# Patient Record
Sex: FEMALE | Race: WHITE | NOT HISPANIC OR LATINO
[De-identification: names, ages, dates, MRNs, and addresses within clinical notes are randomized per-mention and may not be internally consistent; named-entity substitution may affect disease eponyms.]

---

## 2017-01-13 ENCOUNTER — LABORATORY RESULT (OUTPATIENT)
Age: 82
End: 2017-01-13

## 2017-01-13 ENCOUNTER — APPOINTMENT (OUTPATIENT)
Dept: INTERNAL MEDICINE | Facility: CLINIC | Age: 82
End: 2017-01-13

## 2017-01-13 ENCOUNTER — NON-APPOINTMENT (OUTPATIENT)
Age: 82
End: 2017-01-13

## 2017-01-13 VITALS — SYSTOLIC BLOOD PRESSURE: 138 MMHG | DIASTOLIC BLOOD PRESSURE: 80 MMHG

## 2017-01-13 VITALS — BODY MASS INDEX: 29.06 KG/M2 | WEIGHT: 164 LBS | HEIGHT: 63 IN

## 2017-01-13 VITALS — SYSTOLIC BLOOD PRESSURE: 112 MMHG | DIASTOLIC BLOOD PRESSURE: 82 MMHG

## 2017-01-13 VITALS — DIASTOLIC BLOOD PRESSURE: 88 MMHG | SYSTOLIC BLOOD PRESSURE: 128 MMHG

## 2017-01-14 LAB
25(OH)D3 SERPL-MCNC: 37.1 NG/ML
ALBUMIN SERPL ELPH-MCNC: 4 G/DL
ALP BLD-CCNC: 93 U/L
ALT SERPL-CCNC: 19 U/L
ANION GAP SERPL CALC-SCNC: 14 MMOL/L
AST SERPL-CCNC: 25 U/L
BASOPHILS # BLD AUTO: 0.02 K/UL
BASOPHILS NFR BLD AUTO: 0.3 %
BILIRUB SERPL-MCNC: 0.2 MG/DL
BUN SERPL-MCNC: 32 MG/DL
CALCIUM SERPL-MCNC: 9.5 MG/DL
CHLORIDE SERPL-SCNC: 101 MMOL/L
CHOLEST SERPL-MCNC: 166 MG/DL
CHOLEST/HDLC SERPL: 2.1 RATIO
CO2 SERPL-SCNC: 25 MMOL/L
CREAT SERPL-MCNC: 0.96 MG/DL
EOSINOPHIL # BLD AUTO: 0.14 K/UL
EOSINOPHIL NFR BLD AUTO: 1.8 %
GLUCOSE SERPL-MCNC: 94 MG/DL
HBA1C MFR BLD HPLC: 6.1 %
HCT VFR BLD CALC: 37.5 %
HDLC SERPL-MCNC: 80 MG/DL
HGB BLD-MCNC: 12.3 G/DL
IMM GRANULOCYTES NFR BLD AUTO: 0.1 %
LDLC SERPL CALC-MCNC: 72 MG/DL
LYMPHOCYTES # BLD AUTO: 1.93 K/UL
LYMPHOCYTES NFR BLD AUTO: 25.3 %
MAN DIFF?: NORMAL
MCHC RBC-ENTMCNC: 31.4 PG
MCHC RBC-ENTMCNC: 32.8 GM/DL
MCV RBC AUTO: 95.7 FL
MONOCYTES # BLD AUTO: 0.57 K/UL
MONOCYTES NFR BLD AUTO: 7.5 %
NEUTROPHILS # BLD AUTO: 4.97 K/UL
NEUTROPHILS NFR BLD AUTO: 65 %
PLATELET # BLD AUTO: 311 K/UL
POTASSIUM SERPL-SCNC: 3.9 MMOL/L
PROT SERPL-MCNC: 7.1 G/DL
RBC # BLD: 3.92 M/UL
RBC # FLD: 15.2 %
SAVE SPECIMEN: NORMAL
SODIUM SERPL-SCNC: 140 MMOL/L
T3RU NFR SERPL: 1.03 INDEX
T4 SERPL-MCNC: 5.8 UG/DL
TRIGL SERPL-MCNC: 72 MG/DL
TSH SERPL-ACNC: 3.35 UIU/ML
URATE SERPL-MCNC: 6.3 MG/DL
WBC # FLD AUTO: 7.64 K/UL

## 2017-03-25 ENCOUNTER — RX RENEWAL (OUTPATIENT)
Age: 82
End: 2017-03-25

## 2017-03-28 ENCOUNTER — MEDICATION RENEWAL (OUTPATIENT)
Age: 82
End: 2017-03-28

## 2017-04-03 ENCOUNTER — APPOINTMENT (OUTPATIENT)
Dept: INTERNAL MEDICINE | Facility: CLINIC | Age: 82
End: 2017-04-03

## 2017-04-03 VITALS
DIASTOLIC BLOOD PRESSURE: 74 MMHG | WEIGHT: 165 LBS | HEART RATE: 53 BPM | SYSTOLIC BLOOD PRESSURE: 162 MMHG | BODY MASS INDEX: 31.15 KG/M2 | HEIGHT: 61 IN

## 2017-04-03 LAB — S PYO AG SPEC QL IA: NEGATIVE

## 2017-04-20 ENCOUNTER — APPOINTMENT (OUTPATIENT)
Dept: INTERNAL MEDICINE | Facility: CLINIC | Age: 82
End: 2017-04-20

## 2017-04-20 VITALS — BODY MASS INDEX: 31.15 KG/M2 | HEIGHT: 61 IN | WEIGHT: 165 LBS

## 2017-04-20 VITALS — SYSTOLIC BLOOD PRESSURE: 122 MMHG | DIASTOLIC BLOOD PRESSURE: 70 MMHG

## 2017-04-20 RX ORDER — AZITHROMYCIN 250 MG/1
250 TABLET, FILM COATED ORAL
Qty: 6 | Refills: 0 | Status: DISCONTINUED | COMMUNITY
Start: 2017-04-03 | End: 2017-04-20

## 2017-06-12 ENCOUNTER — APPOINTMENT (OUTPATIENT)
Dept: INTERNAL MEDICINE | Facility: CLINIC | Age: 82
End: 2017-06-12

## 2017-06-12 VITALS — HEIGHT: 60 IN | WEIGHT: 168 LBS | BODY MASS INDEX: 32.98 KG/M2

## 2017-06-30 ENCOUNTER — MEDICATION RENEWAL (OUTPATIENT)
Age: 82
End: 2017-06-30

## 2017-07-25 ENCOUNTER — APPOINTMENT (OUTPATIENT)
Dept: INTERNAL MEDICINE | Facility: CLINIC | Age: 82
End: 2017-07-25

## 2017-07-25 ENCOUNTER — LABORATORY RESULT (OUTPATIENT)
Age: 82
End: 2017-07-25

## 2017-07-25 ENCOUNTER — NON-APPOINTMENT (OUTPATIENT)
Age: 82
End: 2017-07-25

## 2017-07-25 VITALS
BODY MASS INDEX: 32.59 KG/M2 | DIASTOLIC BLOOD PRESSURE: 70 MMHG | SYSTOLIC BLOOD PRESSURE: 170 MMHG | HEIGHT: 60 IN | WEIGHT: 166 LBS

## 2017-07-25 VITALS — DIASTOLIC BLOOD PRESSURE: 68 MMHG | SYSTOLIC BLOOD PRESSURE: 160 MMHG

## 2017-07-25 VITALS — DIASTOLIC BLOOD PRESSURE: 74 MMHG | SYSTOLIC BLOOD PRESSURE: 160 MMHG

## 2017-07-26 LAB
25(OH)D3 SERPL-MCNC: 37.8 NG/ML
ALBUMIN SERPL ELPH-MCNC: 4.3 G/DL
ALP BLD-CCNC: 91 U/L
ALT SERPL-CCNC: 16 U/L
ANION GAP SERPL CALC-SCNC: 18 MMOL/L
AST SERPL-CCNC: 26 U/L
BASOPHILS # BLD AUTO: 0.02 K/UL
BASOPHILS NFR BLD AUTO: 0.3 %
BILIRUB SERPL-MCNC: 0.2 MG/DL
BUN SERPL-MCNC: 24 MG/DL
CALCIUM SERPL-MCNC: 10 MG/DL
CHLORIDE SERPL-SCNC: 99 MMOL/L
CHOLEST SERPL-MCNC: 173 MG/DL
CHOLEST/HDLC SERPL: 2 RATIO
CO2 SERPL-SCNC: 25 MMOL/L
CREAT SERPL-MCNC: 0.94 MG/DL
EOSINOPHIL # BLD AUTO: 0.09 K/UL
EOSINOPHIL NFR BLD AUTO: 1.5 %
GLUCOSE SERPL-MCNC: 86 MG/DL
HBA1C MFR BLD HPLC: 5.8 %
HCT VFR BLD CALC: 38.7 %
HDLC SERPL-MCNC: 86 MG/DL
HGB BLD-MCNC: 12.6 G/DL
IMM GRANULOCYTES NFR BLD AUTO: 0.3 %
LDLC SERPL CALC-MCNC: 64 MG/DL
LYMPHOCYTES # BLD AUTO: 2.07 K/UL
LYMPHOCYTES NFR BLD AUTO: 34.1 %
MAN DIFF?: NORMAL
MCHC RBC-ENTMCNC: 30.7 PG
MCHC RBC-ENTMCNC: 32.6 GM/DL
MCV RBC AUTO: 94.2 FL
MONOCYTES # BLD AUTO: 0.41 K/UL
MONOCYTES NFR BLD AUTO: 6.8 %
NEUTROPHILS # BLD AUTO: 3.46 K/UL
NEUTROPHILS NFR BLD AUTO: 57 %
PLATELET # BLD AUTO: 317 K/UL
POTASSIUM SERPL-SCNC: 4.9 MMOL/L
PROT SERPL-MCNC: 7.5 G/DL
RBC # BLD: 4.11 M/UL
RBC # FLD: 15.2 %
SAVE SPECIMEN: NORMAL
SODIUM SERPL-SCNC: 142 MMOL/L
T3RU NFR SERPL: 1.06 INDEX
T4 SERPL-MCNC: 5.8 UG/DL
TRIGL SERPL-MCNC: 117 MG/DL
TSH SERPL-ACNC: 4.02 UIU/ML
URATE SERPL-MCNC: 6.4 MG/DL
WBC # FLD AUTO: 6.07 K/UL

## 2017-08-21 ENCOUNTER — MEDICATION RENEWAL (OUTPATIENT)
Age: 82
End: 2017-08-21

## 2017-09-06 ENCOUNTER — APPOINTMENT (OUTPATIENT)
Dept: INTERNAL MEDICINE | Facility: CLINIC | Age: 82
End: 2017-09-06
Payer: MEDICARE

## 2017-09-06 ENCOUNTER — NON-APPOINTMENT (OUTPATIENT)
Age: 82
End: 2017-09-06

## 2017-09-06 VITALS — WEIGHT: 166 LBS | BODY MASS INDEX: 32.59 KG/M2 | HEIGHT: 60 IN

## 2017-09-06 VITALS — SYSTOLIC BLOOD PRESSURE: 120 MMHG | DIASTOLIC BLOOD PRESSURE: 70 MMHG

## 2017-09-06 VITALS — DIASTOLIC BLOOD PRESSURE: 70 MMHG | SYSTOLIC BLOOD PRESSURE: 130 MMHG

## 2017-09-06 PROCEDURE — 93000 ELECTROCARDIOGRAM COMPLETE: CPT

## 2017-09-06 PROCEDURE — 99214 OFFICE O/P EST MOD 30 MIN: CPT | Mod: 25

## 2017-10-10 ENCOUNTER — APPOINTMENT (OUTPATIENT)
Dept: OPHTHALMOLOGY | Facility: CLINIC | Age: 82
End: 2017-10-10
Payer: MEDICARE

## 2017-10-10 DIAGNOSIS — H35.30 UNSPECIFIED MACULAR DEGENERATION: ICD-10-CM

## 2017-10-10 PROCEDURE — 92014 COMPRE OPH EXAM EST PT 1/>: CPT

## 2017-11-21 ENCOUNTER — MEDICATION RENEWAL (OUTPATIENT)
Age: 82
End: 2017-11-21

## 2017-12-04 ENCOUNTER — RECORD ABSTRACTING (OUTPATIENT)
Age: 82
End: 2017-12-04

## 2017-12-05 ENCOUNTER — LABORATORY RESULT (OUTPATIENT)
Age: 82
End: 2017-12-05

## 2017-12-05 ENCOUNTER — APPOINTMENT (OUTPATIENT)
Dept: INTERNAL MEDICINE | Facility: CLINIC | Age: 82
End: 2017-12-05
Payer: MEDICARE

## 2017-12-05 ENCOUNTER — NON-APPOINTMENT (OUTPATIENT)
Age: 82
End: 2017-12-05

## 2017-12-05 VITALS
WEIGHT: 165 LBS | BODY MASS INDEX: 32.39 KG/M2 | SYSTOLIC BLOOD PRESSURE: 138 MMHG | DIASTOLIC BLOOD PRESSURE: 78 MMHG | HEIGHT: 60 IN

## 2017-12-05 VITALS — SYSTOLIC BLOOD PRESSURE: 140 MMHG | DIASTOLIC BLOOD PRESSURE: 70 MMHG

## 2017-12-05 DIAGNOSIS — J34.89 OTHER SPECIFIED DISORDERS OF NOSE AND NASAL SINUSES: ICD-10-CM

## 2017-12-05 DIAGNOSIS — Z00.00 ENCOUNTER FOR GENERAL ADULT MEDICAL EXAMINATION W/OUT ABNORMAL FINDINGS: ICD-10-CM

## 2017-12-05 LAB
BILIRUB UR QL STRIP: NORMAL
CLARITY UR: CLEAR
COLLECTION METHOD: NORMAL
GLUCOSE UR-MCNC: NORMAL
HCG UR QL: 0.2 EU/DL
HGB UR QL STRIP.AUTO: NORMAL
KETONES UR-MCNC: NORMAL
LEUKOCYTE ESTERASE UR QL STRIP: NORMAL
NITRITE UR QL STRIP: NORMAL
PH UR STRIP: 5
PROT UR STRIP-MCNC: NORMAL
SP GR UR STRIP: 1.01

## 2017-12-05 PROCEDURE — 81003 URINALYSIS AUTO W/O SCOPE: CPT | Mod: QW

## 2017-12-05 PROCEDURE — G0439: CPT

## 2017-12-05 PROCEDURE — G0438: CPT

## 2017-12-05 PROCEDURE — 93000 ELECTROCARDIOGRAM COMPLETE: CPT

## 2017-12-05 PROCEDURE — 36415 COLL VENOUS BLD VENIPUNCTURE: CPT

## 2017-12-05 RX ORDER — MENTHOL/CAMPHOR 0.5 %-0.5%
1000 LOTION (ML) TOPICAL DAILY
Refills: 0 | Status: ACTIVE | COMMUNITY

## 2017-12-05 RX ORDER — AMLODIPINE BESYLATE 2.5 MG/1
2.5 TABLET ORAL
Refills: 0 | Status: DISCONTINUED | COMMUNITY
End: 2017-12-05

## 2017-12-09 LAB
25(OH)D3 SERPL-MCNC: 35.3 NG/ML
ALBUMIN SERPL ELPH-MCNC: 3.9 G/DL
ALP BLD-CCNC: 91 U/L
ALT SERPL-CCNC: 19 U/L
ANION GAP SERPL CALC-SCNC: 16 MMOL/L
AST SERPL-CCNC: 27 U/L
BASOPHILS # BLD AUTO: 0.01 K/UL
BASOPHILS NFR BLD AUTO: 0.1 %
BILIRUB SERPL-MCNC: 0.2 MG/DL
BUN SERPL-MCNC: 24 MG/DL
CALCIUM SERPL-MCNC: 9.5 MG/DL
CHLORIDE SERPL-SCNC: 101 MMOL/L
CHOLEST SERPL-MCNC: 166 MG/DL
CHOLEST/HDLC SERPL: 2.2 RATIO
CO2 SERPL-SCNC: 25 MMOL/L
CREAT SERPL-MCNC: 0.64 MG/DL
EOSINOPHIL # BLD AUTO: 0.16 K/UL
EOSINOPHIL NFR BLD AUTO: 2.3 %
GLUCOSE SERPL-MCNC: 96 MG/DL
HBA1C MFR BLD HPLC: 5.8 %
HCT VFR BLD CALC: 34.7 %
HDLC SERPL-MCNC: 76 MG/DL
HGB BLD-MCNC: 11.4 G/DL
IMM GRANULOCYTES NFR BLD AUTO: 0.1 %
LDLC SERPL CALC-MCNC: 73 MG/DL
LYMPHOCYTES # BLD AUTO: 1.84 K/UL
LYMPHOCYTES NFR BLD AUTO: 26.5 %
MAN DIFF?: NORMAL
MCHC RBC-ENTMCNC: 31.1 PG
MCHC RBC-ENTMCNC: 32.9 GM/DL
MCV RBC AUTO: 94.8 FL
MONOCYTES # BLD AUTO: 0.46 K/UL
MONOCYTES NFR BLD AUTO: 6.6 %
NEUTROPHILS # BLD AUTO: 4.47 K/UL
NEUTROPHILS NFR BLD AUTO: 64.4 %
PLATELET # BLD AUTO: 299 K/UL
POTASSIUM SERPL-SCNC: 3.9 MMOL/L
PROT SERPL-MCNC: 7 G/DL
RBC # BLD: 3.66 M/UL
RBC # FLD: 15.5 %
SAVE SPECIMEN: NORMAL
SODIUM SERPL-SCNC: 142 MMOL/L
T3RU NFR SERPL: 1.06 INDEX
T4 SERPL-MCNC: 5.4 UG/DL
TRIGL SERPL-MCNC: 84 MG/DL
TSH SERPL-ACNC: 3.31 UIU/ML
URATE SERPL-MCNC: 6.1 MG/DL
WBC # FLD AUTO: 6.95 K/UL

## 2018-02-02 ENCOUNTER — APPOINTMENT (OUTPATIENT)
Dept: INTERNAL MEDICINE | Facility: CLINIC | Age: 83
End: 2018-02-02
Payer: MEDICARE

## 2018-02-02 VITALS — WEIGHT: 165 LBS | BODY MASS INDEX: 32.39 KG/M2 | HEIGHT: 60 IN

## 2018-02-02 PROCEDURE — 99214 OFFICE O/P EST MOD 30 MIN: CPT

## 2018-02-02 RX ORDER — FLUTICASONE PROPIONATE 50 UG/1
50 SPRAY, METERED NASAL DAILY
Qty: 1 | Refills: 3 | Status: DISCONTINUED | COMMUNITY
Start: 2017-12-05 | End: 2018-02-02

## 2018-02-02 RX ORDER — MONTELUKAST SODIUM 10 MG/1
10 TABLET, FILM COATED ORAL
Qty: 1 | Refills: 3 | Status: DISCONTINUED | COMMUNITY
Start: 2017-12-05 | End: 2018-02-02

## 2018-02-02 RX ORDER — LIDOCAINE 5% 700 MG/1
5 PATCH TOPICAL
Qty: 1 | Refills: 3 | Status: DISCONTINUED | COMMUNITY
Start: 2017-12-05 | End: 2018-02-02

## 2018-02-05 ENCOUNTER — APPOINTMENT (OUTPATIENT)
Dept: VASCULAR SURGERY | Facility: CLINIC | Age: 83
End: 2018-02-05
Payer: MEDICARE

## 2018-02-05 VITALS
WEIGHT: 165 LBS | TEMPERATURE: 98.5 F | BODY MASS INDEX: 32.39 KG/M2 | HEART RATE: 57 BPM | DIASTOLIC BLOOD PRESSURE: 110 MMHG | SYSTOLIC BLOOD PRESSURE: 154 MMHG | HEIGHT: 60 IN

## 2018-02-05 PROCEDURE — 93970 EXTREMITY STUDY: CPT

## 2018-02-05 PROCEDURE — 99203 OFFICE O/P NEW LOW 30 MIN: CPT

## 2018-02-12 ENCOUNTER — MEDICATION RENEWAL (OUTPATIENT)
Age: 83
End: 2018-02-12

## 2018-03-12 ENCOUNTER — LABORATORY RESULT (OUTPATIENT)
Age: 83
End: 2018-03-12

## 2018-03-12 ENCOUNTER — NON-APPOINTMENT (OUTPATIENT)
Age: 83
End: 2018-03-12

## 2018-03-12 ENCOUNTER — APPOINTMENT (OUTPATIENT)
Dept: INTERNAL MEDICINE | Facility: CLINIC | Age: 83
End: 2018-03-12
Payer: MEDICARE

## 2018-03-12 VITALS
HEIGHT: 60 IN | WEIGHT: 165 LBS | BODY MASS INDEX: 32.39 KG/M2 | DIASTOLIC BLOOD PRESSURE: 70 MMHG | SYSTOLIC BLOOD PRESSURE: 128 MMHG

## 2018-03-12 VITALS — DIASTOLIC BLOOD PRESSURE: 70 MMHG | SYSTOLIC BLOOD PRESSURE: 120 MMHG

## 2018-03-12 PROCEDURE — 99214 OFFICE O/P EST MOD 30 MIN: CPT | Mod: 25

## 2018-03-12 PROCEDURE — 36415 COLL VENOUS BLD VENIPUNCTURE: CPT

## 2018-03-12 PROCEDURE — 93000 ELECTROCARDIOGRAM COMPLETE: CPT

## 2018-03-13 LAB
25(OH)D3 SERPL-MCNC: 41.1 NG/ML
ALBUMIN SERPL ELPH-MCNC: 3.8 G/DL
ALP BLD-CCNC: 85 U/L
ALT SERPL-CCNC: 12 U/L
ANION GAP SERPL CALC-SCNC: 16 MMOL/L
AST SERPL-CCNC: 25 U/L
BASOPHILS # BLD AUTO: 0.03 K/UL
BASOPHILS NFR BLD AUTO: 0.5 %
BILIRUB SERPL-MCNC: 0.2 MG/DL
BUN SERPL-MCNC: 32 MG/DL
CALCIUM SERPL-MCNC: 9.2 MG/DL
CHLORIDE SERPL-SCNC: 101 MMOL/L
CHOLEST SERPL-MCNC: 170 MG/DL
CHOLEST/HDLC SERPL: 2.4 RATIO
CO2 SERPL-SCNC: 25 MMOL/L
CREAT SERPL-MCNC: 0.87 MG/DL
EOSINOPHIL # BLD AUTO: 0.09 K/UL
EOSINOPHIL NFR BLD AUTO: 1.4 %
GLUCOSE SERPL-MCNC: 93 MG/DL
HBA1C MFR BLD HPLC: 5.8 %
HCT VFR BLD CALC: 37.5 %
HDLC SERPL-MCNC: 72 MG/DL
HGB BLD-MCNC: 12 G/DL
IMM GRANULOCYTES NFR BLD AUTO: 0.3 %
LDLC SERPL CALC-MCNC: 84 MG/DL
LYMPHOCYTES # BLD AUTO: 1.97 K/UL
LYMPHOCYTES NFR BLD AUTO: 30.6 %
MAN DIFF?: NORMAL
MCHC RBC-ENTMCNC: 31.1 PG
MCHC RBC-ENTMCNC: 32 GM/DL
MCV RBC AUTO: 97.2 FL
MONOCYTES # BLD AUTO: 0.57 K/UL
MONOCYTES NFR BLD AUTO: 8.9 %
NEUTROPHILS # BLD AUTO: 3.76 K/UL
NEUTROPHILS NFR BLD AUTO: 58.3 %
PLATELET # BLD AUTO: 268 K/UL
POTASSIUM SERPL-SCNC: 4.1 MMOL/L
PROT SERPL-MCNC: 7.1 G/DL
RBC # BLD: 3.86 M/UL
RBC # FLD: 15.1 %
SAVE SPECIMEN: NORMAL
SODIUM SERPL-SCNC: 142 MMOL/L
T3RU NFR SERPL: 0.98 INDEX
T4 SERPL-MCNC: 5.8 UG/DL
TRIGL SERPL-MCNC: 72 MG/DL
TSH SERPL-ACNC: 2.52 UIU/ML
URATE SERPL-MCNC: 6.8 MG/DL
WBC # FLD AUTO: 6.44 K/UL

## 2018-03-29 ENCOUNTER — APPOINTMENT (OUTPATIENT)
Dept: VASCULAR SURGERY | Facility: CLINIC | Age: 83
End: 2018-03-29

## 2018-04-23 ENCOUNTER — FORM ENCOUNTER (OUTPATIENT)
Age: 83
End: 2018-04-23

## 2018-04-23 ENCOUNTER — APPOINTMENT (OUTPATIENT)
Dept: INTERNAL MEDICINE | Facility: CLINIC | Age: 83
End: 2018-04-23
Payer: MEDICARE

## 2018-04-23 VITALS — SYSTOLIC BLOOD PRESSURE: 120 MMHG | DIASTOLIC BLOOD PRESSURE: 70 MMHG

## 2018-04-23 VITALS — HEIGHT: 61 IN | BODY MASS INDEX: 30.58 KG/M2 | WEIGHT: 162 LBS

## 2018-04-23 VITALS — OXYGEN SATURATION: 92 % | HEART RATE: 59 BPM

## 2018-04-23 VITALS — TEMPERATURE: 97.8 F

## 2018-04-23 DIAGNOSIS — Z87.09 PERSONAL HISTORY OF OTHER DISEASES OF THE RESPIRATORY SYSTEM: ICD-10-CM

## 2018-04-23 LAB
FLUAV SPEC QL CULT: NORMAL
FLUBV AG SPEC QL IA: NORMAL
S PYO AG SPEC QL IA: NORMAL

## 2018-04-23 PROCEDURE — 87804 INFLUENZA ASSAY W/OPTIC: CPT | Mod: QW

## 2018-04-23 PROCEDURE — 99214 OFFICE O/P EST MOD 30 MIN: CPT

## 2018-04-23 PROCEDURE — 87880 STREP A ASSAY W/OPTIC: CPT | Mod: QW

## 2018-04-23 RX ORDER — CEPHALEXIN 500 MG/1
500 CAPSULE ORAL
Qty: 40 | Refills: 0 | Status: DISCONTINUED | COMMUNITY
Start: 2018-01-28 | End: 2018-04-23

## 2018-04-24 ENCOUNTER — APPOINTMENT (OUTPATIENT)
Dept: INTERNAL MEDICINE | Facility: CLINIC | Age: 83
End: 2018-04-24
Payer: MEDICARE

## 2018-04-24 ENCOUNTER — OUTPATIENT (OUTPATIENT)
Dept: OUTPATIENT SERVICES | Facility: HOSPITAL | Age: 83
LOS: 1 days | End: 2018-04-24
Payer: MEDICARE

## 2018-04-24 ENCOUNTER — APPOINTMENT (OUTPATIENT)
Dept: RADIOLOGY | Facility: IMAGING CENTER | Age: 83
End: 2018-04-24
Payer: MEDICARE

## 2018-04-24 VITALS — SYSTOLIC BLOOD PRESSURE: 156 MMHG | DIASTOLIC BLOOD PRESSURE: 80 MMHG

## 2018-04-24 VITALS — BODY MASS INDEX: 30.58 KG/M2 | HEIGHT: 61 IN | WEIGHT: 162 LBS

## 2018-04-24 DIAGNOSIS — J20.9 ACUTE BRONCHITIS, UNSPECIFIED: ICD-10-CM

## 2018-04-24 DIAGNOSIS — Z87.09 PERSONAL HISTORY OF OTHER DISEASES OF THE RESPIRATORY SYSTEM: ICD-10-CM

## 2018-04-24 PROCEDURE — 71046 X-RAY EXAM CHEST 2 VIEWS: CPT | Mod: 26

## 2018-04-24 PROCEDURE — 99214 OFFICE O/P EST MOD 30 MIN: CPT

## 2018-04-24 PROCEDURE — 71046 X-RAY EXAM CHEST 2 VIEWS: CPT

## 2018-05-15 ENCOUNTER — LABORATORY RESULT (OUTPATIENT)
Age: 83
End: 2018-05-15

## 2018-05-15 ENCOUNTER — APPOINTMENT (OUTPATIENT)
Dept: INTERNAL MEDICINE | Facility: CLINIC | Age: 83
End: 2018-05-15
Payer: MEDICARE

## 2018-05-15 VITALS
WEIGHT: 161 LBS | DIASTOLIC BLOOD PRESSURE: 78 MMHG | BODY MASS INDEX: 30.4 KG/M2 | HEIGHT: 61 IN | SYSTOLIC BLOOD PRESSURE: 148 MMHG

## 2018-05-15 VITALS — SYSTOLIC BLOOD PRESSURE: 140 MMHG | DIASTOLIC BLOOD PRESSURE: 80 MMHG

## 2018-05-15 DIAGNOSIS — D89.2 HYPERGAMMAGLOBULINEMIA, UNSPECIFIED: ICD-10-CM

## 2018-05-15 PROCEDURE — 99214 OFFICE O/P EST MOD 30 MIN: CPT | Mod: 25

## 2018-05-15 PROCEDURE — 93000 ELECTROCARDIOGRAM COMPLETE: CPT

## 2018-05-15 PROCEDURE — 36415 COLL VENOUS BLD VENIPUNCTURE: CPT

## 2018-05-15 RX ORDER — METHYLPREDNISOLONE 4 MG/1
4 TABLET ORAL
Qty: 1 | Refills: 0 | Status: DISCONTINUED | COMMUNITY
Start: 2018-04-24 | End: 2018-05-15

## 2018-05-15 RX ORDER — FLUTICASONE PROPIONATE 250 UG/1
250 POWDER, METERED RESPIRATORY (INHALATION) TWICE DAILY
Qty: 1 | Refills: 3 | Status: DISCONTINUED | COMMUNITY
Start: 2018-04-23 | End: 2018-05-15

## 2018-05-15 RX ORDER — AZITHROMYCIN DIHYDRATE 250 MG/1
250 TABLET, FILM COATED ORAL
Qty: 1 | Refills: 0 | Status: DISCONTINUED | COMMUNITY
Start: 2018-04-24 | End: 2018-05-15

## 2018-05-15 NOTE — REVIEW OF SYSTEMS
[Negative] : Psychiatric [FreeTextEntry5] : history [FreeTextEntry6] : history [de-identified] : history

## 2018-05-15 NOTE — PHYSICAL EXAM
[No Acute Distress] : no acute distress [Well Nourished] : well nourished [Normal Sclera/Conjunctiva] : normal sclera/conjunctiva [Normal Rate] : normal rate  [Normal Affect] : the affect was normal [de-identified] : possible occasional rales right base. No wheeze [de-identified] : Difficulty with ambulation but able to walk with walker [de-identified] : Mild ataxia

## 2018-05-15 NOTE — ASSESSMENT
[FreeTextEntry1] : This is a complex 89-year-old female whose history has been reviewed above\par \par She has had exacerbations of both her symptoms of dyspnea on exertion and unsteadiness. It is of interest that both of these seem to diminish with prolonged exertion.\par \par She does have a normal LV she does have some mild pulmonary hypertension\par \par Her physical examination is essentially unchanged.\par \par It is possible that either her Tegretol levels are too high we'll she has electrolyte imbalance.\par \par he states she feels better today than yesterday. At this point I will just wait for blood tests no intervention Other than to restart Norvasc

## 2018-05-15 NOTE — HISTORY OF PRESENT ILLNESS
[FreeTextEntry8] : This is a complex 89-year-old female with a history of atrial fibrillation seizure disorder mild pulmonary hypertension and preserved left ventricular function who presents with increasing symptoms of shortness of breath and the feeling of being unstable as she walks.\par \par Neither of these complaints or new however they are more pronounced over the last 2 days.

## 2018-05-16 LAB
25(OH)D3 SERPL-MCNC: 40.9 NG/ML
ALBUMIN SERPL ELPH-MCNC: 3.7 G/DL
ALP BLD-CCNC: 87 U/L
ALT SERPL-CCNC: 19 U/L
ANION GAP SERPL CALC-SCNC: 14 MMOL/L
AST SERPL-CCNC: 27 U/L
BASOPHILS # BLD AUTO: 0.01 K/UL
BASOPHILS NFR BLD AUTO: 0.1 %
BILIRUB SERPL-MCNC: 0.2 MG/DL
BUN SERPL-MCNC: 26 MG/DL
CALCIUM SERPL-MCNC: 9.3 MG/DL
CHLORIDE SERPL-SCNC: 95 MMOL/L
CHOLEST SERPL-MCNC: 159 MG/DL
CHOLEST/HDLC SERPL: 2.2 RATIO
CO2 SERPL-SCNC: 24 MMOL/L
CREAT SERPL-MCNC: 0.92 MG/DL
EOSINOPHIL # BLD AUTO: 0.07 K/UL
EOSINOPHIL NFR BLD AUTO: 1 %
GLUCOSE SERPL-MCNC: 95 MG/DL
HBA1C MFR BLD HPLC: 6.1 %
HCT VFR BLD CALC: 32.7 %
HDLC SERPL-MCNC: 73 MG/DL
HGB BLD-MCNC: 11.3 G/DL
IMM GRANULOCYTES NFR BLD AUTO: 0.1 %
LDLC SERPL CALC-MCNC: 69 MG/DL
LYMPHOCYTES # BLD AUTO: 1.67 K/UL
LYMPHOCYTES NFR BLD AUTO: 24.5 %
MAN DIFF?: NORMAL
MCHC RBC-ENTMCNC: 30.7 PG
MCHC RBC-ENTMCNC: 34.6 GM/DL
MCV RBC AUTO: 88.9 FL
MONOCYTES # BLD AUTO: 0.55 K/UL
MONOCYTES NFR BLD AUTO: 8.1 %
NEUTROPHILS # BLD AUTO: 4.52 K/UL
NEUTROPHILS NFR BLD AUTO: 66.2 %
PLATELET # BLD AUTO: 353 K/UL
POTASSIUM SERPL-SCNC: 3.9 MMOL/L
PROT SERPL-MCNC: 7.2 G/DL
RBC # BLD: 3.68 M/UL
RBC # FLD: 15.2 %
SODIUM SERPL-SCNC: 133 MMOL/L
T3RU NFR SERPL: 0.96 INDEX
T4 SERPL-MCNC: 5.2 UG/DL
TRIGL SERPL-MCNC: 83 MG/DL
TSH SERPL-ACNC: 4.5 UIU/ML
URATE SERPL-MCNC: 6.3 MG/DL
WBC # FLD AUTO: 6.83 K/UL

## 2018-05-18 PROBLEM — D89.2 PARAPROTEINEMIA: Status: ACTIVE | Noted: 2017-12-11

## 2018-05-18 LAB
ALBUMIN MFR SERPL ELPH: 54.1 %
ALBUMIN SERPL-MCNC: 3.4 G/DL
ALBUMIN/GLOB SERPL: 1.2 RATIO
ALPHA1 GLOB MFR SERPL ELPH: 6.4 %
ALPHA1 GLOB SERPL ELPH-MCNC: 0.4 G/DL
ALPHA2 GLOB MFR SERPL ELPH: 14.9 %
ALPHA2 GLOB SERPL ELPH-MCNC: 0.9 G/DL
B-GLOBULIN MFR SERPL ELPH: 11.5 %
B-GLOBULIN SERPL ELPH-MCNC: 0.7 G/DL
FERRITIN SERPL-MCNC: 49 NG/ML
FOLATE SERPL-MCNC: >20 NG/ML
GAMMA GLOB FLD ELPH-MCNC: 0.8 G/DL
GAMMA GLOB MFR SERPL ELPH: 13.1 %
INTERPRETATION SERPL IEP-IMP: NORMAL
IRON SATN MFR SERPL: 31 %
IRON SERPL-MCNC: 80 UG/DL
M PROTEIN MFR SERPL ELPH: NORMAL %
M PROTEIN SPEC IFE-MCNC: NORMAL
MONOCLON BAND OBS SERPL: NORMAL G/DL
OXCARBAZEPINE SERPL-MCNC: 20 UG/ML
PROT SERPL-MCNC: 6.3 G/DL
PROT SERPL-MCNC: 6.3 G/DL
STFR SERPL-MCNC: 3 MG/L
TIBC SERPL-MCNC: 262 UG/DL
UIBC SERPL-MCNC: 182 UG/DL
VIT B12 SERPL-MCNC: 949 PG/ML

## 2018-05-21 ENCOUNTER — RX RENEWAL (OUTPATIENT)
Age: 83
End: 2018-05-21

## 2018-06-19 ENCOUNTER — LABORATORY RESULT (OUTPATIENT)
Age: 83
End: 2018-06-19

## 2018-06-19 ENCOUNTER — APPOINTMENT (OUTPATIENT)
Dept: INTERNAL MEDICINE | Facility: CLINIC | Age: 83
End: 2018-06-19
Payer: MEDICARE

## 2018-06-19 VITALS
HEIGHT: 61 IN | SYSTOLIC BLOOD PRESSURE: 138 MMHG | WEIGHT: 161 LBS | BODY MASS INDEX: 30.4 KG/M2 | DIASTOLIC BLOOD PRESSURE: 70 MMHG

## 2018-06-19 VITALS — SYSTOLIC BLOOD PRESSURE: 120 MMHG | DIASTOLIC BLOOD PRESSURE: 70 MMHG

## 2018-06-19 DIAGNOSIS — E87.1 HYPO-OSMOLALITY AND HYPONATREMIA: ICD-10-CM

## 2018-06-19 PROCEDURE — 36415 COLL VENOUS BLD VENIPUNCTURE: CPT

## 2018-06-19 PROCEDURE — 99214 OFFICE O/P EST MOD 30 MIN: CPT | Mod: 25

## 2018-06-19 RX ORDER — AMLODIPINE BESYLATE 2.5 MG/1
2.5 TABLET ORAL
Qty: 90 | Refills: 1 | Status: DISCONTINUED | COMMUNITY
Start: 2017-07-25 | End: 2018-06-19

## 2018-06-19 RX ORDER — BUDESONIDE AND FORMOTEROL FUMARATE DIHYDRATE 80; 4.5 UG/1; UG/1
80-4.5 AEROSOL RESPIRATORY (INHALATION) TWICE DAILY
Qty: 1 | Refills: 5 | Status: DISCONTINUED | COMMUNITY
Start: 2018-04-24 | End: 2018-06-19

## 2018-06-19 RX ORDER — MONTELUKAST 10 MG/1
10 TABLET, FILM COATED ORAL
Qty: 30 | Refills: 0 | Status: DISCONTINUED | COMMUNITY
Start: 2018-04-23 | End: 2018-06-19

## 2018-06-19 RX ORDER — FLUTICASONE PROPIONATE 50 UG/1
50 SPRAY, METERED NASAL DAILY
Qty: 1 | Refills: 0 | Status: DISCONTINUED | COMMUNITY
Start: 2018-04-23 | End: 2018-06-19

## 2018-06-19 NOTE — PHYSICAL EXAM
[No Acute Distress] : no acute distress [Well Nourished] : well nourished [No JVD] : no jugular venous distention [No Respiratory Distress] : no respiratory distress  [Clear to Auscultation] : lungs were clear to auscultation bilaterally [No Accessory Muscle Use] : no accessory muscle use [Normal Rate] : normal rate  [Regular Rhythm] : with a regular rhythm [No Edema] : there was no peripheral edema [No Focal Deficits] : no focal deficits [Normal Affect] : the affect was normal

## 2018-06-19 NOTE — HISTORY OF PRESENT ILLNESS
[FreeTextEntry1] : This is a 89-year-old female for evaluation and treatment of her hypertension hypothyroidism hypercholesterolemia dizziness cough shortness of breath edema and ecchymosis [de-identified] : She is here in followup because of the edema ecchymosis or shortness of breath\par \par She has seen cardiology she has had some minor changes in her medications\par \par She feels well

## 2018-06-19 NOTE — ASSESSMENT
[FreeTextEntry1] : This is a remarkable 89-year-old female whose history has been reviewed above\par \par She had had some increasing shortness of breath. She was seen by cardiology although I noted no evidence of fluid overload. His assessment was that this was probably due to salt excess. In point of fact at this point she is symptom-free she has clear lungs. An she has no edema\par \par She did have some edema which was probably due to amlodipine this is  and discontinued her edema is gone incidentally her ecchymosis has improved probably secondary to decreased pressure on her cutaneous tissue\par \par Her blood pressure has remained normal she has no orthostasis no medication changes\par \par She is encouraged to continue exercise and to do balance therapy.\par \par She did have some mild anemia but did work this up she is not oriented to fish and a repeat CBC has been obtained

## 2018-06-20 LAB
25(OH)D3 SERPL-MCNC: 40.6 NG/ML
ALBUMIN SERPL ELPH-MCNC: 3.9 G/DL
ALP BLD-CCNC: 90 U/L
ALT SERPL-CCNC: 21 U/L
ANION GAP SERPL CALC-SCNC: 13 MMOL/L
AST SERPL-CCNC: 27 U/L
BASOPHILS # BLD AUTO: 0.02 K/UL
BASOPHILS NFR BLD AUTO: 0.3 %
BILIRUB SERPL-MCNC: 0.3 MG/DL
BUN SERPL-MCNC: 25 MG/DL
CALCIUM SERPL-MCNC: 9.6 MG/DL
CHLORIDE SERPL-SCNC: 99 MMOL/L
CHOLEST SERPL-MCNC: 150 MG/DL
CHOLEST/HDLC SERPL: 1.8 RATIO
CO2 SERPL-SCNC: 26 MMOL/L
CREAT SERPL-MCNC: 0.95 MG/DL
EOSINOPHIL # BLD AUTO: 0.13 K/UL
EOSINOPHIL NFR BLD AUTO: 2.1 %
GLUCOSE SERPL-MCNC: 85 MG/DL
HBA1C MFR BLD HPLC: 5.9 %
HCT VFR BLD CALC: 35.2 %
HDLC SERPL-MCNC: 84 MG/DL
HGB BLD-MCNC: 11.6 G/DL
IMM GRANULOCYTES NFR BLD AUTO: 0.2 %
LDLC SERPL CALC-MCNC: 52 MG/DL
LYMPHOCYTES # BLD AUTO: 1.82 K/UL
LYMPHOCYTES NFR BLD AUTO: 29.7 %
MAN DIFF?: NORMAL
MCHC RBC-ENTMCNC: 30.9 PG
MCHC RBC-ENTMCNC: 33 GM/DL
MCV RBC AUTO: 93.9 FL
MONOCYTES # BLD AUTO: 0.57 K/UL
MONOCYTES NFR BLD AUTO: 9.3 %
NEUTROPHILS # BLD AUTO: 3.58 K/UL
NEUTROPHILS NFR BLD AUTO: 58.4 %
PLATELET # BLD AUTO: 290 K/UL
POTASSIUM SERPL-SCNC: 4.4 MMOL/L
PROT SERPL-MCNC: 6.7 G/DL
RBC # BLD: 3.75 M/UL
RBC # FLD: 16.5 %
SAVE SPECIMEN: NORMAL
SODIUM SERPL-SCNC: 138 MMOL/L
T3RU NFR SERPL: 1.01 INDEX
T4 SERPL-MCNC: 5.7 UG/DL
TRIGL SERPL-MCNC: 72 MG/DL
TSH SERPL-ACNC: 3.23 UIU/ML
URATE SERPL-MCNC: 5.5 MG/DL
WBC # FLD AUTO: 6.13 K/UL

## 2018-06-21 ENCOUNTER — RECORD ABSTRACTING (OUTPATIENT)
Age: 83
End: 2018-06-21

## 2018-06-21 RX ORDER — CARVEDILOL 12.5 MG/1
12.5 TABLET, FILM COATED ORAL
Qty: 120 | Refills: 0 | Status: DISCONTINUED | COMMUNITY
Start: 2018-05-17 | End: 2018-06-21

## 2018-06-28 ENCOUNTER — MEDICATION RENEWAL (OUTPATIENT)
Age: 83
End: 2018-06-28

## 2018-10-15 ENCOUNTER — RX RENEWAL (OUTPATIENT)
Age: 83
End: 2018-10-15

## 2018-11-20 ENCOUNTER — LABORATORY RESULT (OUTPATIENT)
Age: 83
End: 2018-11-20

## 2018-11-20 ENCOUNTER — APPOINTMENT (OUTPATIENT)
Dept: INTERNAL MEDICINE | Facility: CLINIC | Age: 83
End: 2018-11-20
Payer: MEDICARE

## 2018-11-20 ENCOUNTER — NON-APPOINTMENT (OUTPATIENT)
Age: 83
End: 2018-11-20

## 2018-11-20 VITALS
WEIGHT: 161 LBS | HEIGHT: 61 IN | SYSTOLIC BLOOD PRESSURE: 120 MMHG | BODY MASS INDEX: 30.4 KG/M2 | DIASTOLIC BLOOD PRESSURE: 70 MMHG

## 2018-11-20 VITALS — SYSTOLIC BLOOD PRESSURE: 122 MMHG | DIASTOLIC BLOOD PRESSURE: 80 MMHG

## 2018-11-20 PROCEDURE — 99214 OFFICE O/P EST MOD 30 MIN: CPT | Mod: 25

## 2018-11-20 PROCEDURE — 93000 ELECTROCARDIOGRAM COMPLETE: CPT

## 2018-11-20 PROCEDURE — 36415 COLL VENOUS BLD VENIPUNCTURE: CPT

## 2018-11-20 NOTE — HISTORY OF PRESENT ILLNESS
[FreeTextEntry1] : This is a 89-year-old female for evaluation and treatment of her hypertension hypercholesterolemia hypothyroidism atrial fibrillation seizure disorder [de-identified] : Patient overall is doing well. She has no chest pain but it seems that her shortness of breath may be increasing.\par \par She's had no further syncope or seizure activity\par \par She is complaining of profound nasal discharge which is chronic

## 2018-11-20 NOTE — PHYSICAL EXAM
[No Acute Distress] : no acute distress [Well Nourished] : well nourished [Normal Sclera/Conjunctiva] : normal sclera/conjunctiva [Normal Outer Ear/Nose] : the outer ears and nose were normal in appearance [Normal Oropharynx] : the oropharynx was normal [Normal TMs] : both tympanic membranes were normal [No JVD] : no jugular venous distention [Normal Rate] : normal rate  [Regular Rhythm] : with a regular rhythm [Normal S1, S2] : normal S1 and S2 [No HSM] : no HSM [No Focal Deficits] : no focal deficits [Normal Affect] : the affect was normal [de-identified] : Bibasal rales which may be chronic [de-identified] : no ecchymosis

## 2018-11-20 NOTE — ASSESSMENT
[FreeTextEntry1] : This is a remarkable 89-year-old female whose history has been reviewed above\par \par She has a history of hypertension blood pressure is normal she has no orthostasis in going from the sitting to standing position. No medication changes\par \par She has a history of atrial fibrillation remains on anticoagulation rate is well controlled.\par \par She has a history of a seizure disorder she remains on seizure medicines Tegretol. She has had no further episodes medication will be continued\par \par She is complaining of some increasing shortness of breath this may be due to her pulmonary hypertension or some fluid overload she does have +1 edema.I spoke with cardiology we will defer increasing diuresis until after her echo which has been scheduled\par \par She has had intermittent hyponatremia her serum sodium was repeated today

## 2018-11-21 LAB
25(OH)D3 SERPL-MCNC: 38.7 NG/ML
ALBUMIN SERPL ELPH-MCNC: 3.8 G/DL
ALP BLD-CCNC: 89 U/L
ALT SERPL-CCNC: 17 U/L
ANION GAP SERPL CALC-SCNC: 14 MMOL/L
AST SERPL-CCNC: 22 U/L
BASOPHILS # BLD AUTO: 0.02 K/UL
BASOPHILS NFR BLD AUTO: 0.3 %
BILIRUB SERPL-MCNC: <0.2 MG/DL
BUN SERPL-MCNC: 33 MG/DL
CALCIUM SERPL-MCNC: 9.4 MG/DL
CHLORIDE SERPL-SCNC: 100 MMOL/L
CHOLEST SERPL-MCNC: 153 MG/DL
CHOLEST/HDLC SERPL: 2.2 RATIO
CO2 SERPL-SCNC: 26 MMOL/L
CREAT SERPL-MCNC: 1.08 MG/DL
EOSINOPHIL # BLD AUTO: 0.14 K/UL
EOSINOPHIL NFR BLD AUTO: 2.1 %
GLUCOSE SERPL-MCNC: 127 MG/DL
HBA1C MFR BLD HPLC: 5.8 %
HCT VFR BLD CALC: 37.5 %
HDLC SERPL-MCNC: 69 MG/DL
HGB BLD-MCNC: 12 G/DL
IMM GRANULOCYTES NFR BLD AUTO: 0.2 %
LDLC SERPL CALC-MCNC: 53 MG/DL
LYMPHOCYTES # BLD AUTO: 1.56 K/UL
LYMPHOCYTES NFR BLD AUTO: 23.5 %
MAN DIFF?: NORMAL
MCHC RBC-ENTMCNC: 30.5 PG
MCHC RBC-ENTMCNC: 32 GM/DL
MCV RBC AUTO: 95.4 FL
MONOCYTES # BLD AUTO: 0.5 K/UL
MONOCYTES NFR BLD AUTO: 7.5 %
NEUTROPHILS # BLD AUTO: 4.42 K/UL
NEUTROPHILS NFR BLD AUTO: 66.4 %
PLATELET # BLD AUTO: 278 K/UL
POTASSIUM SERPL-SCNC: 3.8 MMOL/L
PROT SERPL-MCNC: 6.7 G/DL
RBC # BLD: 3.93 M/UL
RBC # FLD: 17 %
SAVE SPECIMEN: NORMAL
SODIUM SERPL-SCNC: 140 MMOL/L
T3RU NFR SERPL: 1.04 INDEX
T4 SERPL-MCNC: 5.3 UG/DL
TRIGL SERPL-MCNC: 153 MG/DL
TSH SERPL-ACNC: 1.84 UIU/ML
URATE SERPL-MCNC: 6.7 MG/DL
WBC # FLD AUTO: 6.65 K/UL

## 2019-02-26 ENCOUNTER — APPOINTMENT (OUTPATIENT)
Dept: INTERNAL MEDICINE | Facility: CLINIC | Age: 84
End: 2019-02-26
Payer: MEDICARE

## 2019-02-26 ENCOUNTER — LABORATORY RESULT (OUTPATIENT)
Age: 84
End: 2019-02-26

## 2019-02-26 ENCOUNTER — MEDICATION RENEWAL (OUTPATIENT)
Age: 84
End: 2019-02-26

## 2019-02-26 ENCOUNTER — NON-APPOINTMENT (OUTPATIENT)
Age: 84
End: 2019-02-26

## 2019-02-26 VITALS — HEIGHT: 60 IN | WEIGHT: 160 LBS | BODY MASS INDEX: 31.41 KG/M2

## 2019-02-26 PROCEDURE — 99214 OFFICE O/P EST MOD 30 MIN: CPT | Mod: 25

## 2019-02-26 PROCEDURE — 36415 COLL VENOUS BLD VENIPUNCTURE: CPT

## 2019-02-26 PROCEDURE — 93000 ELECTROCARDIOGRAM COMPLETE: CPT

## 2019-02-26 NOTE — HISTORY OF PRESENT ILLNESS
[FreeTextEntry1] : This is a 89-year-old female for evaluation and treatment of her paroxysmal atrial fibrillation hypothyroidism hypertension hypercholesterolemia seizure disorder and dyspnea on exertion [de-identified] : Overall she is quite stable. Her only complaint is still dyspnea on exertion\par \par She denies any seizure activities no chest pain no shortness of breath on rest no orthopnea

## 2019-02-26 NOTE — ASSESSMENT
[FreeTextEntry1] : This is as stated in the past a remarkable 89-year-old female whose history has been reviewed above\par \par She has a history of atrial fibrillation she is currently in sinus rhythm she remains on anticoagulation\par \par She is in sinus rhythm today no intervention\par \par She has a history of hypertension her blood pressure is under excellent control she has no orthostasis no medication changes\par \par She has a history of hypercholesterolemia she remains on a statin a cholesterol profile to obtain medication changes predicated on results\par \par She has history of hypothyroidism she remains on Synthroid she is clinically euthyroid but TSH has been obtained medication changes predicated on the results\par \par In terms of her dyspnea on exertion there has been no change in her cardiovascular status my impression is that she should and strengthening exercises as well as weight loss\par \par She has had no seizure or syncopal activity we will continue her current regime

## 2019-02-27 ENCOUNTER — MEDICATION RENEWAL (OUTPATIENT)
Age: 84
End: 2019-02-27

## 2019-02-27 LAB
ALBUMIN SERPL ELPH-MCNC: 4.1 G/DL
ALP BLD-CCNC: 99 U/L
ALT SERPL-CCNC: 17 U/L
ANION GAP SERPL CALC-SCNC: 12 MMOL/L
AST SERPL-CCNC: 22 U/L
BASOPHILS # BLD AUTO: 0.02 K/UL
BASOPHILS NFR BLD AUTO: 0.3 %
BILIRUB SERPL-MCNC: 0.2 MG/DL
BUN SERPL-MCNC: 27 MG/DL
CALCIUM SERPL-MCNC: 9.6 MG/DL
CHLORIDE SERPL-SCNC: 104 MMOL/L
CHOLEST SERPL-MCNC: 165 MG/DL
CHOLEST/HDLC SERPL: 2.3 RATIO
CO2 SERPL-SCNC: 27 MMOL/L
CREAT SERPL-MCNC: 0.9 MG/DL
EOSINOPHIL # BLD AUTO: 0.1 K/UL
EOSINOPHIL NFR BLD AUTO: 1.6 %
GLUCOSE SERPL-MCNC: 83 MG/DL
HBA1C MFR BLD HPLC: 6 %
HCT VFR BLD CALC: 36 %
HDLC SERPL-MCNC: 73 MG/DL
HGB BLD-MCNC: 11.6 G/DL
IMM GRANULOCYTES NFR BLD AUTO: 0.3 %
LDLC SERPL CALC-MCNC: 73 MG/DL
LYMPHOCYTES # BLD AUTO: 1.42 K/UL
LYMPHOCYTES NFR BLD AUTO: 22.8 %
MAN DIFF?: NORMAL
MCHC RBC-ENTMCNC: 30.4 PG
MCHC RBC-ENTMCNC: 32.2 GM/DL
MCV RBC AUTO: 94.2 FL
MONOCYTES # BLD AUTO: 0.54 K/UL
MONOCYTES NFR BLD AUTO: 8.7 %
NEUTROPHILS # BLD AUTO: 4.12 K/UL
NEUTROPHILS NFR BLD AUTO: 66.3 %
PLATELET # BLD AUTO: 263 K/UL
POTASSIUM SERPL-SCNC: 4.3 MMOL/L
PROT SERPL-MCNC: 6.5 G/DL
RBC # BLD: 3.82 M/UL
RBC # FLD: 14.9 %
SAVE SPECIMEN: NORMAL
SODIUM SERPL-SCNC: 143 MMOL/L
T3RU NFR SERPL: 1 TBI
T4 SERPL-MCNC: 5.5 UG/DL
TRIGL SERPL-MCNC: 93 MG/DL
TSH SERPL-ACNC: 5.93 UIU/ML
URATE SERPL-MCNC: 6.3 MG/DL
WBC # FLD AUTO: 6.22 K/UL

## 2019-02-28 LAB — 25(OH)D3 SERPL-MCNC: 40.7 NG/ML

## 2019-05-01 ENCOUNTER — APPOINTMENT (OUTPATIENT)
Dept: INTERNAL MEDICINE | Facility: CLINIC | Age: 84
End: 2019-05-01
Payer: MEDICARE

## 2019-05-01 PROCEDURE — 36415 COLL VENOUS BLD VENIPUNCTURE: CPT

## 2019-05-02 LAB
ALBUMIN SERPL ELPH-MCNC: 4.1 G/DL
ALP BLD-CCNC: 98 U/L
ALT SERPL-CCNC: 21 U/L
ANION GAP SERPL CALC-SCNC: 14 MMOL/L
AST SERPL-CCNC: 26 U/L
BASOPHILS # BLD AUTO: 0.03 K/UL
BASOPHILS NFR BLD AUTO: 0.4 %
BILIRUB SERPL-MCNC: 0.2 MG/DL
BUN SERPL-MCNC: 30 MG/DL
CALCIUM SERPL-MCNC: 9.6 MG/DL
CHLORIDE SERPL-SCNC: 98 MMOL/L
CO2 SERPL-SCNC: 28 MMOL/L
CREAT SERPL-MCNC: 0.89 MG/DL
EOSINOPHIL # BLD AUTO: 0.16 K/UL
EOSINOPHIL NFR BLD AUTO: 2.4 %
GLUCOSE SERPL-MCNC: 85 MG/DL
HCT VFR BLD CALC: 36.7 %
HGB BLD-MCNC: 11.6 G/DL
IMM GRANULOCYTES NFR BLD AUTO: 0.3 %
LYMPHOCYTES # BLD AUTO: 1.9 K/UL
LYMPHOCYTES NFR BLD AUTO: 28.2 %
MAN DIFF?: NORMAL
MCHC RBC-ENTMCNC: 30 PG
MCHC RBC-ENTMCNC: 31.6 GM/DL
MCV RBC AUTO: 94.8 FL
MONOCYTES # BLD AUTO: 0.53 K/UL
MONOCYTES NFR BLD AUTO: 7.9 %
NEUTROPHILS # BLD AUTO: 4.09 K/UL
NEUTROPHILS NFR BLD AUTO: 60.8 %
PLATELET # BLD AUTO: 270 K/UL
POTASSIUM SERPL-SCNC: 3.7 MMOL/L
PROT SERPL-MCNC: 6.4 G/DL
RBC # BLD: 3.87 M/UL
RBC # FLD: 16.4 %
SODIUM SERPL-SCNC: 140 MMOL/L
T4 SERPL-MCNC: 5.3 UG/DL
TSH SERPL-ACNC: 3.83 UIU/ML
WBC # FLD AUTO: 6.73 K/UL

## 2019-05-06 LAB — SAVE SPECIMEN: NORMAL

## 2019-05-13 ENCOUNTER — EMERGENCY (EMERGENCY)
Facility: HOSPITAL | Age: 84
LOS: 1 days | Discharge: HOME | End: 2019-05-13
Attending: EMERGENCY MEDICINE
Payer: MEDICARE

## 2019-05-13 VITALS
DIASTOLIC BLOOD PRESSURE: 70 MMHG | TEMPERATURE: 98 F | HEART RATE: 68 BPM | SYSTOLIC BLOOD PRESSURE: 185 MMHG | OXYGEN SATURATION: 94 % | RESPIRATION RATE: 17 BRPM

## 2019-05-13 VITALS
DIASTOLIC BLOOD PRESSURE: 64 MMHG | OXYGEN SATURATION: 96 % | SYSTOLIC BLOOD PRESSURE: 163 MMHG | RESPIRATION RATE: 16 BRPM | HEART RATE: 68 BPM | TEMPERATURE: 98 F

## 2019-05-13 PROCEDURE — 99284 EMERGENCY DEPT VISIT MOD MDM: CPT | Mod: 25

## 2019-05-13 PROCEDURE — 70450 CT HEAD/BRAIN W/O DYE: CPT

## 2019-05-13 PROCEDURE — 70450 CT HEAD/BRAIN W/O DYE: CPT | Mod: 26

## 2019-05-13 PROCEDURE — 99284 EMERGENCY DEPT VISIT MOD MDM: CPT | Mod: GC

## 2019-05-13 NOTE — ED PROVIDER NOTE - NS ED ROS FT
Constitutional: no fever  Eyes: no conjunctivitis  Ears: no ear pain   Nose: no nasal congestion, Mouth/Throat: no throat pain, Neck: no stiffness  Cardiovascular: no chest pain  Chest: no cough  Gastrointestinal: no abdominal pain, no vomiting and diarrhea  MSK: no joint pain  : no dysuria  Skin: +bruise  Neuro: no LOC

## 2019-05-13 NOTE — ED PROVIDER NOTE - PMH
Afib  on pradaxa  CVA (cerebrovascular accident)    GERD (gastroesophageal reflux disease)    HTN (hypertension)    Hyperlipemia    Hypothyroidism    TIA (transient ischemic attack)

## 2019-05-13 NOTE — ED ADULT NURSE NOTE - NSFALLRSKINDICATORS_ED_ALL_ED
.  
Please advise. She's suppose to do the urine at [facility] today when she goes for her mammo.  
yes

## 2019-05-13 NOTE — ED ADULT NURSE NOTE - OBJECTIVE STATEMENT
Pt is an ambulatory 89 yr old female a/oX 3 BIBA after mechanical fall avoiding a falling billboard.  Pt hit head, denies LOC and was on ground for about 10 minutes before EMS arrived as she could not walk.  Pt normally walks with a walker at home.  PERRL wnl, crawford with equal strength.  No neurological deficits appreciated.  LUNGS CTA no chest pain or sob.  No fevers, chills or N/V.  Abdomen NT ND.  Denies urinary symptoms.  No bony deformity palpated from head to toe.  Small hematoma to posterior head.

## 2019-05-13 NOTE — ED PROVIDER NOTE - ATTENDING CONTRIBUTION TO CARE
Dr. Mccormick (Attending Physician)  I performed a history and physical exam of the patient and discussed their management with the resident. I reviewed the resident's note and agree with the documented findings and plan of care. My medical decision making and observations are found above.

## 2019-05-13 NOTE — ED ADULT NURSE NOTE - NSIMPLEMENTINTERV_GEN_ALL_ED
Implemented All Fall with Harm Risk Interventions:  [place] to call system. Call bell, personal items and telephone within reach. Instruct patient to call for assistance. Room bathroom lighting operational. Non-slip footwear when patient is off stretcher. Physically safe environment: no spills, clutter or unnecessary equipment. Stretcher in lowest position, wheels locked, appropriate side rails in place. Provide visual cue, wrist band, yellow gown, etc. Monitor gait and stability. Monitor for mental status changes and reorient to person, place, and time. Review medications for side effects contributing to fall risk. Reinforce activity limits and safety measures with patient and family. Provide visual clues: red socks.

## 2019-05-13 NOTE — ED PROVIDER NOTE - CLINICAL SUMMARY MEDICAL DECISION MAKING FREE TEXT BOX
89 yo well appearing female presenting after mechanical fall with concern for head injury; ct head re eval Dr. Mccormick (Attending Physician)  89 yo well appearing female presenting after mechanical fall with concern for head injury on rivaroxaban; ct head re eval

## 2019-05-13 NOTE — ED PROVIDER NOTE - PSH
Bilateral cataracts    H/O repair of left rotator cuff    S/P laminectomy    Status post left hip replacement

## 2019-05-13 NOTE — ED PROVIDER NOTE - OBJECTIVE STATEMENT
89 yo female presenting with mechanical fall backwards 89 yo female presenting with mechanical fall backwards hitting her head, no loc, was not able to ambulate after the event.  currently at her baseline with no complaints or pain.  came in because she is on xarelto.  has been ambulating since the fall.  does not want anything for pain.

## 2019-05-13 NOTE — ED PROVIDER NOTE - PHYSICAL EXAMINATION
gen: well appearing  Mentation: AAO x 3  psych: mood appropriate  ENT: airway patent  Eyes: conjunctivae clear bilaterally  Cardio: RRR, no m/r/g  Resp: normal BS b/l  GI: s/nt/nd   Neuro: AAO x 3, sensation and motor function intact  Skin: +bruise in upper back   MSK: normal movement of all extremities, no midline spinal tenderness, no upper or lower extremity tenderness, no chest wall tenderness, no pelvic instability, able to ambulate with assistance

## 2019-05-15 NOTE — ED ADULT NURSE NOTE - NS ED NURSE LEVEL OF CONSCIOUSNESS SPEECH
"CLINICAL NUTRITION SERVICES - ASSESSMENT NOTE     Nutrition Prescription    RECOMMENDATIONS FOR MDs/PROVIDERS TO ORDER:  --Addendum: patient transitioned to comfort cares, No further Nutrition interventions planned at this time. RD can be consulted if needed.   ---RD signing off on 5/15/2019.    Malnutrition Status:    - Unable to determine - pt confused and lethargic.    Recommendations already ordered by Registered Dietitian (RD):   --None at this time         REASON FOR ASSESSMENT  Herminia Dewitt is a/an 59 year old female assessed by the dietitian for Provider Order - specify Reason - Malnutrition in setting of liver disease    Chart Reviewed:  PMH&Admit:  History of HLD, alcoholic cirrhosis c/b EV and ascites s/p TIPS (10/2018), hemochromatosis, provoked DVT, and depression and was transferred from [facility] ED with decompensated cirrhosis. Not a candidate for transplant workup.    - Decompensated alcoholic cirrhosis: Paracentesis every 1-2 weeks (last para 5/6);  - AIMEE: Concern for hepatorenal syndrome;      NUTRITION HISTORY  - Deferred     CURRENT NUTRITION ORDERS  Diet: NPO for medical/clinical reasons  Intake/Tolerance:   - - Prior to seeing pt spoke with charge nurse who stated pt is confused and lethargic. She also indicated pt may need a higher level of care.    LABS  Na+ 129 (L); K+ 3.0 (L);  Mg+ 2.6 (H)  BUN 31 (H); Cr 1.36 (H); T. Bili 25 (H);    (H); ALT 42 (WNL)  GFR 42 (L);  Meld Na score of 33;    MEDICATIONS  Albumin;  Folic Acid;  Lactulose;  Protonix;  Thiamine;    ANTHROPOMETRICS  Height: 170.2 cm (5' 7\")  Most Recent Weight: 69.6 kg (153 lb 6.4 oz), on 05/14  IBW: 61 kg - 114%IBW  BMI: 24.03 kg/m2 Normal BMI  Weight History:   Wt Readings from Last 10 Encounters:   05/14/19 69.6 kg (153 lb 6.4 oz)     Per care everywhere review, pt's weight has been fluctuating between 66 kg - 70 kg  from June 18 - Feb 19. Wt seems stable over the past six months.    Dosing Weight: 70 kg based on " admit wt.    ASSESSED NUTRITION NEEDS  Estimated Energy Needs: 2257-3182 kcals/day (25 - 30 kcals/kg)  Justification: Maintenance  Estimated Protein Needs: 70-84 grams protein/day (1 - 1.2 grams of pro/kg)  Justification: Maintenance  Estimated Fluid Needs: Per provider pending fluid status    PHYSICAL FINDINGS  See malnutrition section below.  (Per chart note review:)  - Generalized jaundice;  - No peripheral edema;    MALNUTRITION  % Intake: Unable to assess  % Weight Loss: None noted  Subcutaneous Fat Loss: Unable to assess  Muscle Loss: Unable to assess  Fluid Accumulation/Edema: Unable to assess  Malnutrition Diagnosis: Unable to determine - pt confused and lethargic.    NUTRITION DIAGNOSIS  Predicted inadequate nutrient intake related to suspected decreased oral intake and NPO status.    INTERVENTIONS  Implementation  - Unable to visit with pt.    Goals  Diet adv v nutrition support within 2-3 days.     Monitoring/Evaluation  Progress toward goals will be monitored and evaluated per protocol.    Nicole Muniz  Dietetic Intern,    I have read the above assessment and agree with the evaluation and implementation.  Wesley Barnett RD/RYANN  Pager [phone]                     Speaking Coherently

## 2019-05-31 ENCOUNTER — LABORATORY RESULT (OUTPATIENT)
Age: 84
End: 2019-05-31

## 2019-05-31 ENCOUNTER — APPOINTMENT (OUTPATIENT)
Dept: INTERNAL MEDICINE | Facility: CLINIC | Age: 84
End: 2019-05-31
Payer: MEDICARE

## 2019-05-31 ENCOUNTER — NON-APPOINTMENT (OUTPATIENT)
Age: 84
End: 2019-05-31

## 2019-05-31 VITALS
BODY MASS INDEX: 31.02 KG/M2 | WEIGHT: 158 LBS | DIASTOLIC BLOOD PRESSURE: 72 MMHG | HEIGHT: 60 IN | SYSTOLIC BLOOD PRESSURE: 160 MMHG
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
## 2019-05-31 PROCEDURE — 93000 ELECTROCARDIOGRAM COMPLETE: CPT

## 2019-05-31 PROCEDURE — 99214 OFFICE O/P EST MOD 30 MIN: CPT | Mod: 25

## 2019-05-31 PROCEDURE — 36415 COLL VENOUS BLD VENIPUNCTURE: CPT

## 2019-05-31 NOTE — REVIEW OF SYSTEMS
[Shortness Of Breath] : shortness of breath [Negative] : Psychiatric [FreeTextEntry5] : no change [FreeTextEntry6] : no change

## 2019-05-31 NOTE — ASSESSMENT
[FreeTextEntry1] : This is a remarkable 89-year-old female who was history has been reviewed above\par \par She has had a recent fall I reviewed the emergency room records. CT of the head was done which was normal. And the patient was discharged. The fall was mechanical no loss of consciousness. She has no residual effects and has no neurologic defects\par \par She recently had her atenolol diminished because of normal blood pressure however her systolics are consistently 160 today. Of note is that she does have mild orthostasis. However I do not wish to leave her with a blood pressure at this level we will restore her original antihypertensives. She was cautioned on getting up quickly which is unlikely\par \par She has a history of hypothyroidism a TSH has been obtained medication changes per dictated on the results\par \par She has a history of hypercholesterolemia she remains on atorvastatin and cholesterol profile today medication changes particularly on the results\par \par She does have a seizure disorder she has had no further episodes she remains on Tegretol appropriate blood tests excluding a level were drawn\par \par She has her persistent shortness of breath on exertion with no change lungs are clear no intervention\par \par She has history for fibrillation she remains on anticoagulation currently she is in sinus rhythm no intervention\par \par

## 2019-05-31 NOTE — PHYSICAL EXAM
[No Acute Distress] : no acute distress [Well Nourished] : well nourished [Well Developed] : well developed [No JVD] : no jugular venous distention [Supple] : supple [No Lymphadenopathy] : no lymphadenopathy [No Respiratory Distress] : no respiratory distress  [Clear to Auscultation] : lungs were clear to auscultation bilaterally [No Accessory Muscle Use] : no accessory muscle use [Normal Percussion] : the chest was normal to percussion [Soft] : abdomen soft [Non Tender] : non-tender [No HSM] : no HSM [Coordination Grossly Intact] : coordination grossly intact [No Focal Deficits] : no focal deficits [Normal Affect] : the affect was normal [de-identified] : irregularly irregular [de-identified] : difficulty with gait chemical [de-identified] : Anterior scar where lesion removed from scalp

## 2019-06-01 LAB
25(OH)D3 SERPL-MCNC: 39.6 NG/ML
ALBUMIN SERPL ELPH-MCNC: 4.1 G/DL
ALP BLD-CCNC: 101 U/L
ALT SERPL-CCNC: 19 U/L
ANION GAP SERPL CALC-SCNC: 12 MMOL/L
AST SERPL-CCNC: 25 U/L
BASOPHILS # BLD AUTO: 0.04 K/UL
BASOPHILS NFR BLD AUTO: 0.6 %
BILIRUB SERPL-MCNC: <0.2 MG/DL
BUN SERPL-MCNC: 22 MG/DL
CALCIUM SERPL-MCNC: 10 MG/DL
CHLORIDE SERPL-SCNC: 100 MMOL/L
CHOLEST SERPL-MCNC: 168 MG/DL
CHOLEST/HDLC SERPL: 2.1 RATIO
CO2 SERPL-SCNC: 28 MMOL/L
CREAT SERPL-MCNC: 0.83 MG/DL
EOSINOPHIL # BLD AUTO: 0.14 K/UL
EOSINOPHIL NFR BLD AUTO: 2 %
ESTIMATED AVERAGE GLUCOSE: 117 MG/DL
FERRITIN SERPL-MCNC: 28 NG/ML
GLUCOSE SERPL-MCNC: 85 MG/DL
HBA1C MFR BLD HPLC: 5.7 %
HCT VFR BLD CALC: 37.2 %
HDLC SERPL-MCNC: 81 MG/DL
HGB BLD-MCNC: 11.7 G/DL
IMM GRANULOCYTES NFR BLD AUTO: 0.3 %
LDLC SERPL CALC-MCNC: 70 MG/DL
LYMPHOCYTES # BLD AUTO: 1.9 K/UL
LYMPHOCYTES NFR BLD AUTO: 26.5 %
MAN DIFF?: NORMAL
MCHC RBC-ENTMCNC: 30.3 PG
MCHC RBC-ENTMCNC: 31.5 GM/DL
MCV RBC AUTO: 96.4 FL
MONOCYTES # BLD AUTO: 0.6 K/UL
MONOCYTES NFR BLD AUTO: 8.4 %
NEUTROPHILS # BLD AUTO: 4.46 K/UL
NEUTROPHILS NFR BLD AUTO: 62.2 %
PLATELET # BLD AUTO: 298 K/UL
POTASSIUM SERPL-SCNC: 4.5 MMOL/L
PROT SERPL-MCNC: 7.1 G/DL
RBC # BLD: 3.86 M/UL
RBC # FLD: 16.9 %
SAVE SPECIMEN: NORMAL
SODIUM SERPL-SCNC: 140 MMOL/L
T3RU NFR SERPL: 1 TBI
T4 SERPL-MCNC: 5.6 UG/DL
TRIGL SERPL-MCNC: 85 MG/DL
TSH SERPL-ACNC: 3.93 UIU/ML
URATE SERPL-MCNC: 5.3 MG/DL
VIT B12 SERPL-MCNC: 841 PG/ML
WBC # FLD AUTO: 7.16 K/UL

## 2019-06-24 ENCOUNTER — RX RENEWAL (OUTPATIENT)
Age: 84
End: 2019-06-24

## 2019-07-01 ENCOUNTER — RX RENEWAL (OUTPATIENT)
Age: 84
End: 2019-07-01

## 2019-08-13 ENCOUNTER — OTHER (OUTPATIENT)
Age: 84
End: 2019-08-13

## 2019-09-10 ENCOUNTER — LABORATORY RESULT (OUTPATIENT)
Age: 84
End: 2019-09-10

## 2019-09-10 ENCOUNTER — APPOINTMENT (OUTPATIENT)
Dept: INTERNAL MEDICINE | Facility: CLINIC | Age: 84
End: 2019-09-10
Payer: MEDICARE

## 2019-09-10 ENCOUNTER — NON-APPOINTMENT (OUTPATIENT)
Age: 84
End: 2019-09-10

## 2019-09-10 VITALS — SYSTOLIC BLOOD PRESSURE: 120 MMHG | DIASTOLIC BLOOD PRESSURE: 70 MMHG

## 2019-09-10 LAB
BILIRUB UR QL STRIP: NORMAL
CLARITY UR: NORMAL
COLLECTION METHOD: NORMAL
GLUCOSE UR-MCNC: NORMAL
HCG UR QL: 0.2 EU/DL
HGB UR QL STRIP.AUTO: NORMAL
KETONES UR-MCNC: NORMAL
LEUKOCYTE ESTERASE UR QL STRIP: NORMAL
NITRITE UR QL STRIP: POSITIVE
PH UR STRIP: 5.5
PROT UR STRIP-MCNC: NORMAL
SP GR UR STRIP: 1.02

## 2019-09-10 PROCEDURE — 36415 COLL VENOUS BLD VENIPUNCTURE: CPT

## 2019-09-10 PROCEDURE — 99214 OFFICE O/P EST MOD 30 MIN: CPT | Mod: 25

## 2019-09-10 PROCEDURE — 93000 ELECTROCARDIOGRAM COMPLETE: CPT

## 2019-09-10 PROCEDURE — 81003 URINALYSIS AUTO W/O SCOPE: CPT | Mod: QW

## 2019-09-10 NOTE — REVIEW OF SYSTEMS
[Fatigue] : fatigue [Shortness Of Breath] : shortness of breath [Negative] : Musculoskeletal [FreeTextEntry8] : history [de-identified] : stable

## 2019-09-10 NOTE — ASSESSMENT
[FreeTextEntry1] : This is a remarkable 89-year-old female whose history has been reviewed above\par \par She has a history of hypertension blood pressure is under excellent control she has no orthostasis no medication changes she has a history of fibrillation she remains in sinus rhythm she is on anticoagulation. Her EKG shows a sinus bradycardia\par \par She has a history of hypercholesterolemia she remains on a statin cholesterol profile has been obtained medication changes predicated on the results\par \par She has a history of hypothyroidism she remains on Synthroid her TSH has been obtained medication changes predicated on the results\par \par She is being treated for a seizure disorder which she is being treated with Tegretol. She has had no further episodes\par \par Urinalysis today shows large amount of leukocytes and trace blood\par \par Urine culture is pending we will treat her empirically with Macrobid. I will obtain a repeat urinalysis in approximately 10 days

## 2019-09-10 NOTE — PHYSICAL EXAM
[No JVD] : no jugular venous distention [Supple] : supple [Normal] : no respiratory distress, lungs were clear to auscultation bilaterally and no accessory muscle use

## 2019-09-10 NOTE — HISTORY OF PRESENT ILLNESS
[FreeTextEntry1] : This is a 89-year-old female for evaluation treatment for hypertension hypercholesterolemia hypothyroidism atrial fibrillation seizure disorder and occasional dyspnea on exertion [de-identified] : Patient has chronic shortness of breath. She has some difficulty with ambulation but this is stable\par \par She has chronic rhinorrhea\par \par Her new problem is she noted bright red blood in her urine this morning and once following she has no dysuria but states that her urinary frequency which is minimal to begin with has increased. Over the last 2 days

## 2019-09-11 LAB
25(OH)D3 SERPL-MCNC: 36.5 NG/ML
ALBUMIN SERPL ELPH-MCNC: 4.1 G/DL
ALP BLD-CCNC: 93 U/L
ALT SERPL-CCNC: 17 U/L
ANION GAP SERPL CALC-SCNC: 14 MMOL/L
AST SERPL-CCNC: 27 U/L
BASOPHILS # BLD AUTO: 0.02 K/UL
BASOPHILS NFR BLD AUTO: 0.3 %
BILIRUB SERPL-MCNC: 0.2 MG/DL
BUN SERPL-MCNC: 27 MG/DL
CALCIUM SERPL-MCNC: 9.4 MG/DL
CHLORIDE SERPL-SCNC: 98 MMOL/L
CHOLEST SERPL-MCNC: 156 MG/DL
CHOLEST/HDLC SERPL: 2.1 RATIO
CO2 SERPL-SCNC: 26 MMOL/L
CREAT SERPL-MCNC: 0.83 MG/DL
EOSINOPHIL # BLD AUTO: 0.1 K/UL
EOSINOPHIL NFR BLD AUTO: 1.5 %
ESTIMATED AVERAGE GLUCOSE: 120 MG/DL
GLUCOSE SERPL-MCNC: 104 MG/DL
HBA1C MFR BLD HPLC: 5.8 %
HCT VFR BLD CALC: 36.5 %
HDLC SERPL-MCNC: 76 MG/DL
HGB BLD-MCNC: 12.2 G/DL
IMM GRANULOCYTES NFR BLD AUTO: 0.1 %
LDLC SERPL CALC-MCNC: 63 MG/DL
LYMPHOCYTES # BLD AUTO: 1.62 K/UL
LYMPHOCYTES NFR BLD AUTO: 23.6 %
MAN DIFF?: NORMAL
MCHC RBC-ENTMCNC: 31.1 PG
MCHC RBC-ENTMCNC: 33.4 GM/DL
MCV RBC AUTO: 93.1 FL
MONOCYTES # BLD AUTO: 0.54 K/UL
MONOCYTES NFR BLD AUTO: 7.9 %
NEUTROPHILS # BLD AUTO: 4.58 K/UL
NEUTROPHILS NFR BLD AUTO: 66.6 %
PLATELET # BLD AUTO: 253 K/UL
POTASSIUM SERPL-SCNC: 3.6 MMOL/L
PROT SERPL-MCNC: 6.4 G/DL
RBC # BLD: 3.92 M/UL
RBC # FLD: 15.2 %
SAVE SPECIMEN: NORMAL
SODIUM SERPL-SCNC: 138 MMOL/L
T3RU NFR SERPL: 1 TBI
T4 SERPL-MCNC: 4.2 UG/DL
TRIGL SERPL-MCNC: 83 MG/DL
TSH SERPL-ACNC: 7.1 UIU/ML
URATE SERPL-MCNC: 5.8 MG/DL
WBC # FLD AUTO: 6.87 K/UL

## 2019-09-13 LAB — BACTERIA UR CULT: ABNORMAL

## 2019-09-19 ENCOUNTER — RESULT CHARGE (OUTPATIENT)
Age: 84
End: 2019-09-19

## 2019-09-19 ENCOUNTER — APPOINTMENT (OUTPATIENT)
Dept: INTERNAL MEDICINE | Facility: CLINIC | Age: 84
End: 2019-09-19
Payer: MEDICARE

## 2019-09-19 DIAGNOSIS — M54.9 DORSALGIA, UNSPECIFIED: ICD-10-CM

## 2019-09-19 DIAGNOSIS — N39.0 URINARY TRACT INFECTION, SITE NOT SPECIFIED: ICD-10-CM

## 2019-09-19 PROCEDURE — 81003 URINALYSIS AUTO W/O SCOPE: CPT | Mod: QW

## 2019-09-20 LAB
APPEARANCE: CLEAR
BACTERIA: NEGATIVE
BILIRUB UR QL STRIP: NEGATIVE
BILIRUBIN URINE: NEGATIVE
BLOOD URINE: NEGATIVE
CLARITY UR: CLEAR
COLLECTION METHOD: NORMAL
COLOR: YELLOW
GLUCOSE QUALITATIVE U: NEGATIVE
GLUCOSE UR-MCNC: NEGATIVE
HCG UR QL: 0.2 EU/DL
HGB UR QL STRIP.AUTO: NEGATIVE
HYALINE CASTS: 0 /LPF
KETONES UR-MCNC: NEGATIVE
KETONES URINE: NEGATIVE
LEUKOCYTE ESTERASE UR QL STRIP: NORMAL
LEUKOCYTE ESTERASE URINE: NEGATIVE
MICROSCOPIC-UA: NORMAL
NITRITE UR QL STRIP: NEGATIVE
NITRITE URINE: NEGATIVE
PH UR STRIP: 6.5
PH URINE: 6.5
PROT UR STRIP-MCNC: NEGATIVE
PROTEIN URINE: NEGATIVE
RED BLOOD CELLS URINE: 3 /HPF
SP GR UR STRIP: 1.01
SPECIFIC GRAVITY URINE: 1.02
SQUAMOUS EPITHELIAL CELLS: 1 /HPF
UROBILINOGEN URINE: NORMAL
WHITE BLOOD CELLS URINE: 3 /HPF

## 2019-09-21 LAB — BACTERIA UR CULT: NORMAL

## 2019-09-27 ENCOUNTER — APPOINTMENT (OUTPATIENT)
Dept: INTERNAL MEDICINE | Facility: CLINIC | Age: 84
End: 2019-09-27
Payer: MEDICARE

## 2019-09-27 VITALS — SYSTOLIC BLOOD PRESSURE: 140 MMHG | DIASTOLIC BLOOD PRESSURE: 80 MMHG

## 2019-09-27 DIAGNOSIS — M54.9 DORSALGIA, UNSPECIFIED: ICD-10-CM

## 2019-09-27 LAB
BILIRUB UR QL STRIP: NORMAL
CLARITY UR: CLEAR
COLLECTION METHOD: NORMAL
GLUCOSE UR-MCNC: NORMAL
HCG UR QL: 0.2 EU/DL
HGB UR QL STRIP.AUTO: NORMAL
KETONES UR-MCNC: NORMAL
LEUKOCYTE ESTERASE UR QL STRIP: NORMAL
NITRITE UR QL STRIP: NORMAL
PH UR STRIP: 5.5
PROT UR STRIP-MCNC: NORMAL
SP GR UR STRIP: 1.01

## 2019-09-27 PROCEDURE — 81003 URINALYSIS AUTO W/O SCOPE: CPT | Mod: QW

## 2019-09-27 PROCEDURE — 99214 OFFICE O/P EST MOD 30 MIN: CPT | Mod: 25

## 2019-09-27 RX ORDER — NITROFURANTOIN (MONOHYDRATE/MACROCRYSTALS) 25; 75 MG/1; MG/1
100 CAPSULE ORAL TWICE DAILY
Qty: 14 | Refills: 0 | Status: DISCONTINUED | COMMUNITY
Start: 2019-09-10 | End: 2019-09-27

## 2019-09-27 NOTE — PHYSICAL EXAM
[No Acute Distress] : no acute distress [Normal Sclera/Conjunctiva] : normal sclera/conjunctiva [No JVD] : no jugular venous distention [Normal] : normal rate, regular rhythm, normal S1 and S2 and no murmur heard [No Edema] : there was no peripheral edema [de-identified] : weight

## 2019-09-27 NOTE — HISTORY OF PRESENT ILLNESS
[FreeTextEntry8] : This is a 89-year-old female accompanied by her daughter. She has 2 distinct complaints.\par \par She has noticed blood on her underpants and also blood when she wipes after urinating. She has been treated for urinary tract infection\par \par In addition she has a peculiar os a history of early shortness of breath which seems to improve with the day and with activity

## 2019-09-27 NOTE — REVIEW OF SYSTEMS
[Negative] : Constitutional [FreeTextEntry5] : history [FreeTextEntry6] : history [FreeTextEntry8] : history

## 2019-09-27 NOTE — ASSESSMENT
[FreeTextEntry1] : This is a 89-year-old female whose history has been reviewed above\par \par She has complaints of seeing blood when she wipes after urinating and blood on the toilet paper a urinalysis continues to show the absence of blood and only trace leukocytes. It isn't clear to me whether this is vaginal or coming from the bladder. In the presence of gross hematuria sufficient to sustain her panties on the toilet paper I would think that I would see hematuria on dipstick. I will just start with a gynecologist and if this is negative we will have her seen by urology we do have a urine culture pending\par \par In terms of her dyspnea it is very difficult to ascertain exactly the change that has occurred over the last 3-4 weeks.\par \par She is taking hydrochlorothiazide I will ask her to stop that and start her on Lasix empirically I will have her take this for a week and then to call me at the end of the week to see if there is a change in her shortness of breath she may have a bit of fluid overload\par \par Her daughter states that she has also become a little sedentary and stiff she has no neurologic signs. We will send her for physical therapy

## 2019-09-28 LAB
APPEARANCE: CLEAR
BACTERIA: NEGATIVE
BILIRUBIN URINE: NEGATIVE
BLOOD URINE: NEGATIVE
COLOR: NORMAL
GLUCOSE QUALITATIVE U: NEGATIVE
HYALINE CASTS: 1 /LPF
KETONES URINE: NEGATIVE
LEUKOCYTE ESTERASE URINE: ABNORMAL
MICROSCOPIC-UA: NORMAL
NITRITE URINE: NEGATIVE
PH URINE: 6
PROTEIN URINE: NEGATIVE
RED BLOOD CELLS URINE: 1 /HPF
SPECIFIC GRAVITY URINE: 1.01
SQUAMOUS EPITHELIAL CELLS: 2 /HPF
UROBILINOGEN URINE: NORMAL
WHITE BLOOD CELLS URINE: 5 /HPF

## 2019-09-30 ENCOUNTER — APPOINTMENT (OUTPATIENT)
Dept: OBGYN | Facility: CLINIC | Age: 84
End: 2019-09-30
Payer: MEDICARE

## 2019-09-30 ENCOUNTER — APPOINTMENT (OUTPATIENT)
Dept: OBGYN | Facility: CLINIC | Age: 84
End: 2019-09-30

## 2019-09-30 VITALS
SYSTOLIC BLOOD PRESSURE: 151 MMHG | HEIGHT: 60 IN | DIASTOLIC BLOOD PRESSURE: 78 MMHG | HEART RATE: 57 BPM | BODY MASS INDEX: 30.63 KG/M2 | WEIGHT: 156 LBS

## 2019-09-30 DIAGNOSIS — N36.2 URETHRAL CARUNCLE: ICD-10-CM

## 2019-09-30 PROCEDURE — 99203 OFFICE O/P NEW LOW 30 MIN: CPT

## 2019-09-30 NOTE — CHIEF COMPLAINT
[Initial Visit] : initial GYN visit [FreeTextEntry1] : 89 y.o. P 3 postmenopausal female presents for evaluation of 2 weeks of blood tinged urine. pt saw Dr. Nolasco, who performed a Urine C&S which was non-revealing ,and pt was referred here for GYN evaluation.

## 2019-10-01 LAB — HPV HIGH+LOW RISK DNA PNL CVX: NOT DETECTED

## 2019-10-02 LAB — BACTERIA UR CULT: NORMAL

## 2019-10-04 LAB — CYTOLOGY CVX/VAG DOC THIN PREP: ABNORMAL

## 2019-12-01 ENCOUNTER — RX RENEWAL (OUTPATIENT)
Age: 84
End: 2019-12-01

## 2019-12-02 ENCOUNTER — APPOINTMENT (OUTPATIENT)
Dept: INTERNAL MEDICINE | Facility: CLINIC | Age: 84
End: 2019-12-02

## 2019-12-06 ENCOUNTER — NON-APPOINTMENT (OUTPATIENT)
Age: 84
End: 2019-12-06

## 2019-12-06 ENCOUNTER — LABORATORY RESULT (OUTPATIENT)
Age: 84
End: 2019-12-06

## 2019-12-06 ENCOUNTER — APPOINTMENT (OUTPATIENT)
Dept: INTERNAL MEDICINE | Facility: CLINIC | Age: 84
End: 2019-12-06
Payer: MEDICARE

## 2019-12-06 VITALS
WEIGHT: 155 LBS | BODY MASS INDEX: 30.43 KG/M2 | HEIGHT: 60 IN | SYSTOLIC BLOOD PRESSURE: 142 MMHG | DIASTOLIC BLOOD PRESSURE: 80 MMHG

## 2019-12-06 PROCEDURE — 99214 OFFICE O/P EST MOD 30 MIN: CPT | Mod: 25

## 2019-12-06 PROCEDURE — 93000 ELECTROCARDIOGRAM COMPLETE: CPT

## 2019-12-06 PROCEDURE — 36415 COLL VENOUS BLD VENIPUNCTURE: CPT

## 2019-12-06 RX ORDER — FUROSEMIDE 20 MG/1
20 TABLET ORAL DAILY
Qty: 90 | Refills: 0 | Status: DISCONTINUED | COMMUNITY
Start: 2019-09-27 | End: 2019-12-06

## 2019-12-06 RX ORDER — BETAMETHASONE DIPROPIONATE 0.5 MG/G
0.05 CREAM, AUGMENTED TOPICAL
Qty: 50 | Refills: 0 | Status: DISCONTINUED | COMMUNITY
Start: 2019-04-22 | End: 2019-12-06

## 2019-12-06 NOTE — ASSESSMENT
[FreeTextEntry1] : This is a 89-year-old female whose history has been reviewed above\par \par She has a history of hypertension her blood pressure is normal she has no orthostasis no medication changes\par \par She has history of a seizure disorder she remains on Tegretol she has no further episodes this will be continued\par \par She has a history of major fibrillation she remains on anticoagulation she has had no bleeding episodes this will be continued\par \par Patient has a history of hypothyroidism she remains on Synthroid her TSH has been obtained medication changes predicated on results\par \par In terms of her shortness of breath and etiology is unclear. I will not increase her diuretics at this time.\par \par We will try an empiric trial of Spirira

## 2019-12-06 NOTE — HISTORY OF PRESENT ILLNESS
[FreeTextEntry1] : This is a 89-year-old remarkable female for evaluation of treatment for hypertension hypercholesterolemia hypothyroidism atrial fibrillation and seizure disorder [de-identified] : Patient had been complaining of increasing shortness of breath. She was placed on Lasix she took it for 2 days and had several episodes of feeling like she was going to pass out so she discontinued it. At this point she is back to baseline with a mild increase in her shortness of on exertion

## 2019-12-06 NOTE — PHYSICAL EXAM
[Normal Sclera/Conjunctiva] : normal sclera/conjunctiva [No Edema] : there was no peripheral edema [No JVD] : no jugular venous distention [Normal] : affect was normal and insight and judgment were intact [No Focal Deficits] : no focal deficits [de-identified] : Rales right base possibly chronic [de-identified] : no ecchymosis [de-identified] : seems to be in sinus

## 2019-12-06 NOTE — REVIEW OF SYSTEMS
[Negative] : Psychiatric [FreeTextEntry5] : dyspnea on exertion [FreeTextEntry2] : stable [FreeTextEntry6] : dyspnea on exertion

## 2019-12-10 LAB
25(OH)D3 SERPL-MCNC: 38.5 NG/ML
ALBUMIN SERPL ELPH-MCNC: 4 G/DL
ALP BLD-CCNC: 92 U/L
ALT SERPL-CCNC: 18 U/L
ANION GAP SERPL CALC-SCNC: 12 MMOL/L
AST SERPL-CCNC: 24 U/L
BASOPHILS # BLD AUTO: 0.03 K/UL
BASOPHILS NFR BLD AUTO: 0.4 %
BILIRUB SERPL-MCNC: <0.2 MG/DL
BUN SERPL-MCNC: 29 MG/DL
CALCIUM SERPL-MCNC: 9.6 MG/DL
CHLORIDE SERPL-SCNC: 103 MMOL/L
CHOLEST SERPL-MCNC: 168 MG/DL
CHOLEST/HDLC SERPL: 2.3 RATIO
CO2 SERPL-SCNC: 26 MMOL/L
CREAT SERPL-MCNC: 0.91 MG/DL
EOSINOPHIL # BLD AUTO: 0.11 K/UL
EOSINOPHIL NFR BLD AUTO: 1.6 %
ESTIMATED AVERAGE GLUCOSE: 123 MG/DL
FERRITIN SERPL-MCNC: 23 NG/ML
FOLATE SERPL-MCNC: 20 NG/ML
GLUCOSE SERPL-MCNC: 85 MG/DL
HBA1C MFR BLD HPLC: 5.9 %
HCT VFR BLD CALC: 34.3 %
HDLC SERPL-MCNC: 73 MG/DL
HGB BLD-MCNC: 11.2 G/DL
IMM GRANULOCYTES NFR BLD AUTO: 0.1 %
IRON SATN MFR SERPL: 12 %
IRON SERPL-MCNC: 43 UG/DL
LDLC SERPL CALC-MCNC: 77 MG/DL
LYMPHOCYTES # BLD AUTO: 1.76 K/UL
LYMPHOCYTES NFR BLD AUTO: 25.8 %
MAN DIFF?: NORMAL
MCHC RBC-ENTMCNC: 31.3 PG
MCHC RBC-ENTMCNC: 32.7 GM/DL
MCV RBC AUTO: 95.8 FL
MONOCYTES # BLD AUTO: 0.59 K/UL
MONOCYTES NFR BLD AUTO: 8.7 %
NEUTROPHILS # BLD AUTO: 4.31 K/UL
NEUTROPHILS NFR BLD AUTO: 63.4 %
PLATELET # BLD AUTO: 282 K/UL
POTASSIUM SERPL-SCNC: 3.7 MMOL/L
PROT SERPL-MCNC: 6.3 G/DL
RBC # BLD: 3.58 M/UL
RBC # FLD: 15.9 %
SAVE SPECIMEN: NORMAL
SODIUM SERPL-SCNC: 141 MMOL/L
T3RU NFR SERPL: 1 TBI
T4 SERPL-MCNC: 4.9 UG/DL
TIBC SERPL-MCNC: 354 UG/DL
TRIGL SERPL-MCNC: 90 MG/DL
TSH SERPL-ACNC: 7.89 UIU/ML
UIBC SERPL-MCNC: 311 UG/DL
URATE SERPL-MCNC: 6.1 MG/DL
VIT B12 SERPL-MCNC: 731 PG/ML
WBC # FLD AUTO: 6.81 K/UL

## 2019-12-11 LAB
ALBUMIN MFR SERPL ELPH: 59.6 %
ALBUMIN SERPL-MCNC: 3.9 G/DL
ALBUMIN/GLOB SERPL: 1.4 RATIO
ALPHA1 GLOB MFR SERPL ELPH: 5.1 %
ALPHA1 GLOB SERPL ELPH-MCNC: 0.3 G/DL
ALPHA2 GLOB MFR SERPL ELPH: 11.6 %
ALPHA2 GLOB SERPL ELPH-MCNC: 0.8 G/DL
B-GLOBULIN MFR SERPL ELPH: 11.3 %
B-GLOBULIN SERPL ELPH-MCNC: 0.7 G/DL
GAMMA GLOB FLD ELPH-MCNC: 0.8 G/DL
GAMMA GLOB MFR SERPL ELPH: 12.4 %
INTERPRETATION SERPL IEP-IMP: NORMAL
M PROTEIN MFR SERPL ELPH: NORMAL
M PROTEIN SPEC IFE-MCNC: NORMAL
MONOCLON BAND OBS SERPL: NORMAL
PROT SERPL-MCNC: 6.6 G/DL
PROT SERPL-MCNC: 6.6 G/DL
STFR SERPL-MCNC: 3.7 MG/L

## 2019-12-20 ENCOUNTER — NON-APPOINTMENT (OUTPATIENT)
Age: 84
End: 2019-12-20

## 2019-12-20 ENCOUNTER — APPOINTMENT (OUTPATIENT)
Dept: OPHTHALMOLOGY | Facility: CLINIC | Age: 84
End: 2019-12-20
Payer: MEDICARE

## 2019-12-20 PROCEDURE — 92012 INTRM OPH EXAM EST PATIENT: CPT

## 2020-01-13 ENCOUNTER — RX RENEWAL (OUTPATIENT)
Age: 85
End: 2020-01-13

## 2020-01-15 ENCOUNTER — LABORATORY RESULT (OUTPATIENT)
Age: 85
End: 2020-01-15

## 2020-01-15 ENCOUNTER — APPOINTMENT (OUTPATIENT)
Dept: INTERNAL MEDICINE | Facility: CLINIC | Age: 85
End: 2020-01-15
Payer: MEDICARE

## 2020-01-15 ENCOUNTER — NON-APPOINTMENT (OUTPATIENT)
Age: 85
End: 2020-01-15

## 2020-01-15 VITALS — DIASTOLIC BLOOD PRESSURE: 80 MMHG | SYSTOLIC BLOOD PRESSURE: 130 MMHG

## 2020-01-15 VITALS — WEIGHT: 156 LBS | BODY MASS INDEX: 30.63 KG/M2 | HEIGHT: 60 IN

## 2020-01-15 PROCEDURE — 36415 COLL VENOUS BLD VENIPUNCTURE: CPT

## 2020-01-15 PROCEDURE — 99214 OFFICE O/P EST MOD 30 MIN: CPT | Mod: 25

## 2020-01-15 PROCEDURE — 93000 ELECTROCARDIOGRAM COMPLETE: CPT

## 2020-01-15 NOTE — PHYSICAL EXAM
[No Acute Distress] : no acute distress [No JVD] : no jugular venous distention [No Joint Swelling] : no joint swelling [Normal] : affect was normal and insight and judgment were intact [No Focal Deficits] : no focal deficits [de-identified] : weight [de-identified] : no ecchymosis [de-identified] : 1+ edema [de-identified] : 1/6 systolic ejection murmur

## 2020-01-15 NOTE — ASSESSMENT
[FreeTextEntry1] : This is a 89-year-old female whose history has been reviewed above\par \par She has a history of hypertension her blood pressure is under good control she has no orthostasis when tested in the sitting to standing position\par \par She has a history of hypothyroidism she remains on  Synthroid she is clinically euthyroid her TSH has been obtained medication changes predicated on the results.\par \par She has history of a fibrillation she is clinically in sinus she remains on anticoagulation this will be continued\par \par She has a seizure disorder that is loss of consciousness she remains on Tegretol. I will obtain levels in addition we will obtain appropriate blood tests\par \par in terms of shortness of breath it is difficult to ascertain the etiology. She is on we will switch her to a combination medication in addition I will have her seen by cardiology. She has increased her diuretic on her own twice and both times she feltas if she was going to lose consciousness\par \par She was not taking spider we will try once again

## 2020-01-15 NOTE — REVIEW OF SYSTEMS
[Fatigue] : fatigue [Dyspnea on Exertion] : dyspnea on exertion [Negative] : Psychiatric [FreeTextEntry5] : dysmenorrhea exertion

## 2020-01-15 NOTE — HISTORY OF PRESENT ILLNESS
[de-identified] : She states that she has had increasing shortness of breath. She wakes up short of breath when she ambulates for a while in the morning continues did dyspnea decreases. She walks 3 blocks to her bridge game she arrived short of breath but this dissipates she denies chest pain.\par \par She has had no seizure activity no loss of consciousness [FreeTextEntry1] : This is a 89-year-old female for evaluation and treatment of her atrial fibrillation hypothyroidism hypertension hypercholesterolemia balance disorder discharge exertion and seizure disorder

## 2020-01-16 LAB
25(OH)D3 SERPL-MCNC: 48.6 NG/ML
ALBUMIN SERPL ELPH-MCNC: 4.1 G/DL
ALP BLD-CCNC: 97 U/L
ALT SERPL-CCNC: 19 U/L
ANION GAP SERPL CALC-SCNC: 11 MMOL/L
AST SERPL-CCNC: 26 U/L
BASOPHILS # BLD AUTO: 0.03 K/UL
BASOPHILS NFR BLD AUTO: 0.5 %
BILIRUB SERPL-MCNC: 0.2 MG/DL
BUN SERPL-MCNC: 27 MG/DL
CALCIUM SERPL-MCNC: 9.6 MG/DL
CHLORIDE SERPL-SCNC: 100 MMOL/L
CHOLEST SERPL-MCNC: 154 MG/DL
CHOLEST/HDLC SERPL: 1.9 RATIO
CO2 SERPL-SCNC: 30 MMOL/L
CREAT SERPL-MCNC: 0.89 MG/DL
EOSINOPHIL # BLD AUTO: 0.15 K/UL
EOSINOPHIL NFR BLD AUTO: 2.4 %
ESTIMATED AVERAGE GLUCOSE: 120 MG/DL
GLUCOSE SERPL-MCNC: 92 MG/DL
HBA1C MFR BLD HPLC: 5.8 %
HCT VFR BLD CALC: 34.9 %
HDLC SERPL-MCNC: 80 MG/DL
HGB BLD-MCNC: 11.3 G/DL
IMM GRANULOCYTES NFR BLD AUTO: 0.2 %
LDLC SERPL CALC-MCNC: 62 MG/DL
LYMPHOCYTES # BLD AUTO: 1.58 K/UL
LYMPHOCYTES NFR BLD AUTO: 25 %
MAN DIFF?: NORMAL
MCHC RBC-ENTMCNC: 31 PG
MCHC RBC-ENTMCNC: 32.4 GM/DL
MCV RBC AUTO: 95.9 FL
MONOCYTES # BLD AUTO: 0.5 K/UL
MONOCYTES NFR BLD AUTO: 7.9 %
NEUTROPHILS # BLD AUTO: 4.05 K/UL
NEUTROPHILS NFR BLD AUTO: 64 %
PLATELET # BLD AUTO: 248 K/UL
POTASSIUM SERPL-SCNC: 4.2 MMOL/L
PROT SERPL-MCNC: 6.5 G/DL
RBC # BLD: 3.64 M/UL
RBC # FLD: 14.9 %
SAVE SPECIMEN: NORMAL
SODIUM SERPL-SCNC: 140 MMOL/L
T3RU NFR SERPL: 1 TBI
T4 SERPL-MCNC: 6.2 UG/DL
TRIGL SERPL-MCNC: 59 MG/DL
TSH SERPL-ACNC: 4.36 UIU/ML
URATE SERPL-MCNC: 5.9 MG/DL
WBC # FLD AUTO: 6.32 K/UL

## 2020-01-21 ENCOUNTER — RX RENEWAL (OUTPATIENT)
Age: 85
End: 2020-01-21

## 2020-02-22 ENCOUNTER — RX RENEWAL (OUTPATIENT)
Age: 85
End: 2020-02-22

## 2020-03-09 ENCOUNTER — LABORATORY RESULT (OUTPATIENT)
Age: 85
End: 2020-03-09

## 2020-03-09 ENCOUNTER — NON-APPOINTMENT (OUTPATIENT)
Age: 85
End: 2020-03-09

## 2020-03-09 ENCOUNTER — APPOINTMENT (OUTPATIENT)
Dept: INTERNAL MEDICINE | Facility: CLINIC | Age: 85
End: 2020-03-09
Payer: MEDICARE

## 2020-03-09 VITALS
DIASTOLIC BLOOD PRESSURE: 70 MMHG | BODY MASS INDEX: 30.63 KG/M2 | HEIGHT: 60 IN | SYSTOLIC BLOOD PRESSURE: 120 MMHG | WEIGHT: 156 LBS

## 2020-03-09 PROCEDURE — 36415 COLL VENOUS BLD VENIPUNCTURE: CPT

## 2020-03-09 PROCEDURE — 93000 ELECTROCARDIOGRAM COMPLETE: CPT

## 2020-03-09 PROCEDURE — 99214 OFFICE O/P EST MOD 30 MIN: CPT | Mod: 25

## 2020-03-09 NOTE — HISTORY OF PRESENT ILLNESS
[FreeTextEntry1] : This is a 89-year-old female for evaluation of treatment of hypertension hypercholesterolemia hypothyroidism atrial fibrillation and seizure disorder [de-identified] : Patient states that she is relatively well and she has noticed a significant effect from her bronchodilator she has less shortness of breath

## 2020-03-09 NOTE — ASSESSMENT
[FreeTextEntry1] : This is a remarkable 89-year-old female whose history has been reviewed above\par \par She has a history of hypertension the blood pressure is under excellent control she has no orthostasis no medication changes\par \par She has a history of hypothyroidism she remains on Synthroid her TSH has been obtained medication changes predicated on the results\par \par She has a history of paroxysmal atrial fibrillation she remains on anticoagulation she currently is in sinus rhythm no intervention\par \par She has a history of seizures. She remains on medication she's had no new events\par \par Nicely she has started taking bronchodilators. She does have a long history of smoking in the past her pulmonary examination is is improved and she is less short of breath. No change

## 2020-03-09 NOTE — PHYSICAL EXAM
[No JVD] : no jugular venous distention [Normal Rate] : normal rate  [Regular Rhythm] : with a regular rhythm [No Joint Swelling] : no joint swelling [No Focal Deficits] : no focal deficits [Normal] : affect was normal and insight and judgment were intact [de-identified] : Scattered occasional rales but this is chronic and actually sounds less pronounced

## 2020-03-10 LAB
25(OH)D3 SERPL-MCNC: 38.2 NG/ML
ALBUMIN SERPL ELPH-MCNC: 4 G/DL
ALP BLD-CCNC: 84 U/L
ALT SERPL-CCNC: 17 U/L
ANION GAP SERPL CALC-SCNC: 11 MMOL/L
AST SERPL-CCNC: 23 U/L
BASOPHILS # BLD AUTO: 0.03 K/UL
BASOPHILS NFR BLD AUTO: 0.5 %
BILIRUB SERPL-MCNC: 0.2 MG/DL
BUN SERPL-MCNC: 25 MG/DL
CALCIUM SERPL-MCNC: 9.4 MG/DL
CHLORIDE SERPL-SCNC: 102 MMOL/L
CHOLEST SERPL-MCNC: 162 MG/DL
CHOLEST/HDLC SERPL: 2.2 RATIO
CO2 SERPL-SCNC: 28 MMOL/L
CREAT SERPL-MCNC: 0.94 MG/DL
EOSINOPHIL # BLD AUTO: 0.11 K/UL
EOSINOPHIL NFR BLD AUTO: 1.7 %
ESTIMATED AVERAGE GLUCOSE: 120 MG/DL
GLUCOSE SERPL-MCNC: 90 MG/DL
HBA1C MFR BLD HPLC: 5.8 %
HCT VFR BLD CALC: 34.7 %
HDLC SERPL-MCNC: 73 MG/DL
HGB BLD-MCNC: 10.9 G/DL
IMM GRANULOCYTES NFR BLD AUTO: 0.3 %
LDLC SERPL CALC-MCNC: 71 MG/DL
LYMPHOCYTES # BLD AUTO: 1.41 K/UL
LYMPHOCYTES NFR BLD AUTO: 21.9 %
MAN DIFF?: NORMAL
MCHC RBC-ENTMCNC: 30.5 PG
MCHC RBC-ENTMCNC: 31.4 GM/DL
MCV RBC AUTO: 97.2 FL
MONOCYTES # BLD AUTO: 0.58 K/UL
MONOCYTES NFR BLD AUTO: 9 %
NEUTROPHILS # BLD AUTO: 4.28 K/UL
NEUTROPHILS NFR BLD AUTO: 66.6 %
PLATELET # BLD AUTO: 273 K/UL
POTASSIUM SERPL-SCNC: 4.1 MMOL/L
PROT SERPL-MCNC: 6.1 G/DL
RBC # BLD: 3.57 M/UL
RBC # FLD: 15.1 %
SAVE SPECIMEN: NORMAL
SODIUM SERPL-SCNC: 141 MMOL/L
T3RU NFR SERPL: 1 TBI
T4 SERPL-MCNC: 5.3 UG/DL
TRIGL SERPL-MCNC: 91 MG/DL
TSH SERPL-ACNC: 2.67 UIU/ML
URATE SERPL-MCNC: 6.3 MG/DL
WBC # FLD AUTO: 6.43 K/UL

## 2020-03-30 ENCOUNTER — RX RENEWAL (OUTPATIENT)
Age: 85
End: 2020-03-30

## 2020-04-21 ENCOUNTER — APPOINTMENT (OUTPATIENT)
Dept: INTERNAL MEDICINE | Facility: CLINIC | Age: 85
End: 2020-04-21

## 2020-04-24 ENCOUNTER — RX RENEWAL (OUTPATIENT)
Age: 85
End: 2020-04-24

## 2020-06-10 ENCOUNTER — APPOINTMENT (OUTPATIENT)
Dept: INTERNAL MEDICINE | Facility: CLINIC | Age: 85
End: 2020-06-10
Payer: MEDICARE

## 2020-06-10 VITALS
BODY MASS INDEX: 31.22 KG/M2 | WEIGHT: 159 LBS | SYSTOLIC BLOOD PRESSURE: 135 MMHG | DIASTOLIC BLOOD PRESSURE: 75 MMHG | HEIGHT: 60 IN | HEART RATE: 60 BPM | TEMPERATURE: 96 F

## 2020-06-10 PROCEDURE — 99214 OFFICE O/P EST MOD 30 MIN: CPT | Mod: 95

## 2020-06-10 RX ORDER — CRANBERRY FRUIT EXTRACT 200 MG
200 CAPSULE ORAL DAILY
Refills: 0 | Status: DISCONTINUED | COMMUNITY
Start: 2019-09-27 | End: 2020-06-10

## 2020-06-10 NOTE — HISTORY OF PRESENT ILLNESS
[Home] : at home, [unfilled] , at the time of the visit. [Medical Office: ([facility])___] : at the medical office located in  [Family Member] : family member [FreeTextEntry1] : 89-year-old female for evaluation and treatment of her hypertension hypothyroidismatrial fibrillation seizure disorder [de-identified] : patient is in her usual state of health\par \par She did have a couple of events about a week and a half ago she had some mild swelling in her foot. This has gotten better\par \par She stopped her spivera with no change in her breathing\par \par He had one episode of dizziness this morning after eating but this was not positional

## 2020-06-10 NOTE — REVIEW OF SYSTEMS
[Lower Ext Edema] : lower extremity edema [Dyspnea on Exertion] : dyspnea on exertion [Negative] : Psychiatric [FreeTextEntry5] : dyspnea on exertion [de-identified] : Transient episode of dizziness

## 2020-06-10 NOTE — PHYSICAL EXAM
[Normal] : no acute distress, well nourished, well developed and well-appearing [No Focal Deficits] : no focal deficits [de-identified] : unchanged [de-identified] : Minimal swelling left foot

## 2020-06-10 NOTE — ASSESSMENT
[FreeTextEntry1] : This is a remarkable 89-year-old female whose history has been reviewed above\par \par She has a history of hypertension the blood pressure is normal she has no significant orthostasis she took her blood pressure sitting and standing 120/65 and 121/68 no change\par \par She had one episode of dizziness after eating this morning. She is fine now no intervention if this continues she will let me know\par \par She has had no further episodes of seizures she continues her current medications\par \par She has stable dyspnea on exertion she did not respond to bronchodilators. No intervention at this time\par \par She had trivial edema of her left foot no intervention other than to keep the feet elevated this has been improved\par \par She has had some mild anemia serologic workup was negative we will repeat her CBC

## 2020-07-20 ENCOUNTER — RX RENEWAL (OUTPATIENT)
Age: 85
End: 2020-07-20

## 2020-08-31 ENCOUNTER — RX RENEWAL (OUTPATIENT)
Age: 85
End: 2020-08-31

## 2020-09-24 ENCOUNTER — NON-APPOINTMENT (OUTPATIENT)
Age: 85
End: 2020-09-24

## 2020-09-24 ENCOUNTER — APPOINTMENT (OUTPATIENT)
Dept: CARDIOLOGY | Facility: CLINIC | Age: 85
End: 2020-09-24
Payer: MEDICARE

## 2020-09-24 VITALS
HEART RATE: 52 BPM | BODY MASS INDEX: 31.02 KG/M2 | DIASTOLIC BLOOD PRESSURE: 80 MMHG | WEIGHT: 158 LBS | HEIGHT: 60 IN | SYSTOLIC BLOOD PRESSURE: 152 MMHG | OXYGEN SATURATION: 95 %

## 2020-09-24 DIAGNOSIS — Z23 ENCOUNTER FOR IMMUNIZATION: ICD-10-CM

## 2020-09-24 PROCEDURE — 99214 OFFICE O/P EST MOD 30 MIN: CPT

## 2020-09-24 PROCEDURE — 90662 IIV NO PRSV INCREASED AG IM: CPT

## 2020-09-24 PROCEDURE — G0008: CPT

## 2020-09-24 PROCEDURE — 93000 ELECTROCARDIOGRAM COMPLETE: CPT

## 2020-09-24 PROCEDURE — 99204 OFFICE O/P NEW MOD 45 MIN: CPT

## 2020-09-24 RX ORDER — ZOSTER VACCINE RECOMBINANT, ADJUVANTED 50 MCG/0.5
50 KIT INTRAMUSCULAR
Qty: 1 | Refills: 0 | Status: DISCONTINUED | COMMUNITY
Start: 2018-06-19 | End: 2020-09-24

## 2020-09-24 NOTE — ASSESSMENT
[FreeTextEntry1] : Sabi Cordova with a history of hypertension paroxysmal atrial fibrillation is actually doing amazingly well.  Her blood pressure is under good control and there is no evidence of overt CHF.  She does have chronic shortness of breath which she has had for some time which could be a combination of her body habitus perhaps diastolic dysfunction and age.  She is somewhat unsteady on her feet\par \par Overall however I find her clinically stable\par The episodes of feeling out of it as if she is going to pass out are unclear to me in etiology.  If they worsen or continue it more frequency a further cardiac evaluation to include monitoring and repeat echocardiogram will be considered

## 2020-09-24 NOTE — HISTORY OF PRESENT ILLNESS
[FreeTextEntry1] : Sabi is now 89 years old and well-known to me.  She has a history of paroxysmal atrial fibrillation but has reverted to normal sinus rhythm and remains in normal sinus rhythm with a sinus bradycardia.  She has been on anticoagulation for several years and is tolerated\par \par Several years ago she had an episode of confusion initially felt to be a possible TIA but subsequently felt possibly to be a seizure disorder.  She has been on seizure medications without any recurrence of these episodes and has remained on those medications\par \par She does have chronic shortness of breath which may recently have exacerbated\par Last echocardiogram 2016 revealed overall preserved LV function with no significant valvular disease\par \par She has had recurrent episodes difficult to describe often while she is sitting which she feels that she is possibly going to pass out, feels strange feeling come over her difficult to describe which passes in about 30 seconds.  This is occurred a few times and is of concern to her.  She has no chest pain palpitations.  She has had no clinical recurrence of atrial fibrillation\par \par She remains quite mentally intact, uses a walker but finds a difficulty walking with fatigue and discomfort in her legs\par

## 2020-09-24 NOTE — PHYSICAL EXAM
[Normal Conjunctiva] : the conjunctiva exhibited no abnormalities [] : no respiratory distress [Respiration, Rhythm And Depth] : normal respiratory rhythm and effort [Auscultation Breath Sounds / Voice Sounds] : lungs were clear to auscultation bilaterally [Abdomen Soft] : soft [Abdomen Tenderness] : non-tender [FreeTextEntry1] : Trace edema of the lower extremities

## 2020-09-24 NOTE — REASON FOR VISIT
[FreeTextEntry1] : Sabi Cordova now 89 years old with a history of paroxysmal atrial fibrillation essential hypertension for follow-up of her cardiac status with symptoms of shortness of breath and unsteadiness on her feet

## 2020-09-24 NOTE — DISCUSSION/SUMMARY
[FreeTextEntry1] : For now she will continue on her present regimen of medications.  I encouraged her to try to be as active as possible and walk with a walker.  If the symptoms worsen, then we will reevaluate her from a cardiac point of view with an echo and possibly long-term monitoring\par \par Overall however I find a relatively stable at age 89\par \par Follow-up with me in 3 months

## 2020-11-02 ENCOUNTER — RX RENEWAL (OUTPATIENT)
Age: 85
End: 2020-11-02

## 2020-11-24 ENCOUNTER — RX RENEWAL (OUTPATIENT)
Age: 85
End: 2020-11-24

## 2020-12-11 DIAGNOSIS — Z11.59 ENCOUNTER FOR SCREENING FOR OTHER VIRAL DISEASES: ICD-10-CM

## 2020-12-14 LAB
SARS-COV-2 IGG SERPL IA-ACNC: 0.06 INDEX
SARS-COV-2 IGG SERPL QL IA: NEGATIVE
SARS-COV-2 N GENE NPH QL NAA+PROBE: NOT DETECTED

## 2020-12-17 ENCOUNTER — APPOINTMENT (OUTPATIENT)
Dept: CARDIOLOGY | Facility: CLINIC | Age: 85
End: 2020-12-17
Payer: MEDICARE

## 2020-12-17 ENCOUNTER — NON-APPOINTMENT (OUTPATIENT)
Age: 85
End: 2020-12-17

## 2020-12-17 VITALS
DIASTOLIC BLOOD PRESSURE: 70 MMHG | WEIGHT: 157 LBS | OXYGEN SATURATION: 94 % | BODY MASS INDEX: 30.66 KG/M2 | HEART RATE: 58 BPM | SYSTOLIC BLOOD PRESSURE: 160 MMHG | TEMPERATURE: 98.6 F

## 2020-12-17 PROCEDURE — 93000 ELECTROCARDIOGRAM COMPLETE: CPT

## 2020-12-17 PROCEDURE — 99214 OFFICE O/P EST MOD 30 MIN: CPT

## 2020-12-17 NOTE — HISTORY OF PRESENT ILLNESS
[FreeTextEntry1] : Sabi is now 89 years old and well-known to me.  She has a history of paroxysmal atrial fibrillation but has reverted to normal sinus rhythm and remains in normal sinus rhythm with a sinus bradycardia.  She has been on anticoagulation for several years with Xarelto which she has tolerated.  She is also on aspirin which was given to her after a presumed CVA\par \par Several years ago she had an episode of confusion initially felt to be a possible TIA but subsequently felt possibly to be a seizure disorder.  She has been on seizure medications without any recurrence of these episodes and has remained on those medications including the Xarelto and aspirin\par \par She does have chronic shortness of breath which may recently have exacerbated\par Last echocardiogram 2019 revealed overall preserved LV function with moderate mitral regurgitation and mild pulmonary hypertension.\par \par \par She remains quite mentally intact, uses a walker but finds a difficulty walking with fatigue and discomfort in her legs\par \par She recently had some degree of a cold and a cough with sputum production and felt increased shortness of breath.  She had no chest pain.  She took Spiriva with improvement and presently has no significant shortness of breath other than her chronic shortness of breath and fatigue when she is walking with a walker.  According to her daughter she has decreased her walking activity.  She continues to be mentally intact.  Presently she has no shortness of breath no wheezing no cough and no sputum production.  No time did she have fever\par

## 2020-12-17 NOTE — ASSESSMENT
[FreeTextEntry1] : Sabi Cordova with a history of hypertension paroxysmal atrial fibrillation is actually doing amazingly well.  Her blood pressure is under good control and there is no evidence of overt CHF.  She does have chronic shortness of breath which she has had for some time which could be a combination of her body habitus perhaps diastolic dysfunction and some underlying airways disease\par Overall however I find her clinically stable\par \par The present episode of shortness of breath cough sputum production probably related to some bronchitis which has now resolved completely.  There was never any fever.\par \par Blood pressure systolic is somewhat elevated today but this usually is the case in my office\par \par Clinically she looks quite well and quite intact at age 89

## 2020-12-17 NOTE — PHYSICAL EXAM
[Normal Conjunctiva] : the conjunctiva exhibited no abnormalities [No Jugular Venous Bustillo A Waves] : no jugular venous bustillo A waves [Murmurs] : no murmurs present [] : no respiratory distress [Respiration, Rhythm And Depth] : normal respiratory rhythm and effort [Auscultation Breath Sounds / Voice Sounds] : lungs were clear to auscultation bilaterally [Abdomen Soft] : soft [Abdomen Tenderness] : non-tender [FreeTextEntry1] : No edema

## 2020-12-17 NOTE — REASON FOR VISIT
[FreeTextEntry1] : Sabi Cordova 89 years old seen semiurgently for symptoms of shortness of breath over the last week which now have resolved

## 2020-12-17 NOTE — DISCUSSION/SUMMARY
[FreeTextEntry1] : 1.  I reassured her that the episode of shortness of breath sputum production was not related to a cardiac issue.  EKG today again remains with normal sinus rhythm first-degree AV block and otherwise within normal limits\par \par 2.  The patient is on aspirin and Xarelto.  I think the aspirin can be discontinued.  At age 89 with 2 anticoagulants she is at increased risk of bleeding which I discussed with the daughter and the patient.  She will stop aspirin for now\par \par 3.  I urged to be careful with salt restriction concerning her blood pressure.  She should continue otherwise on her present medications which he is tolerating

## 2020-12-21 PROBLEM — N39.0 ACUTE UTI: Status: RESOLVED | Noted: 2019-09-10 | Resolved: 2020-12-21

## 2021-01-30 ENCOUNTER — RX RENEWAL (OUTPATIENT)
Age: 86
End: 2021-01-30

## 2021-02-03 ENCOUNTER — RX RENEWAL (OUTPATIENT)
Age: 86
End: 2021-02-03

## 2021-02-15 ENCOUNTER — RX RENEWAL (OUTPATIENT)
Age: 86
End: 2021-02-15

## 2021-03-05 ENCOUNTER — RX RENEWAL (OUTPATIENT)
Age: 86
End: 2021-03-05

## 2021-03-11 ENCOUNTER — NON-APPOINTMENT (OUTPATIENT)
Age: 86
End: 2021-03-11

## 2021-03-11 ENCOUNTER — APPOINTMENT (OUTPATIENT)
Dept: CARDIOLOGY | Facility: CLINIC | Age: 86
End: 2021-03-11
Payer: MEDICARE

## 2021-03-11 VITALS
OXYGEN SATURATION: 92 % | DIASTOLIC BLOOD PRESSURE: 70 MMHG | BODY MASS INDEX: 33.96 KG/M2 | SYSTOLIC BLOOD PRESSURE: 140 MMHG | HEART RATE: 55 BPM | HEIGHT: 60 IN | WEIGHT: 173 LBS

## 2021-03-11 PROCEDURE — 93000 ELECTROCARDIOGRAM COMPLETE: CPT

## 2021-03-11 PROCEDURE — 99214 OFFICE O/P EST MOD 30 MIN: CPT

## 2021-03-11 NOTE — PHYSICAL EXAM
[Normal Conjunctiva] : the conjunctiva exhibited no abnormalities [No Jugular Venous Bustillo A Waves] : no jugular venous bustillo A waves [] : no respiratory distress [Respiration, Rhythm And Depth] : normal respiratory rhythm and effort [Auscultation Breath Sounds / Voice Sounds] : lungs were clear to auscultation bilaterally [Murmurs] : no murmurs present [Abdomen Soft] : soft [Abdomen Tenderness] : non-tender [FreeTextEntry1] : No edema

## 2021-03-11 NOTE — HISTORY OF PRESENT ILLNESS
[FreeTextEntry1] : To review, Sabi is now 89 years old and well-known to me.  She has a history of paroxysmal atrial fibrillation but has reverted to normal sinus rhythm and remains in normal sinus rhythm with a sinus bradycardia.  She has been on anticoagulation for several years with Xarelto which she has tolerated.  She is also on aspirin which was given to her after a presumed CVA\par \par Several years ago she had an episode of confusion initially felt to be a possible TIA but subsequently felt possibly to be a seizure disorder.  She has been on seizure medications without any recurrence of these episodes and has remained on those medications including the Xarelto and aspirin\par \par She does have chronic shortness of breath which may recently have exacerbated\par Last echocardiogram 2019 revealed overall preserved LV function with moderate mitral regurgitation and mild pulmonary hypertension.\par \par \par She remains quite mentally intact, uses a walker but finds a difficulty walking with fatigue and discomfort in her legs\par \par Last visit, she had some degree of a cold and a cough with sputum production and felt increased shortness of breath.  She had no chest pain.  She took Spiriva with improvement and presently has no significant shortness of breath other than her chronic shortness of breath and fatigue when she is walking with a walker.  According to her daughter she has decreased her walking activity.  She continues to be mentally intact.  Presently she has no shortness of breath no wheezing no cough and no sputum production.  No time did she have fever\par \par She received the COVID-19 Moderna vaccine\par \par She continues to fail exertional shortness of breath and fatigue no chest pain or palpitations.  She has had the symptoms for some time but this is her major complaint.  She denies palpitations dizziness or syncope\par \par EKG today reveals normal sinus rhythm first-degree AV block with a sinus bradycardia\par \par

## 2021-03-11 NOTE — REASON FOR VISIT
[FreeTextEntry1] : Sabi Cordova now 89 years old returns for follow-up of her hypertension and cardiac status.  She continues to complain of shortness of breath with mild degrees of exertion which has been present for some time

## 2021-03-11 NOTE — ASSESSMENT
[FreeTextEntry1] : Sabi Cordova with a history of hypertension paroxysmal atrial fibrillation is actually doing amazingly well.  Her blood pressure is under good control and there is no evidence of overt CHF.  She does have chronic shortness of breath which she has had for some time which could be a combination of her body habitus perhaps diastolic dysfunction and some underlying airways disease.  The shortness of breath is a major complaint.  It has not exacerbated but is very bothersome to her\par \par Overall however I find her clinically stable\par \par Clinically she looks quite well and quite intact at age 89\par \par Exact cause of the shortness of breath is still unclear to me

## 2021-03-11 NOTE — DISCUSSION/SUMMARY
[FreeTextEntry1] : 1.  Continue present regimen of medications low-dose beta-blocker and ACE inhibitor\par 2.  Continue Xarelto for paroxysmal atrial fibrillation\par 3.  Repeat echocardiogram to reevaluate for mitral regurgitation pulmonary hypertension LV function as a possible cause of her shortness of breath\par \par Patient will follow with me in 3 months.  Once the echo was performed I will review the results with the patient and a copy will be sent to Dr. Nolasco

## 2021-03-25 ENCOUNTER — APPOINTMENT (OUTPATIENT)
Dept: CARDIOLOGY | Facility: CLINIC | Age: 86
End: 2021-03-25
Payer: MEDICARE

## 2021-03-25 PROCEDURE — 93306 TTE W/DOPPLER COMPLETE: CPT

## 2021-04-16 ENCOUNTER — LABORATORY RESULT (OUTPATIENT)
Age: 86
End: 2021-04-16

## 2021-04-16 ENCOUNTER — APPOINTMENT (OUTPATIENT)
Dept: INTERNAL MEDICINE | Facility: CLINIC | Age: 86
End: 2021-04-16
Payer: MEDICARE

## 2021-04-16 ENCOUNTER — NON-APPOINTMENT (OUTPATIENT)
Age: 86
End: 2021-04-16

## 2021-04-16 VITALS — BODY MASS INDEX: 29.64 KG/M2 | HEIGHT: 60 IN | HEART RATE: 57 BPM | WEIGHT: 151 LBS | TEMPERATURE: 97.8 F

## 2021-04-16 DIAGNOSIS — Z00.00 ENCOUNTER FOR GENERAL ADULT MEDICAL EXAMINATION W/OUT ABNORMAL FINDINGS: ICD-10-CM

## 2021-04-16 PROCEDURE — 99214 OFFICE O/P EST MOD 30 MIN: CPT | Mod: 25

## 2021-04-16 PROCEDURE — 93000 ELECTROCARDIOGRAM COMPLETE: CPT

## 2021-04-16 PROCEDURE — G0009: CPT

## 2021-04-16 PROCEDURE — 90732 PPSV23 VACC 2 YRS+ SUBQ/IM: CPT

## 2021-04-16 PROCEDURE — 36415 COLL VENOUS BLD VENIPUNCTURE: CPT

## 2021-04-16 PROCEDURE — G0439: CPT

## 2021-04-16 NOTE — HEALTH RISK ASSESSMENT
[Fair] :  ~his/her~ mood as fair [Yes] : Yes [0] : 1) Little interest or pleasure doing things: Not at all (0) [None] : None [College] : College [] :  [Fully functional (bathing, dressing, toileting, transferring, walking, feeding)] : Fully functional (bathing, dressing, toileting, transferring, walking, feeding) [Fully functional (using the telephone, shopping, preparing meals, housekeeping, doing laundry, using] : Fully functional and needs no help or supervision to perform IADLs (using the telephone, shopping, preparing meals, housekeeping, doing laundry, using transportation, managing medications and managing finances) [Reports changes in vision] : Reports changes in vision [Smoke Detector] : smoke detector [Carbon Monoxide Detector] : carbon monoxide detector [Seat Belt] :  uses seat belt [Sunscreen] : uses sunscreen [I will adhere to the patient's wishes as expressed in the advance directive except as noted below.] : I will adhere to the patient's wishes as expressed in the advance directive except as noted below [Any fall with injury in past year] : Patient reported fall with injury in the past year [] : No [Change in mental status noted] : No change in mental status noted [Sexually Active] : not sexually active [Reports changes in hearing] : Reports no changes in hearing [Reports changes in dental health] : Reports no changes in dental health [Guns at Home] : no guns at home [Safety elements used in home] : no safety elements used in home [Travel to Developing Areas] : does not  travel to developing areas [TB Exposure] : is not being exposed to tuberculosis [Caregiver Concerns] : does not have caregiver concerns [FreeTextEntry4] : Daughter is healthcare proxy

## 2021-04-16 NOTE — REVIEW OF SYSTEMS
[Vision Problems] : vision problems [Dyspnea on Exertion] : dyspnea on exertion [Joint Pain] : joint pain [Negative] : Heme/Lymph [de-identified] : Laceration

## 2021-04-16 NOTE — ASSESSMENT
[FreeTextEntry1] : This is a 89-year-old female with multiple medical issues\par \par She has a history of hypertension her blood pressure is under good control she has no orthostasis no medication changes\par \par She has a history of hypercholesterolemia she remains on a statin a cholesterol profile has been obtained medication changes predicated on the results.\par \par She has a deterioration in her vision she has a historyOf macular degeneration she is now complaining of decreased vision I asked her to see retinal ASAP\par \par She has a History of seizure disorder she Remains on Tegretol she has been seizure-free Level has been drawn\par \par She had a recent fall she has no evidence of infection however we will put her on antibiotics prophylactically this is a large area.  In addition she has pain in her toe we will refer her to podiatry\par \par He has a history of reflux she remains on Prilosec appropriate blood tests have been obtained\par \par She has a history of COPD she states that she takes Spivera and it is helpful however she takes it intermittently I asked her to take it on a daily basis and to give me a call in about 2 weeks\par \par Has a history of atrial fibrillation she remains on Xarelto\par \par Recent echocardiogram was reviewed she does have moderate pulmonary hypertension and  moderate To severe mitral insufficiency Cardiology does not wish any intervention and I agree

## 2021-04-16 NOTE — HISTORY OF PRESENT ILLNESS
[FreeTextEntry1] : This is a 89-year-old female for annual health assessment.  Specifically we will address her history of atrial fibrillation hypertension hypercholesterolemia hypothyroidism seizure disorder [de-identified] : Patient continues to be short of breath on exertion\par \par She states that she had a fall approximately 3 days ago injuring her left toe and lacerating her shin she went to [facility] med

## 2021-04-17 ENCOUNTER — RX RENEWAL (OUTPATIENT)
Age: 86
End: 2021-04-17

## 2021-04-17 LAB
25(OH)D3 SERPL-MCNC: 40.9 NG/ML
ALBUMIN SERPL ELPH-MCNC: 4.2 G/DL
ALP BLD-CCNC: 115 U/L
ALT SERPL-CCNC: 18 U/L
ANION GAP SERPL CALC-SCNC: 12 MMOL/L
APPEARANCE: CLEAR
AST SERPL-CCNC: 27 U/L
BASOPHILS # BLD AUTO: 0.02 K/UL
BASOPHILS NFR BLD AUTO: 0.2 %
BILIRUB SERPL-MCNC: 0.3 MG/DL
BILIRUBIN URINE: NEGATIVE
BLOOD URINE: NEGATIVE
BUN SERPL-MCNC: 32 MG/DL
CALCIUM SERPL-MCNC: 9.4 MG/DL
CHLORIDE SERPL-SCNC: 97 MMOL/L
CHOLEST SERPL-MCNC: 166 MG/DL
CO2 SERPL-SCNC: 26 MMOL/L
COLOR: YELLOW
CREAT SERPL-MCNC: 0.92 MG/DL
EOSINOPHIL # BLD AUTO: 0.08 K/UL
EOSINOPHIL NFR BLD AUTO: 0.9 %
ESTIMATED AVERAGE GLUCOSE: 123 MG/DL
GLUCOSE QUALITATIVE U: NEGATIVE
GLUCOSE SERPL-MCNC: 92 MG/DL
HBA1C MFR BLD HPLC: 5.9 %
HCT VFR BLD CALC: 40.1 %
HDLC SERPL-MCNC: 76 MG/DL
HGB BLD-MCNC: 12.8 G/DL
IMM GRANULOCYTES NFR BLD AUTO: 0.1 %
KETONES URINE: NEGATIVE
LDLC SERPL CALC-MCNC: 68 MG/DL
LEUKOCYTE ESTERASE URINE: ABNORMAL
LYMPHOCYTES # BLD AUTO: 1.6 K/UL
LYMPHOCYTES NFR BLD AUTO: 18 %
MAN DIFF?: NORMAL
MCHC RBC-ENTMCNC: 30.2 PG
MCHC RBC-ENTMCNC: 31.9 GM/DL
MCV RBC AUTO: 94.6 FL
MONOCYTES # BLD AUTO: 0.74 K/UL
MONOCYTES NFR BLD AUTO: 8.3 %
NEUTROPHILS # BLD AUTO: 6.44 K/UL
NEUTROPHILS NFR BLD AUTO: 72.5 %
NITRITE URINE: POSITIVE
NONHDLC SERPL-MCNC: 90 MG/DL
PH URINE: 6
PLATELET # BLD AUTO: 249 K/UL
POTASSIUM SERPL-SCNC: 4 MMOL/L
PROT SERPL-MCNC: 6.7 G/DL
PROTEIN URINE: NORMAL
RBC # BLD: 4.24 M/UL
RBC # FLD: 16.1 %
SAVE SPECIMEN: NORMAL
SODIUM SERPL-SCNC: 135 MMOL/L
SPECIFIC GRAVITY URINE: 1.02
T3RU NFR SERPL: 0.9 TBI
T4 SERPL-MCNC: 6.3 UG/DL
TRIGL SERPL-MCNC: 111 MG/DL
TSH SERPL-ACNC: 3.06 UIU/ML
URATE SERPL-MCNC: 6.8 MG/DL
UROBILINOGEN URINE: NORMAL
WBC # FLD AUTO: 8.89 K/UL

## 2021-04-20 PROBLEM — D47.2 MONOCLONAL GAMMOPATHY: Status: ACTIVE | Noted: 2021-04-20

## 2021-04-20 LAB
ALBUMIN MFR SERPL ELPH: 54.6 %
ALBUMIN SERPL-MCNC: 3.9 G/DL
ALBUMIN/GLOB SERPL: 1.2 RATIO
ALPHA1 GLOB MFR SERPL ELPH: 6.2 %
ALPHA1 GLOB SERPL ELPH-MCNC: 0.4 G/DL
ALPHA2 GLOB MFR SERPL ELPH: 13.7 %
ALPHA2 GLOB SERPL ELPH-MCNC: 1 G/DL
B-GLOBULIN MFR SERPL ELPH: 11.6 %
B-GLOBULIN SERPL ELPH-MCNC: 0.8 G/DL
GAMMA GLOB FLD ELPH-MCNC: 1 G/DL
GAMMA GLOB MFR SERPL ELPH: 13.9 %
INTERPRETATION SERPL IEP-IMP: NORMAL
M PROTEIN MFR SERPL ELPH: 2.4 %
M PROTEIN SPEC IFE-MCNC: NORMAL
MONOCLON BAND OBS SERPL: 0.2 G/DL
OXCARBAZEPINE SERPL-MCNC: 12 UG/ML
PROT SERPL-MCNC: 7.1 G/DL
PROT SERPL-MCNC: 7.1 G/DL

## 2021-04-22 ENCOUNTER — APPOINTMENT (OUTPATIENT)
Dept: INTERNAL MEDICINE | Facility: CLINIC | Age: 86
End: 2021-04-22

## 2021-04-22 DIAGNOSIS — D47.2 MONOCLONAL GAMMOPATHY: ICD-10-CM

## 2021-04-29 ENCOUNTER — OUTPATIENT (OUTPATIENT)
Dept: OUTPATIENT SERVICES | Facility: HOSPITAL | Age: 86
LOS: 1 days | Discharge: HOME | End: 2021-04-29

## 2021-04-29 DIAGNOSIS — L89.90 PRESSURE ULCER OF UNSPECIFIED SITE, UNSPECIFIED STAGE: ICD-10-CM

## 2021-05-03 ENCOUNTER — APPOINTMENT (OUTPATIENT)
Dept: WOUND CARE | Facility: CLINIC | Age: 86
End: 2021-05-03

## 2021-05-12 DIAGNOSIS — Z91.018 ALLERGY TO OTHER FOODS: ICD-10-CM
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
## 2021-05-12 DIAGNOSIS — Z86.73 PERSONAL HISTORY OF TRANSIENT ISCHEMIC ATTACK (TIA), AND CEREBRAL INFARCTION WITHOUT RESIDUAL DEFICITS: ICD-10-CM

## 2021-05-12 DIAGNOSIS — Z91.81 HISTORY OF FALLING: ICD-10-CM

## 2021-05-12 DIAGNOSIS — H04.123 DRY EYE SYNDROME OF BILATERAL LACRIMAL GLANDS: ICD-10-CM

## 2021-05-12 DIAGNOSIS — R06.09 OTHER FORMS OF DYSPNEA: ICD-10-CM

## 2021-05-12 DIAGNOSIS — F40.240 CLAUSTROPHOBIA: ICD-10-CM

## 2021-05-12 DIAGNOSIS — Z87.891 PERSONAL HISTORY OF NICOTINE DEPENDENCE: ICD-10-CM

## 2021-05-12 DIAGNOSIS — Z98.49 CATARACT EXTRACTION STATUS, UNSPECIFIED EYE: ICD-10-CM

## 2021-05-12 DIAGNOSIS — I10 ESSENTIAL (PRIMARY) HYPERTENSION: ICD-10-CM

## 2021-05-12 DIAGNOSIS — S81.802A UNSPECIFIED OPEN WOUND, LEFT LOWER LEG, INITIAL ENCOUNTER: ICD-10-CM
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
## 2021-05-12 DIAGNOSIS — H35.30 UNSPECIFIED MACULAR DEGENERATION: ICD-10-CM
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
## 2021-05-12 DIAGNOSIS — Z96.642 PRESENCE OF LEFT ARTIFICIAL HIP JOINT: ICD-10-CM

## 2021-05-12 DIAGNOSIS — Z79.890 HORMONE REPLACEMENT THERAPY: ICD-10-CM

## 2021-05-12 DIAGNOSIS — Z79.899 OTHER LONG TERM (CURRENT) DRUG THERAPY: ICD-10-CM
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
## 2021-05-12 DIAGNOSIS — R56.9 UNSPECIFIED CONVULSIONS: ICD-10-CM

## 2021-05-12 DIAGNOSIS — M19.90 UNSPECIFIED OSTEOARTHRITIS, UNSPECIFIED SITE: ICD-10-CM

## 2021-05-13 ENCOUNTER — OUTPATIENT (OUTPATIENT)
Dept: OUTPATIENT SERVICES | Facility: HOSPITAL | Age: 86
LOS: 1 days | Discharge: HOME | End: 2021-05-13

## 2021-05-13 DIAGNOSIS — L89.90 PRESSURE ULCER OF UNSPECIFIED SITE, UNSPECIFIED STAGE: ICD-10-CM
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
## 2021-05-14 DIAGNOSIS — Z79.890 HORMONE REPLACEMENT THERAPY: ICD-10-CM
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
## 2021-05-14 DIAGNOSIS — S81.802A UNSPECIFIED OPEN WOUND, LEFT LOWER LEG, INITIAL ENCOUNTER: ICD-10-CM

## 2021-05-14 DIAGNOSIS — M19.90 UNSPECIFIED OSTEOARTHRITIS, UNSPECIFIED SITE: ICD-10-CM

## 2021-05-14 DIAGNOSIS — H26.9 UNSPECIFIED CATARACT: ICD-10-CM

## 2021-05-14 DIAGNOSIS — Z91.018 ALLERGY TO OTHER FOODS: ICD-10-CM
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
## 2021-05-14 DIAGNOSIS — F40.240 CLAUSTROPHOBIA: ICD-10-CM

## 2021-05-14 DIAGNOSIS — R56.9 UNSPECIFIED CONVULSIONS: ICD-10-CM

## 2021-05-14 DIAGNOSIS — I10 ESSENTIAL (PRIMARY) HYPERTENSION: ICD-10-CM

## 2021-05-14 DIAGNOSIS — Z79.899 OTHER LONG TERM (CURRENT) DRUG THERAPY: ICD-10-CM

## 2021-05-14 DIAGNOSIS — Z79.01 LONG TERM (CURRENT) USE OF ANTICOAGULANTS: ICD-10-CM
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
## 2021-05-14 DIAGNOSIS — Z91.81 HISTORY OF FALLING: ICD-10-CM

## 2021-05-17 ENCOUNTER — RX RENEWAL (OUTPATIENT)
Age: 86
End: 2021-05-17

## 2021-06-09 DIAGNOSIS — M25.519 PAIN IN UNSPECIFIED SHOULDER: ICD-10-CM

## 2021-06-21 RX ORDER — TIOTROPIUM BROMIDE 18 UG/1
18 CAPSULE ORAL; RESPIRATORY (INHALATION)
Qty: 1 | Refills: 6 | Status: DISCONTINUED | COMMUNITY
Start: 2019-12-06 | End: 2021-06-21

## 2021-06-23 ENCOUNTER — APPOINTMENT (OUTPATIENT)
Dept: INTERNAL MEDICINE | Facility: CLINIC | Age: 86
End: 2021-06-23
Payer: MEDICARE

## 2021-06-23 ENCOUNTER — APPOINTMENT (OUTPATIENT)
Dept: ULTRASOUND IMAGING | Facility: CLINIC | Age: 86
End: 2021-06-23
Payer: MEDICARE

## 2021-06-23 ENCOUNTER — OUTPATIENT (OUTPATIENT)
Dept: OUTPATIENT SERVICES | Facility: HOSPITAL | Age: 86
LOS: 1 days | End: 2021-06-23
Payer: MEDICARE

## 2021-06-23 VITALS — DIASTOLIC BLOOD PRESSURE: 70 MMHG | TEMPERATURE: 97.6 F | SYSTOLIC BLOOD PRESSURE: 120 MMHG

## 2021-06-23 DIAGNOSIS — Z00.8 ENCOUNTER FOR OTHER GENERAL EXAMINATION: ICD-10-CM

## 2021-06-23 PROCEDURE — 99213 OFFICE O/P EST LOW 20 MIN: CPT

## 2021-06-23 PROCEDURE — 93970 EXTREMITY STUDY: CPT

## 2021-06-23 PROCEDURE — 93970 EXTREMITY STUDY: CPT | Mod: 26

## 2021-06-23 NOTE — HISTORY OF PRESENT ILLNESS
[FreeTextEntry8] : This is a remarkable 89-year-old female who is totally in charge of her faculties and is accompanied by her daughter.  She had a wound several weeks ago and was treated at a wound center and ultimately discharged the wound was open it is now closed.  She states over the last 3 days she has felt a tightness in both legs but more so in the left leg\par \par She has no pleuritic pain she has no change in her basic dyspnea

## 2021-06-23 NOTE — REVIEW OF SYSTEMS
[Lower Ext Edema] : lower extremity edema [Dyspnea on Exertion] : dyspnea on exertion [Negative] : Constitutional [de-identified] : Erythema left lower extremity

## 2021-06-23 NOTE — ASSESSMENT
[FreeTextEntry1] : 89-year-old female whose history has been reviewed above\par \par She has mildly asymmetric edema which would be expected secondary to her wound she has some erythema as well.  She was discharged from wound care and feels that it has just gotten better since then\par \par She is on anticoagulation.  I seriously doubt that she has a DVT but I cannot know her asymmetric edema and her underlying sedentary situation and inflammatory process.  We will get a duplex and hopefully rule out a DVT

## 2021-06-24 DIAGNOSIS — M71.20 SYNOVIAL CYST OF POPLITEAL SPACE [BAKER], UNSPECIFIED KNEE: ICD-10-CM

## 2021-06-29 ENCOUNTER — RX RENEWAL (OUTPATIENT)
Age: 86
End: 2021-06-29

## 2021-07-05 ENCOUNTER — RX RENEWAL (OUTPATIENT)
Age: 86
End: 2021-07-05

## 2021-07-14 ENCOUNTER — LABORATORY RESULT (OUTPATIENT)
Age: 86
End: 2021-07-14

## 2021-07-14 ENCOUNTER — APPOINTMENT (OUTPATIENT)
Dept: INTERNAL MEDICINE | Facility: CLINIC | Age: 86
End: 2021-07-14
Payer: MEDICARE

## 2021-07-14 VITALS
WEIGHT: 156 LBS | SYSTOLIC BLOOD PRESSURE: 138 MMHG | HEIGHT: 60 IN | DIASTOLIC BLOOD PRESSURE: 78 MMHG | BODY MASS INDEX: 30.63 KG/M2

## 2021-07-14 PROCEDURE — 99214 OFFICE O/P EST MOD 30 MIN: CPT | Mod: 25

## 2021-07-14 PROCEDURE — 36415 COLL VENOUS BLD VENIPUNCTURE: CPT

## 2021-07-14 NOTE — PHYSICAL EXAM
[No JVD] : no jugular venous distention [Normal] : no CVA or spinal tenderness [No Focal Deficits] : no focal deficits [de-identified] : Overweight [de-identified] : Swelling edema mild erythema lower extremities [de-identified] : Minimal erythema

## 2021-07-14 NOTE — HISTORY OF PRESENT ILLNESS
[FreeTextEntry1] : This is a 89-year-old female for evaluation and treatment of her seizure disorder hypertension hypercholesterolemia atrial fibrillation COPD shortness of breath [de-identified] : Patient is feeling well she has chronic dyspnea on exertion but no change\par \par Her wounds have healed.\par \par She has had no seizure activity

## 2021-07-14 NOTE — ASSESSMENT
[FreeTextEntry1] : This is a 89-year-old female whose history has been reviewed above\par \par Has a history of hypertension her blood pressure is under excellent control she has no orthostasis no intervention continue current medications assess electrolytes\par \par She remains on atorvastatin for hypercholesterolemia cholesterol profile has been obtained medication changes predicated on the results\par \par She has a history of hypothyroidism TSH has been repeated she is clinically euthyroid recommendations pending results\par \par She does have shortness of breath which has been stable her lungs are clear she is currently on Incruse Ellipta her lungs are clear however she does feel that this does help her with her walking we will continue\par \par She has a history of atrial fibrillation she clinically is in sinus we will continue her anticoagulation\par \par She has no seizure activity she remains on oxcarbazepine appropriate blood tests were obtained including a serum sodium\par

## 2021-07-14 NOTE — REVIEW OF SYSTEMS
[Dyspnea on Exertion] : dyspnea on exertion [Negative] : Psychiatric [FreeTextEntry5] : Dyspnea on exertion

## 2021-07-16 LAB
25(OH)D3 SERPL-MCNC: 46.7 NG/ML
ALBUMIN SERPL ELPH-MCNC: 4.1 G/DL
ALP BLD-CCNC: 131 U/L
ALT SERPL-CCNC: 19 U/L
ANION GAP SERPL CALC-SCNC: 12 MMOL/L
AST SERPL-CCNC: 26 U/L
BASOPHILS # BLD AUTO: 0.03 K/UL
BASOPHILS NFR BLD AUTO: 0.4 %
BILIRUB SERPL-MCNC: 0.2 MG/DL
BUN SERPL-MCNC: 27 MG/DL
CALCIUM SERPL-MCNC: 9.2 MG/DL
CHLORIDE SERPL-SCNC: 99 MMOL/L
CHOLEST SERPL-MCNC: 151 MG/DL
CO2 SERPL-SCNC: 26 MMOL/L
CREAT SERPL-MCNC: 0.92 MG/DL
EOSINOPHIL # BLD AUTO: 0.1 K/UL
EOSINOPHIL NFR BLD AUTO: 1.4 %
ESTIMATED AVERAGE GLUCOSE: 117 MG/DL
GLUCOSE SERPL-MCNC: 96 MG/DL
HBA1C MFR BLD HPLC: 5.7 %
HCT VFR BLD CALC: 35.9 %
HDLC SERPL-MCNC: 68 MG/DL
HGB BLD-MCNC: 11.9 G/DL
IMM GRANULOCYTES NFR BLD AUTO: 0.3 %
LDLC SERPL CALC-MCNC: 66 MG/DL
LYMPHOCYTES # BLD AUTO: 1.41 K/UL
LYMPHOCYTES NFR BLD AUTO: 19.9 %
MAN DIFF?: NORMAL
MCHC RBC-ENTMCNC: 31.1 PG
MCHC RBC-ENTMCNC: 33.1 GM/DL
MCV RBC AUTO: 93.7 FL
MONOCYTES # BLD AUTO: 0.63 K/UL
MONOCYTES NFR BLD AUTO: 8.9 %
NEUTROPHILS # BLD AUTO: 4.91 K/UL
NEUTROPHILS NFR BLD AUTO: 69.1 %
NONHDLC SERPL-MCNC: 83 MG/DL
PLATELET # BLD AUTO: 246 K/UL
POTASSIUM SERPL-SCNC: 4.3 MMOL/L
PROT SERPL-MCNC: 6.4 G/DL
RBC # BLD: 3.83 M/UL
RBC # FLD: 14.7 %
SAVE SPECIMEN: NORMAL
SODIUM SERPL-SCNC: 136 MMOL/L
T3RU NFR SERPL: 1 TBI
T4 SERPL-MCNC: 4.7 UG/DL
TRIGL SERPL-MCNC: 88 MG/DL
TSH SERPL-ACNC: 11.2 UIU/ML
URATE SERPL-MCNC: 6.1 MG/DL
WBC # FLD AUTO: 7.1 K/UL

## 2021-07-26 ENCOUNTER — RX RENEWAL (OUTPATIENT)
Age: 86
End: 2021-07-26

## 2021-08-09 ENCOUNTER — RX RENEWAL (OUTPATIENT)
Age: 86
End: 2021-08-09

## 2021-08-16 ENCOUNTER — RX RENEWAL (OUTPATIENT)
Age: 86
End: 2021-08-16

## 2021-08-24 ENCOUNTER — RX RENEWAL (OUTPATIENT)
Age: 86
End: 2021-08-24

## 2021-10-08 ENCOUNTER — RX RENEWAL (OUTPATIENT)
Age: 86
End: 2021-10-08

## 2021-10-13 ENCOUNTER — LABORATORY RESULT (OUTPATIENT)
Age: 86
End: 2021-10-13

## 2021-10-13 ENCOUNTER — APPOINTMENT (OUTPATIENT)
Dept: INTERNAL MEDICINE | Facility: CLINIC | Age: 86
End: 2021-10-13
Payer: MEDICARE

## 2021-10-13 ENCOUNTER — NON-APPOINTMENT (OUTPATIENT)
Age: 86
End: 2021-10-13

## 2021-10-13 VITALS
HEIGHT: 60 IN | BODY MASS INDEX: 30.23 KG/M2 | DIASTOLIC BLOOD PRESSURE: 80 MMHG | WEIGHT: 154 LBS | SYSTOLIC BLOOD PRESSURE: 142 MMHG

## 2021-10-13 PROCEDURE — 93000 ELECTROCARDIOGRAM COMPLETE: CPT

## 2021-10-13 PROCEDURE — 99214 OFFICE O/P EST MOD 30 MIN: CPT | Mod: 25

## 2021-10-13 PROCEDURE — 36415 COLL VENOUS BLD VENIPUNCTURE: CPT

## 2021-10-13 NOTE — ASSESSMENT
[FreeTextEntry1] : -This is a 89 year-old female whose history has been reviewed above\par \par She has a history of hypertension her blood pressure is under excellent control she has no orthostasis no medication changes\par \par Has a history of hypercholesterolemia she remains on a statin a cholesterol profile has been obtained medication changes predicated on the results\par \par She does have a chronic cough which is more like a tickle I do believe this is due to her quinapril we discussed this she can tolerate the sensation\par \par He has no seizure activity she remains on oxcarbazepine appropriate blood tests including serum sodium were obtained\par \par She is currently on Incruse Ellipta which she feels is helping\par \par I will obtain a BNP.  She may have mild fluid overload.  If her BNP is significantly elevated I will switch her from hydrochlorothiazide to Lasix\par Last echo showed good left ventricular function but moderate pulmonary hypertension\par \par Get the Covid booster and then the flu shot

## 2021-10-13 NOTE — PHYSICAL EXAM
[No JVD] : no jugular venous distention [Normal] : normal rate, regular rhythm, normal S1 and S2 and no murmur heard [No Focal Deficits] : no focal deficits [de-identified] : Bibasilar crackles

## 2021-10-13 NOTE — HISTORY OF PRESENT ILLNESS
[FreeTextEntry1] : Is a remarkable 89-year-old female for evaluation and treatment of her hypertension hypercholesterolemia hypothyroidism pulmonary hypertension seizure disorder and balance difficulties [de-identified] : Her complaints remain the same she has dyspnea on exertion.  She does not believe that this has increased but she is a bit tachypneic today.  However she did not take her inhaler.  Actually this is one of the few times where she states that the inhaler has been helpful\par \par She has had no chest pain no dysuria no seizures

## 2021-10-14 LAB
25(OH)D3 SERPL-MCNC: 42.1 NG/ML
ALBUMIN SERPL ELPH-MCNC: 4.1 G/DL
ALP BLD-CCNC: 114 U/L
ALT SERPL-CCNC: 23 U/L
ANION GAP SERPL CALC-SCNC: 11 MMOL/L
AST SERPL-CCNC: 28 U/L
BASOPHILS # BLD AUTO: 0.03 K/UL
BASOPHILS NFR BLD AUTO: 0.5 %
BILIRUB SERPL-MCNC: 0.2 MG/DL
BUN SERPL-MCNC: 27 MG/DL
CALCIUM SERPL-MCNC: 9.6 MG/DL
CHLORIDE SERPL-SCNC: 96 MMOL/L
CHOLEST SERPL-MCNC: 159 MG/DL
CO2 SERPL-SCNC: 27 MMOL/L
CREAT SERPL-MCNC: 0.94 MG/DL
EOSINOPHIL # BLD AUTO: 0.1 K/UL
EOSINOPHIL NFR BLD AUTO: 1.6 %
ESTIMATED AVERAGE GLUCOSE: 123 MG/DL
GLUCOSE SERPL-MCNC: 123 MG/DL
HBA1C MFR BLD HPLC: 5.9 %
HCT VFR BLD CALC: 38.4 %
HDLC SERPL-MCNC: 78 MG/DL
HGB BLD-MCNC: 12.5 G/DL
IMM GRANULOCYTES NFR BLD AUTO: 0.3 %
LDLC SERPL CALC-MCNC: 66 MG/DL
LYMPHOCYTES # BLD AUTO: 1.35 K/UL
LYMPHOCYTES NFR BLD AUTO: 21.3 %
MAN DIFF?: NORMAL
MCHC RBC-ENTMCNC: 30.6 PG
MCHC RBC-ENTMCNC: 32.6 GM/DL
MCV RBC AUTO: 93.9 FL
MONOCYTES # BLD AUTO: 0.53 K/UL
MONOCYTES NFR BLD AUTO: 8.4 %
NEUTROPHILS # BLD AUTO: 4.31 K/UL
NEUTROPHILS NFR BLD AUTO: 67.9 %
NONHDLC SERPL-MCNC: 81 MG/DL
NT-PROBNP SERPL-MCNC: 971 PG/ML
PLATELET # BLD AUTO: 237 K/UL
POTASSIUM SERPL-SCNC: 4.1 MMOL/L
PROT SERPL-MCNC: 6.5 G/DL
RBC # BLD: 4.09 M/UL
RBC # FLD: 15.5 %
SODIUM SERPL-SCNC: 133 MMOL/L
T3RU NFR SERPL: 0.9 TBI
T4 SERPL-MCNC: 6.4 UG/DL
TRIGL SERPL-MCNC: 77 MG/DL
TSH SERPL-ACNC: 5.17 UIU/ML
URATE SERPL-MCNC: 5.1 MG/DL
WBC # FLD AUTO: 6.34 K/UL

## 2021-10-25 ENCOUNTER — APPOINTMENT (OUTPATIENT)
Dept: INTERNAL MEDICINE | Facility: CLINIC | Age: 86
End: 2021-10-25
Payer: MEDICARE

## 2021-10-25 ENCOUNTER — RX RENEWAL (OUTPATIENT)
Age: 86
End: 2021-10-25

## 2021-10-25 VITALS
HEIGHT: 60 IN | DIASTOLIC BLOOD PRESSURE: 70 MMHG | BODY MASS INDEX: 30.23 KG/M2 | SYSTOLIC BLOOD PRESSURE: 120 MMHG | WEIGHT: 154 LBS

## 2021-10-25 PROCEDURE — 99214 OFFICE O/P EST MOD 30 MIN: CPT

## 2021-11-02 ENCOUNTER — NON-APPOINTMENT (OUTPATIENT)
Age: 86
End: 2021-11-02

## 2021-11-02 ENCOUNTER — APPOINTMENT (OUTPATIENT)
Dept: CARDIOLOGY | Facility: CLINIC | Age: 86
End: 2021-11-02
Payer: MEDICARE

## 2021-11-02 VITALS
OXYGEN SATURATION: 96 % | HEART RATE: 92 BPM | SYSTOLIC BLOOD PRESSURE: 150 MMHG | WEIGHT: 153 LBS | DIASTOLIC BLOOD PRESSURE: 88 MMHG | BODY MASS INDEX: 29.88 KG/M2

## 2021-11-02 PROCEDURE — 93000 ELECTROCARDIOGRAM COMPLETE: CPT

## 2021-11-02 PROCEDURE — 99214 OFFICE O/P EST MOD 30 MIN: CPT

## 2021-11-02 NOTE — HISTORY OF PRESENT ILLNESS
[FreeTextEntry1] : To review, Sabi is now 89 years old and well-known to me.  She has a history of paroxysmal atrial fibrillation but has reverted to normal sinus rhythm and remains in normal sinus rhythm with a sinus bradycardia.  She has been on anticoagulation for several years with Xarelto which she has tolerated.  She is also on aspirin which was given to her after a presumed CVA\par \par Several years ago she had an episode of confusion initially felt to be a possible TIA but subsequently felt possibly to be a seizure disorder.  She has been on seizure medications without any recurrence of these episodes and has remained on those medications including the Xarelto and aspirin\par \par She does have chronic shortness of breath which may recently have exacerbated\par Last echocardiogram 2019 revealed overall preserved LV function with moderate mitral regurgitation and mild pulmonary hypertension.\par \par \par She remains quite mentally intact, uses a walker but finds a difficulty walking with fatigue and discomfort in her legs\par \par Last visit, she had some degree of a cold and a cough with sputum production and felt increased shortness of breath.  She had no chest pain.  She took Spiriva with improvement and presently has no significant shortness of breath other than her chronic shortness of breath and fatigue when she is walking with a walker.  According to her daughter she has decreased her walking activity.  She continues to be mentally intact.  Presently she has no shortness of breath no wheezing no cough and no sputum production.  No time did she have fever\par \par She received the COVID-19 Moderna vaccine\par \par She continues to experience  exertional shortness of breath and fatigue no chest pain or palpitations.  She has had the symptoms for some time but this is her major complaint.  She denies palpitations dizziness or syncope\par \par She recently had something fall on her right leg and developed some increasing edema and some weeping.  It is slowly getting better and Lasix 20 mg was prescribed\par \par EKG today reveals normal sinus rhythm first-degree AV block with a sinus bradycardia\par \par During the exam she suddenly had an irregularly irregular heart rate probably compatible with A. fib\par \par Overall at age 89 she has been functioning fairly well.  She tries to keep as active as possible and mentally she remains quite sharp\par \par Recent blood test October 2021  LDL 66 triglycerides 77 HDL 78 creatinine 0.94 potassium 4.1 GFR 51 and hemoglobin A1c 5.9\par \par

## 2021-11-02 NOTE — DISCUSSION/SUMMARY
[FreeTextEntry1] : Continue on her regimen of medication and continue on Xarelto for prevention of CVA.  In terms of the swelling and weeping from her right leg I believe it is related to the trauma and should heal on its own.  I would continue the Lasix 20 mg for now and if the leg does not heal I agree with wound care.\par \par She will follow up again in 3 months

## 2021-11-02 NOTE — PHYSICAL EXAM
[Normal Conjunctiva] : the conjunctiva exhibited no abnormalities [No Jugular Venous Bustillo A Waves] : no jugular venous bustillo A waves [] : no respiratory distress [Respiration, Rhythm And Depth] : normal respiratory rhythm and effort [Auscultation Breath Sounds / Voice Sounds] : lungs were clear to auscultation bilaterally [Murmurs] : no murmurs present [Abdomen Soft] : soft [Abdomen Tenderness] : non-tender [FreeTextEntry1] : Right lower extremity 2+ pitting edema left lower extremity brawny edema

## 2021-11-02 NOTE — ASSESSMENT
[FreeTextEntry1] : Sabi Cordova with a history of hypertension paroxysmal atrial fibrillation is actually doing amazingly well.  Her blood pressure is under good control and there is no evidence of overt CHF.  She does have chronic shortness of breath which she has had for some time which could be a combination of her body habitus perhaps diastolic dysfunction and some underlying airways disease.  The shortness of breath is a major complaint.  It has not exacerbated but is very bothersome to her.  Her last echocardiogram in March 2021 revealed moderate pulmonary hypertension moderate to severe mitral regurgitation but preserved LV function\par \par \par 1.  Essential hypertension relatively well controlled on present regimen of medication\par \par 2.  Diastolic dysfunction moderate mitral regurgitation and pulmonary hypertension probably the cause of her shortness of breath.  Her shortness of breath however is stable\par \par 3.  Recent injury to her right leg with edema slowly improving started on Lasix 20\par \par 4.  History of paroxysmal atrial fibrillation.  Initially she was in normal sinus rhythm and then later in the exam she was in atrial fibrillation with a relatively well-controlled ventricular response.  Patient initially today was in sinus rhythm but repeat EKG reveals atrial fibrillation with a relatively well-controlled ventricular rate\par \par \par \par

## 2021-11-08 NOTE — ADDENDUM
[FreeTextEntry1] : This patient requires a transport wheelchair.  She easily becomes short of breath, edematous, and has pain when walking short distances.  She has a family member to help her with the chair.  Due to her medical conditions her ADL's are impaired.

## 2021-11-08 NOTE — HISTORY OF PRESENT ILLNESS
[FreeTextEntry8] : 89-year-old female who comes in because of an abrasion to her lower extremity.  Her daughter called and stated it is weeping

## 2021-11-08 NOTE — ASSESSMENT
[FreeTextEntry1] : This is a 89-year-old female whose history has been reviewed above\par \par Patient has a history of COPD and possible start dysfunction.\par \par She does have significant edema and she does have some rales a BNP was mildly elevated we will switch her hydrochlorothiazide to Lasix.  I will reevaluate her or have cardiology evaluate her within the next 2 weeks\par \par Her blood pressure is normal she has no orthostasis\par \par In terms of her wound we will have her elevate and keep bacitracin on she was told that if there is any evidence of infection to let me know\par \par A bit concerned because of her hyponatremia but we will continue to follow\par \par Reviewing cardiology's notes she may have some mild diastolic dysfunction

## 2021-11-08 NOTE — REVIEW OF SYSTEMS
[Lower Ext Edema] : lower extremity edema [Shortness Of Breath] : shortness of breath [Negative] : Constitutional [FreeTextEntry5] : Shortness of breath

## 2021-11-08 NOTE — PHYSICAL EXAM
[No JVD] : no jugular venous distention [Normal] : normal rate, regular rhythm, normal S1 and S2 and no murmur heard [de-identified] : Dry crackles mild minimal mild [de-identified] : 2+ edema [de-identified] : Lacerations small not infected

## 2021-11-19 ENCOUNTER — APPOINTMENT (OUTPATIENT)
Dept: INTERNAL MEDICINE | Facility: CLINIC | Age: 86
End: 2021-11-19
Payer: MEDICARE

## 2021-11-19 VITALS — SYSTOLIC BLOOD PRESSURE: 120 MMHG | HEIGHT: 60 IN | DIASTOLIC BLOOD PRESSURE: 70 MMHG

## 2021-11-19 PROCEDURE — 36415 COLL VENOUS BLD VENIPUNCTURE: CPT

## 2021-11-19 PROCEDURE — 99214 OFFICE O/P EST MOD 30 MIN: CPT | Mod: 25

## 2021-11-19 RX ORDER — UMECLIDINIUM 62.5 UG/1
62.5 AEROSOL, POWDER ORAL DAILY
Qty: 3 | Refills: 1 | Status: DISCONTINUED | COMMUNITY
Start: 2021-06-21 | End: 2021-11-19

## 2021-11-19 NOTE — PHYSICAL EXAM
[Normal] : no acute distress, well nourished, well developed and well-appearing [de-identified] : Diffuse crackles seem somewhat improved [de-identified] : Bilateral edema

## 2021-11-19 NOTE — HISTORY OF PRESENT ILLNESS
[FreeTextEntry8] : This is a 89-year-old female who is brought in by her daughter for increasing edema weeping and bilateral\par \par In addition she is complaining of increasing shortness of breath however she states that the inhaler has helped

## 2021-11-19 NOTE — REVIEW OF SYSTEMS
[Shortness Of Breath] : shortness of breath [Negative] : Cardiovascular [Lower Ext Edema] : no lower extremity edema

## 2021-11-20 ENCOUNTER — RX RENEWAL (OUTPATIENT)
Age: 86
End: 2021-11-20

## 2021-11-20 LAB
BUN SERPL-MCNC: 25 MG/DL
CHLORIDE SERPL-SCNC: 102 MMOL/L
CO2 SERPL-SCNC: 27 MMOL/L
CREAT SERPL-MCNC: 0.94 MG/DL
NT-PROBNP SERPL-MCNC: 1767 PG/ML
POTASSIUM SERPL-SCNC: 4.5 MMOL/L
POTASSIUM SERPL-SCNC: 4.5 MMOL/L
SODIUM SERPL-SCNC: 141 MMOL/L

## 2021-11-28 ENCOUNTER — RX RENEWAL (OUTPATIENT)
Age: 86
End: 2021-11-28

## 2021-12-07 ENCOUNTER — APPOINTMENT (OUTPATIENT)
Dept: INTERNAL MEDICINE | Facility: CLINIC | Age: 86
End: 2021-12-07
Payer: MEDICARE

## 2021-12-07 VITALS
SYSTOLIC BLOOD PRESSURE: 140 MMHG | DIASTOLIC BLOOD PRESSURE: 80 MMHG | WEIGHT: 154 LBS | BODY MASS INDEX: 30.23 KG/M2 | HEIGHT: 60 IN

## 2021-12-07 PROCEDURE — 36415 COLL VENOUS BLD VENIPUNCTURE: CPT

## 2021-12-07 PROCEDURE — 99214 OFFICE O/P EST MOD 30 MIN: CPT | Mod: 25

## 2021-12-07 NOTE — REVIEW OF SYSTEMS
[Negative] : Constitutional [FreeTextEntry5] :  diminished edema [FreeTextEntry6] : Minute shortness of breath  [de-identified] : Wound healed

## 2021-12-07 NOTE — ASSESSMENT
[FreeTextEntry1] : Patient seems to be doing significantly better\par \par Patient has been on increased Lasix which has resulted in decreased edema.  She also has been using support hose and elevating her leg.\par \par In addition she did have weeping of her legs which has disappeared and a small wound which has been recalcitrant is now healed as well\par \par It is also of interest that her shortness of breath has decreased I started her on trilogy as well\par \par An SMA-6 in a proBNP have been obtained\par \par I am not sure as to why her shortness of breath is decreased she may have been in some degree of failure or it may be the inhaler.  At this point I am going to maintain her on her current regime.\par \par She is normotensive and has no orthostasis

## 2021-12-07 NOTE — HISTORY OF PRESENT ILLNESS
[FreeTextEntry1] : This is a 89-year-old female who has returned today after treatment for her peripheral edema\par \par In addition she was started on an inhaler\par \par Also she was asked to use support hose and keep her legs elevated and follow-up with wound care [de-identified] : Patient has had dramatic improvement she has follow-up with wound care and dermatology\par \par

## 2021-12-07 NOTE — PHYSICAL EXAM
[Normal] : no acute distress, well nourished, well developed and well-appearing [de-identified] : Still with occasional crackles [de-identified] : Unchanged [de-identified] : Edema has dramatically diminished [de-identified] : Wound healed

## 2021-12-08 LAB
BUN SERPL-MCNC: 29 MG/DL
CHLORIDE SERPL-SCNC: 104 MMOL/L
CO2 SERPL-SCNC: 27 MMOL/L
CREAT SERPL-MCNC: 0.93 MG/DL
NT-PROBNP SERPL-MCNC: 2563 PG/ML
POTASSIUM SERPL-SCNC: 4.6 MMOL/L
SODIUM SERPL-SCNC: 142 MMOL/L

## 2022-01-11 ENCOUNTER — APPOINTMENT (OUTPATIENT)
Dept: CARDIOLOGY | Facility: CLINIC | Age: 87
End: 2022-01-11
Payer: MEDICARE

## 2022-01-11 VITALS
HEIGHT: 60 IN | SYSTOLIC BLOOD PRESSURE: 142 MMHG | HEART RATE: 58 BPM | OXYGEN SATURATION: 93 % | BODY MASS INDEX: 30.23 KG/M2 | DIASTOLIC BLOOD PRESSURE: 80 MMHG | WEIGHT: 154 LBS

## 2022-01-11 DIAGNOSIS — R53.83 OTHER FATIGUE: ICD-10-CM

## 2022-01-11 PROCEDURE — 93000 ELECTROCARDIOGRAM COMPLETE: CPT

## 2022-01-11 PROCEDURE — 99214 OFFICE O/P EST MOD 30 MIN: CPT

## 2022-01-11 NOTE — DISCUSSION/SUMMARY
[FreeTextEntry1] : 1.  I told her to decrease her Lasix to 40 mg a day taking 2 tablets in the morning.\par \par 2.  As I feel she does have a significant element of diastolic dysfunction heart failure preserved ejection fraction based upon new data, I suggested she start an SGLT2 inhibitor and I started her on Jardiance 10 mg a day\par \par 3.  She will follow-up with me again in 1 month and keep me aware of how she is feeling and if she is improved on the Jardiance.  I did tell her and her daughter about the side effect of urinary tract infection\par \par She will follow-up with me in 1 month

## 2022-01-11 NOTE — ASSESSMENT
[FreeTextEntry1] : Sabi Cordova with a history of hypertension paroxysmal atrial fibrillation is actually doing amazingly well.  Her blood pressure is under good control and there is no evidence of overt CHF.  She does have chronic shortness of breath which she has had for some time which could be a combination of her body habitus perhaps diastolic dysfunction and some underlying airways disease.  The shortness of breath is a major complaint.  Recently her shortness of breath seems to have increased with some response to Spiriva and Trelegy.  She does have some edema of the lower extremities.  Her noninvasive echo was suggestive of moderate to severe MR though I do not appreciate the murmur and pulmonary hypertension with preserved LV function probably mostly compatible with diastolic dysfunction–heart failure preserved ejection fraction\par \par \par 1.  Essential hypertension relatively well controlled on present regimen of medication\par \par 2.  Diastolic dysfunction moderate mitral regurgitation and pulmonary hypertension probably the cause of her shortness of breath.  Her shortness of breath however is stable.  She probably has heart failure preserved ejection fraction contributing to her shortness of breath\par \par 3..  History of paroxysmal atrial fibrillation.  Initially she was in normal sinus rhythm and then later in the exam she was in atrial fibrillation with a relatively well-controlled ventricular response.  Patient initially today was in sinus rhythm but repeat EKG reveals atrial fibrillation with a relatively well-controlled ventricular rate.  She does have intermittent atrial fibrillation alternating with sinus rhythm\par \par \par \par

## 2022-01-11 NOTE — PHYSICAL EXAM
[Normal Conjunctiva] : the conjunctiva exhibited no abnormalities [No Jugular Venous Bustillo A Waves] : no jugular venous bustillo A waves [] : no respiratory distress [Respiration, Rhythm And Depth] : normal respiratory rhythm and effort [Auscultation Breath Sounds / Voice Sounds] : lungs were clear to auscultation bilaterally [Murmurs] : no murmurs present [Abdomen Soft] : soft [Abdomen Tenderness] : non-tender [FreeTextEntry1] : Both lower extremities 1-2+ pitting edema

## 2022-01-14 ENCOUNTER — RX RENEWAL (OUTPATIENT)
Age: 87
End: 2022-01-14

## 2022-01-26 ENCOUNTER — LABORATORY RESULT (OUTPATIENT)
Age: 87
End: 2022-01-26

## 2022-01-26 ENCOUNTER — NON-APPOINTMENT (OUTPATIENT)
Age: 87
End: 2022-01-26

## 2022-01-26 ENCOUNTER — APPOINTMENT (OUTPATIENT)
Dept: INTERNAL MEDICINE | Facility: CLINIC | Age: 87
End: 2022-01-26
Payer: MEDICARE

## 2022-01-26 VITALS — DIASTOLIC BLOOD PRESSURE: 70 MMHG | SYSTOLIC BLOOD PRESSURE: 120 MMHG

## 2022-01-26 PROCEDURE — 36415 COLL VENOUS BLD VENIPUNCTURE: CPT

## 2022-01-26 PROCEDURE — 93000 ELECTROCARDIOGRAM COMPLETE: CPT

## 2022-01-26 PROCEDURE — 99214 OFFICE O/P EST MOD 30 MIN: CPT | Mod: 25

## 2022-01-26 NOTE — REVIEW OF SYSTEMS
[Lower Ext Edema] : lower extremity edema [Shortness Of Breath] : shortness of breath [Negative] : Psychiatric

## 2022-01-26 NOTE — HISTORY OF PRESENT ILLNESS
[FreeTextEntry1] : This is a 89-year-old female for evaluation treatment of her hypertension hypercholesterolemia congestive failure with preserved ejection fraction hypothyroidism dyspnea on exertion [de-identified] : Patient has recently been seen by cardiology and started on Jardiance.  In addition her Lasix has decreased\par \par Patient states that she is still short of breath she thinks that her inhaler helps.  Additionally she has reaccumulated edema and has some areas of breakdown

## 2022-01-26 NOTE — PHYSICAL EXAM
[Normal] : no acute distress, well nourished, well developed and well-appearing [No JVD] : no jugular venous distention [de-identified] : Rales seem to be decreased [de-identified] : Irregularly irregular [de-identified] : 2+ edema [de-identified] : 2 new areas of breakdown

## 2022-01-26 NOTE — ASSESSMENT
[FreeTextEntry1] : This is a remarkable but complex 89-year-old female who is faculties or remarkably intact\par \par She has a history of congestive heart failure with preserved ejection fraction and has recently been started on Jardiance.  She does not believe there has been a change in her shortness of breath however this is hard to ascertain.  She has had no urinary tract infections.  She does have some areas of breakdown with possible local infection.  We will apply local care and I will have her reviewed by nursing\par Will be restarted on Bactroban\par \par She has a history of hypertension her blood pressure is under excellent control she has no orthostasis no medication changes\par \par She has reaccumulated her edema and has breakdown as above we will increase her Lasix back to 3 a day\par \par Today she is in atrial fib flutter but her rate is well controlled she is unaware of the arrhythmia no intervention however electrolytes were drawn she remains on Xarelto\par \par She has a history of a seizure or she remains on medication ox combined oxcarbamizine appropriate blood tests have been drawn\par \par She has a history of hypothyroidism a TSH was obtained she is clinically euthyroid medication changes predicated on the results\par \par \par \par

## 2022-01-27 LAB
25(OH)D3 SERPL-MCNC: 35.8 NG/ML
ALBUMIN SERPL ELPH-MCNC: 4.2 G/DL
ALP BLD-CCNC: 119 U/L
ALT SERPL-CCNC: 30 U/L
ANION GAP SERPL CALC-SCNC: 15 MMOL/L
AST SERPL-CCNC: 30 U/L
BASOPHILS # BLD AUTO: 0.04 K/UL
BASOPHILS NFR BLD AUTO: 0.5 %
BILIRUB SERPL-MCNC: 0.4 MG/DL
BUN SERPL-MCNC: 34 MG/DL
CALCIUM SERPL-MCNC: 9.6 MG/DL
CHLORIDE SERPL-SCNC: 105 MMOL/L
CHOLEST SERPL-MCNC: 158 MG/DL
CO2 SERPL-SCNC: 24 MMOL/L
CREAT SERPL-MCNC: 1.07 MG/DL
EOSINOPHIL # BLD AUTO: 0.09 K/UL
EOSINOPHIL NFR BLD AUTO: 1.2 %
ESTIMATED AVERAGE GLUCOSE: 123 MG/DL
GLUCOSE SERPL-MCNC: 90 MG/DL
HBA1C MFR BLD HPLC: 5.9 %
HCT VFR BLD CALC: 41.5 %
HDLC SERPL-MCNC: 66 MG/DL
HGB BLD-MCNC: 13.1 G/DL
IMM GRANULOCYTES NFR BLD AUTO: 0.4 %
LDLC SERPL CALC-MCNC: 75 MG/DL
LYMPHOCYTES # BLD AUTO: 1.29 K/UL
LYMPHOCYTES NFR BLD AUTO: 17.2 %
MAN DIFF?: NORMAL
MCHC RBC-ENTMCNC: 31.6 GM/DL
MCHC RBC-ENTMCNC: 31.6 PG
MCV RBC AUTO: 100.2 FL
MONOCYTES # BLD AUTO: 0.6 K/UL
MONOCYTES NFR BLD AUTO: 8 %
NEUTROPHILS # BLD AUTO: 5.43 K/UL
NEUTROPHILS NFR BLD AUTO: 72.7 %
NONHDLC SERPL-MCNC: 92 MG/DL
PLATELET # BLD AUTO: 253 K/UL
POTASSIUM SERPL-SCNC: 4.4 MMOL/L
PROT SERPL-MCNC: 6.7 G/DL
RBC # BLD: 4.14 M/UL
RBC # FLD: 16.1 %
SODIUM SERPL-SCNC: 143 MMOL/L
T3RU NFR SERPL: 0.9 TBI
T4 SERPL-MCNC: 6 UG/DL
TRIGL SERPL-MCNC: 86 MG/DL
TSH SERPL-ACNC: 5.84 UIU/ML
URATE SERPL-MCNC: 6.2 MG/DL
WBC # FLD AUTO: 7.48 K/UL

## 2022-01-28 DIAGNOSIS — D75.89 OTHER SPECIFIED DISEASES OF BLOOD AND BLOOD-FORMING ORGANS: ICD-10-CM

## 2022-01-28 LAB — VIT B12 SERPL-MCNC: 762 PG/ML

## 2022-02-08 ENCOUNTER — APPOINTMENT (OUTPATIENT)
Dept: INTERNAL MEDICINE | Facility: CLINIC | Age: 87
End: 2022-02-08
Payer: MEDICARE

## 2022-02-08 VITALS — DIASTOLIC BLOOD PRESSURE: 76 MMHG | SYSTOLIC BLOOD PRESSURE: 110 MMHG

## 2022-02-08 PROCEDURE — 99213 OFFICE O/P EST LOW 20 MIN: CPT

## 2022-02-08 NOTE — ASSESSMENT
[FreeTextEntry1] : This is a 89-year-old female whose history has been reviewed above\par \par She has a history of congestive heart failure with preserved ejection fraction and pulmonary hypertension.\par \par She has recently been started on Jardiance.  Her Lasix had been decreased and she developed peripheral edema\par \par Although her breathing is stable her peripheral edema has receded she has no areas of breakdown\par \par At this point we will continue her current regime.  I did review her potassium it was 4.0 I do not see any reason to increase her potassium but I will see her again in 6 weeks

## 2022-02-08 NOTE — PHYSICAL EXAM
[No Acute Distress] : no acute distress [de-identified] : Occasional rhonchi [de-identified] : Irregularly irregular [de-identified] : 1+ edema

## 2022-02-08 NOTE — HISTORY OF PRESENT ILLNESS
[FreeTextEntry1] : This is a 89-year-old female with a history of heart failure with preserved ejection fraction pulmonary hypertension and mitral insufficiency peripheral edema atrial fibrillation seizure disorder and hypothyroidism [de-identified] : Patient was recently started on Jardiance by cardiology with reaccumulation of her edema.  We increased her Lasix because of the edema and some areas of breakdown\par \par Patient is accompanied by her daughter she is still short of breath but her edema has decreased and there is no longer areas of breakdown

## 2022-02-08 NOTE — REVIEW OF SYSTEMS
[Lower Ext Edema] : lower extremity edema [Dyspnea on Exertion] : dyspnea on exertion [Negative] : Psychiatric

## 2022-02-24 ENCOUNTER — RX RENEWAL (OUTPATIENT)
Age: 87
End: 2022-02-24

## 2022-03-03 ENCOUNTER — RX RENEWAL (OUTPATIENT)
Age: 87
End: 2022-03-03

## 2022-03-15 ENCOUNTER — NON-APPOINTMENT (OUTPATIENT)
Age: 87
End: 2022-03-15

## 2022-03-15 ENCOUNTER — LABORATORY RESULT (OUTPATIENT)
Age: 87
End: 2022-03-15

## 2022-03-15 ENCOUNTER — APPOINTMENT (OUTPATIENT)
Dept: INTERNAL MEDICINE | Facility: CLINIC | Age: 87
End: 2022-03-15
Payer: MEDICARE

## 2022-03-15 VITALS
DIASTOLIC BLOOD PRESSURE: 76 MMHG | WEIGHT: 154 LBS | HEIGHT: 60 IN | SYSTOLIC BLOOD PRESSURE: 110 MMHG | BODY MASS INDEX: 30.23 KG/M2

## 2022-03-15 PROCEDURE — 99214 OFFICE O/P EST MOD 30 MIN: CPT | Mod: 25

## 2022-03-15 PROCEDURE — 36415 COLL VENOUS BLD VENIPUNCTURE: CPT

## 2022-03-15 PROCEDURE — 93000 ELECTROCARDIOGRAM COMPLETE: CPT

## 2022-03-15 RX ORDER — EMPAGLIFLOZIN 10 MG/1
10 TABLET, FILM COATED ORAL DAILY
Qty: 30 | Refills: 3 | Status: DISCONTINUED | COMMUNITY
Start: 2022-01-11 | End: 2022-03-15

## 2022-03-15 NOTE — HISTORY OF PRESENT ILLNESS
[FreeTextEntry1] : This is a 89-year-old female for evaluation and treatment of her seizure disorder hypertension hypercholesterolemia seizure disorder hypothyroidism peripheral edema shortness of breath.  She does have preserved left ventricular function and moderate mitral regurgitation [de-identified] : Patient is accompanied by her daughter.  They both state that she is in her usual state of health which is dyspnea on mild exertion.\par \par She did however have an episode 2 weeks ago where she lacerated her lower extremity she had some weeping edema she was seen in urgent care and was placed on tetracycline yesterday.  She feels that the tetracycline may have precipitated an episode of shortness of breath.  She did take it with a large amount of water

## 2022-03-15 NOTE — ASSESSMENT
[FreeTextEntry1] : This is a 89-year-old female whose history has been reviewed above\par \par She has an acute episode of "a laceration of her left lower extremity which seems to be healing fine.  She is not using support hose because this is been too difficult.  At this point I will send her to vascular.  Although I do not feel this is infected she can continue the tetracycline but if she develops any symptomatology I have no compulsion about stopping\par \par She remains dyspneic her lungs are clear.  We certainly can change her diuretic to a more potent diuretic such Bumex or torsemide.  I will defer to cardiology I did asked him to make an appointment\par \par She has had no further episodes of seizures we will continue her current medications she remains on ox carbamazepine appropriate blood tests have been drawn\par \par She has a history of hypothyroidism she remains on Synthroid a TSH has been obtained medication changes predicated on the results\par \par Does use an inhaler irregularly but I do not believe this is made a substantial difference however we will continue\par \par She does remain in sinus rhythm\par \par Patient's wound was reviewed by nurse.  It was decided to send her to wound care

## 2022-03-15 NOTE — REVIEW OF SYSTEMS
[Fatigue] : fatigue [Lower Ext Edema] : lower extremity edema [Dyspnea on Exertion] : dyspnea on exertion [Negative] : Psychiatric

## 2022-03-15 NOTE — PHYSICAL EXAM
[No JVD] : no jugular venous distention [Normal] : no respiratory distress, lungs were clear to auscultation bilaterally and no accessory muscle use [de-identified] : Regular [de-identified] : 2+ edema.  Healing noninfected looking eschar left lower extremity

## 2022-03-16 LAB
25(OH)D3 SERPL-MCNC: 44.6 NG/ML
ALBUMIN SERPL ELPH-MCNC: 4 G/DL
ALP BLD-CCNC: 126 U/L
ALT SERPL-CCNC: 38 U/L
ANION GAP SERPL CALC-SCNC: 13 MMOL/L
AST SERPL-CCNC: 35 U/L
BASOPHILS # BLD AUTO: 0.03 K/UL
BASOPHILS NFR BLD AUTO: 0.4 %
BILIRUB SERPL-MCNC: 0.3 MG/DL
BUN SERPL-MCNC: 29 MG/DL
CALCIUM SERPL-MCNC: 9 MG/DL
CHLORIDE SERPL-SCNC: 101 MMOL/L
CHOLEST SERPL-MCNC: 150 MG/DL
CO2 SERPL-SCNC: 25 MMOL/L
CREAT SERPL-MCNC: 0.97 MG/DL
EGFR: 53 ML/MIN/1.73M2
EOSINOPHIL # BLD AUTO: 0.09 K/UL
EOSINOPHIL NFR BLD AUTO: 1.3 %
ESTIMATED AVERAGE GLUCOSE: 123 MG/DL
GLUCOSE SERPL-MCNC: 93 MG/DL
HBA1C MFR BLD HPLC: 5.9 %
HCT VFR BLD CALC: 39.8 %
HDLC SERPL-MCNC: 72 MG/DL
HGB BLD-MCNC: 12.8 G/DL
IMM GRANULOCYTES NFR BLD AUTO: 0.4 %
LDLC SERPL CALC-MCNC: 66 MG/DL
LYMPHOCYTES # BLD AUTO: 1.43 K/UL
LYMPHOCYTES NFR BLD AUTO: 20.2 %
MAN DIFF?: NORMAL
MCHC RBC-ENTMCNC: 31.8 PG
MCHC RBC-ENTMCNC: 32.2 GM/DL
MCV RBC AUTO: 98.8 FL
MONOCYTES # BLD AUTO: 0.59 K/UL
MONOCYTES NFR BLD AUTO: 8.3 %
NEUTROPHILS # BLD AUTO: 4.91 K/UL
NEUTROPHILS NFR BLD AUTO: 69.4 %
NONHDLC SERPL-MCNC: 78 MG/DL
PLATELET # BLD AUTO: 218 K/UL
POTASSIUM SERPL-SCNC: 4.3 MMOL/L
PROT SERPL-MCNC: 6.4 G/DL
RBC # BLD: 4.03 M/UL
RBC # FLD: 15 %
SODIUM SERPL-SCNC: 139 MMOL/L
T3RU NFR SERPL: 1 TBI
T4 SERPL-MCNC: 5.9 UG/DL
TRIGL SERPL-MCNC: 64 MG/DL
TSH SERPL-ACNC: 6.13 UIU/ML
URATE SERPL-MCNC: 7.1 MG/DL
WBC # FLD AUTO: 7.08 K/UL

## 2022-03-28 NOTE — REASON FOR VISIT
Dr Nicholson requests RN call patient's mom to convey normal results of tests completed at recent visit on 3/25/22. We are still waiting on celiac disease panel and will call back when those results are complete with a plan of care.   Mom verbalized understanding.   
[FreeTextEntry1] : Sabi Cordova now 89 years old here for follow-up of her cardiac status.  She has been complaining of increased exertional shortness of breath with less exertion

## 2022-03-30 ENCOUNTER — APPOINTMENT (OUTPATIENT)
Dept: WOUND CARE | Facility: CLINIC | Age: 87
End: 2022-03-30
Payer: MEDICARE

## 2022-03-30 VITALS — HEIGHT: 60 IN | WEIGHT: 154 LBS | BODY MASS INDEX: 30.23 KG/M2 | TEMPERATURE: 98 F

## 2022-03-30 PROCEDURE — 99204 OFFICE O/P NEW MOD 45 MIN: CPT

## 2022-04-02 NOTE — HISTORY OF PRESENT ILLNESS
[FreeTextEntry1] : Ms. CARLOS SALVADOR   presents to the office with a wound since the beginning of March 2022. The wound is located on  the right leg. The patient has no complaints of pain.  The patient has been dressing the wound with mupirocin.  The patient denies fevers or chills. The patient has localized pain to the wound upon dressing changes. The patient has no other complaints or associated symptoms.\par \par Wound started with walker rubbing against leg and opened skin. Patient went to Urgent Care and received 10 days of antibiotics for wound. \par \par

## 2022-04-02 NOTE — ASSESSMENT
[FreeTextEntry1] : The patient presents with a wound to the right leg.  Swelling noted to the extremity.  \par TERRY/PVR and standing venous reflux study scheduled.\par No clinical sign of infection

## 2022-04-02 NOTE — PLAN
[FreeTextEntry1] : Plan\par Apply lidocaine or topical anesthetic if needed to reduce pain upon washing the wound.\par Wash wound with ---- Dove skin sensitive soap and clean water \par Apply ----Lathea and Gauze, with ming\par Apply ---- compression garment\par Change dressing ---every other day\par \par Leg elevation as tolerated\par Encouraged ambulation or exercise.\par Optimization of nutrition.\par Offloading to the wound site.\par \par -----Wound supplies ordered via DME\par Patient given contact information to DME\par \par Follow up appointment scheduled for 1 week\par \par TeleHealth Services discussed with the patient and/or family.  Discharge instructions given including download of [facility] information regarding:\par 1)  [facility] to obtain medical records\par 2)  [facility] to conduct Face-to-Face TeleHealth visit\par 3)  [employer] for the Patient (patient takes a picture of their wound which is sent to the patient's chart for review)\par \par

## 2022-04-03 ENCOUNTER — INPATIENT (INPATIENT)
Facility: HOSPITAL | Age: 87
LOS: 4 days | Discharge: HOME | DRG: 871 | End: 2022-04-08
Attending: HOSPITALIST | Admitting: STUDENT IN AN ORGANIZED HEALTH CARE EDUCATION/TRAINING PROGRAM
Payer: MEDICARE

## 2022-04-03 VITALS
OXYGEN SATURATION: 93 % | RESPIRATION RATE: 16 BRPM | TEMPERATURE: 98 F | HEART RATE: 69 BPM | SYSTOLIC BLOOD PRESSURE: 108 MMHG | DIASTOLIC BLOOD PRESSURE: 64 MMHG | HEIGHT: 61 IN | WEIGHT: 149.91 LBS

## 2022-04-03 DIAGNOSIS — I10 ESSENTIAL (PRIMARY) HYPERTENSION: ICD-10-CM

## 2022-04-03 DIAGNOSIS — R65.10 SYSTEMIC INFLAMMATORY RESPONSE SYNDROME (SIRS) OF NON-INFECTIOUS ORIGIN WITHOUT ACUTE ORGAN DYSFUNCTION: ICD-10-CM

## 2022-04-03 DIAGNOSIS — R29.898 OTHER SYMPTOMS AND SIGNS INVOLVING THE MUSCULOSKELETAL SYSTEM: ICD-10-CM

## 2022-04-03 DIAGNOSIS — I48.0 PAROXYSMAL ATRIAL FIBRILLATION: ICD-10-CM

## 2022-04-03 DIAGNOSIS — J12.9 VIRAL PNEUMONIA, UNSPECIFIED: ICD-10-CM

## 2022-04-03 DIAGNOSIS — K21.9 GASTRO-ESOPHAGEAL REFLUX DISEASE WITHOUT ESOPHAGITIS: ICD-10-CM

## 2022-04-03 DIAGNOSIS — I50.32 CHRONIC DIASTOLIC (CONGESTIVE) HEART FAILURE: ICD-10-CM

## 2022-04-03 DIAGNOSIS — G40.909 EPILEPSY, UNSPECIFIED, NOT INTRACTABLE, WITHOUT STATUS EPILEPTICUS: ICD-10-CM

## 2022-04-03 DIAGNOSIS — E78.5 HYPERLIPIDEMIA, UNSPECIFIED: ICD-10-CM

## 2022-04-03 DIAGNOSIS — J96.01 ACUTE RESPIRATORY FAILURE WITH HYPOXIA: ICD-10-CM

## 2022-04-03 DIAGNOSIS — Z29.9 ENCOUNTER FOR PROPHYLACTIC MEASURES, UNSPECIFIED: ICD-10-CM

## 2022-04-03 LAB
ALBUMIN SERPL ELPH-MCNC: 3.9 G/DL — SIGNIFICANT CHANGE UP (ref 3.3–5)
ALP SERPL-CCNC: 118 U/L — SIGNIFICANT CHANGE UP (ref 40–120)
ALT FLD-CCNC: 27 U/L — SIGNIFICANT CHANGE UP (ref 10–45)
ANION GAP SERPL CALC-SCNC: 13 MMOL/L — SIGNIFICANT CHANGE UP (ref 5–17)
APPEARANCE UR: ABNORMAL
APTT BLD: 34.7 SEC — SIGNIFICANT CHANGE UP (ref 27.5–35.5)
AST SERPL-CCNC: 29 U/L — SIGNIFICANT CHANGE UP (ref 10–40)
BACTERIA # UR AUTO: ABNORMAL
BASE EXCESS BLDV CALC-SCNC: 3.5 MMOL/L — HIGH (ref -2–2)
BASOPHILS # BLD AUTO: 0.01 K/UL — SIGNIFICANT CHANGE UP (ref 0–0.2)
BASOPHILS NFR BLD AUTO: 0.1 % — SIGNIFICANT CHANGE UP (ref 0–2)
BILIRUB SERPL-MCNC: 0.5 MG/DL — SIGNIFICANT CHANGE UP (ref 0.2–1.2)
BILIRUB UR-MCNC: NEGATIVE — SIGNIFICANT CHANGE UP
BLOOD GAS VENOUS - CREATININE: SIGNIFICANT CHANGE UP MG/DL (ref 0.5–1.3)
BUN SERPL-MCNC: 22 MG/DL — SIGNIFICANT CHANGE UP (ref 7–23)
CA-I SERPL-SCNC: 1.17 MMOL/L — SIGNIFICANT CHANGE UP (ref 1.15–1.33)
CALCIUM SERPL-MCNC: 9 MG/DL — SIGNIFICANT CHANGE UP (ref 8.4–10.5)
CHLORIDE BLDV-SCNC: 99 MMOL/L — SIGNIFICANT CHANGE UP (ref 96–108)
CHLORIDE SERPL-SCNC: 98 MMOL/L — SIGNIFICANT CHANGE UP (ref 96–108)
CO2 BLDV-SCNC: 30 MMOL/L — HIGH (ref 22–26)
CO2 SERPL-SCNC: 26 MMOL/L — SIGNIFICANT CHANGE UP (ref 22–31)
COLOR SPEC: YELLOW — SIGNIFICANT CHANGE UP
CREAT SERPL-MCNC: 1.06 MG/DL — SIGNIFICANT CHANGE UP (ref 0.5–1.3)
DIFF PNL FLD: ABNORMAL
EGFR: 48 ML/MIN/1.73M2 — LOW
EOSINOPHIL # BLD AUTO: 0 K/UL — SIGNIFICANT CHANGE UP (ref 0–0.5)
EOSINOPHIL NFR BLD AUTO: 0 % — SIGNIFICANT CHANGE UP (ref 0–6)
EPI CELLS # UR: 0 /HPF — SIGNIFICANT CHANGE UP
GAS PNL BLDV: 129 MMOL/L — LOW (ref 136–145)
GAS PNL BLDV: SIGNIFICANT CHANGE UP
GAS PNL BLDV: SIGNIFICANT CHANGE UP
GLUCOSE BLDV-MCNC: 111 MG/DL — HIGH (ref 70–99)
GLUCOSE SERPL-MCNC: 106 MG/DL — HIGH (ref 70–99)
GLUCOSE UR QL: NEGATIVE — SIGNIFICANT CHANGE UP
HCO3 BLDV-SCNC: 28 MMOL/L — SIGNIFICANT CHANGE UP (ref 22–29)
HCT VFR BLD CALC: 38.4 % — SIGNIFICANT CHANGE UP (ref 34.5–45)
HCT VFR BLDA CALC: 39 % — SIGNIFICANT CHANGE UP (ref 34.5–46.5)
HGB BLD CALC-MCNC: 12.9 G/DL — SIGNIFICANT CHANGE UP (ref 11.7–16.1)
HGB BLD-MCNC: 12.9 G/DL — SIGNIFICANT CHANGE UP (ref 11.5–15.5)
HYALINE CASTS # UR AUTO: 3 /LPF — HIGH (ref 0–2)
IMM GRANULOCYTES NFR BLD AUTO: 1.4 % — SIGNIFICANT CHANGE UP (ref 0–1.5)
INR BLD: 1.43 RATIO — HIGH (ref 0.88–1.16)
KETONES UR-MCNC: ABNORMAL
LACTATE BLDV-MCNC: 1.4 MMOL/L — SIGNIFICANT CHANGE UP (ref 0.7–2)
LEUKOCYTE ESTERASE UR-ACNC: ABNORMAL
LYMPHOCYTES # BLD AUTO: 0.54 K/UL — LOW (ref 1–3.3)
LYMPHOCYTES # BLD AUTO: 4.1 % — LOW (ref 13–44)
MCHC RBC-ENTMCNC: 32 PG — SIGNIFICANT CHANGE UP (ref 27–34)
MCHC RBC-ENTMCNC: 33.6 GM/DL — SIGNIFICANT CHANGE UP (ref 32–36)
MCV RBC AUTO: 95.3 FL — SIGNIFICANT CHANGE UP (ref 80–100)
MONOCYTES # BLD AUTO: 0.66 K/UL — SIGNIFICANT CHANGE UP (ref 0–0.9)
MONOCYTES NFR BLD AUTO: 5 % — SIGNIFICANT CHANGE UP (ref 2–14)
NEUTROPHILS # BLD AUTO: 11.81 K/UL — HIGH (ref 1.8–7.4)
NEUTROPHILS NFR BLD AUTO: 89.4 % — HIGH (ref 43–77)
NITRITE UR-MCNC: POSITIVE
NRBC # BLD: 0 /100 WBCS — SIGNIFICANT CHANGE UP (ref 0–0)
PCO2 BLDV: 44 MMHG — HIGH (ref 39–42)
PH BLDV: 7.42 — SIGNIFICANT CHANGE UP (ref 7.32–7.43)
PH UR: 7 — SIGNIFICANT CHANGE UP (ref 5–8)
PLATELET # BLD AUTO: 192 K/UL — SIGNIFICANT CHANGE UP (ref 150–400)
PO2 BLDV: 35 MMHG — SIGNIFICANT CHANGE UP (ref 25–45)
POTASSIUM BLDV-SCNC: 3.6 MMOL/L — SIGNIFICANT CHANGE UP (ref 3.5–5.1)
POTASSIUM SERPL-MCNC: 3.9 MMOL/L — SIGNIFICANT CHANGE UP (ref 3.5–5.3)
POTASSIUM SERPL-SCNC: 3.9 MMOL/L — SIGNIFICANT CHANGE UP (ref 3.5–5.3)
PROT SERPL-MCNC: 6.8 G/DL — SIGNIFICANT CHANGE UP (ref 6–8.3)
PROT UR-MCNC: ABNORMAL
PROTHROM AB SERPL-ACNC: 16.7 SEC — HIGH (ref 10.5–13.4)
RAPID RVP RESULT: SIGNIFICANT CHANGE UP
RBC # BLD: 4.03 M/UL — SIGNIFICANT CHANGE UP (ref 3.8–5.2)
RBC # FLD: 14.4 % — SIGNIFICANT CHANGE UP (ref 10.3–14.5)
RBC CASTS # UR COMP ASSIST: 7 /HPF — HIGH (ref 0–4)
SAO2 % BLDV: 57 % — LOW (ref 67–88)
SARS-COV-2 RNA SPEC QL NAA+PROBE: SIGNIFICANT CHANGE UP
SODIUM SERPL-SCNC: 137 MMOL/L — SIGNIFICANT CHANGE UP (ref 135–145)
SP GR SPEC: 1.02 — SIGNIFICANT CHANGE UP (ref 1.01–1.02)
UROBILINOGEN FLD QL: NEGATIVE — SIGNIFICANT CHANGE UP
WBC # BLD: 13.2 K/UL — HIGH (ref 3.8–10.5)
WBC # FLD AUTO: 13.2 K/UL — HIGH (ref 3.8–10.5)
WBC UR QL: 640 /HPF — HIGH (ref 0–5)

## 2022-04-03 PROCEDURE — 99285 EMERGENCY DEPT VISIT HI MDM: CPT | Mod: CS,GC

## 2022-04-03 PROCEDURE — 99223 1ST HOSP IP/OBS HIGH 75: CPT

## 2022-04-03 PROCEDURE — 71045 X-RAY EXAM CHEST 1 VIEW: CPT | Mod: 26

## 2022-04-03 RX ORDER — RIVAROXABAN 15 MG-20MG
15 KIT ORAL
Refills: 0 | Status: DISCONTINUED | OUTPATIENT
Start: 2022-04-03 | End: 2022-04-08

## 2022-04-03 RX ORDER — ATORVASTATIN CALCIUM 80 MG/1
20 TABLET, FILM COATED ORAL AT BEDTIME
Refills: 0 | Status: DISCONTINUED | OUTPATIENT
Start: 2022-04-03 | End: 2022-04-08

## 2022-04-03 RX ORDER — DIPHENHYDRAMINE HCL 50 MG
25 CAPSULE ORAL ONCE
Refills: 0 | Status: DISCONTINUED | OUTPATIENT
Start: 2022-04-03 | End: 2022-04-04

## 2022-04-03 RX ORDER — LEVOTHYROXINE SODIUM 125 MCG
75 TABLET ORAL DAILY
Refills: 0 | Status: DISCONTINUED | OUTPATIENT
Start: 2022-04-03 | End: 2022-04-08

## 2022-04-03 RX ORDER — CHOLECALCIFEROL (VITAMIN D3) 125 MCG
1000 CAPSULE ORAL DAILY
Refills: 0 | Status: DISCONTINUED | OUTPATIENT
Start: 2022-04-03 | End: 2022-04-08

## 2022-04-03 RX ORDER — ACETAMINOPHEN 500 MG
975 TABLET ORAL ONCE
Refills: 0 | Status: COMPLETED | OUTPATIENT
Start: 2022-04-03 | End: 2022-04-03

## 2022-04-03 RX ORDER — SODIUM CHLORIDE 9 MG/ML
500 INJECTION INTRAMUSCULAR; INTRAVENOUS; SUBCUTANEOUS ONCE
Refills: 0 | Status: DISCONTINUED | OUTPATIENT
Start: 2022-04-03 | End: 2022-04-03

## 2022-04-03 RX ORDER — SODIUM CHLORIDE 9 MG/ML
1000 INJECTION INTRAMUSCULAR; INTRAVENOUS; SUBCUTANEOUS ONCE
Refills: 0 | Status: COMPLETED | OUTPATIENT
Start: 2022-04-03 | End: 2022-04-03

## 2022-04-03 RX ORDER — CEFTRIAXONE 500 MG/1
1000 INJECTION, POWDER, FOR SOLUTION INTRAMUSCULAR; INTRAVENOUS ONCE
Refills: 0 | Status: COMPLETED | OUTPATIENT
Start: 2022-04-03 | End: 2022-04-03

## 2022-04-03 RX ORDER — OXCARBAZEPINE 300 MG/1
150 TABLET, FILM COATED ORAL
Refills: 0 | Status: DISCONTINUED | OUTPATIENT
Start: 2022-04-03 | End: 2022-04-08

## 2022-04-03 RX ORDER — PANTOPRAZOLE SODIUM 20 MG/1
40 TABLET, DELAYED RELEASE ORAL
Refills: 0 | Status: DISCONTINUED | OUTPATIENT
Start: 2022-04-03 | End: 2022-04-08

## 2022-04-03 RX ORDER — ACETAMINOPHEN 500 MG
1000 TABLET ORAL ONCE
Refills: 0 | Status: COMPLETED | OUTPATIENT
Start: 2022-04-03 | End: 2022-04-03

## 2022-04-03 RX ADMIN — CEFTRIAXONE 100 MILLIGRAM(S): 500 INJECTION, POWDER, FOR SOLUTION INTRAMUSCULAR; INTRAVENOUS at 17:47

## 2022-04-03 RX ADMIN — Medication 1000 MILLIGRAM(S): at 22:50

## 2022-04-03 RX ADMIN — SODIUM CHLORIDE 1000 MILLILITER(S): 9 INJECTION INTRAMUSCULAR; INTRAVENOUS; SUBCUTANEOUS at 17:31

## 2022-04-03 RX ADMIN — Medication 975 MILLIGRAM(S): at 17:36

## 2022-04-03 RX ADMIN — Medication 975 MILLIGRAM(S): at 15:41

## 2022-04-03 RX ADMIN — Medication 400 MILLIGRAM(S): at 20:30

## 2022-04-03 NOTE — ED PROVIDER NOTE - PROGRESS NOTE DETAILS
Oswaldo, PGY3 - UTI+, given rocephin.Gentle ivf given as pt with normal lactate, not hypotensive; initially had concern for ?fluid overload as pt hypoxic but cxr clear; potential viral pna component. Oswaldo, PGY3 - Called to bedside for bradycardia to 50s, pt reevaluated, asymptomatic, no change in baseline SOB. Will repeat ekg & change admission to tele. Pt also previously ordered for 1L ivf for hypotension to SBP 90s, BP improved s/p ivf Dr. Mendieta Note: pt already admitted, called to bedside for rigors.  Pt with same pulse ox reading, clear lungs, no JVD, rigors.  Rectal temp normal, likely still febrile rigors. Gave tylenol with resolution of symptoms. Stable for inpt.

## 2022-04-03 NOTE — ED PROVIDER NOTE - ATTENDING CONTRIBUTION TO CARE
Pt here with daughter with weakness in legs b/l, found here to be febrile and slightly hypoxic, improved with 2L NC O2, coarse bs b/l, no JVD, chronic RLE wound, chronic R foot drop, 5/5 motor UE and LE able to lift either leg against gravity without drift.  suspect viral pneumonia, labs, o2, rvp, admission.

## 2022-04-03 NOTE — ED PROVIDER NOTE - NSICDXPASTMEDICALHX_GEN_ALL_CORE_FT
PAST MEDICAL HISTORY:  Afib on pradaxa    CVA (cerebrovascular accident)     GERD (gastroesophageal reflux disease)     HTN (hypertension)     Hyperlipemia     Hypothyroidism     TIA (transient ischemic attack)

## 2022-04-03 NOTE — ED PROVIDER NOTE - CARE PLAN
Principal Discharge DX:	Viral pneumonia  Secondary Diagnosis:	Hypoxia   1 Principal Discharge DX:	Viral pneumonia  Secondary Diagnosis:	Hypoxia  Secondary Diagnosis:	UTI (urinary tract infection), bacterial   Principal Discharge DX:	Viral pneumonia  Secondary Diagnosis:	Hypoxia  Secondary Diagnosis:	UTI (urinary tract infection), bacterial  Secondary Diagnosis:	Sinus bradycardia

## 2022-04-03 NOTE — ED ADULT NURSE REASSESSMENT NOTE - NS ED NURSE REASSESS COMMENT FT1
pt found to be acutely dyspneic after eating dinner stating "the food does not feel like it is going down". VS recorded, lungs auscultated, crackles heard over left lower lobe. ED MD Mendieta notified and at bedside for eval, IV ofirmev ordered to be given per order. pt remains 96% on 2L at this time. multiple attempts made to contact inpatient team.

## 2022-04-03 NOTE — H&P ADULT - NSHPREVIEWOFSYSTEMS_GEN_ALL_CORE
REVIEW OF SYSTEMS:    CONSTITUTIONAL: No weakness, fevers or chills  EYES/ENT: No visual changes;  No vertigo or throat pain   NECK: No pain or stiffness  RESPIRATORY: No cough, wheezing, hemoptysis; No shortness of breath  CARDIOVASCULAR: No chest pain or palpitations  GASTROINTESTINAL: No abdominal or epigastric pain. No nausea, vomiting, or hematemesis; No diarrhea or constipation. No melena or hematochezia.  GENITOURINARY: No dysuria, frequency or hematuria  NEUROLOGICAL: No numbness or weakness  SKIN: No itching, burning, rashes, or lesions   HEME: no easy bruising or unexplained bleeding  ENDO: no heat intolerance, no cold intolerance  PSYCH: no SI, or depression  All other review of systems is negative unless indicated above. REVIEW OF SYSTEMS:    CONSTITUTIONAL: +weakness, +fevers, No chills  EYES/ENT: No visual changes;  No vertigo or throat pain   NECK: No pain or stiffness  RESPIRATORY: No cough, wheezing, hemoptysis; +VILLAR  CARDIOVASCULAR: No chest pain or palpitations  GASTROINTESTINAL: No abdominal or epigastric pain. No nausea, vomiting, or hematemesis; No diarrhea or constipation. No melena or hematochezia.  GENITOURINARY: No dysuria, frequency or hematuria  NEUROLOGICAL: No numbness or weakness  SKIN: No itching, burning, rashes, or lesions   HEME: no easy bruising or unexplained bleeding  ENDO: no heat intolerance, no cold intolerance  PSYCH: no SI, or depression  All other review of systems is negative unless indicated above.

## 2022-04-03 NOTE — ED PROVIDER NOTE - NSICDXPASTSURGICALHX_GEN_ALL_CORE_FT
PAST SURGICAL HISTORY:  Bilateral cataracts     H/O repair of left rotator cuff     S/P laminectomy     Status post left hip replacement

## 2022-04-03 NOTE — ED ADULT NURSE NOTE - OBJECTIVE STATEMENT
pt is a 89 y/o female brought in by EMS presenting to the ED with generalized weakness. pt stated that she could not move her legs when she woke up this morning. upon assessment pt was A/Ox4 with a rectal temp was 101.2 F. pt has a wound on the right lower leg around the ankle. pt is a 89 y/o female brought in by EMS presenting to the ED with generalized weakness. pt stated that she could not move her legs when she woke up this morning. upon assessment pt A&Ox3 gross neuro intact, no difficulty speaking in complete sentences, s1s2 heart sounds heard, pulses x 4, crawford x4, abdomen soft nontender nondistended, two open wounds noted on lower right lower extremity. dressing dry and intact before removal by provider. no drainage noted to wound. pt denies chest pain, sob, ha, n/v/d, abdominal pain, f/c, urinary symptoms, hematuria. 2 18 gauge IVs placed to bilateral AC. pt put on cardiac monitor, NS rhythm 60s. pt straight cathed under sterile technique, 300mL output dark yellow urine.

## 2022-04-03 NOTE — H&P ADULT - NSHPPHYSICALEXAM_GEN_ALL_CORE
VITALS:   T(C): 37 (04-03-22 @ 20:30), Max: 38.4 (04-03-22 @ 15:56)  HR: 66 (04-03-22 @ 20:51) (61 - 72)  BP: 100/78 (04-03-22 @ 20:51) (100/51 - 158/143)  RR: 20 (04-03-22 @ 20:51) (16 - 24)  SpO2: 96% (04-03-22 @ 20:51) (90% - 98%)    GENERAL: NAD, lying in bed comfortably  HEAD:  Atraumatic, Normocephalic  EYES: EOMI, PERRLA, conjunctiva and sclera clear  ENT: Moist mucous membranes  NECK: Supple, No JVD  CHEST/LUNG: Clear to auscultation bilaterally; No rales, rhonchi, wheezing, or rubs. Unlabored respirations  HEART: Regular rate and rhythm; No murmurs, rubs, or gallops  ABDOMEN: BSx4; Soft, nontender, nondistended  EXTREMITIES:  2+ Peripheral Pulses, brisk capillary refill. No clubbing, cyanosis, or edema  NERVOUS SYSTEM:  A&Ox3, no focal deficits   SKIN: No rashes or lesions  Psych: Normal speech, normal behavior, normal affect VITALS:   T(C): 37 (04-03-22 @ 20:30), Max: 38.4 (04-03-22 @ 15:56)  HR: 66 (04-03-22 @ 20:51) (61 - 72)  BP: 100/78 (04-03-22 @ 20:51) (100/51 - 158/143)  RR: 20 (04-03-22 @ 20:51) (16 - 24)  SpO2: 96% (04-03-22 @ 20:51) (90% - 98%)    GENERAL: NAD, lying in bed comfortably, on 2L NC  HEAD:  Atraumatic, Normocephalic  EYES: EOMI, PERRLA, conjunctiva and sclera clear  ENT: Moist mucous membranes  NECK: Supple, No JVD  CHEST/LUNG: Clear to auscultation bilaterally; No rales, rhonchi, wheezing, or rubs. Unlabored respirations  HEART: Regular rate and rhythm; No murmurs, rubs, or gallops  ABDOMEN: BSx4; Soft, nontender, nondistended  EXTREMITIES:  2+ Peripheral Pulses, brisk capillary refill. No clubbing, cyanosis, or edema  NERVOUS SYSTEM:  A&Ox3, no focal deficits noted on lower extremity neurologic exam  SKIN: 2x healing wound on RLE with surrounding erythema, no discharge, edema, pain on palpation of area  Psych: Normal speech, normal behavior, normal affect

## 2022-04-03 NOTE — ED PROVIDER NOTE - PHYSICAL EXAMINATION
I have reviewed the triage vital signs.  Const: AAOx3, in NAD  Eyes: no conjunctival injection  HENT: NCAT, Neck supple, oral mucosa moist  CV: RRR, +S1, S2  Resp: CTAB, no respiratory distress, on NC  GI: Abdomen soft, NTND, no guarding, no CVA tenderness  Extremities: No peripheral edema,  2+ radial and DP pulses  Skin: R shin with 2cm round ulcer (with pink tissue, does not appear infected), adjacent to skin tear, surrounding erythema by wounds which pt & pt's daughter state are baseline  MSK: No gross deformities appreciated  Neuro: No focal sensory or motor deficits  Psych: Appropriate mood and affect

## 2022-04-03 NOTE — ED ADULT NURSE REASSESSMENT NOTE - NS ED NURSE REASSESS COMMENT FT1
pt now bradycardic in 40s, NS rhythm. repeat EKG being performed as per providers orders. pt is A/Ox3, appears calm, and is reading a book. denies CP, SOB, complaints at this time.

## 2022-04-03 NOTE — H&P ADULT - PROBLEM SELECTOR PLAN 6
- Holding home atenolol, quinapril, Lasix i/s/o sepsis with hypotension As above, questionable history of TIAs  - Continue aspirin, statin

## 2022-04-03 NOTE — H&P ADULT - PROBLEM SELECTOR PLAN 1
Patient met SIRS criteria on admission with T>38, RR>20, WBC>12  With weakness, decreased po, and VILLAR, no other localizing symptoms  UA positive for nitrite, large LE, 640 WBC, many bacteria  CXR without acute pathology, RVP negative  Likely i/s/o urinary tract infection, less likely 2/2 viral pneumonia  s/p 1x CTX in ED  - Admit to telemetry for further management  - Continue CTX (4/3-)  - F/u urine culture  - F/u blood culture  - F/u repeat CXR  - Trend fever curve  - Monitor WBC count Patient met SIRS criteria on admission with T>38, RR>20, WBC>12  With weakness, decreased po, and VILLAR, no other localizing symptoms  UA positive for nitrite, large LE, 640 WBC, many bacteria  CXR without acute pathology, RVP negative  Likely i/s/o urinary tract infection, less likely 2/2 viral pneumonia  s/p 1x CTX in ED  - Admit to telemetry for further management  - Continue CTX (4/3-)  - F/u urine culture  - F/u blood culture  - F/u repeat CXR  - Trend fever curve  - Monitor WBC count  - Tylenol prn fever

## 2022-04-03 NOTE — H&P ADULT - NSHPSOCIALHISTORY_GEN_ALL_CORE
Patient lives alone in [facility]. She is mostly independent but has someone clean the apartment and has a HHA that helps mostly with cooking. She walks with a walker at baseline. Her daughter and son live in [place]. She denies any tobacco or illicit drug use, endorses an occasional glass of wine.

## 2022-04-03 NOTE — H&P ADULT - ATTENDING COMMENTS
89 yr old female with a pmh of HTN, HLD, hypothyoridism, ?CVA vs. seizure disorder, paroxysmal afib on Xarelto who presents with a one day history of weakness/inability to ambulate and poor oral intake.   Denies  headache, dizziness, chest pain, palpitations, SOB, abdominal pain, joint pain, diarrhea/constipation, urinary symptoms.   Vitals in the ED: T 101.2, HR 68, BP 90/51, RR 23 satting 97% on 2L    EKG interpreted by myself: sinus bradycardia with 1st degree AV Block  CXR x 2-interpreted by myself: clear lungs      REVIEW OF SYSTEMS:    CONSTITUTIONAL: +weakness, + decreased PO intake no fevers or chills  EYES/ENT: No visual changes;  No dysphagia; No sore throat; No rhinorrhea; No sinus pain/pressure  NECK: No pain or stiffness  RESPIRATORY: No cough, wheezing, hemoptysis; + shortness of breath slightly more than from baseline   CARDIOVASCULAR: No chest pain or palpitations; No lower extremity edema  GASTROINTESTINAL: No abdominal or epigastric pain. No nausea, vomiting, or hematemesis; No diarrhea or constipation. No melena or hematochezia.  GENITOURINARY: No dysuria, frequency or hematuria  NEUROLOGICAL: No numbness or weakness  MSK: ambulates with a walker  SKIN: No itching, burning, rashes, or lesions   All other review of systems is negative unless indicated above.    PHYSICAL EXAM:  GENERAL: NAD, well-developed, well-nourished  HEAD:  Atraumatic, Normocephalic  EYES: EOMI, PERRL, conjunctiva and sclera clear  NECK: Supple, No JVD  CHEST/LUNG: Clear to auscultation bilaterally; No wheezes, rales or rhonchi  HEART: Regular rate and rhythm; No murmurs, rubs, or gallops, (+)S1, S2  ABDOMEN: Soft, Nontender, Nondistended; Normal Bowel sounds   EXTREMITIES:  2+ Peripheral Pulses, No clubbing, cyanosis, or edema  PSYCH: normal mood and affect  NEUROLOGY: AAOx3, non-focal  SKIN: No rashes or lesions- dressing on right lower extremity as described above     Sepsis 2/2 UTI  New  T 101.2 WBC 13.2, BP 90/51, UA +, CXR clear lungs  Bcx and Ucx pending   Continue Rocephin   Encourage  PO intake     Lower extremity weakness  New  Likely in setting of sepsis  PT consulted     Acute hypoxic resp failure  New  Desatted in the ED while having rigors  CXR clear  Wean O2 as tolerated- aim for sats >92%    Paroxysmal atrial fibrillation  Chronic stable  Continue home Xarelto  Holding home BB in setting of sepsis    HTN  Chronic unstable as hypotensive  Holding home antihypertensive agents    Remained as above

## 2022-04-03 NOTE — ED PROVIDER NOTE - NS ED ROS FT
General: +Fever, poor appetite, generalized weakness  Eyes: Denies vision changes  ENMT: Denies sore throat  CV: Denies chest pain  Resp: Denies SOB  GI: Denies abdominal pain, nausea, vomiting, diarrhea  : Denies painful urination  Skin: +R shin wound  Neuro: Denies headache, focal weakness  MSK: Denies recent trauma

## 2022-04-03 NOTE — PATIENT PROFILE ADULT - FALL HARM RISK - HARM RISK INTERVENTIONS

## 2022-04-03 NOTE — ED ADULT NURSE REASSESSMENT NOTE - NS ED NURSE REASSESS COMMENT FT1
Pt reporting significant improvement in symptoms after ofirmev administration. VSS at this time. Contacted MD Kim on teams, updated on pt status, MD Kim reports that he will evaluate pt after transport to room on 3D.

## 2022-04-03 NOTE — H&P ADULT - PROBLEM SELECTOR PLAN 2
Patient presented with weakness, noted inability to walk, denies numbness, collapsing  No residual noted neurologic deficits on exam  Patient is compliant with medications  Likely i/s/o sepsis versus less-likely neurologic event (TIA vs. seizure)  - Admit to telemetry  - Treatment of sepsis as per above  - Fall precautions  - PT eval  - Can consider CTH or EEG if patient has recurrent symptoms

## 2022-04-03 NOTE — H&P ADULT - ASSESSMENT
Ms. Cordova is a 89 year old F with a history of HTN, HLD, hypothyoridism, ?CVA vs. seizure disorder, paroxysmal a. fib on Xarelto who presents with weakness and decreased po intake, likely in the setting of sepsis secondary to urinary tract infection vs. viral pneumonia, less likely neurologic event.

## 2022-04-03 NOTE — ED PROVIDER NOTE - CLINICAL SUMMARY MEDICAL DECISION MAKING FREE TEXT BOX
Oswaldo, PGY3 - 89F compliant with xarelto p/w fever, generalized weakness, poor appetite. Hypoxic to 93% on RA, does not appear in respiratory distress, CTAB, RLE with some erythema & wound which does not appear infected (daughter states redness is at baseline and appears improved s/p doxy). Suspect poor PO 2/2 infectious etiology (pna vs UTI, lower suspicion for cellulitis). Plan for labs, cxr, tba

## 2022-04-03 NOTE — H&P ADULT - PROBLEM SELECTOR PLAN 5
- Continue home oxcarbazepine Questionable history of TIAs vs. seizure disorder, episodes stopped after starting oxcarbazepine  - Continue home oxcarbazepine

## 2022-04-03 NOTE — H&P ADULT - PROBLEM SELECTOR PLAN 3
Patient hypoxic to 89-90% on RA on presentation to the ED, placed on 2L NC with O2 sat >95%  W/ recent workup for VILLAR outpatient, w/ noted moderate MR, pulmonary hypertension, ?HFpEF  Started on Trelegy Ellipta inhaler prn with noted improvement  CXR without acute findings  Likely i/s/o Patient hypoxic to 89-90% on RA on presentation to the ED, placed on 2L NC with O2 sat >95%  W/ recent workup for VILLAR outpatient, w/ noted moderate MR, pulmonary hypertension, ?HFpEF  Started on Trelegy Ellipta inhaler prn with noted improvement  CXR without acute pathology  Likely 2/2   - Wean NC as tolerated, currently ~97% on 2L NC  - Continue equivalent of Trelegy Ellipta qd  - Continuous pulse oximetry  - Incentive spirometry Patient hypoxic to 89-90% on RA on presentation to the ED, placed on 2L NC with O2 sat >95%  W/ recent workup for VILLAR outpatient, w/ noted moderate MR, pulmonary hypertension, ?HFpEF  Started on Trelegy Ellipta inhaler prn with noted improvement  CXR without acute pathology  Possibly 2/2 pulmonary hypertension or HFpEF or i/s/o sepsis 2/2 UTI vs. viral pneumonia  - Wean NC as tolerated, currently ~97% on 2L NC  - Continue equivalent of Trelegy Ellipta qd  - Continuous pulse oximetry  - Incentive spirometry  - Treatment of sepsis as per above

## 2022-04-03 NOTE — H&P ADULT - HISTORY OF PRESENT ILLNESS
Ms. Cordova is a 89 year old F with a history of HTN, HLD, hypothyoridism, ?CVA vs. seizure disorder, paroxysmal a. fib on Xarelto, HFpEF who presents with generalized weakness and decreased po intake.  Ms. Cordova is a 89 year old F with a history of HTN, HLD, hypothyoridism, ?CVA vs. seizure disorder, paroxysmal a. fib on Xarelto who presents with weakness and decreased po intake. Per the patient and daughter at bedside, this morning starting around 10:30AM, she started to suddenly feel weaker than usual, finding herself unable to initiate the steps to walk. Prior to that, she had gotten herself out of bed and been active. She denies any numbness or tingling at the time of the event, denies her legs giving out or falling, and denies any confusion or visual changes. She endorses decreased appetite and worsened from baseline VILLAR this morning but denies any chills, chest pain, palpitations, cough, SOB at rest, abdominal pain, nausea, vomiting, diarrhea, constipation, dysuria, urinary frequency, worsened lower extremity swelling. She denies experiencing weakness like this before, prior TIAs presented as loss of speech. She was started on oxcarbazepine following her last episode in 2014 and has not had any episodes since.    Of note, she has had ongoing VILLAR, which has been recently addressed by her primary care doctor and cardiologist. Her cardiologist believes she may have a component of HFpEF given moderate MR and pulmonary hypertension noted on echo. She was also started on a Trelegy Ellipta inhaler that provides her some benefit. She has a remote history of treated tuberculosis.     In the ED,  Ms. Cordova is a 89 year old F with a history of HTN, HLD, hypothyoridism, ?CVA vs. seizure disorder, paroxysmal a. fib on Xarelto who presents with weakness and decreased po intake. Per the patient and daughter at bedside, this morning starting around 10:30AM, she started to suddenly feel weaker than usual, finding herself unable to initiate the steps to walk. Prior to that, she had gotten herself out of bed and been active. She denies any numbness or tingling at the time of the event, denies her legs giving out or falling, and denies any confusion or visual changes. She endorses decreased appetite and worsened from baseline VILLAR this morning but denies any chills, chest pain, palpitations, cough, SOB at rest, abdominal pain, nausea, vomiting, diarrhea, constipation, dysuria, urinary frequency, worsened lower extremity swelling. She denies experiencing weakness like this before, prior TIAs presented as loss of speech. She was started on oxcarbazepine following her last episode in 2014 and has not had any episodes since.    Of note, she has had ongoing VILLAR, which has been recently addressed by her primary care doctor and cardiologist. Her cardiologist believes she may have a component of HFpEF given moderate MR and pulmonary hypertension noted on echo. She was also started on a Trelegy Ellipta inhaler that provides her some benefit. She has a remote history of treated tuberculosis.     In the ED, she was febrile to 101.2, HR 60s, /64, RR to 26, O2 sat 89-90% on RA, >95% on 2L NC. Labs were significant for WBC 13.20. Lactate 1.4. UA positive for nitrite, large LE, 640 WBC, many bacteria. RVP negative. EKG with sinus bradycardia with 1st degree AV block. CXR with no acute pulmonary disease. In the ED, she had an episode of hypoxia with rigors, afebrile, but symptoms resolved with 1g acetaminophen. In total, she was given 1g acetaminophen x2, 1.5L NS, CTX 1g x1 and admitted to telemetry for further management.

## 2022-04-03 NOTE — ED PROVIDER NOTE - OBJECTIVE STATEMENT
89F PMH AFib on Xarelto, HTN, HLD, hypothyroidism, CVA p/w generalized weakness and decreased PO intake today. Normally ambulates with a walker at baseline, felt too weak today in general. No falls/trauma. Also states has poor appetite. Found to be febrile here, denies fevers. No CP, SOB, URI sx, abd pain, NVD, urinary sx. Does have R shin wound which is being followed by wound clinic, completed course of Doxycycline recently and pt/daughter at bedside states site looks improved.    PCP Dr. Kenneth Nolasco (physician partner)

## 2022-04-04 DIAGNOSIS — Z86.73 PERSONAL HISTORY OF TRANSIENT ISCHEMIC ATTACK (TIA), AND CEREBRAL INFARCTION WITHOUT RESIDUAL DEFICITS: ICD-10-CM

## 2022-04-04 DIAGNOSIS — R78.81 BACTEREMIA: ICD-10-CM

## 2022-04-04 DIAGNOSIS — A41.9 SEPSIS, UNSPECIFIED ORGANISM: ICD-10-CM

## 2022-04-04 LAB
ANION GAP SERPL CALC-SCNC: 14 MMOL/L — SIGNIFICANT CHANGE UP (ref 5–17)
BUN SERPL-MCNC: 30 MG/DL — HIGH (ref 7–23)
CALCIUM SERPL-MCNC: 8.4 MG/DL — SIGNIFICANT CHANGE UP (ref 8.4–10.5)
CHLORIDE SERPL-SCNC: 102 MMOL/L — SIGNIFICANT CHANGE UP (ref 96–108)
CO2 SERPL-SCNC: 21 MMOL/L — LOW (ref 22–31)
CREAT SERPL-MCNC: 1.05 MG/DL — SIGNIFICANT CHANGE UP (ref 0.5–1.3)
E COLI DNA BLD POS QL NAA+NON-PROBE: SIGNIFICANT CHANGE UP
EGFR: 48 ML/MIN/1.73M2 — LOW
GLUCOSE SERPL-MCNC: 83 MG/DL — SIGNIFICANT CHANGE UP (ref 70–99)
GRAM STN FLD: SIGNIFICANT CHANGE UP
GRAM STN FLD: SIGNIFICANT CHANGE UP
HCT VFR BLD CALC: 34.3 % — LOW (ref 34.5–45)
HGB BLD-MCNC: 11.4 G/DL — LOW (ref 11.5–15.5)
LEGIONELLA AG UR QL: NEGATIVE — SIGNIFICANT CHANGE UP
MAGNESIUM SERPL-MCNC: 1.8 MG/DL — SIGNIFICANT CHANGE UP (ref 1.6–2.6)
MCHC RBC-ENTMCNC: 31.7 PG — SIGNIFICANT CHANGE UP (ref 27–34)
MCHC RBC-ENTMCNC: 33.2 GM/DL — SIGNIFICANT CHANGE UP (ref 32–36)
MCV RBC AUTO: 95.3 FL — SIGNIFICANT CHANGE UP (ref 80–100)
METHOD TYPE: SIGNIFICANT CHANGE UP
NRBC # BLD: 0 /100 WBCS — SIGNIFICANT CHANGE UP (ref 0–0)
PHOSPHATE SERPL-MCNC: 3.1 MG/DL — SIGNIFICANT CHANGE UP (ref 2.5–4.5)
PLATELET # BLD AUTO: 164 K/UL — SIGNIFICANT CHANGE UP (ref 150–400)
POTASSIUM SERPL-MCNC: 3.2 MMOL/L — LOW (ref 3.5–5.3)
POTASSIUM SERPL-SCNC: 3.2 MMOL/L — LOW (ref 3.5–5.3)
RBC # BLD: 3.6 M/UL — LOW (ref 3.8–5.2)
RBC # FLD: 14.6 % — HIGH (ref 10.3–14.5)
SODIUM SERPL-SCNC: 137 MMOL/L — SIGNIFICANT CHANGE UP (ref 135–145)
SPECIMEN SOURCE: SIGNIFICANT CHANGE UP
SPECIMEN SOURCE: SIGNIFICANT CHANGE UP
WBC # BLD: 14.34 K/UL — HIGH (ref 3.8–10.5)
WBC # FLD AUTO: 14.34 K/UL — HIGH (ref 3.8–10.5)

## 2022-04-04 PROCEDURE — 99233 SBSQ HOSP IP/OBS HIGH 50: CPT

## 2022-04-04 PROCEDURE — 99222 1ST HOSP IP/OBS MODERATE 55: CPT

## 2022-04-04 PROCEDURE — 76775 US EXAM ABDO BACK WALL LIM: CPT | Mod: 26

## 2022-04-04 RX ORDER — POTASSIUM CHLORIDE 20 MEQ
40 PACKET (EA) ORAL ONCE
Refills: 0 | Status: COMPLETED | OUTPATIENT
Start: 2022-04-04 | End: 2022-04-04

## 2022-04-04 RX ORDER — FLUTICASONE FUROATE, UMECLIDINIUM BROMIDE AND VILANTEROL TRIFENATATE 200; 62.5; 25 UG/1; UG/1; UG/1
1 POWDER RESPIRATORY (INHALATION) DAILY
Refills: 0 | Status: DISCONTINUED | OUTPATIENT
Start: 2022-04-04 | End: 2022-04-08

## 2022-04-04 RX ORDER — TIOTROPIUM BROMIDE 18 UG/1
1 CAPSULE ORAL; RESPIRATORY (INHALATION) DAILY
Refills: 0 | Status: DISCONTINUED | OUTPATIENT
Start: 2022-04-04 | End: 2022-04-04

## 2022-04-04 RX ORDER — ACETAMINOPHEN 500 MG
650 TABLET ORAL EVERY 6 HOURS
Refills: 0 | Status: DISCONTINUED | OUTPATIENT
Start: 2022-04-04 | End: 2022-04-08

## 2022-04-04 RX ORDER — CEFTRIAXONE 500 MG/1
1000 INJECTION, POWDER, FOR SOLUTION INTRAMUSCULAR; INTRAVENOUS EVERY 24 HOURS
Refills: 0 | Status: DISCONTINUED | OUTPATIENT
Start: 2022-04-04 | End: 2022-04-04

## 2022-04-04 RX ORDER — CEFTRIAXONE 500 MG/1
2000 INJECTION, POWDER, FOR SOLUTION INTRAMUSCULAR; INTRAVENOUS EVERY 24 HOURS
Refills: 0 | Status: DISCONTINUED | OUTPATIENT
Start: 2022-04-04 | End: 2022-04-06

## 2022-04-04 RX ORDER — BUDESONIDE AND FORMOTEROL FUMARATE DIHYDRATE 160; 4.5 UG/1; UG/1
2 AEROSOL RESPIRATORY (INHALATION)
Refills: 0 | Status: DISCONTINUED | OUTPATIENT
Start: 2022-04-04 | End: 2022-04-04

## 2022-04-04 RX ORDER — PETROLATUM,WHITE
1 JELLY (GRAM) TOPICAL
Refills: 0 | Status: DISCONTINUED | OUTPATIENT
Start: 2022-04-04 | End: 2022-04-08

## 2022-04-04 RX ADMIN — Medication 1000 UNIT(S): at 12:04

## 2022-04-04 RX ADMIN — Medication 1 APPLICATION(S): at 16:52

## 2022-04-04 RX ADMIN — CEFTRIAXONE 100 MILLIGRAM(S): 500 INJECTION, POWDER, FOR SOLUTION INTRAMUSCULAR; INTRAVENOUS at 16:35

## 2022-04-04 RX ADMIN — Medication 75 MICROGRAM(S): at 05:23

## 2022-04-04 RX ADMIN — PANTOPRAZOLE SODIUM 40 MILLIGRAM(S): 20 TABLET, DELAYED RELEASE ORAL at 05:23

## 2022-04-04 RX ADMIN — OXCARBAZEPINE 150 MILLIGRAM(S): 300 TABLET, FILM COATED ORAL at 05:23

## 2022-04-04 RX ADMIN — ATORVASTATIN CALCIUM 20 MILLIGRAM(S): 80 TABLET, FILM COATED ORAL at 20:36

## 2022-04-04 RX ADMIN — RIVAROXABAN 15 MILLIGRAM(S): KIT at 16:36

## 2022-04-04 RX ADMIN — OXCARBAZEPINE 150 MILLIGRAM(S): 300 TABLET, FILM COATED ORAL at 16:36

## 2022-04-04 RX ADMIN — Medication 1 TABLET(S): at 12:04

## 2022-04-04 RX ADMIN — FLUTICASONE FUROATE, UMECLIDINIUM BROMIDE AND VILANTEROL TRIFENATATE 1 PUFF(S): 200; 62.5; 25 POWDER RESPIRATORY (INHALATION) at 12:57

## 2022-04-04 RX ADMIN — Medication 40 MILLIEQUIVALENT(S): at 12:03

## 2022-04-04 NOTE — PROGRESS NOTE ADULT - SUBJECTIVE AND OBJECTIVE BOX
Andre Reyes, M.D.  Pager: [phone]  Office: [phone]    Patient is a 89y old  Female who presents with a chief complaint of Generalized weakness, decreased po (04 Apr 2022 15:27)          SUBJECTIVE / OVERNIGHT EVENTS:    No acute overnight events.  pt reports feeling better today  weakness has improved    ROS: ( - ) Fever, ( - )Chills,  ( - )Nausea/Vomiting, ( - ) Cough, ( - )Shortness of breath, ( - )Chest Pain    MEDICATIONS  (STANDING):  AQUAPHOR (petrolatum Ointment) 1 Application(s) Topical two times a day  atorvastatin 20 milliGRAM(s) Oral at bedtime  cefTRIAXone   IVPB 2000 milliGRAM(s) IV Intermittent every 24 hours  cholecalciferol 1000 Unit(s) Oral daily  fluticasone furoate/umeclidinium/vilanterol 100-62.5-25 MICROgram(s) Inhaler 1 Puff(s) Inhalation daily  levothyroxine 75 MICROGram(s) Oral daily  multivitamin 1 Tablet(s) Oral daily  OXcarbazepine 150 milliGRAM(s) Oral two times a day  pantoprazole    Tablet 40 milliGRAM(s) Oral before breakfast  rivaroxaban 15 milliGRAM(s) Oral with dinner    MEDICATIONS  (PRN):  acetaminophen     Tablet .. 650 milliGRAM(s) Oral every 6 hours PRN Temp greater or equal to 38C (100.4F), Mild Pain (1 - 3), Moderate Pain (4 - 6)          T(C): 36.4 (04-04 @ 20:41), Max: 36.8 (04-04 @ 04:08)   HR: 78   BP: 126/66   RR: 18   SpO2: 92%    PHYSICAL EXAM:    CONSTITUTIONAL: NAD, well-developed, well-groomed  EYES: PERRLA; conjunctiva and sclera clear  ENMT: Moist oral mucosa, no pharyngeal injection or exudates; normal dentition  NECK: Supple, no palpable masses; no thyromegaly  RESPIRATORY: Normal respiratory effort; lungs are clear to auscultation bilaterally  CARDIOVASCULAR: Regular rate and rhythm, normal S1 and S2, no murmur/rub/gallop; No lower extremity edema; Peripheral pulses are 2+ bilaterally  ABDOMEN: Nontender to palpation, normoactive bowel sounds, no rebound/guarding; No hepatosplenomegaly  MUSCULOSKELETAL:  Normal gait; no clubbing or cyanosis of digits; no joint swelling or tenderness to palpation  PSYCH: A+O to person, place, and time; affect appropriate  NEUROLOGY: CN 2-12 are intact and symmetric; no gross sensory deficits   SKIN: No rashes; no palpable lesions      LABS:                        11.4   14.34 )-----------( 164      ( 04 Apr 2022 07:06 )             34.3      04-04    137  |  102  |  30<H>  ----------------------------<  83  3.2<L>   |  21<L>  |  1.05    Ca    8.4      04 Apr 2022 07:00  Phos  3.1     04-04  Mg     1.8     04-04    TPro  6.8  /  Alb  3.9  /  TBili  0.5  /  DBili  x   /  AST  29  /  ALT  27  /  AlkPhos  118  04-03       CAPILLARY BLOOD GLUCOSE          RADIOLOGY & ADDITIONAL TESTS:    Imaging Personally Reviewed:  Consultant(s) Notes Reviewed:    Care Discussed with Consultants/Other Providers:

## 2022-04-04 NOTE — PROVIDER CONTACT NOTE (CRITICAL VALUE NOTIFICATION) - TEST AND RESULT REPORTED:
Lab Culture-Blood (one set) Result: Growth in anaerobic bottle: Gram Variable Rods. Growth in aerobic bottle: Gram Negative Rods. Lab Culture-Blood (second set) Result: Growth in aerobic bottle: Gram Negative Rods.

## 2022-04-04 NOTE — PHYSICAL THERAPY INITIAL EVALUATION ADULT - PLANNED THERAPY INTERVENTIONS, PT EVAL
balance training/bed mobility training/gait training/transfer training local wound care at direction of wound team/balance training/bed mobility training/gait training/transfer training

## 2022-04-04 NOTE — PHYSICAL THERAPY INITIAL EVALUATION ADULT - MANUAL MUSCLE TESTING RESULTS, REHAB EVAL
B/l shoulder flex 3-/5, b/l elbow, wrist 4/5, BLE grossly 4/5, except R ankle eversion 0/5/grossly assessed due to

## 2022-04-04 NOTE — PROGRESS NOTE ADULT - PROBLEM SELECTOR PLAN 4
unclear cause at this time. ? decondition ? COPD exacerbation  - c/w fluticasone furoate/umeclidinium/vilanterol 100-62.5-25 MICROgram(s) Inhaler 1 Puff(s) Inhalation daily  - check a tte  < from: Xray Chest 1 View- PORTABLE-Urgent (Xray Chest 1 View- PORTABLE-Urgent .) (04.03.22 @ 20:50) >    FINDINGS:  Calcified aortic knob. The heart is is not accurately assessed on this projection, unchanged. The lungs are clear. There is no pneumothorax or pleural effusion.    IMPRESSION:  Clear lungs.    < end of copied text >  - c/w supplemental oxygen to maintain a Sat of >90

## 2022-04-04 NOTE — PROVIDER CONTACT NOTE (OTHER) - ASSESSMENT
Physical Therapist reported once patient was back in bed patient's O2 Saturation increased to 94% Room Air. Physical Therapist reported once patient was back in bed patient's O2 Saturation increased to 94% Room Air. Physical Therapist reported that patient's Heart Rate 63 and Blood Pressure 125/63. Patient is asymptomatic beside patient complained of Dyspnea on Exertion. Patient states that once she rested in bed her dyspnea resolved. Patient is resting in bed. Patient is Alert and Oriented times Four. Patient denies chest pain, discomfort, shortness of breath, palpitations, dizziness and lightheadedness. Patient's Heart Rhythm is Normal Sinus Rhythm on the cardiac monitor. Patient's Respiratory Rate 18 & O2 Saturation is 93% - 100% Room Air while patient is resting in bed. Patient states that she does not use Symbicort inhaler and patient educated but continues to refuse stating that she uses a different inhaler at home and only as needed and will ask her daughter to bring in her inhaler.

## 2022-04-04 NOTE — PROGRESS NOTE ADULT - PROBLEM SELECTOR PLAN 2
Culture Results:   Growth in aerobic bottle: Gram Negative Rods (04.03.22 @ 18:22)  Culture Results:   Growth in anaerobic bottle: Gram Variable Rods  Growth in aerobic bottle: Gram Negative Rods    will increase ceftriaxone to 2gm daily  needs repeat cxs in the am

## 2022-04-04 NOTE — PROVIDER CONTACT NOTE (CRITICAL VALUE NOTIFICATION) - BACKGROUND
Patient admitted for Pneumonia, Urinary Tract Infection, History of Transient Ischemic Attack, Respiratory Failure, Lower Extremity Weakness.

## 2022-04-04 NOTE — PROVIDER CONTACT NOTE (CRITICAL VALUE NOTIFICATION) - NS PROVIDER READ BACK TO LAB
Lab Culture-Blood (one set) Result: Growth in anaerobic bottle: Gram Variable Rods. Growth in aerobic bottle: Gram Negative Rods. Lab Culture-Blood (second set) Result: Growth in aerobic bottle: Gram Negative Rods./yes

## 2022-04-04 NOTE — PROVIDER CONTACT NOTE (CRITICAL VALUE NOTIFICATION) - ASSESSMENT
Patient's Lab Culture-Blood (one set) Result: Growth in anaerobic bottle: Gram Variable Rods. Growth in aerobic bottle: Gram Negative Rods. Lab Culture-Blood (second set) Result: Growth in aerobic bottle: Gram Negative Rods. Patient has an active order for Ceftriaxone IVPB every 24 hours.

## 2022-04-04 NOTE — PHYSICAL THERAPY INITIAL EVALUATION ADULT - ORIENTATION, REHAB EVAL
Admitted for elevated troponin, generalized weakness; s/p transfusion, EGD oriented to person, place, time and situation

## 2022-04-04 NOTE — PHYSICAL THERAPY INITIAL EVALUATION ADULT - MODALITIES TREATMENT COMMENTS
Patient with chronic RLE lower leg partial thickness wounds managed at [facility] wound center s/p recent collagen matrix application; +erythema

## 2022-04-04 NOTE — PHYSICAL THERAPY INITIAL EVALUATION ADULT - ACTIVE RANGE OF MOTION EXAMINATION, REHAB EVAL
except b/l shoulder flex/abduction limited ~50%; R ankle inversion AROM severly limited/passive intact/bilateral upper extremity Active ROM was WFL (within functional limits)/bilateral  lower extremity Active ROM was WFL (within functional limits)

## 2022-04-04 NOTE — PROVIDER CONTACT NOTE (CRITICAL VALUE NOTIFICATION) - ACTION/TREATMENT ORDERED:
NP aware & assessed patient & reviewed all Lab Results, orders, history & plan. NP consulted with MD.

## 2022-04-04 NOTE — PHYSICAL THERAPY INITIAL EVALUATION ADULT - PERTINENT HX OF CURRENT PROBLEM, REHAB EVAL
89 year old F with pmhx of HTN, HLD, hypothyoridism, ?CVA vs. seizure disorder, paroxysmal a. fib on Xarelto; p/w weakness and decreased po intake, likely in the setting of sepsis secondary to urinary tract infection vs. viral pneumonia, less likely neurologic event.

## 2022-04-04 NOTE — PHYSICAL THERAPY INITIAL EVALUATION ADULT - REFERRING PHYSICIAN, REHAB EVAL
Marek Kim. Consult- PT evaluate and treat, Marek Kim. Consult- PT evaluate and treat; PT wound consult requested 4/6 by wound care team

## 2022-04-04 NOTE — PHYSICAL THERAPY INITIAL EVALUATION ADULT - LIVES WITH, PROFILE
Pt lives alone in [facility] with elevator access. Pt is independent with functional mobility, ambulating with RW. Pt with HHA that assists mostly with cooking, and has someone clean the apartment./alone

## 2022-04-05 DIAGNOSIS — N28.89 OTHER SPECIFIED DISORDERS OF KIDNEY AND URETER: ICD-10-CM

## 2022-04-05 LAB
ANION GAP SERPL CALC-SCNC: 14 MMOL/L — SIGNIFICANT CHANGE UP (ref 5–17)
BUN SERPL-MCNC: 27 MG/DL — HIGH (ref 7–23)
CALCIUM SERPL-MCNC: 9.2 MG/DL — SIGNIFICANT CHANGE UP (ref 8.4–10.5)
CHLORIDE SERPL-SCNC: 103 MMOL/L — SIGNIFICANT CHANGE UP (ref 96–108)
CO2 SERPL-SCNC: 21 MMOL/L — LOW (ref 22–31)
CREAT SERPL-MCNC: 1.06 MG/DL — SIGNIFICANT CHANGE UP (ref 0.5–1.3)
EGFR: 48 ML/MIN/1.73M2 — LOW
GLUCOSE SERPL-MCNC: 87 MG/DL — SIGNIFICANT CHANGE UP (ref 70–99)
HCT VFR BLD CALC: 38.6 % — SIGNIFICANT CHANGE UP (ref 34.5–45)
HGB BLD-MCNC: 12.6 G/DL — SIGNIFICANT CHANGE UP (ref 11.5–15.5)
MCHC RBC-ENTMCNC: 31.2 PG — SIGNIFICANT CHANGE UP (ref 27–34)
MCHC RBC-ENTMCNC: 32.6 GM/DL — SIGNIFICANT CHANGE UP (ref 32–36)
MCV RBC AUTO: 95.5 FL — SIGNIFICANT CHANGE UP (ref 80–100)
NRBC # BLD: 0 /100 WBCS — SIGNIFICANT CHANGE UP (ref 0–0)
PLATELET # BLD AUTO: 190 K/UL — SIGNIFICANT CHANGE UP (ref 150–400)
POTASSIUM SERPL-MCNC: 4.3 MMOL/L — SIGNIFICANT CHANGE UP (ref 3.5–5.3)
POTASSIUM SERPL-SCNC: 4.3 MMOL/L — SIGNIFICANT CHANGE UP (ref 3.5–5.3)
RBC # BLD: 4.04 M/UL — SIGNIFICANT CHANGE UP (ref 3.8–5.2)
RBC # FLD: 14.8 % — HIGH (ref 10.3–14.5)
SODIUM SERPL-SCNC: 138 MMOL/L — SIGNIFICANT CHANGE UP (ref 135–145)
WBC # BLD: 13.62 K/UL — HIGH (ref 3.8–10.5)
WBC # FLD AUTO: 13.62 K/UL — HIGH (ref 3.8–10.5)

## 2022-04-05 PROCEDURE — 99223 1ST HOSP IP/OBS HIGH 75: CPT

## 2022-04-05 PROCEDURE — 99233 SBSQ HOSP IP/OBS HIGH 50: CPT

## 2022-04-05 PROCEDURE — 71250 CT THORAX DX C-: CPT | Mod: 26

## 2022-04-05 RX ORDER — ATENOLOL 25 MG/1
25 TABLET ORAL
Refills: 0 | Status: DISCONTINUED | OUTPATIENT
Start: 2022-04-05 | End: 2022-04-08

## 2022-04-05 RX ADMIN — Medication 75 MICROGRAM(S): at 05:41

## 2022-04-05 RX ADMIN — Medication 1 APPLICATION(S): at 05:41

## 2022-04-05 RX ADMIN — Medication 1000 UNIT(S): at 12:55

## 2022-04-05 RX ADMIN — ATENOLOL 25 MILLIGRAM(S): 25 TABLET ORAL at 22:25

## 2022-04-05 RX ADMIN — OXCARBAZEPINE 150 MILLIGRAM(S): 300 TABLET, FILM COATED ORAL at 17:55

## 2022-04-05 RX ADMIN — ATENOLOL 25 MILLIGRAM(S): 25 TABLET ORAL at 12:54

## 2022-04-05 RX ADMIN — Medication 1 APPLICATION(S): at 17:58

## 2022-04-05 RX ADMIN — CEFTRIAXONE 100 MILLIGRAM(S): 500 INJECTION, POWDER, FOR SOLUTION INTRAMUSCULAR; INTRAVENOUS at 17:48

## 2022-04-05 RX ADMIN — RIVAROXABAN 15 MILLIGRAM(S): KIT at 18:34

## 2022-04-05 RX ADMIN — ATORVASTATIN CALCIUM 20 MILLIGRAM(S): 80 TABLET, FILM COATED ORAL at 21:14

## 2022-04-05 RX ADMIN — Medication 1 TABLET(S): at 12:55

## 2022-04-05 RX ADMIN — PANTOPRAZOLE SODIUM 40 MILLIGRAM(S): 20 TABLET, DELAYED RELEASE ORAL at 05:41

## 2022-04-05 RX ADMIN — OXCARBAZEPINE 150 MILLIGRAM(S): 300 TABLET, FILM COATED ORAL at 05:41

## 2022-04-05 NOTE — PROGRESS NOTE ADULT - PROBLEM SELECTOR PLAN 5
pt with incidental finding of a renal lesion  will need to discuss with patient on how extensive/invasive she would like this to be worked up. If she is agreeable will persue a dedicated CT of the Kidney

## 2022-04-05 NOTE — PROGRESS NOTE ADULT - SUBJECTIVE AND OBJECTIVE BOX
Andre Reyes, M.D.  Pager: [phone]  Office: [phone]    Patient is a 89y old  Female who presents with a chief complaint of Generalized weakness, decreased po (05 Apr 2022 22:11)          SUBJECTIVE / OVERNIGHT EVENTS:    No acute overnight events.  pt feeling well.    ROS: ( - ) Fever, ( - )Chills,  ( - )Nausea/Vomiting, ( - ) Cough, ( - )Shortness of breath, ( - )Chest Pain    MEDICATIONS  (STANDING):  AQUAPHOR (petrolatum Ointment) 1 Application(s) Topical two times a day  ATENolol  Tablet 25 milliGRAM(s) Oral two times a day  atorvastatin 20 milliGRAM(s) Oral at bedtime  cefTRIAXone   IVPB 2000 milliGRAM(s) IV Intermittent every 24 hours  cholecalciferol 1000 Unit(s) Oral daily  fluticasone furoate/umeclidinium/vilanterol 100-62.5-25 MICROgram(s) Inhaler 1 Puff(s) Inhalation daily  levothyroxine 75 MICROGram(s) Oral daily  multivitamin 1 Tablet(s) Oral daily  OXcarbazepine 150 milliGRAM(s) Oral two times a day  pantoprazole    Tablet 40 milliGRAM(s) Oral before breakfast  rivaroxaban 15 milliGRAM(s) Oral with dinner    MEDICATIONS  (PRN):  acetaminophen     Tablet .. 650 milliGRAM(s) Oral every 6 hours PRN Temp greater or equal to 38C (100.4F), Mild Pain (1 - 3), Moderate Pain (4 - 6)          T(C): 36.4 (04-05 @ 20:37), Max: 36.9 (04-05 @ 04:42)   HR: 84   BP: 124/71   RR: 19   SpO2: 96%    PHYSICAL EXAM:    CONSTITUTIONAL: NAD, well-developed, well-groomed  EYES: PERRLA; conjunctiva and sclera clear  ENMT: Moist oral mucosa, no pharyngeal injection or exudates; normal dentition  NECK: Supple, no palpable masses; no thyromegaly  RESPIRATORY: Normal respiratory effort; lungs are clear to auscultation bilaterally  CARDIOVASCULAR: Regular rate and rhythm, normal S1 and S2, no murmur/rub/gallop; No lower extremity edema; Peripheral pulses are 2+ bilaterally  ABDOMEN: Nontender to palpation, normoactive bowel sounds, no rebound/guarding; No hepatosplenomegaly  MUSCULOSKELETAL:  Normal gait; no clubbing or cyanosis of digits; no joint swelling or tenderness to palpation  PSYCH: A+O to person, place, and time; affect appropriate  NEUROLOGY: CN 2-12 are intact and symmetric; no gross sensory deficits   SKIN: No rashes; no palpable lesions      LABS:                        12.6   13.62 )-----------( 190      ( 05 Apr 2022 07:10 )             38.6      04-05    138  |  103  |  27<H>  ----------------------------<  87  4.3   |  21<L>  |  1.06    Ca    9.2      05 Apr 2022 07:10  Phos  3.1     04-04  Mg     1.8     04-04         CAPILLARY BLOOD GLUCOSE          RADIOLOGY & ADDITIONAL TESTS:    Imaging Personally Reviewed:  Consultant(s) Notes Reviewed:    Care Discussed with Consultants/Other Providers:

## 2022-04-05 NOTE — PROGRESS NOTE ADULT - PROBLEM SELECTOR PLAN 2
Culture Results:   Growth in aerobic bottle: Escherichia coli  Urine Culture Results: >100,000 CFU/ml Escherichia coli Susceptibility to follow. (04.04.22 @ 01:15)  c/w ceftriaxone to 2gm daily

## 2022-04-05 NOTE — PROGRESS NOTE ADULT - PROBLEM SELECTOR PLAN 1
on ceftriaxone  complicated UTI  - pt symptomatically improving with abx  - check a renal US< from: US Renal (04.04.22 @ 18:02) >  Left upper pole hypoechoic mass with increased vascularity. It is  suspicious for renal neoplasm. Recommend further evaluation with  contrast-enhanced CT or MRI as clinically indicated. Additional  complicated and simple left renal cysts.  Increased echogenicity of the right kidney compatible with medical renal disease.  < end of copied text >

## 2022-04-06 LAB
-  AMIKACIN: SIGNIFICANT CHANGE UP
-  AMIKACIN: SIGNIFICANT CHANGE UP
-  AMOXICILLIN/CLAVULANIC ACID: SIGNIFICANT CHANGE UP
-  AMPICILLIN/SULBACTAM: SIGNIFICANT CHANGE UP
-  AMPICILLIN/SULBACTAM: SIGNIFICANT CHANGE UP
-  AMPICILLIN: SIGNIFICANT CHANGE UP
-  AMPICILLIN: SIGNIFICANT CHANGE UP
-  AZTREONAM: SIGNIFICANT CHANGE UP
-  AZTREONAM: SIGNIFICANT CHANGE UP
-  CEFAZOLIN: SIGNIFICANT CHANGE UP
-  CEFAZOLIN: SIGNIFICANT CHANGE UP
-  CEFEPIME: SIGNIFICANT CHANGE UP
-  CEFEPIME: SIGNIFICANT CHANGE UP
-  CEFOXITIN: SIGNIFICANT CHANGE UP
-  CEFOXITIN: SIGNIFICANT CHANGE UP
-  CEFTRIAXONE: SIGNIFICANT CHANGE UP
-  CEFTRIAXONE: SIGNIFICANT CHANGE UP
-  CIPROFLOXACIN: SIGNIFICANT CHANGE UP
-  CIPROFLOXACIN: SIGNIFICANT CHANGE UP
-  ERTAPENEM: SIGNIFICANT CHANGE UP
-  ERTAPENEM: SIGNIFICANT CHANGE UP
-  GENTAMICIN: SIGNIFICANT CHANGE UP
-  GENTAMICIN: SIGNIFICANT CHANGE UP
-  IMIPENEM: SIGNIFICANT CHANGE UP
-  IMIPENEM: SIGNIFICANT CHANGE UP
-  LEVOFLOXACIN: SIGNIFICANT CHANGE UP
-  LEVOFLOXACIN: SIGNIFICANT CHANGE UP
-  MEROPENEM: SIGNIFICANT CHANGE UP
-  MEROPENEM: SIGNIFICANT CHANGE UP
-  NITROFURANTOIN: SIGNIFICANT CHANGE UP
-  PIPERACILLIN/TAZOBACTAM: SIGNIFICANT CHANGE UP
-  PIPERACILLIN/TAZOBACTAM: SIGNIFICANT CHANGE UP
-  TIGECYCLINE: SIGNIFICANT CHANGE UP
-  TOBRAMYCIN: SIGNIFICANT CHANGE UP
-  TOBRAMYCIN: SIGNIFICANT CHANGE UP
-  TRIMETHOPRIM/SULFAMETHOXAZOLE: SIGNIFICANT CHANGE UP
-  TRIMETHOPRIM/SULFAMETHOXAZOLE: SIGNIFICANT CHANGE UP
ANION GAP SERPL CALC-SCNC: 13 MMOL/L — SIGNIFICANT CHANGE UP (ref 5–17)
BUN SERPL-MCNC: 23 MG/DL — SIGNIFICANT CHANGE UP (ref 7–23)
CALCIUM SERPL-MCNC: 8.8 MG/DL — SIGNIFICANT CHANGE UP (ref 8.4–10.5)
CHLORIDE SERPL-SCNC: 104 MMOL/L — SIGNIFICANT CHANGE UP (ref 96–108)
CO2 SERPL-SCNC: 22 MMOL/L — SIGNIFICANT CHANGE UP (ref 22–31)
CREAT SERPL-MCNC: 1 MG/DL — SIGNIFICANT CHANGE UP (ref 0.5–1.3)
CULTURE RESULTS: SIGNIFICANT CHANGE UP
EGFR: 51 ML/MIN/1.73M2 — LOW
GLUCOSE SERPL-MCNC: 80 MG/DL — SIGNIFICANT CHANGE UP (ref 70–99)
HCT VFR BLD CALC: 37.7 % — SIGNIFICANT CHANGE UP (ref 34.5–45)
HGB BLD-MCNC: 12.2 G/DL — SIGNIFICANT CHANGE UP (ref 11.5–15.5)
MCHC RBC-ENTMCNC: 31.5 PG — SIGNIFICANT CHANGE UP (ref 27–34)
MCHC RBC-ENTMCNC: 32.4 GM/DL — SIGNIFICANT CHANGE UP (ref 32–36)
MCV RBC AUTO: 97.4 FL — SIGNIFICANT CHANGE UP (ref 80–100)
METHOD TYPE: SIGNIFICANT CHANGE UP
METHOD TYPE: SIGNIFICANT CHANGE UP
NRBC # BLD: 0 /100 WBCS — SIGNIFICANT CHANGE UP (ref 0–0)
ORGANISM # SPEC MICROSCOPIC CNT: SIGNIFICANT CHANGE UP
PLATELET # BLD AUTO: 188 K/UL — SIGNIFICANT CHANGE UP (ref 150–400)
POTASSIUM SERPL-MCNC: 4.2 MMOL/L — SIGNIFICANT CHANGE UP (ref 3.5–5.3)
POTASSIUM SERPL-SCNC: 4.2 MMOL/L — SIGNIFICANT CHANGE UP (ref 3.5–5.3)
RBC # BLD: 3.87 M/UL — SIGNIFICANT CHANGE UP (ref 3.8–5.2)
RBC # FLD: 14.9 % — HIGH (ref 10.3–14.5)
SODIUM SERPL-SCNC: 139 MMOL/L — SIGNIFICANT CHANGE UP (ref 135–145)
SPECIMEN SOURCE: SIGNIFICANT CHANGE UP
WBC # BLD: 8.71 K/UL — SIGNIFICANT CHANGE UP (ref 3.8–10.5)
WBC # FLD AUTO: 8.71 K/UL — SIGNIFICANT CHANGE UP (ref 3.8–10.5)

## 2022-04-06 PROCEDURE — 74174 CTA ABD&PLVS W/CONTRAST: CPT | Mod: 26

## 2022-04-06 PROCEDURE — 99222 1ST HOSP IP/OBS MODERATE 55: CPT

## 2022-04-06 PROCEDURE — 99233 SBSQ HOSP IP/OBS HIGH 50: CPT

## 2022-04-06 PROCEDURE — 93306 TTE W/DOPPLER COMPLETE: CPT | Mod: 26

## 2022-04-06 PROCEDURE — 99232 SBSQ HOSP IP/OBS MODERATE 35: CPT

## 2022-04-06 RX ORDER — CEFAZOLIN SODIUM 1 G
1000 VIAL (EA) INJECTION EVERY 8 HOURS
Refills: 0 | Status: DISCONTINUED | OUTPATIENT
Start: 2022-04-06 | End: 2022-04-08

## 2022-04-06 RX ADMIN — Medication 75 MICROGRAM(S): at 06:09

## 2022-04-06 RX ADMIN — PANTOPRAZOLE SODIUM 40 MILLIGRAM(S): 20 TABLET, DELAYED RELEASE ORAL at 06:09

## 2022-04-06 RX ADMIN — ATENOLOL 25 MILLIGRAM(S): 25 TABLET ORAL at 21:54

## 2022-04-06 RX ADMIN — OXCARBAZEPINE 150 MILLIGRAM(S): 300 TABLET, FILM COATED ORAL at 05:56

## 2022-04-06 RX ADMIN — Medication 1 TABLET(S): at 11:30

## 2022-04-06 RX ADMIN — Medication 1 APPLICATION(S): at 06:15

## 2022-04-06 RX ADMIN — ATENOLOL 25 MILLIGRAM(S): 25 TABLET ORAL at 11:30

## 2022-04-06 RX ADMIN — Medication 1 APPLICATION(S): at 17:04

## 2022-04-06 RX ADMIN — Medication 100 MILLIGRAM(S): at 21:46

## 2022-04-06 RX ADMIN — RIVAROXABAN 15 MILLIGRAM(S): KIT at 17:03

## 2022-04-06 RX ADMIN — FLUTICASONE FUROATE, UMECLIDINIUM BROMIDE AND VILANTEROL TRIFENATATE 1 PUFF(S): 200; 62.5; 25 POWDER RESPIRATORY (INHALATION) at 11:00

## 2022-04-06 RX ADMIN — OXCARBAZEPINE 150 MILLIGRAM(S): 300 TABLET, FILM COATED ORAL at 17:03

## 2022-04-06 RX ADMIN — Medication 1000 UNIT(S): at 11:29

## 2022-04-06 RX ADMIN — ATORVASTATIN CALCIUM 20 MILLIGRAM(S): 80 TABLET, FILM COATED ORAL at 21:54

## 2022-04-06 NOTE — PROVIDER CONTACT NOTE (OTHER) - SITUATION
Pt HR dropped to 41 while asleep.
Physical Therapist reports that patient's O2 Saturation decreased to 89% Room Air while patient was ambulating to and from the bathroom and immediately post ambulation.

## 2022-04-06 NOTE — PROGRESS NOTE ADULT - SUBJECTIVE AND OBJECTIVE BOX
Andre Reyes, M.D.  Pager: [phone]  Office: [phone]    Patient is a 89y old  Female who presents with a chief complaint of Generalized weakness, decreased po (06 Apr 2022 08:26)          SUBJECTIVE / OVERNIGHT EVENTS:    No acute overnight events.    ROS: ( - ) Fever, ( - )Chills,  ( - )Nausea/Vomiting, ( - ) Cough, ( - )Shortness of breath, ( - )Chest Pain    MEDICATIONS  (STANDING):  AQUAPHOR (petrolatum Ointment) 1 Application(s) Topical two times a day  ATENolol  Tablet 25 milliGRAM(s) Oral two times a day  atorvastatin 20 milliGRAM(s) Oral at bedtime  ceFAZolin   IVPB 1000 milliGRAM(s) IV Intermittent every 8 hours  cholecalciferol 1000 Unit(s) Oral daily  fluticasone furoate/umeclidinium/vilanterol 100-62.5-25 MICROgram(s) Inhaler 1 Puff(s) Inhalation daily  levothyroxine 75 MICROGram(s) Oral daily  multivitamin 1 Tablet(s) Oral daily  OXcarbazepine 150 milliGRAM(s) Oral two times a day  pantoprazole    Tablet 40 milliGRAM(s) Oral before breakfast  rivaroxaban 15 milliGRAM(s) Oral with dinner    MEDICATIONS  (PRN):  acetaminophen     Tablet .. 650 milliGRAM(s) Oral every 6 hours PRN Temp greater or equal to 38C (100.4F), Mild Pain (1 - 3), Moderate Pain (4 - 6)          T(C): 36.5 (04-06 @ 12:42), Max: 36.5 (04-06 @ 12:42)   HR: 76   BP: 113/71   RR: 18   SpO2: 94%    PHYSICAL EXAM:    CONSTITUTIONAL: NAD, well-developed, well-groomed  EYES: PERRLA; conjunctiva and sclera clear  ENMT: Moist oral mucosa, no pharyngeal injection or exudates; normal dentition  NECK: Supple, no palpable masses; no thyromegaly  RESPIRATORY: Normal respiratory effort; lungs are clear to auscultation bilaterally  CARDIOVASCULAR: Regular rate and rhythm, normal S1 and S2, no murmur/rub/gallop; No lower extremity edema; Peripheral pulses are 2+ bilaterally  ABDOMEN: Nontender to palpation, normoactive bowel sounds, no rebound/guarding; No hepatosplenomegaly  MUSCULOSKELETAL:  Normal gait; no clubbing or cyanosis of digits; no joint swelling or tenderness to palpation  PSYCH: A+O to person, place, and time; affect appropriate  NEUROLOGY: CN 2-12 are intact and symmetric; no gross sensory deficits   SKIN: No rashes; no palpable lesions      LABS:                        12.2   8.71  )-----------( 188      ( 06 Apr 2022 07:50 )             37.7      04-06    139  |  104  |  23  ----------------------------<  80  4.2   |  22  |  1.00    Ca    8.8      06 Apr 2022 07:50         CAPILLARY BLOOD GLUCOSE          RADIOLOGY & ADDITIONAL TESTS:    Imaging Personally Reviewed:  Consultant(s) Notes Reviewed:    Care Discussed with Consultants/Other Providers:   Andre Reyes, M.D.  Pager: [phone]  Office: [phone]    Patient is a 89y old  Female who presents with a chief complaint of Generalized weakness, decreased po (06 Apr 2022 08:26)          SUBJECTIVE / OVERNIGHT EVENTS:    No acute overnight events.  Pt feels well today.  no complaints    ROS: ( - ) Fever, ( - )Chills,  ( - )Nausea/Vomiting, ( - ) Cough, ( - )Shortness of breath, ( - )Chest Pain    MEDICATIONS  (STANDING):  AQUAPHOR (petrolatum Ointment) 1 Application(s) Topical two times a day  ATENolol  Tablet 25 milliGRAM(s) Oral two times a day  atorvastatin 20 milliGRAM(s) Oral at bedtime  ceFAZolin   IVPB 1000 milliGRAM(s) IV Intermittent every 8 hours  cholecalciferol 1000 Unit(s) Oral daily  fluticasone furoate/umeclidinium/vilanterol 100-62.5-25 MICROgram(s) Inhaler 1 Puff(s) Inhalation daily  levothyroxine 75 MICROGram(s) Oral daily  multivitamin 1 Tablet(s) Oral daily  OXcarbazepine 150 milliGRAM(s) Oral two times a day  pantoprazole    Tablet 40 milliGRAM(s) Oral before breakfast  rivaroxaban 15 milliGRAM(s) Oral with dinner    MEDICATIONS  (PRN):  acetaminophen     Tablet .. 650 milliGRAM(s) Oral every 6 hours PRN Temp greater or equal to 38C (100.4F), Mild Pain (1 - 3), Moderate Pain (4 - 6)          T(C): 36.5 (04-06 @ 12:42), Max: 36.5 (04-06 @ 12:42)   HR: 76   BP: 113/71   RR: 18   SpO2: 94%    PHYSICAL EXAM:    CONSTITUTIONAL: NAD, well-developed, well-groomed  EYES: PERRLA; conjunctiva and sclera clear  ENMT: Moist oral mucosa, no pharyngeal injection or exudates; normal dentition  NECK: Supple, no palpable masses; no thyromegaly  RESPIRATORY: Normal respiratory effort; lungs are clear to auscultation bilaterally  CARDIOVASCULAR: Regular rate and rhythm, normal S1 and S2, no murmur/rub/gallop; No lower extremity edema; Peripheral pulses are 2+ bilaterally  ABDOMEN: Nontender to palpation, normoactive bowel sounds, no rebound/guarding; No hepatosplenomegaly  MUSCULOSKELETAL:  Normal gait; no clubbing or cyanosis of digits; no joint swelling or tenderness to palpation  PSYCH: A+O to person, place, and time; affect appropriate  NEUROLOGY: CN 2-12 are intact and symmetric; no gross sensory deficits   SKIN: No rashes; no palpable lesions      LABS:                        12.2   8.71  )-----------( 188      ( 06 Apr 2022 07:50 )             37.7      04-06    139  |  104  |  23  ----------------------------<  80  4.2   |  22  |  1.00    Ca    8.8      06 Apr 2022 07:50         CAPILLARY BLOOD GLUCOSE          RADIOLOGY & ADDITIONAL TESTS:    Imaging Personally Reviewed:    < from: US Renal (04.04.22 @ 18:02) >  FINDINGS:  Right kidney: 10.6 cm. No renal mass, hydronephrosis or calculi.   Increased echogenicity compatible with medical renal disease.    Left kidney: 11.6 cm. No hydronephrosis or calculi. Well-circumscribed   solid hypoechoic mass in the upper pole with vascularity measuring 3.5 x   2.8 x 2.6 cm. It is suspicious for renal neoplasm. Additional Midpole   cortical cyst with debris and septations measuring 6.0 x 4.7 x 4.7 cm.   Multiple additional cortical cysts.    Urinary bladder: Within normal limits.    IMPRESSION:  Left upper pole hypoechoic mass with increased vascularity. It is   suspicious for renal neoplasm. Recommend further evaluation with   contrast-enhanced CT or MRI as clinically indicated. Additional   complicated and simple left renal cysts.    Increased echogenicity of the right kidney compatible with medical renal   disease.      < end of copied text >    Consultant(s) Notes Reviewed:    Care Discussed with Consultants/Other Providers:

## 2022-04-06 NOTE — PROGRESS NOTE ADULT - PROBLEM SELECTOR PLAN 7
Chronic stable  Continue home Xarelto  Holding home BB in setting of sepsis Chronic stable  Continue home Xarelto  atenolol now restarted

## 2022-04-06 NOTE — PROGRESS NOTE ADULT - PROBLEM SELECTOR PLAN 5
pt with incidental finding of a renal lesion  will need to discuss with patient on how extensive/invasive she would like this to be worked up. If she is agreeable will persue a dedicated CT of the Kidney pt with incidental finding of a renal lesion  we discussed the concern for malignant renal mass. Pt states she just does not want to suffer from pain. She would like to discuss the options with Urology.  she is agreeable to getting a CT for better assessment of this renal mass

## 2022-04-06 NOTE — PROGRESS NOTE ADULT - PROBLEM SELECTOR PLAN 1
on ceftriaxone  complicated UTI  - pt symptomatically improving with abx  - check a renal US< from: US Renal (04.04.22 @ 18:02) >  Left upper pole hypoechoic mass with increased vascularity. It is  suspicious for renal neoplasm. Recommend further evaluation with  contrast-enhanced CT or MRI as clinically indicated. Additional  complicated and simple left renal cysts.  Increased echogenicity of the right kidney compatible with medical renal disease.  < end of copied text > Complicated UTI with associated bacteremia  Sepsis is resolving.  Leukocytosis is improving  Bld Cx showing E. Coli sensitive to Ancef, will change abx and follow up cultures until clearance. (Last sent yesterday)

## 2022-04-06 NOTE — PROGRESS NOTE ADULT - PROBLEM SELECTOR PLAN 4
unclear cause at this time. ? decondition ? COPD exacerbation  - c/w fluticasone furoate/umeclidinium/vilanterol 100-62.5-25 MICROgram(s) Inhaler 1 Puff(s) Inhalation daily  - check a tte  < from: Xray Chest 1 View- PORTABLE-Urgent (Xray Chest 1 View- PORTABLE-Urgent .) (04.03.22 @ 20:50) >    FINDINGS:  Calcified aortic knob. The heart is is not accurately assessed on this projection, unchanged. The lungs are clear. There is no pneumothorax or pleural effusion.    IMPRESSION:  Clear lungs.    < end of copied text >  - c/w supplemental oxygen to maintain a Sat of >90  - will check a CT chest given persistent Hypoxia CT chest showing Empyema and lower lobe is a 7 x 7mm noncalcified nodule. 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 Moderate AS. Minimal aortic regurgitation. Normal left ventricular systolic. No wall motion abnormalities. Mild right ventricular enlargement with normal right ventricular systolic function. Moderate pulmonary pressures.  - c/w fluticasone furoate/umeclidinium/vilanterol 100-62.5-25 MICROgram(s) Inhaler 1 Puff(s) Inhalation daily  - c/w supplemental oxygen to maintain a Sat of >90  - hypoxia seems to be from Emphysema --> will target a lower O2 sats

## 2022-04-06 NOTE — PROVIDER CONTACT NOTE (OTHER) - BACKGROUND
Pt admitted sepsis, viral pneumonia, acute respiratory failure with hypoxia
Patient admitted for Pneumonia, Urinary Tract Infection, History of Transient Ischemic Attack, Respiratory Failure, Lower Extremity Weakness.

## 2022-04-06 NOTE — CONSULT NOTE ADULT - ASSESSMENT
89 year old F with a history of HTN, HLD, hypothyoridism, ?CVA vs. seizure disorder, paroxysmal a. fib on Xarelto who presents with weakness and decreased po intake, likely in the setting of sepsis. Urology consulted for 3.5cm renal mass, L.     --Images reviewed.   --Pending CT, will f/u  --Discussed w/ patient and family options. Both would prefer conservative management at this point  --Likely will need outpatient f/u for surveillance of the mass    d/w Dr Monreal 
A/P:  89 year old F with a history of HTN, HLD, hypothyroidism, CVA vs. seizure disorder, paroxysmal a fib on Xarelto who presents with weakness and decreased po intake, likely in the setting of sepsis secondary to urinary tract infection vs. viral pneumonia, less likely neurologic event.    Wound Consult requested to assist w/ management of BLE PVD  RLE wounds    RLE- Althea dressing QOD  BLE elevation & Compression  Abx per Medicine/ ID  Moisturize intact skin w/ Aquaphor cream BID and prn  Nutrition Consult for optimization         encourage high quality protein, MVI & Vit C to promote wound healing  Continue turning and positioning w/ offloading assistive devices as per protocol  Hetal BID and prn soiling, Continue w/ Pericare w/ pravena care, as per protocol  Waffle Cushion to chair when oob to chair  Continue w/ low air loss pressure redistribution bed surface   Care as per medicine, will follow w/ you  Upon discharge f/[facility] as outpatient at [facility] [address] [place] [address] [phone]  D/w team & RN   Thank you for this consult  Melvi Flaherty PA-C [id number]  I spent 50 minutes face to face w/ this pt of which more than 50% of the time was spent counseling & coordinating care of this pt. 
1. sepsis secondary to UTI ?kidney mass ?pyelonephritis    2. PAF alternating with sinus bradycardia    3. diastolic heart failure MR moderate to severe normal LV function    Recommend  continue anticaogulation  continue present meds  further evaluation of renal mass as per primary umm  ID followup

## 2022-04-06 NOTE — CONSULT NOTE ADULT - SUBJECTIVE AND OBJECTIVE BOX
Wound SURGERY CONSULT NOTE    HPI:  89 year old F with PMH/o HTN, HLD, hypothyoridism, CVA vs. seizure disorder, paroxysmal a. fib who presents with weakness and decreased po intake. Per the patient and daughter at bedside.  On day of admission pt suddenly felt weaker than usual, finding herself unable to initiate the steps to walk. Prior to that, she had gotten herself out of bed and been active. She denies any numbness or tingling at the time of the event, denies her legs giving out or falling, and denies any confusion or visual changes. She had decreased appetite and worsened from baseline VILLAR which has been improving. She denies any chills, chest pain, palpitations, cough, SOB at rest, abdominal pain, nausea, vomiting, diarrhea, constipation, dysuria, urinary frequency, worsened lower extremity swelling. She denies experiencing weakness like this before, prior TIAs presented as loss of speech. She was started on oxcarbazepine following her last episode in 2014 and has not had any episodes since.  Of note, she has had ongoing VILLAR, which has been recently addressed by her primary care doctor and cardiologist. Her cardiologist believes she may have a component of HFpEF given moderate MR and pulmonary hypertension noted on echo. She was also started on a Trelegy Ellipta inhaler that provides her some benefit. She has a remote history of treated tuberculosis.     In the ED, she was febrile to 101.2, HR 60s, /64, RR to 26, O2 sat 89-90% on RA, >95% on 2L NC. Labs were significant for WBC 13.20. Lactate 1.4. UA positive for nitrite, large LE, 640 WBC, many bacteria. RVP negative. EKG with sinus bradycardia with 1st degree AV block. CXR with no acute pulmonary disease. In the ED, she had an episode of hypoxia with rigors, afebrile, but symptoms resolved with 1g acetaminophen. In total, she was given 1g acetaminophen x2, 1.5L NS, CTX 1g x1 and admitted to telemetry for further management.     Wound consult requested by team to assist w/ management of RLE wound. Pt fell out of bed at home about one month ago and scratched her leg on her walker.  Pt used mupirocin, but wound didn't heal.  Pt w/ leg swelling from time to time, but not wearing compression garments. Pt w/o c/o pain, drainage, oor, color change,  worsening swelling.  Pt was seen at the wound center last week and Rx Althea.  This is something not stocked in the hospital, so mupirocin dressings continued.  Daughter at bedside w/ althea dressings today.  Offloading and pericare initiated as pt sedentary 2/2 to recent illness. Pt;s Appetite improving w/o weight loss.  All questions asked and answered to pt's and family's satisfaction and expressed understanding     Current Diet: Diet, Regular:   DASH/TLC Sodium & Cholesterol Restricted (DASH) (04-03-22 @ 23:43)      PAST MEDICAL & SURGICAL HISTORY:  HTN (hypertension)    Hyperlipemia    GERD (gastroesophageal reflux disease)    Hypothyroidism    TIA (transient ischemic attack)    CVA (cerebrovascular accident)    Afib    Tuberculosis    s/p left hip replacement    s/p repair of left rotator cuff    S/P laminectomy    s/p removal of Bilateral cataracts        REVIEW OF SYSTEMS: General/ Skin/ MSK: see HPI  All other systems negative    MEDICATIONS  (STANDING):  atorvastatin 20 milliGRAM(s) Oral at bedtime  cefTRIAXone   IVPB 2000 milliGRAM(s) IV Intermittent every 24 hours  cholecalciferol 1000 Unit(s) Oral daily  fluticasone furoate/umeclidinium/vilanterol 100-62.5-25 MICROgram(s) Inhaler 1 Puff(s) Inhalation daily  levothyroxine 75 MICROGram(s) Oral daily  multivitamin 1 Tablet(s) Oral daily  OXcarbazepine 150 milliGRAM(s) Oral two times a day  pantoprazole    Tablet 40 milliGRAM(s) Oral before breakfast  rivaroxaban 15 milliGRAM(s) Oral with dinner    MEDICATIONS  (PRN):  acetaminophen     Tablet .. 650 milliGRAM(s) Oral every 6 hours PRN Temp greater or equal to 38C (100.4F), Mild Pain (1 - 3), Moderate Pain (4 - 6)      No Known Allergies    SOCIAL HISTORY: ; lives alone w/ (+)HHA, family involved; Denies smoking, ETOH, drugs    FAMILY HISTORY: no significant problems    PHYSICAL EXAM:  Vital Signs Last 24 Hrs  T(C): 36.4 (04 Apr 2022 12:02), Max: 38.4 (03 Apr 2022 15:56)  T(F): 97.6 (04 Apr 2022 12:02), Max: 101.2 (03 Apr 2022 15:56)  HR: 65 (04 Apr 2022 12:02) (48 - 83)  BP: 112/66 (04 Apr 2022 12:02) (90/51 - 158/143)  BP(mean): --  RR: 18 (04 Apr 2022 12:02) (16 - 26)  SpO2: 96% (04 Apr 2022 12:02) (89% - 98%)    NAD,  A&Ox3, WD/ WN/ WG  Versa Care P500 bed  HEENT:  NC/AT, EOMI, mucosa moist, throat clear, trachea midline, neck supple  Cardiovascular: RRR (+)m  Respiratory: CTA  Gastrointestinal soft NT/ND (+)BS   Neurology: strength & sensation grossly intact  Psych: calm/ appropriate  Musculoskeletal: FROM, no deformities/ contractures  Vascular: BLE equally warm,  no cyanosis, clubbing, nor acute ischemia noted         BLE DP/PT pulses palpable,              BLE hemosiderin staining         Rt shin w/ 2 partial thickness wounds, moist granular         scant serosanguinous drainage       No odor, erythema, increased warmth, tenderness, induration, fluctuance, nor crepitus  Skin: thin, atrophic, frail,  ecchymosis w/o hematoma      LABS/ CULTURES/ RADIOLOGY:                        11.4   14.34 )-----------( 164      ( 04 Apr 2022 07:06 )             34.3       137  |  102  |  30  ----------------------------<  83      [04-04-22 @ 07:00]  3.2   |  21  |  1.05        Ca     8.4     [04-04-22 @ 07:00]      Mg     1.8     [04-04-22 @ 07:00]      Phos  3.1     [04-04-22 @ 07:00]    TPro  6.8  /  Alb  3.9  /  TBili  0.5  /  DBili  x   /  AST  29  /  ALT  27  /  AlkPhos  118  [04-03-22 @ 15:38]    PT/INR: PT 16.7 , INR 1.43       [04-03-22 @ 15:39]  PTT: 34.7       [04-03-22 @ 15:39]      Culture - Blood (collected 04-03-22 @ 18:22)  Source: .Blood Blood-Peripheral  Gram Stain (04-04-22 @ 10:06):    Growth in aerobic bottle: Gram Negative Rods  Preliminary Report (04-04-22 @ 10:06):    Growth in aerobic bottle: Gram Negative Rods    Culture - Blood (collected 04-03-22 @ 18:22)  Source: .Blood Blood-Peripheral  Gram Stain (04-04-22 @ 10:04):    Growth in anaerobic bottle: Gram Variable Rods    Growth in aerobic bottle: Gram Negative Rods  Preliminary Report (04-04-22 @ 10:05):    Growth in anaerobic bottle: Gram Variable Rods    Growth in aerobic bottle: Gram Negative Rods    ***Blood Panel PCR results on this specimen are available    approximately 3 hours after the Gram stain result.***    Gram stain, PCR, and/or culture results may not always    correspond due to difference in methodologies.    ************************************************************    This PCR assay was performed by multiplex PCR. This    Assay tests for 66 bacterial and resistance gene targets.    Please refer to the [facility] test directory    at [email or web address] for details.  Organism: Blood Culture PCR (04-04-22 @ 12:20)  Organism: Blood Culture PCR (04-04-22 @ 12:20)      -  Escherichia coli: Detec      Method Type: PCR                  
HPI  89 year old F with a history of HTN, HLD, hypothyoridism, ?CVA vs. seizure disorder, paroxysmal a. fib on Xarelto who presents with weakness and decreased po intake, likely in the setting of sepsis secondary to urinary tract infection vs. viral pneumonia, less likely neurologic event. During workup for sepsis, found to have 3cm renal mass on u/s    Evaluated at bedside. No prior  hx. No hx of known renal mass. No family hx of kidney/bladder cancer. Cannot remember having gross hematuria. Nonsmoker. No past abdominal surgeries.     PAST MEDICAL & SURGICAL HISTORY:  HTN (hypertension)    Hyperlipemia    GERD (gastroesophageal reflux disease)    Hypothyroidism    TIA (transient ischemic attack)    CVA (cerebrovascular accident)    Afib  on pradaxa    Status post left hip replacement    H/O repair of left rotator cuff    S/P laminectomy    Bilateral cataract  MEDICATIONS  (STANDING):  AQUAPHOR (petrolatum Ointment) 1 Application(s) Topical two times a day  ATENolol  Tablet 25 milliGRAM(s) Oral two times a day  atorvastatin 20 milliGRAM(s) Oral at bedtime  ceFAZolin   IVPB 1000 milliGRAM(s) IV Intermittent every 8 hours  cholecalciferol 1000 Unit(s) Oral daily  fluticasone furoate/umeclidinium/vilanterol 100-62.5-25 MICROgram(s) Inhaler 1 Puff(s) Inhalation daily  levothyroxine 75 MICROGram(s) Oral daily     multivitamin 1 Tablet(s) Oral daily  OXcarbazepine 150 milliGRAM(s) Oral two times a day  pantoprazole    Tablet 40 milliGRAM(s) Oral before breakfast  rivaroxaban 15 milliGRAM(s) Oral with dinner    MEDICATIONS  (PRN):  acetaminophen     Tablet .. 650 milliGRAM(s) Oral every 6 hours PRN Temp greater or equal to 38C (100.4F), Mild Pain (1 - 3), Moderate Pain (4 - 6)      FAMILY HISTORY:      Allergies    No Known Allergies    Intolerances        SOCIAL HISTORY:    REVIEW OF SYSTEMS: Otherwise negative as stated in HPI    Physical Exam  Vital signs  T(C): 36.5 (04-06-22 @ 12:42), Max: 36.5 (04-06-22 @ 12:42)  HR: 76 (04-06-22 @ 12:42)  BP: 113/71 (04-06-22 @ 12:42)  SpO2: 94% (04-06-22 @ 12:42)  Wt(kg): --    Output        Gen: awake and alert. NAD.   Pulm: Normal work of breathing on RA  : no cva ttp b/l   Psych:  AOx3        LABS:      04-06 @ 07:50    WBC 8.71  / Hct 37.7  / SCr 1.00     04-05 @ 07:10    WBC 13.62 / Hct 38.6  / SCr 1.06     04-06    139  |  104  |  23  ----------------------------<  80  4.2   |  22  |  1.00    Ca    8.8      06 Apr 2022 07:50        RADIOLOGY:    < from: US Renal (04.04.22 @ 18:02) >    ACC: [id number] EXAM:  US KIDNEY(S)                          PROCEDURE DATE:  04/04/2022          INTERPRETATION:  CLINICAL INFORMATION: Bacteremia.    COMPARISON: None available.    TECHNIQUE: Sonography of the kidneys and bladder.    FINDINGS:  Right kidney: 10.6 cm. No renal mass, hydronephrosis or calculi.   Increased echogenicity compatible with medical renal disease.    Left kidney: 11.6 cm. No hydronephrosis or calculi. Well-circumscribed   solid hypoechoic mass in the upper pole with vascularity measuring 3.5 x   2.8 x 2.6 cm. It is suspicious for renal neoplasm. Additional Midpole   cortical cyst with debris and septations measuring 6.0 x 4.7 x 4.7 cm.   Multiple additional cortical cysts.    Urinary bladder: Within normal limits.    IMPRESSION:  Left upper pole hypoechoic mass with increased vascularity. It is   suspicious for renal neoplasm. Recommend further evaluation with   contrast-enhanced CT or MRI as clinically indicated. Additional   complicated and simple left renal cysts.    Increased echogenicity of the right kidney compatible with medical renal   disease.        --- End of Report ---          KRISTA STRICKLAND MD; Resident Radiology  This document has been electronically signed.  DU MARTINEZ MD; Attending Radiologist  This document has been electronically signed. Apr 5 2022  4:50PM    < end of copied text >  
Patient is well known to me family called for me to evaluate cardiac status today patient in [place]    HPI:  Ms. Cordova is a 89 year old F with a history of HTN, HLD, hypothyoridism, ?CVA vs. seizure disorder, paroxysmal a. fib on Xarelto who presents with weakness and decreased po intake. Per the patient and daughter at bedside, this morning starting around 10:30AM, she started to suddenly feel weaker than usual, finding herself unable to initiate the steps to walk. Prior to that, she had gotten herself out of bed and been active. She denies any numbness or tingling at the time of the event, denies her legs giving out or falling, and denies any confusion or visual changes. She endorses decreased appetite and worsened from baseline VILLAR this morning but denies any chills, chest pain, palpitations, cough, SOB at rest, abdominal pain, nausea, vomiting, diarrhea, constipation, dysuria, urinary frequency, worsened lower extremity swelling. She denies experiencing weakness like this before, prior TIAs presented as loss of speech. She was started on oxcarbazepine following her last episode in 2014 and has not had any episodes since.    Of note, she has had ongoing VILLAR, which has been recently addressed by her primary care doctor and me FDr. Toi. believes she may have a component of HFpEF given moderate MR and pulmonary hypertension noted on echo. She was also started on a Trelegy Ellipta inhaler that provides her some benefit. She has a remote history of treated tuberculosis.     In the ED, she was febrile to 101.2, HR 60s, /64, RR to 26, O2 sat 89-90% on RA, >95% on 2L NC. Labs were significant for WBC 13.20. Lactate 1.4. UA positive for nitrite, large LE, 640 WBC, many bacteria. RVP negative. EKG with sinus bradycardia with 1st degree AV block. CXR with no acute pulmonary disease. In the ED, she had an episode of hypoxia with rigors, afebrile, but symptoms resolved with 1g acetaminophen. In total, she was given 1g acetaminophen x2, 1.5L NS, CTX 1g x1 and admitted to telemetry for further management.   Blodd cultures postive  ultrasound echp left ple of kidney mass  Presently feels well no cp denies sob but is in afib (has hx of PAF on chrnic pradaxa)  MEDICATIONS:  MEDICATIONS  (STANDING):  AQUAPHOR (petrolatum Ointment) 1 Application(s) Topical two times a day  ATENolol  Tablet 25 milliGRAM(s) Oral two times a day  atorvastatin 20 milliGRAM(s) Oral at bedtime  cefTRIAXone   IVPB 2000 milliGRAM(s) IV Intermittent every 24 hours  cholecalciferol 1000 Unit(s) Oral daily  fluticasone furoate/umeclidinium/vilanterol 100-62.5-25 MICROgram(s) Inhaler 1 Puff(s) Inhalation daily  levothyroxine 75 MICROGram(s) Oral daily  multivitamin 1 Tablet(s) Oral daily  OXcarbazepine 150 milliGRAM(s) Oral two times a day  pantoprazole    Tablet 40 milliGRAM(s) Oral before breakfast  rivaroxaban 15 milliGRAM(s) Oral with dinner      PHYSICAL EXAM:  T(C): 36.4 (04-05-22 @ 20:37), Max: 36.9 (04-05-22 @ 04:42)  HR: 84 (04-05-22 @ 20:37) (69 - 110)  BP: 124/71 (04-05-22 @ 20:37) (124/71 - 157/92)  RR: 19 (04-05-22 @ 20:37) (18 - 22)  SpO2: 96% (04-05-22 @ 20:37) (89% - 97%)  Wt(kg): --  I&O's Summary    04 Apr 2022 07:01  -  05 Apr 2022 07:00  --------------------------------------------------------  IN: 1090 mL / OUT: 0 mL / NET: 1090 mL    05 Apr 2022 07:01  -  05 Apr 2022 22:12  --------------------------------------------------------  IN: 480 mL / OUT: 0 mL / NET: 480 mL          Appearance: Normal awake alert NAD mentally intact no respiratory distress	  HEENT:   Normal oral mucosa, PERRL, EOMI	  Cardiovascular: Normal S1 S2,2/6 murmur apex to axilla irregularly irregular No JVD, No murmurs ,  Respiratory: Lungs clear to auscultation, normal effort no rhonchi whezzing	  Gastrointestinal:  Soft, Non-tender, + BS	  Psychiatry:  Mood & affect appropriate  Ext: trace edema        LABS:    CARDIAC MARKERS:                                12.6   13.62 )-----------( 190      ( 05 Apr 2022 07:10 )             38.6     04-05    138  |  103  |  27<H>  ----------------------------<  87  4.3   |  21<L>  |  1.06    Ca    9.2      05 Apr 2022 07:10  Phos  3.1     04-04  Mg     1.8     04-04      proBNP:   Lipid Profile:   HgA1c:   TSH:           TELEMETRY: 	    ECG:  yesterday sius bradycardia no acute changed today 4/5/2022 afib VR 95	  RADIOLOGY:   < from: US Renal (04.04.22 @ 18:02) >  TECHNIQUE: Sonography of the kidneys and bladder.    FINDINGS:  Right kidney: 10.6 cm. No renal mass, hydronephrosis or calculi.   Increased echogenicity compatible with medical renal disease.    Left kidney: 11.6 cm. No hydronephrosis or calculi. Well-circumscribed   solid hypoechoic mass in the upper pole with vascularity measuring 3.5 x   2.8 x 2.6 cm. It is suspicious for renal neoplasm. Additional Midpole   cortical cyst with debris and septations measuring 6.0 x 4.7 x 4.7 cm.   Multiple additional cortical cysts.    Urinary bladder: Within normal limits.    IMPRESSION:  Left upper pole hypoechoic mass with increased vascularity. It is   suspicious for renal neoplasm. Recommend further evaluation with   contrast-enhanced CT or MRI as clinically indicated. Additional   complicated and simple left renal cysts.    Increased echogenicity of the right kidney compatible with medical renal   disease.

## 2022-04-06 NOTE — PROGRESS NOTE ADULT - SUBJECTIVE AND OBJECTIVE BOX
Patient is well known to me family called for me to evaluate cardiac status today patient in [place]    HPI:  Ms. Cordova is a 89 year old F with a history of HTN, HLD, hypothyoridism, ?CVA vs. seizure disorder, paroxysmal a. fib on Xarelto who presents with weakness and decreased po intake. Per the patient and daughter at bedside, this morning starting around 10:30AM, she started to suddenly feel weaker than usual, finding herself unable to initiate the steps to walk. Prior to that, she had gotten herself out of bed and been active. She denies any numbness or tingling at the time of the event, denies her legs giving out or falling, and denies any confusion or visual changes. She endorses decreased appetite and worsened from baseline VILLAR this morning but denies any chills, chest pain, palpitations, cough, SOB at rest, abdominal pain, nausea, vomiting, diarrhea, constipation, dysuria, urinary frequency, worsened lower extremity swelling. She denies experiencing weakness like this before, prior TIAs presented as loss of speech. She was started on oxcarbazepine following her last episode in 2014 and has not had any episodes since.    Of note, she has had ongoing VILLAR, which has been recently addressed by her primary care doctor and me FDr. Toi. believes she may have a component of HFpEF given moderate MR and pulmonary hypertension noted on echo. She was also started on a Trelegy Ellipta inhaler that provides her some benefit. She has a remote history of treated tuberculosis.     In the ED, she was febrile to 101.2, HR 60s, /64, RR to 26, O2 sat 89-90% on RA, >95% on 2L NC. Labs were significant for WBC 13.20. Lactate 1.4. UA positive for nitrite, large LE, 640 WBC, many bacteria. RVP negative. EKG with sinus bradycardia with 1st degree AV block. CXR with no acute pulmonary disease. In the ED, she had an episode of hypoxia with rigors, afebrile, but symptoms resolved with 1g acetaminophen. In total, she was given 1g acetaminophen x2, 1.5L NS, CTX 1g x1 and admitted to telemetry for further management.   Blodd cultures postive  ultrasound echp left ple of kidney mass  Presently feels well no cp denies sob but is in afib (has hx of PAF on chronic anticoagulatio)  ulstrasound of kidney left renal mass  today feels well no sob no fevere or chills but legs weak  MEDICATIONS:  MEDICATIONS  (STANDING):  AQUAPHOR (petrolatum Ointment) 1 Application(s) Topical two times a day  ATENolol  Tablet 25 milliGRAM(s) Oral two times a day  atorvastatin 20 milliGRAM(s) Oral at bedtime  cefTRIAXone   IVPB 2000 milliGRAM(s) IV Intermittent every 24 hours  cholecalciferol 1000 Unit(s) Oral daily  fluticasone furoate/umeclidinium/vilanterol 100-62.5-25 MICROgram(s) Inhaler 1 Puff(s) Inhalation daily  levothyroxine 75 MICROGram(s) Oral daily  multivitamin 1 Tablet(s) Oral daily  OXcarbazepine 150 milliGRAM(s) Oral two times a day  pantoprazole    Tablet 40 milliGRAM(s) Oral before breakfast  rivaroxaban 15 milliGRAM(s) Oral with dinner    Vital Signs Last 24 Hrs  T(C): 36.4 (04-06-22 @ 05:11), Max: 36.4 (04-05-22 @ 20:37)  T(F): 97.6 (04-06-22 @ 05:11), Max: 97.6 (04-05-22 @ 22:20)  HR: 74 (04-06-22 @ 05:11) (74 - 110)  BP: 127/79 (04-06-22 @ 05:11) (124/71 - 157/92)  BP(mean): --  RR: 18 (04-06-22 @ 05:11) (17 - 20)  SpO2: 98% (04-06-22 @ 05:11) (95% - 98%)      Appearance: Normal awake alert NAD mentally intact no respiratory distress feels well except for weakness of legs	  HEENT:   Normal oral mucosa, PERRL, EOMI	  Cardiovascular: Normal S1 S2,2/6 murmur apex to axilla irregularly irregular No JVD, No murmurs ,afib VR controlled  Respiratory: Lungs clear to auscultation, normal effort no rhonchi wheezing  Gastrointestinal:  Soft, Non-tender, + BS	  Psychiatry:  Mood & affect appropriate  Ext: trace edema        LABS:    CARDIAC MARKERS:        LABS:    CARDIAC MARKERS:                                12.2   8.71  )-----------( 188      ( 06 Apr 2022 07:50 )             37.7     04-06    139  |  104  |  23  ----------------------------<  80  4.2   |  22  |  1.00    Ca    8.8      06 Apr 2022 07:50      proBNP:   Lipid Profile:   HgA1c:   TSH:           c< from: CT Chest No Cont (04.05.22 @ 17:19) >  ACC: [id number] EXAM:  CT CHEST                          PROCEDURE DATE:  04/05/2022    ******PRELIMINARY REPORT******      ******PRELIMINARY REPORT******           INTERPRETATION:  Indeterminate, partially imaged left upper pole renal   mass for which dedicated MRI or CT can be performed for further   evaluation.  Trace bilateral pleural effusions with adjacent compressive atelectasis.   Additional areas of linear atelectasis are seen in the right upper and   bilateral lower lobes.  Emphysematous changes.  Enlarged paratracheal lymph node measuring 1.4 x 1.9 cm (3:51).  Moderate-sized hiatal hernia.    Follow-up official report in the morning.        ******PRELIMINARY REPORT******      ******PRELIMINARY REPORT******       KALEY BORDEN MD; Resident Radiology    < end of copied text >        TELEMETRY: 	    ECG:  yesterday sius bradycardia no acute changed today 4/5/2022 afib VR 95	  RADIOLOGY:   < from: US Renal (04.04.22 @ 18:02) >  TECHNIQUE: Sonography of the kidneys and bladder.    FINDINGS:  Right kidney: 10.6 cm. No renal mass, hydronephrosis or calculi.   Increased echogenicity compatible with medical renal disease.    Left kidney: 11.6 cm. No hydronephrosis or calculi. Well-circumscribed   solid hypoechoic mass in the upper pole with vascularity measuring 3.5 x   2.8 x 2.6 cm. It is suspicious for renal neoplasm. Additional Midpole   cortical cyst with debris and septations measuring 6.0 x 4.7 x 4.7 cm.   Multiple additional cortical cysts.    Urinary bladder: Within normal limits.    IMPRESSION:  Left upper pole hypoechoic mass with increased vascularity. It is   suspicious for renal neoplasm. Recommend further evaluation with   contrast-enhanced CT or MRI as clinically indicated. Additional   complicated and simple left renal cysts.    Increased echogenicity of the right kidney compatible with medical renal   disease.

## 2022-04-06 NOTE — PROGRESS NOTE ADULT - CONVERSATION DETAILS
we discussed the concern for malignant renal mass. Pt states she just does not want to suffer from pain. She would like to discuss the options with Urology.

## 2022-04-06 NOTE — PROGRESS NOTE ADULT - PROBLEM SELECTOR PLAN 3
Ecoli bacteremia    c/w ceftriaxone to 2gm daily Ecoli bacteremia  E. Coli sensitive to Ancef, will change abx and follow up cultures until clearance. (Last sent yesterday)

## 2022-04-06 NOTE — PHARMACOTHERAPY INTERVENTION NOTE - COMMENTS
TAHIRA Cordova is a 89 yo F with pmh of HTN, HLD, hypothyoridism, ?CVA vs. seizure disorder, paroxysmal afib on Xarelto who presents with a one day history of weakness/inability to ambulate and poor oral intake, found to have UTI with positive blood cultures growing E. coli and currently on ceftriaxone.      Culture - Urine (collected 04 Apr 2022)    >100,000 CFU/ml Escherichia coli - R only to Bactrim, ampicillin, aminoglycosides and I to amp/sulb    Culture - Blood x2 (collected 03 Apr 2022) - Escherichia coli R only to Bactrim, ampicillin, and aminoglycosides     Communicated with Flor MACKEY - recommended consider narrowing ceftriaxone to cefazolin 1g q8h while on IV and PO cephalosporin when ready for PO.  Could also consider ciprofloxacin as PO regimen but less desirable in an elderly patient.    Thank you,  Barbara Allen, PharmD, BCIDP  Clinical Pharmacist, Infectious Diseases  Cell: [phone]/Pager: [phone]

## 2022-04-06 NOTE — PROVIDER CONTACT NOTE (OTHER) - ACTION/TREATMENT ORDERED:
ACP made aware.
NP assessed patient & reviewed orders, labs, history, plan & O2 Saturations. NP ordered to maintain patient on Room Air & maintain Continuous Pulse Oximetry. Patient on Room Air & Pulse Oximetry.

## 2022-04-06 NOTE — CONSULT NOTE ADULT - REASON FOR ADMISSION
Generalized weakness, decreased po

## 2022-04-06 NOTE — PROGRESS NOTE ADULT - PROBLEM SELECTOR PLAN 2
Culture Results:   Growth in aerobic bottle: Escherichia coli  Urine Culture Results: >100,000 CFU/ml Escherichia coli Susceptibility to follow. (04.04.22 @ 01:15)  c/w ceftriaxone to 2gm daily Culture Results:   Growth in aerobic bottle: Escherichia coli  Urine Culture Results: >100,000 CFU/ml Escherichia coli Susceptibility to follow. (04.04.22 @ 01:15)  E. Coli sensitive to Ancef, will change abx and follow up cultures until clearance. (Last sent yesterday)

## 2022-04-06 NOTE — CONSULT NOTE ADULT - ATTENDING COMMENTS
Pt seen/examined.  Case discussed with housestaff/PA team.  Agree with above note history, physical and assessment/plan.  Incidental L renal mass  Would better characterize with cross sectional imaging ie CT or MRI  D/w pt and daughter - would observe at this time and repeat imaging as outpt  Pt may f/u with Uro Oncology partner

## 2022-04-07 PROCEDURE — 99232 SBSQ HOSP IP/OBS MODERATE 35: CPT

## 2022-04-07 RX ADMIN — Medication 100 MILLIGRAM(S): at 13:16

## 2022-04-07 RX ADMIN — Medication 1 TABLET(S): at 11:33

## 2022-04-07 RX ADMIN — OXCARBAZEPINE 150 MILLIGRAM(S): 300 TABLET, FILM COATED ORAL at 17:10

## 2022-04-07 RX ADMIN — ATENOLOL 25 MILLIGRAM(S): 25 TABLET ORAL at 09:39

## 2022-04-07 RX ADMIN — FLUTICASONE FUROATE, UMECLIDINIUM BROMIDE AND VILANTEROL TRIFENATATE 1 PUFF(S): 200; 62.5; 25 POWDER RESPIRATORY (INHALATION) at 11:55

## 2022-04-07 RX ADMIN — Medication 1 APPLICATION(S): at 05:26

## 2022-04-07 RX ADMIN — Medication 1 APPLICATION(S): at 17:56

## 2022-04-07 RX ADMIN — ATENOLOL 25 MILLIGRAM(S): 25 TABLET ORAL at 21:48

## 2022-04-07 RX ADMIN — Medication 1000 UNIT(S): at 11:33

## 2022-04-07 RX ADMIN — Medication 75 MICROGRAM(S): at 05:25

## 2022-04-07 RX ADMIN — OXCARBAZEPINE 150 MILLIGRAM(S): 300 TABLET, FILM COATED ORAL at 05:25

## 2022-04-07 RX ADMIN — Medication 100 MILLIGRAM(S): at 05:23

## 2022-04-07 RX ADMIN — Medication 100 MILLIGRAM(S): at 21:47

## 2022-04-07 RX ADMIN — RIVAROXABAN 15 MILLIGRAM(S): KIT at 17:10

## 2022-04-07 RX ADMIN — ATORVASTATIN CALCIUM 20 MILLIGRAM(S): 80 TABLET, FILM COATED ORAL at 21:47

## 2022-04-07 RX ADMIN — PANTOPRAZOLE SODIUM 40 MILLIGRAM(S): 20 TABLET, DELAYED RELEASE ORAL at 05:27

## 2022-04-07 NOTE — PROGRESS NOTE ADULT - SUBJECTIVE AND OBJECTIVE BOX
Andre Reyes, M.D.  Pager: [phone]  Office: [phone]    Patient is a 89y old  Female who presents with a chief complaint of Generalized weakness, decreased po (07 Apr 2022 15:51)          SUBJECTIVE / OVERNIGHT EVENTS:    No acute overnight events.    ROS: ( - ) Fever, ( - )Chills,  ( - )Nausea/Vomiting, ( - ) Cough, ( - )Shortness of breath, ( - )Chest Pain    MEDICATIONS  (STANDING):  AQUAPHOR (petrolatum Ointment) 1 Application(s) Topical two times a day  ATENolol  Tablet 25 milliGRAM(s) Oral two times a day  atorvastatin 20 milliGRAM(s) Oral at bedtime  ceFAZolin   IVPB 1000 milliGRAM(s) IV Intermittent every 8 hours  cholecalciferol 1000 Unit(s) Oral daily  fluticasone furoate/umeclidinium/vilanterol 100-62.5-25 MICROgram(s) Inhaler 1 Puff(s) Inhalation daily  levothyroxine 75 MICROGram(s) Oral daily  multivitamin 1 Tablet(s) Oral daily  OXcarbazepine 150 milliGRAM(s) Oral two times a day  pantoprazole    Tablet 40 milliGRAM(s) Oral before breakfast  rivaroxaban 15 milliGRAM(s) Oral with dinner    MEDICATIONS  (PRN):  acetaminophen     Tablet .. 650 milliGRAM(s) Oral every 6 hours PRN Temp greater or equal to 38C (100.4F), Mild Pain (1 - 3), Moderate Pain (4 - 6)          T(C): 36.4 (04-07 @ 12:20), Max: 36.7 (04-06 @ 21:50)   HR: 101   BP: 154/83   RR: 18   SpO2: 93%    PHYSICAL EXAM:    CONSTITUTIONAL: NAD, well-developed, well-groomed  EYES: PERRLA; conjunctiva and sclera clear  ENMT: Moist oral mucosa, no pharyngeal injection or exudates; normal dentition  NECK: Supple, no palpable masses; no thyromegaly  RESPIRATORY: Normal respiratory effort; lungs are clear to auscultation bilaterally  CARDIOVASCULAR: Regular rate and rhythm, normal S1 and S2, no murmur/rub/gallop; No lower extremity edema; Peripheral pulses are 2+ bilaterally  ABDOMEN: Nontender to palpation, normoactive bowel sounds, no rebound/guarding; No hepatosplenomegaly  MUSCULOSKELETAL:  Normal gait; no clubbing or cyanosis of digits; no joint swelling or tenderness to palpation  PSYCH: A+O to person, place, and time; affect appropriate  NEUROLOGY: CN 2-12 are intact and symmetric; no gross sensory deficits   SKIN: No rashes; no palpable lesions      LABS:                        12.2   8.71  )-----------( 188      ( 06 Apr 2022 07:50 )             37.7      04-06    139  |  104  |  23  ----------------------------<  80  4.2   |  22  |  1.00    Ca    8.8      06 Apr 2022 07:50         CAPILLARY BLOOD GLUCOSE          RADIOLOGY & ADDITIONAL TESTS:    Imaging Personally Reviewed:  Consultant(s) Notes Reviewed:    Care Discussed with Consultants/Other Providers:   Andre Reyes, M.D.  Pager: [phone]  Office: [phone]    Patient is a 89y old  Female who presents with a chief complaint of Generalized weakness, decreased po (07 Apr 2022 15:51)          SUBJECTIVE / OVERNIGHT EVENTS:    No acute overnight events.  feels well  son at bedside    ROS: ( - ) Fever, ( - )Chills,  ( - )Nausea/Vomiting, ( - ) Cough, ( - )Shortness of breath, ( - )Chest Pain    MEDICATIONS  (STANDING):  AQUAPHOR (petrolatum Ointment) 1 Application(s) Topical two times a day  ATENolol  Tablet 25 milliGRAM(s) Oral two times a day  atorvastatin 20 milliGRAM(s) Oral at bedtime  ceFAZolin   IVPB 1000 milliGRAM(s) IV Intermittent every 8 hours  cholecalciferol 1000 Unit(s) Oral daily  fluticasone furoate/umeclidinium/vilanterol 100-62.5-25 MICROgram(s) Inhaler 1 Puff(s) Inhalation daily  levothyroxine 75 MICROGram(s) Oral daily  multivitamin 1 Tablet(s) Oral daily  OXcarbazepine 150 milliGRAM(s) Oral two times a day  pantoprazole    Tablet 40 milliGRAM(s) Oral before breakfast  rivaroxaban 15 milliGRAM(s) Oral with dinner    MEDICATIONS  (PRN):  acetaminophen     Tablet .. 650 milliGRAM(s) Oral every 6 hours PRN Temp greater or equal to 38C (100.4F), Mild Pain (1 - 3), Moderate Pain (4 - 6)          T(C): 36.4 (04-07 @ 12:20), Max: 36.7 (04-06 @ 21:50)   HR: 101   BP: 154/83   RR: 18   SpO2: 93%    PHYSICAL EXAM:    CONSTITUTIONAL: NAD, well-developed, well-groomed  EYES: PERRLA; conjunctiva and sclera clear  ENMT: Moist oral mucosa, no pharyngeal injection or exudates; normal dentition  NECK: Supple, no palpable masses; no thyromegaly  RESPIRATORY: Normal respiratory effort; lungs are clear to auscultation bilaterally  CARDIOVASCULAR: Irregular rate and Irrhythm, normal S1 and S2, no murmur/rub/gallop; No lower extremity edema; Peripheral pulses are 2+ bilaterally  ABDOMEN: Nontender to palpation, normoactive bowel sounds, no rebound/guarding; No hepatosplenomegaly  MUSCULOSKELETAL:  Normal gait; no clubbing or cyanosis of digits; no joint swelling or tenderness to palpation  PSYCH: A+O to person, place, and time; affect appropriate  NEUROLOGY: CN 2-12 are intact and symmetric; no gross sensory deficits         LABS:                        12.2   8.71  )-----------( 188      ( 06 Apr 2022 07:50 )             37.7      04-06    139  |  104  |  23  ----------------------------<  80  4.2   |  22  |  1.00    Ca    8.8      06 Apr 2022 07:50         CAPILLARY BLOOD GLUCOSE          RADIOLOGY & ADDITIONAL TESTS:    Imaging Personally Reviewed:  Consultant(s) Notes Reviewed:    Care Discussed with Consultants/Other Providers:

## 2022-04-07 NOTE — PROGRESS NOTE ADULT - SUBJECTIVE AND OBJECTIVE BOX
Patient is well known to me family called for me to evaluate cardiac status today patient in [place]    HPI:  Ms. Cordova is a 89 year old F with a history of HTN, HLD, hypothyoridism, ?CVA vs. seizure disorder, paroxysmal a. fib on Xarelto who presents with weakness and decreased po intake. Per the patient and daughter at bedside, this morning starting around 10:30AM, she started to suddenly feel weaker than usual, finding herself unable to initiate the steps to walk. Prior to that, she had gotten herself out of bed and been active. She denies any numbness or tingling at the time of the event, denies her legs giving out or falling, and denies any confusion or visual changes. She endorses decreased appetite and worsened from baseline VILLAR this morning but denies any chills, chest pain, palpitations, cough, SOB at rest, abdominal pain, nausea, vomiting, diarrhea, constipation, dysuria, urinary frequency, worsened lower extremity swelling. She denies experiencing weakness like this before, prior TIAs presented as loss of speech. She was started on oxcarbazepine following her last episode in 2014 and has not had any episodes since.    Of note, she has had ongoing VILLAR, which has been recently addressed by her primary care doctor and me FDr. Toi. believes she may have a component of HFpEF given moderate MR and pulmonary hypertension noted on echo. She was also started on a Trelegy Ellipta inhaler that provides her some benefit. She has a remote history of treated tuberculosis.     In the ED, she was febrile to 101.2, HR 60s, /64, RR to 26, O2 sat 89-90% on RA, >95% on 2L NC. Labs were significant for WBC 13.20. Lactate 1.4. UA positive for nitrite, large LE, 640 WBC, many bacteria. RVP negative. EKG with sinus bradycardia with 1st degree AV block. CXR with no acute pulmonary disease. In the ED, she had an episode of hypoxia with rigors, afebrile, but symptoms resolved with 1g acetaminophen. In total, she was given 1g acetaminophen x2, 1.5L NS, CTX 1g x1 and admitted to telemetry for further management.   Blodd cultures postive  ultrasound echp left ple of kidney mass  Presently feels well no cp denies sob but is in afib (has hx of PAF on chronic anticoagulatio)  ulstrasound of kidney left renal mass  today feels well no sob no fevere or chills but legs weak need physical therapy  medical observation of renal mass given age and size of lesion  MEDICATIONS:  MEDICATIONS  (STANDING):  AQUAPHOR (petrolatum Ointment) 1 Application(s) Topical two times a day  ATENolol  Tablet 25 milliGRAM(s) Oral two times a day  atorvastatin 20 milliGRAM(s) Oral at bedtime  cefTRIAXone   IVPB 2000 milliGRAM(s) IV Intermittent every 24 hours  cholecalciferol 1000 Unit(s) Oral daily  fluticasone furoate/umeclidinium/vilanterol 100-62.5-25 MICROgram(s) Inhaler 1 Puff(s) Inhalation daily  levothyroxine 75 MICROGram(s) Oral daily  multivitamin 1 Tablet(s) Oral daily  OXcarbazepine 150 milliGRAM(s) Oral two times a day  pantoprazole    Tablet 40 milliGRAM(s) Oral before breakfast  rivaroxaban 15 milliGRAM(s) Oral with dinner    LABS:    CARDIAC MARKERS:                                12.2   8.71  )-----------( 188      ( 06 Apr 2022 07:50 )             37.7     04-06    139  |  104  |  23  ----------------------------<  80  4.2   |  22  |  1.00    Ca    8.8      06 Apr 2022 07:50      proBNP:   Lipid Profile:   HgA1c:   TSH:               Appearance: Normal awake alert NAD mentally intact no respiratory distress feels well except for weakness of legs	  HEENT:   Normal oral mucosa, PERRL, EOMI	  Cardiovascular: Normal S1 S2,2/6 murmur apex to axilla irregularly irregular No JVD, No murmurs ,afib VR controlled  Respiratory: Lungs clear to auscultation, normal effort no rhonchi wheezing  Gastrointestinal:  Soft, Non-tender, + BS	  Psychiatry:  Mood & affect appropriate  Ext: trace edema        LABS:    CARDIAC MARKERS:        LABS:    CARDIAC MARKERS:                                12.2   8.71  )-----------( 188      ( 06 Apr 2022 07:50 )             37.7     04-06    139  |  104  |  23  ----------------------------<  80  4.2   |  22  |  1.00    Ca    8.8      06 Apr 2022 07:50      proBNP:   Lipid Profile:   HgA1c:   TSH:           c< from: CT Chest No Cont (04.05.22 @ 17:19) >  ACC: [id number] EXAM:  CT CHEST                          PROCEDURE DATE:  04/05/2022    ******PRELIMINARY REPORT******      ******PRELIMINARY REPORT******           INTERPRETATION:  Indeterminate, partially imaged left upper pole renal   mass for which dedicated MRI or CT can be performed for further   evaluation.  Trace bilateral pleural effusions with adjacent compressive atelectasis.   Additional areas of linear atelectasis are seen in the right upper and   bilateral lower lobes.  Emphysematous changes.  Enlarged paratracheal lymph node measuring 1.4 x 1.9 cm (3:51).  Moderate-sized hiatal hernia.    Follow-up official report in the morning.        ******PRELIMINARY REPORT******      ******PRELIMINARY REPORT******       KALEY BORDEN MD; Resident Radiology    < end of copied text >        TELEMETRY: 	    ECG:  yesterday sius bradycardia no acute changed today 4/5/2022 afib VR 95	  RADIOLOGY:   < from: US Renal (04.04.22 @ 18:02) >  TECHNIQUE: Sonography of the kidneys and bladder.    FINDINGS:  Right kidney: 10.6 cm. No renal mass, hydronephrosis or calculi.   Increased echogenicity compatible with medical renal disease.    Left kidney: 11.6 cm. No hydronephrosis or calculi. Well-circumscribed   solid hypoechoic mass in the upper pole with vascularity measuring 3.5 x   2.8 x 2.6 cm. It is suspicious for renal neoplasm. Additional Midpole   cortical cyst with debris and septations measuring 6.0 x 4.7 x 4.7 cm.   Multiple additional cortical cysts.    Urinary bladder: Within normal limits.    IMPRESSION:  Left upper pole hypoechoic mass with increased vascularity. It is   suspicious for renal neoplasm. Recommend further evaluation with   contrast-enhanced CT or MRI as clinically indicated. Additional   complicated and simple left renal cysts.    Increased echogenicity of the right kidney compatible with medical renal   disease.

## 2022-04-07 NOTE — PROGRESS NOTE ADULT - NS ATTEND AMEND GEN_ALL_CORE FT
Pt seen and examined with ACP.  Assessment and plan discussed. Agree with above.  Status of wounds and treatment recommendations d/w  pt.  All questions answered.   Pt expressed understanding.

## 2022-04-07 NOTE — PROGRESS NOTE ADULT - PROBLEM SELECTOR PLAN 3
Ecoli bacteremia  E. Coli sensitive to Ancef, will change abx and follow up cultures until clearance. (Last sent yesterday) Ecoli bacteremia  E. Coli sensitive to Ancef

## 2022-04-07 NOTE — CHART NOTE - NSCHARTNOTEFT_GEN_A_CORE
CARLOS SALVADOR    Late Entry  Notified by RN patient converted to afib approx 10:20. Patient seen and examined at bedside, NAD. Patient denies CP, palpitations, SOB, dizziness, headache.   89 yr old female with a pmh of HTN, HLD, hypothyoridism, ?CVA vs. seizure disorder, paroxysmal afib on Xarelto who presents with a one day history of weakness/inability to ambulate and poor oral intake.   pt was admitted with a UTI now with positive blood cultures. Patient's home BB was on hold in the setting of Sepsis.  Will resume home atenolol  Discussed with Dr Reyes and in agreement        Evelyn PATEL-brad
CT imaging reviewed - 3cm upper pole mass.    - Given small size and patient age, unlikely to represent a clinically significant lesion.  - Outpatient follow-up at the [facility] for [facility] [phone]
Rn reports positive blood c/s on 4/4 bottles gram neg rods and gram variable rods   Seen and evaluated , Non toxic appearing . Denied any fevers , chills , cough , nausea , vomiting , abdominal discomfort or urinary symptoms .        Vital Signs Last 24 Hrs  T(C): 36.8 (04 Apr 2022 04:08), Max: 38.4 (03 Apr 2022 15:56)  T(F): 98.3 (04 Apr 2022 04:08), Max: 101.2 (03 Apr 2022 15:56)  HR: 63 (04 Apr 2022 10:11) (48 - 83)  BP: 125/63 (04 Apr 2022 10:11) (90/51 - 158/143)  BP(mean): --  RR: 18 (04 Apr 2022 10:12) (16 - 26)  SpO2: 96% (04 Apr 2022 10:12) (89% - 98%)    Physical Exam:  General: WN/WD NAD  Neurology: A&Ox3, nonfocal, FINE x 4  Head:  Normocephalic, atraumatic  Respiratory: CTA B/L  CV: RRR, S1S2, no murmur  Abdominal: Soft, NT, ND no palpable mass, no cva tenderness   MSK:  trace edema on RLE ,  + peripheral pulses, FROM all 4 extremity  Labs:                          11.4   14.34 )-----------( 164      ( 04 Apr 2022 07:06 )             34.3     04-04    137  |  102  |  30<H>  ----------------------------<  83  3.2<L>   |  21<L>  |  1.05    Ca    8.4      04 Apr 2022 07:00  Phos  3.1     04-04  Mg     1.8     04-04    TPro  6.8  /  Alb  3.9  /  TBili  0.5  /  DBili  x   /  AST  29  /  ALT  27  /  AlkPhos  118  04-03            Radiology:        Assessment & Plan: 89 year old F with a history of HTN, HLD, hypothyroidism, ?CVA vs. seizure disorder, paroxysmal a. fib on Xarelto who presents with weakness and decreased po intake, who was found to have positive UA , now with blood c/s that are positive .  >c/w Ceftriaxone  for now   >encourage po hydration   >d/w DR Reyes , Renal US to r/o pyelonephrosis    >closely monitor for HD  changes  and fevers , f/u Urine  c/s     Adolfo Jara NP-C   #[id number]

## 2022-04-07 NOTE — PROGRESS NOTE ADULT - SUBJECTIVE AND OBJECTIVE BOX
[facility]-- WOUND TEAM -- FOLLOW UP NOTE  --------------------------------------------------------------------------------    24 hour events/subjective:    tolerating po  ambulating w/ assist of walker  moving w/o difficulty- can transfer from bed to chair and back   RLE slight pain when touched.     looked crusted over, no drainage nor odor  no f/c/s  abx changed to ancef yesterday.      Diet:  Diet, Regular:   DASH/TLC Sodium & Cholesterol Restricted (DASH) (04-03-22 @ 23:43)      ROS: General/ SKIN/ MSK see HPI  all other systems negative      ALLERGIES & MEDICATIONS  --------------------------------------------------------------------------------    No Known Allergies      STANDING INPATIENT MEDICATIONS  AQUAPHOR (petrolatum Ointment) 1 Application(s) Topical two times a day  ATENolol  Tablet 25 milliGRAM(s) Oral two times a day  atorvastatin 20 milliGRAM(s) Oral at bedtime  ceFAZolin   IVPB 1000 milliGRAM(s) IV Intermittent every 8 hours  cholecalciferol 1000 Unit(s) Oral daily  fluticasone furoate/umeclidinium/vilanterol 100-62.5-25 MICROgram(s) Inhaler 1 Puff(s) Inhalation daily  levothyroxine 75 MICROGram(s) Oral daily  multivitamin 1 Tablet(s) Oral daily  OXcarbazepine 150 milliGRAM(s) Oral two times a day  pantoprazole    Tablet 40 milliGRAM(s) Oral before breakfast  rivaroxaban 15 milliGRAM(s) Oral with dinner      PRN INPATIENT MEDICATION  acetaminophen     Tablet .. 650 milliGRAM(s) Oral every 6 hours PRN      VITALS/PHYSICAL EXAM  --------------------------------------------------------------------------------  T(C): 36.4 (04-07-22 @ 12:20), Max: 36.7 (04-06-22 @ 21:50)  HR: 101 (04-07-22 @ 12:20) (81 - 101)  BP: 154/83 (04-07-22 @ 12:20) (132/73 - 168/90)  RR: 18 (04-07-22 @ 12:20) (18 - 18)  SpO2: 93% (04-07-22 @ 12:20) (93% - 94%)  Wt(kg): --        04-06-22 @ 07:01  -  04-07-22 @ 07:00  --------------------------------------------------------  IN: 770 mL / OUT: 0 mL / NET: 770 mL    04-07-22 @ 07:01  -  04-07-22 @ 15:51  --------------------------------------------------------  IN: 400 mL / OUT: 0 mL / NET: 400 mL      NAD, A&Ox3, WD/ WN/ WG  Versa Care P500 bed  HEENT:  NC/AT, EOMI, mucosa moist, throat clear, trachea midline, neck supple  Gastrointestinal soft NT/ND (+)BS   Neurology: strength & sensation grossly intact  Psych: calm/ appropriate  Musculoskeletal: FROM, no deformities/ contractures  Vascular: BLE equally warm,  no cyanosis, clubbing, nor acute ischemia noted         BLE DP/PT pulses palpable,             BLE hemosiderin staining         Rt shin w/ 2 partial thickness wounds,            dried w/ serous crusting             periwound skin w/ erythema, no increased warmth, but slightly tender            scant serosanguinous drainage       No odor, increased warmth, induration, fluctuance, nor crepitus  Skin: thin, atrophic, frail, ecchymosis w/o hematoma      LABS/ CULTURES/ RADIOLOGY:              12.2   8.71  >-----------<  188      [04-06-22 @ 07:50]              37.7     139  |  104  |  23  ----------------------------<  80      [04-06-22 @ 07:50]  4.2   |  22  |  1.00        Ca     8.8     [04-06-22 @ 07:50]        Culture - Blood (collected 04-05-22 @ 18:52)  Source: .Blood Blood-Venous  Preliminary Report (04-06-22 @ 19:02):    No growth to date.    Culture - Blood (collected 04-03-22 @ 18:22)  Source: .Blood Blood-Peripheral  Gram Stain (04-04-22 @ 10:06):    Growth in aerobic bottle: Gram Negative Rods  Final Report (04-06-22 @ 08:13):    Growth in aerobic bottle: Escherichia coli    See previous culture [id number]    Culture - Blood (collected 04-03-22 @ 18:22)  Source: .Blood Blood-Peripheral  Gram Stain (04-04-22 @ 10:04):    Growth in anaerobic bottle: Gram Variable Rods    Growth in aerobic bottle: Gram Negative Rods  Final Report (04-06-22 @ 08:10):    Growth in aerobic and anaerobic bottles: Escherichia coli    ***Blood Panel PCR results on this specimen are available    approximately 3 hours after the Gram stain result.***    Gram stain, PCR, and/or culture results may not always    correspond due to difference in methodologies.    ************************************************************    This PCR assay was performed by multiplex PCR. This    Assay tests for 66 bacterial and resistance gene targets.    Please refer to the [facility] Labs test directory    at [email or web address] for details.  Organism: Blood Culture PCR  Escherichia coli (04-06-22 @ 08:12)  Organism: Escherichia coli (04-06-22 @ 08:12)      -  Amikacin: S <=16      -  Ampicillin: R >16 These ampicillin results predict results for amoxicillin      -  Ampicillin/Sulbactam: S 8/4 Enterobacter, Klebsiella aerogenes, Citrobacter, and Serratia may develop resistance during prolonged therapy (3-4 days)      -  Aztreonam: S <=4      -  Cefazolin: S <=2 Enterobacter, Klebsiella aerogenes, Citrobacter, and Serratia may develop resistance during prolonged therapy (3-4 days)      -  Cefepime: S <=2      -  Cefoxitin: S <=8      -  Ceftriaxone: S <=1 Enterobacter, Klebsiella aerogenes, Citrobacter, and Serratia may develop resistance during prolonged therapy      -  Ciprofloxacin: S <=0.25      -  Ertapenem: S <=0.5      -  Gentamicin: R >8      -  Imipenem: S <=1      -  Levofloxacin: S <=0.5      -  Meropenem: S <=1      -  Piperacillin/Tazobactam: S <=8      -  Tobramycin: R >8      -  Trimethoprim/Sulfamethoxazole: R >2/38      Method Type: HAMLET  Organism: Blood Culture PCR (04-06-22 @ 08:11)      -  Escherichia coli: Detec      Method Type: PCR           [facility]-- WOUND TEAM -- FOLLOW UP NOTE  --------------------------------------------------------------------------------    24 hour events/subjective:    tolerating po  ambulating w/ assist of walker  moving w/o difficulty- can transfer from bed to chair and back   RLE slight pain when touched.     looked crusted over, no drainage nor odor  no f/c/s  abx changed to ancef yesterday.      Diet:  Diet, Regular:   DASH/TLC Sodium & Cholesterol Restricted (DASH) (04-03-22 @ 23:43)      ROS: General/ SKIN/ MSK see HPI  all other systems negative      ALLERGIES & MEDICATIONS  --------------------------------------------------------------------------------    No Known Allergies      STANDING INPATIENT MEDICATIONS  AQUAPHOR (petrolatum Ointment) 1 Application(s) Topical two times a day  ATENolol  Tablet 25 milliGRAM(s) Oral two times a day  atorvastatin 20 milliGRAM(s) Oral at bedtime  ceFAZolin   IVPB 1000 milliGRAM(s) IV Intermittent every 8 hours  cholecalciferol 1000 Unit(s) Oral daily  fluticasone furoate/umeclidinium/vilanterol 100-62.5-25 MICROgram(s) Inhaler 1 Puff(s) Inhalation daily  levothyroxine 75 MICROGram(s) Oral daily  multivitamin 1 Tablet(s) Oral daily  OXcarbazepine 150 milliGRAM(s) Oral two times a day  pantoprazole    Tablet 40 milliGRAM(s) Oral before breakfast  rivaroxaban 15 milliGRAM(s) Oral with dinner      PRN INPATIENT MEDICATION  acetaminophen     Tablet .. 650 milliGRAM(s) Oral every 6 hours PRN      VITALS/PHYSICAL EXAM  --------------------------------------------------------------------------------  T(C): 36.4 (04-07-22 @ 12:20), Max: 36.7 (04-06-22 @ 21:50)  HR: 101 (04-07-22 @ 12:20) (81 - 101)  BP: 154/83 (04-07-22 @ 12:20) (132/73 - 168/90)  RR: 18 (04-07-22 @ 12:20) (18 - 18)  SpO2: 93% (04-07-22 @ 12:20) (93% - 94%)  Wt(kg): --        04-06-22 @ 07:01  -  04-07-22 @ 07:00  --------------------------------------------------------  IN: 770 mL / OUT: 0 mL / NET: 770 mL    04-07-22 @ 07:01  -  04-07-22 @ 15:51  --------------------------------------------------------  IN: 400 mL / OUT: 0 mL / NET: 400 mL      NAD, A&Ox3, WD/ WN/ WG  Versa Care P500 bed  HEENT:  NC/AT, EOMI, mucosa moist, throat clear, trachea midline, neck supple  Gastrointestinal soft NT/ND (+)BS   Neurology: strength & sensation grossly intact  Psych: calm/ appropriate  Musculoskeletal: FROM, no deformities/ contractures  Vascular: BLE equally warm,  no cyanosis, clubbing, nor acute ischemia noted         BLE DP pulses palpable,             BLE hemosiderin staining         Rt shin w/ 2 partial thickness wounds,            dried w/ serous crusting which was able to be removed/wiped away with cleansing of wound with saline moistened gauze            periwound skin w/ erythema, no increased warmth, but slightly tender            scant serosanguinous drainage       No odor, increased warmth, induration, fluctuance, nor crepitus  Skin: thin, atrophic, frail, ecchymosis w/o hematoma      LABS/ CULTURES/ RADIOLOGY:              12.2   8.71  >-----------<  188      [04-06-22 @ 07:50]              37.7     139  |  104  |  23  ----------------------------<  80      [04-06-22 @ 07:50]  4.2   |  22  |  1.00        Ca     8.8     [04-06-22 @ 07:50]        Culture - Blood (collected 04-05-22 @ 18:52)  Source: .Blood Blood-Venous  Preliminary Report (04-06-22 @ 19:02):    No growth to date.    Culture - Blood (collected 04-03-22 @ 18:22)  Source: .Blood Blood-Peripheral  Gram Stain (04-04-22 @ 10:06):    Growth in aerobic bottle: Gram Negative Rods  Final Report (04-06-22 @ 08:13):    Growth in aerobic bottle: Escherichia coli    See previous culture [id number]    Culture - Blood (collected 04-03-22 @ 18:22)  Source: .Blood Blood-Peripheral  Gram Stain (04-04-22 @ 10:04):    Growth in anaerobic bottle: Gram Variable Rods    Growth in aerobic bottle: Gram Negative Rods  Final Report (04-06-22 @ 08:10):    Growth in aerobic and anaerobic bottles: Escherichia coli    ***Blood Panel PCR results on this specimen are available    approximately 3 hours after the Gram stain result.***    Gram stain, PCR, and/or culture results may not always    correspond due to difference in methodologies.    ************************************************************    This PCR assay was performed by multiplex PCR. This    Assay tests for 66 bacterial and resistance gene targets.    Please refer to the [facility] Labs test directory    at [email or web address] for details.  Organism: Blood Culture PCR  Escherichia coli (04-06-22 @ 08:12)  Organism: Escherichia coli (04-06-22 @ 08:12)      -  Amikacin: S <=16      -  Ampicillin: R >16 These ampicillin results predict results for amoxicillin      -  Ampicillin/Sulbactam: S 8/4 Enterobacter, Klebsiella aerogenes, Citrobacter, and Serratia may develop resistance during prolonged therapy (3-4 days)      -  Aztreonam: S <=4      -  Cefazolin: S <=2 Enterobacter, Klebsiella aerogenes, Citrobacter, and Serratia may develop resistance during prolonged therapy (3-4 days)      -  Cefepime: S <=2      -  Cefoxitin: S <=8      -  Ceftriaxone: S <=1 Enterobacter, Klebsiella aerogenes, Citrobacter, and Serratia may develop resistance during prolonged therapy      -  Ciprofloxacin: S <=0.25      -  Ertapenem: S <=0.5      -  Gentamicin: R >8      -  Imipenem: S <=1      -  Levofloxacin: S <=0.5      -  Meropenem: S <=1      -  Piperacillin/Tazobactam: S <=8      -  Tobramycin: R >8      -  Trimethoprim/Sulfamethoxazole: R >2/38      Method Type: HAMLET  Organism: Blood Culture PCR (04-06-22 @ 08:11)      -  Escherichia coli: Detec      Method Type: PCR

## 2022-04-07 NOTE — PROGRESS NOTE ADULT - PROBLEM SELECTOR PLAN 2
Culture Results:   Growth in aerobic bottle: Escherichia coli  Urine Culture Results: >100,000 CFU/ml Escherichia coli Susceptibility to follow. (04.04.22 @ 01:15)  E. Coli sensitive to Ancef, will change abx and follow up cultures until clearance. (Last sent yesterday) Culture Results:   Growth in aerobic bottle: Escherichia coli  Urine Culture Results: >100,000 CFU/ml Escherichia coli Susceptibility to follow. (04.04.22 @ 01:15)  E. Coli sensitive to Ancef

## 2022-04-07 NOTE — PROGRESS NOTE ADULT - PROBLEM SELECTOR PLAN 1
Complicated UTI with associated bacteremia  Sepsis is resolving.  Leukocytosis is improving  Bld Cx showing E. Coli sensitive to Ancef, will change abx and follow up cultures until clearance. (Last sent yesterday) Complicated UTI with associated bacteremia  Sepsis is resolved  Leukocytosis resolved  Bld Cx showing E. Coli sensitive to Ancef,   cultures have cleared.

## 2022-04-07 NOTE — PROGRESS NOTE ADULT - PROBLEM SELECTOR PLAN 4
CT chest showing Empyema and lower lobe is a 7 x 7mm noncalcified nodule. 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 Moderate AS. Minimal aortic regurgitation. Normal left ventricular systolic. No wall motion abnormalities. Mild right ventricular enlargement with normal right ventricular systolic function. Moderate pulmonary pressures.  - c/w fluticasone furoate/umeclidinium/vilanterol 100-62.5-25 MICROgram(s) Inhaler 1 Puff(s) Inhalation daily  - c/w supplemental oxygen to maintain a Sat of >90  - hypoxia seems to be from Emphysema --> will target a lower O2 sats

## 2022-04-08 ENCOUNTER — TRANSCRIPTION ENCOUNTER (OUTPATIENT)
Age: 87
End: 2022-04-08

## 2022-04-08 VITALS
RESPIRATION RATE: 18 BRPM | DIASTOLIC BLOOD PRESSURE: 78 MMHG | SYSTOLIC BLOOD PRESSURE: 142 MMHG | OXYGEN SATURATION: 94 % | TEMPERATURE: 98 F | HEART RATE: 88 BPM

## 2022-04-08 PROCEDURE — 99239 HOSP IP/OBS DSCHRG MGMT >30: CPT

## 2022-04-08 PROCEDURE — 99232 SBSQ HOSP IP/OBS MODERATE 35: CPT

## 2022-04-08 RX ORDER — FLUTICASONE PROPIONATE 50 MCG
1 SPRAY, SUSPENSION NASAL
Qty: 1 | Refills: 0
Start: 2022-04-08 | End: 2022-05-07

## 2022-04-08 RX ORDER — QUINAPRIL HYDROCHLORIDE 40 MG/1
1 TABLET, FILM COATED ORAL
Qty: 0 | Refills: 0 | DISCHARGE

## 2022-04-08 RX ORDER — POTASSIUM CHLORIDE 20 MEQ
1 PACKET (EA) ORAL
Qty: 0 | Refills: 0 | DISCHARGE

## 2022-04-08 RX ORDER — FLUTICASONE FUROATE, UMECLIDINIUM BROMIDE AND VILANTEROL TRIFENATATE 200; 62.5; 25 UG/1; UG/1; UG/1
1 POWDER RESPIRATORY (INHALATION)
Qty: 1 | Refills: 0
Start: 2022-04-08 | End: 2022-05-07

## 2022-04-08 RX ORDER — FUROSEMIDE 40 MG
1 TABLET ORAL
Qty: 0 | Refills: 0 | DISCHARGE

## 2022-04-08 RX ORDER — FLUTICASONE PROPIONATE 50 MCG
1 SPRAY, SUSPENSION NASAL
Refills: 0 | Status: DISCONTINUED | OUTPATIENT
Start: 2022-04-08 | End: 2022-04-08

## 2022-04-08 RX ORDER — CEPHALEXIN 500 MG
1 CAPSULE ORAL
Qty: 10 | Refills: 0
Start: 2022-04-08 | End: 2022-04-12

## 2022-04-08 RX ORDER — CEPHALEXIN 500 MG
500 CAPSULE ORAL EVERY 12 HOURS
Refills: 0 | Status: DISCONTINUED | OUTPATIENT
Start: 2022-04-08 | End: 2022-04-08

## 2022-04-08 RX ADMIN — Medication 1000 UNIT(S): at 13:10

## 2022-04-08 RX ADMIN — Medication 1 TABLET(S): at 13:11

## 2022-04-08 RX ADMIN — ATENOLOL 25 MILLIGRAM(S): 25 TABLET ORAL at 13:10

## 2022-04-08 RX ADMIN — FLUTICASONE FUROATE, UMECLIDINIUM BROMIDE AND VILANTEROL TRIFENATATE 1 PUFF(S): 200; 62.5; 25 POWDER RESPIRATORY (INHALATION) at 14:36

## 2022-04-08 RX ADMIN — Medication 100 MILLIGRAM(S): at 06:13

## 2022-04-08 RX ADMIN — Medication 75 MICROGRAM(S): at 06:04

## 2022-04-08 RX ADMIN — OXCARBAZEPINE 150 MILLIGRAM(S): 300 TABLET, FILM COATED ORAL at 06:04

## 2022-04-08 RX ADMIN — PANTOPRAZOLE SODIUM 40 MILLIGRAM(S): 20 TABLET, DELAYED RELEASE ORAL at 06:04

## 2022-04-08 NOTE — DISCHARGE NOTE PROVIDER - NSDCMRMEDTOKEN_GEN_ALL_CORE_FT
atenolol 25 mg oral tablet: 1 tab(s) orally 2 times a day  furosemide 20 mg oral tablet: 1 tab(s) orally 2 times a day  Lipitor 20 mg oral tablet: 1 tab(s) orally once a day (at bedtime)  multivitamin: 1 tab(s) orally once a day  OXcarbazepine 150 mg oral tablet: 1 tab(s) orally 2 times a day  potassium chloride 8 mEq (600 mg) oral tablet, extended release: 1 tab(s) orally once a day  PreserVision AREDS oral capsule: 1 cap(s) orally once a day  Prilosec 20 mg oral delayed release capsule: 1 cap(s) orally once a day  quinapril 20 mg oral tablet: 1 tab(s) orally 2 times a day  Synthroid 75 mcg (0.075 mg) oral tablet: 1 tab(s) orally once a day  Trelegy Ellipta 100 mcg-62.5 mcg-25 mcg/inh inhalation powder: 1 puff(s) inhaled once a day, As Needed  Vitamin D3 25 mcg (1000 intl units) oral tablet: 1 tab(s) orally once a day  Xarelto 20 mg oral tablet: 1 tab(s) orally once a day (in the evening)   atenolol 25 mg oral tablet: 1 tab(s) orally 2 times a day  cephalexin 500 mg oral capsule: 1 cap(s) orally every 12 hours  fluticasone 50 mcg/inh nasal spray: 1 spray(s) nasal 2 times a day  fluticasone/umeclidinium/vilanterol 100 mcg-62.5 mcg-25 mcg/inh inhalation powder: 1 puff(s) inhaled once a day  Lipitor 20 mg oral tablet: 1 tab(s) orally once a day (at bedtime)  multivitamin: 1 tab(s) orally once a day  OXcarbazepine 150 mg oral tablet: 1 tab(s) orally 2 times a day  PreserVision AREDS oral capsule: 1 cap(s) orally once a day  Prilosec 20 mg oral delayed release capsule: 1 cap(s) orally once a day  Synthroid 75 mcg (0.075 mg) oral tablet: 1 tab(s) orally once a day  Trelegy Ellipta 100 mcg-62.5 mcg-25 mcg/inh inhalation powder: 1 puff(s) inhaled once a day, As Needed  Vitamin D3 25 mcg (1000 intl units) oral tablet: 1 tab(s) orally once a day  Xarelto 20 mg oral tablet: 1 tab(s) orally once a day (in the evening)

## 2022-04-08 NOTE — PROGRESS NOTE ADULT - REASON FOR ADMISSION
Generalized weakness, decreased po

## 2022-04-08 NOTE — DISCHARGE NOTE PROVIDER - HOSPITAL COURSE
89 yr old female with a pmh of HTN, HLD, hypothyoridism, ?CVA vs. seizure disorder, paroxysmal afib on Xarelto who presents with a one day history of weakness/inability to ambulate and poor oral intake.   pt was admitted with a UTI now with positive blood cultures.     Problem/Plan - 1:  ·  Problem: Sepsis secondary to UTI.   ·  Plan: Complicated UTI with associated bacteremia  Sepsis is resolved  Leukocytosis resolved  Bld Cx showing E. Coli sensitive to Ancef,   cultures have cleared.     Problem/Plan - 2:  ·  Problem: Sepsis, unspecified organism.   ·  Plan: Culture Results:   Growth in aerobic bottle: Escherichia coli  Urine Culture Results: >100,000 CFU/ml Escherichia coli Susceptibility to follow. (04.04.22 @ 01:15)  E. Coli sensitive to Ancef.     Problem/Plan - 3:  ·  Problem: Bacteremia.   ·  Plan: Ecoli bacteremia  E. Coli sensitive to Ancef.     Problem/Plan - 4:  ·  Problem: Acute respiratory failure with hypoxia.   ·  Plan: CT chest showing Empyema and lower lobe is a 7 x 7mm noncalcified nodule. 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 Moderate AS. Minimal aortic regurgitation. Normal left ventricular systolic. No wall motion abnormalities. Mild right ventricular enlargement with normal right ventricular systolic function. Moderate pulmonary pressures.  - c/w fluticasone furoate/umeclidinium/vilanterol 100-62.5-25 MICROgram(s) Inhaler 1 Puff(s) Inhalation daily  - c/w supplemental oxygen to maintain a Sat of >90  - hypoxia seems to be from Emphysema --> will target a lower O2 sats.     Problem/Plan - 5:  ·  Problem: Renal mass.   ·  Plan: pt with incidental finding of a renal lesion  urology eval appreciated  pt wants to be conservative and monitor this lesion with surveillance imaging.     Problem/Plan - 6:  ·  Problem: Seizure disorder.   ·  Plan: - chronic issue  - c/w OXcarbazepine 150 milliGRAM(s) Oral two times a day.     Problem/Plan - 7:  ·  Problem: Paroxysmal atrial fibrillation.   ·  Plan: Chronic stable  Continue home Xarelto  atenolol now restarted.   89 yr old female with a pmh of HTN, HLD, hypothyoridism, ?CVA vs. seizure disorder, paroxysmal afib on Xarelto who presents with a one day history of weakness/inability to ambulate and poor oral intake.   pt was admitted with a UTI now with positive blood cultures. to complete a course with Keflex on 04/12/2022 .  Pt will need close follow up with Wound care center in 1 week      Problem/Plan - 1:  ·  Problem: Sepsis secondary to UTI.   ·  Plan: Complicated UTI with associated bacteremia  Sepsis is resolved  Leukocytosis resolved  Bld Cx showing E. Coli sensitive to Ancef,   cultures have cleared.     Problem/Plan - 2:  ·  Problem: Sepsis, unspecified organism.   ·  Plan: Culture Results:   Growth in aerobic bottle: Escherichia coli  Urine Culture Results: >100,000 CFU/ml Escherichia coli Susceptibility to follow. (04.04.22 @ 01:15)  E. Coli sensitive to Ancef.     Problem/Plan - 3:  ·  Problem: Bacteremia.   ·  Plan: Ecoli bacteremia  E. Coli sensitive to Ancef.     Problem/Plan - 4:  ·  Problem: Acute respiratory failure with hypoxia.   ·  Plan: CT chest showing Empyema and lower lobe is a 7 x 7mm noncalcified nodule. 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 Moderate AS. Minimal aortic regurgitation. Normal left ventricular systolic. No wall motion abnormalities. Mild right ventricular enlargement with normal right ventricular systolic function. Moderate pulmonary pressures.  - c/w fluticasone furoate/umeclidinium/vilanterol 100-62.5-25 MICROgram(s) Inhaler 1 Puff(s) Inhalation daily  - c/w supplemental oxygen to maintain a Sat of >90  - hypoxia seems to be from Emphysema --> will target a lower O2 sats.     Problem/Plan - 5:  ·  Problem: Renal mass.   ·  Plan: pt with incidental finding of a renal lesion  urology eval appreciated  pt wants to be conservative and monitor this lesion with surveillance imaging.     Problem/Plan - 6:  ·  Problem: Seizure disorder.   ·  Plan: - chronic issue  - c/w OXcarbazepine 150 milliGRAM(s) Oral two times a day.     Problem/Plan - 7:  ·  Problem: Paroxysmal atrial fibrillation.   ·  Plan: Chronic stable  Continue home Xarelto  atenolol now restarted.

## 2022-04-08 NOTE — DISCHARGE NOTE PROVIDER - NSDCFUSCHEDAPPT_GEN_ALL_CORE_FT
CARLOS SALVADOR ; 05/10/2022 ; [facility] [address] [facility]  CARLOS SALVADOR ; 07/05/2022 ; [facility] Ultrasound [address]  CARLOS SALVADOR ; 07/05/2022 ; [facility] Ultrasound [address] CARLOS SALVADOR ; 04/12/2022 ; [facility] [address] [facility]  CARLOS SALVADOR ; 05/10/2022 ; [facility] [address] [facility]  CARLOS SALVADOR ; 07/05/2022 ; [facility] Ultrasound [address]  CARLOS SALVADOR ; 07/05/2022 ; [facility] Ultrasound [address] CARLOS SALVADOR R ; 04/12/2022 ; [facility] Cardio [address] [facility]  CARLOS SALVADOR R ; 04/22/2022 ; [facility] Med GenInt [address] [facility]  CARLOS SALVADOR ; 05/10/2022 ; [facility] Med GenInt [address] [facility]  CARLOS SALVADOR ; 07/05/2022 ; [facility] Ultrasound [address]  CARLOS SALVADOR ; 07/05/2022 ; [facility] Ultrasound [address]

## 2022-04-08 NOTE — PROGRESS NOTE ADULT - PROBLEM SELECTOR PLAN 2
Culture Results:   Growth in aerobic bottle: Escherichia coli  Urine Culture Results: >100,000 CFU/ml Escherichia coli Susceptibility to follow. (04.04.22 @ 01:15)  E. Coli sensitive to Ancef Culture Results:   Growth in aerobic bottle: Escherichia coli  Urine Culture Results: >100,000 CFU/ml Escherichia coli Susceptibility to follow. (04.04.22 @ 01:15)  E. Coli sensitive to Ancef  will transition to keflex

## 2022-04-08 NOTE — PROGRESS NOTE ADULT - SUBJECTIVE AND OBJECTIVE BOX
[facility]-- WOUND TEAM -- FOLLOW UP NOTE  --------------------------------------------------------------------------------    24 hour events/subjective:    tolerating po  ambulating w/ assist of walker with ease  moving w/o difficulty- can transfer from bed to chair and back   RLE w/o pain when touched.      no drainage nor odor  no f/c/s  abx changed       Diet:  Diet, Regular:   DASH/TLC Sodium & Cholesterol Restricted (DASH) (04-03-22 @ 23:43)      ROS: General/ SKIN/ MSK see HPI  all other systems negative      ALLERGIES & MEDICATIONS  --------------------------------------------------------------------------------    No Known Allergies    STANDING INPATIENT MEDICATIONS  AQUAPHOR (petrolatum Ointment) 1 Application(s) Topical two times a day  ATENolol  Tablet 25 milliGRAM(s) Oral two times a day  atorvastatin 20 milliGRAM(s) Oral at bedtime  ceFAZolin   IVPB 1000 milliGRAM(s) IV Intermittent every 8 hours  cholecalciferol 1000 Unit(s) Oral daily  fluticasone furoate/umeclidinium/vilanterol 100-62.5-25 MICROgram(s) Inhaler 1 Puff(s) Inhalation daily  levothyroxine 75 MICROGram(s) Oral daily  multivitamin 1 Tablet(s) Oral daily  OXcarbazepine 150 milliGRAM(s) Oral two times a day  pantoprazole    Tablet 40 milliGRAM(s) Oral before breakfast  rivaroxaban 15 milliGRAM(s) Oral with dinner      PRN INPATIENT MEDICATION  acetaminophen     Tablet .. 650 milliGRAM(s) Oral every 6 hours PRN        VITALS/PHYSICAL EXAM  --------------------------------------------------------------------------------  T(C): 36.4 (04-08-22 @ 04:38), Max: 36.4 (04-07-22 @ 12:20)  HR: 93 (04-08-22 @ 04:38) (93 - 101)  BP: 151/85 (04-08-22 @ 04:38) (150/86 - 154/83)  RR: 18 (04-08-22 @ 04:38) (18 - 18)  SpO2: 94% (04-08-22 @ 04:38) (93% - 94%)  Wt(kg): --        04-07-22 @ 07:01  -  04-08-22 @ 07:00  --------------------------------------------------------  IN: 760 mL / OUT: 0 mL / NET: 760 mL    04-08-22 @ 07:01  -  04-08-22 @ 10:49  --------------------------------------------------------  IN: 180 mL / OUT: 0 mL / NET: 180 mL      NAD, A&Ox3, WD/ WN/ WG  Versa Care P500 bed  HEENT:  NC/AT, EOMI, mucosa moist, throat clear, trachea midline, neck supple  Gastrointestinal soft NT/ND (+)BS   Neurology: strength & sensation grossly intact  Psych: calm/ appropriate  Musculoskeletal: FROM, no deformities/ contractures  Vascular: BLE equally warm,  no cyanosis, clubbing, nor acute ischemia noted         BLE DP/PT pulses palpable,             BLE hemosiderin staining         Rt shin w/ 2 partial thickness wounds,            moist partial thickness              periwound skin w/ mild erythema, no increased warmth, nontender             scant serosanguinous drainage       No odor, increased warmth, induration, fluctuance, nor crepitus  Skin: thin, atrophic, frail, ecchymosis w/o hematoma      Culture - Blood (collected 04-05-22 @ 18:52)  Source: .Blood Blood-Venous  Preliminary Report (04-06-22 @ 19:02):    No growth to date.    Culture - Blood (collected 04-03-22 @ 18:22)  Source: .Blood Blood-Peripheral  Gram Stain (04-04-22 @ 10:06):    Growth in aerobic bottle: Gram Negative Rods  Final Report (04-06-22 @ 08:13):    Growth in aerobic bottle: Escherichia coli    See previous culture [id number]    Culture - Blood (collected 04-03-22 @ 18:22)  Source: .Blood Blood-Peripheral  Gram Stain (04-04-22 @ 10:04):    Growth in anaerobic bottle: Gram Variable Rods    Growth in aerobic bottle: Gram Negative Rods  Final Report (04-06-22 @ 08:10):    Growth in aerobic and anaerobic bottles: Escherichia coli    ***Blood Panel PCR results on this specimen are available    approximately 3 hours after the Gram stain result.***    Gram stain, PCR, and/or culture results may not always    correspond due to difference in methodologies.    ************************************************************    This PCR assay was performed by multiplex PCR. This    Assay tests for 66 bacterial and resistance gene targets.    Please refer to the [facility] test directory    at [email or web address] for details.  Organism: Blood Culture PCR  Escherichia coli (04-06-22 @ 08:12)  Organism: Escherichia coli (04-06-22 @ 08:12)      -  Amikacin: S <=16      -  Ampicillin: R >16 These ampicillin results predict results for amoxicillin      -  Ampicillin/Sulbactam: S 8/4 Enterobacter, Klebsiella aerogenes, Citrobacter, and Serratia may develop resistance during prolonged therapy (3-4 days)      -  Aztreonam: S <=4      -  Cefazolin: S <=2 Enterobacter, Klebsiella aerogenes, Citrobacter, and Serratia may develop resistance during prolonged therapy (3-4 days)      -  Cefepime: S <=2      -  Cefoxitin: S <=8      -  Ceftriaxone: S <=1 Enterobacter, Klebsiella aerogenes, Citrobacter, and Serratia may develop resistance during prolonged therapy      -  Ciprofloxacin: S <=0.25      -  Ertapenem: S <=0.5      -  Gentamicin: R >8      -  Imipenem: S <=1      -  Levofloxacin: S <=0.5      -  Meropenem: S <=1      -  Piperacillin/Tazobactam: S <=8      -  Tobramycin: R >8      -  Trimethoprim/Sulfamethoxazole: R >2/38      Method Type: HAMLET  Organism: Blood Culture PCR (04-06-22 @ 08:11)      -  Escherichia coli: Detec      Method Type: PCR

## 2022-04-08 NOTE — DISCHARGE NOTE NURSING/CASE MANAGEMENT/SOCIAL WORK - NSDCPEFALRISK_GEN_ALL_CORE
For information on Fall & Injury Prevention, visit: [email or web address] OR  [email or web address] OR  https://www.cdc.gov/steadi/patient.html

## 2022-04-08 NOTE — PROGRESS NOTE ADULT - PROBLEM SELECTOR PLAN 1
Complicated UTI with associated bacteremia  Sepsis is resolved  Leukocytosis resolved  Bld Cx showing E. Coli sensitive to Ancef,   cultures have cleared.

## 2022-04-08 NOTE — PROGRESS NOTE ADULT - PROBLEM SELECTOR PLAN 6
- chronic issue  - c/w OXcarbazepine 150 milliGRAM(s) Oral two times a day
Chronic stable  Continue home Xarelto  Holding home BB in setting of sepsis
- chronic issue  - c/w OXcarbazepine 150 milliGRAM(s) Oral two times a day

## 2022-04-08 NOTE — DISCHARGE NOTE PROVIDER - CARE PROVIDER_API CALL
Kenneth Nolasco)  Internal Medicine; Nephrology  [address] [facility], [place], [address]  [place] [address]  Phone: [phone]  Fax: [phone]  Follow Up Time: 2 weeks    Guilherme Cm)  Cardiovascular Disease; Internal Medicine; Interventional Cardiology  [address] [place] [address]  Phone: [phone]  Fax: [phone]  Follow Up Time: 2 weeks    Critical access hospital,   [phone]  Phone: (   )    -  Fax: (   )    -  Follow Up Time:

## 2022-04-08 NOTE — DISCHARGE NOTE PROVIDER - NSDCFUADDAPPT_GEN_ALL_CORE_FT
f/u as outpatient at Wound Center [address] [facility] [phone]   f/u as outpatient at Wound Center [address] [facility] [phone]  APPTS ARE READY TO BE MADE: [ ] YES    Best Family or Patient Contact (if needed):    Additional Information about above appointments (if needed):    1: You have an appointment with DR Nolasco on 04/22/2022 at 0310 PM   2: You have an appointment with DR Cm on 04/12/2022 at 0415 PM   3: Please follow up with Wound care centerin 2 weeks ,  at [facility] please call for appointment     Other comments or requests:      f/u as outpatient at Wound Center [address] [facility] [phone]  APPTS ARE READY TO BE MADE: [ ] YES    Best Family or Patient Contact (if needed):    Additional Information about above appointments (if needed):    1: You have an appointment with DR Nolasco on 04/22/2022 at 0310 PM   2: You have an appointment with DR Cm on 04/12/2022 at 0415 PM   3: Please follow up with Wound care center in 2 weeks ,  at [facility] please call for appointment     Other comments or requests:

## 2022-04-08 NOTE — DISCHARGE NOTE PROVIDER - CARE PROVIDERS DIRECT ADDRESSES
[email or web address],DirectAddress_Unknown,DirectAddress_Unknown

## 2022-04-08 NOTE — PROGRESS NOTE ADULT - PROBLEM SELECTOR PLAN 5
pt with incidental finding of a renal lesion  urology eval appreciated  pt wants to be conservative and monitor this lesion with surveillance imaging

## 2022-04-08 NOTE — DISCHARGE NOTE PROVIDER - NSDCCPCAREPLAN_GEN_ALL_CORE_FT
PRINCIPAL DISCHARGE DIAGNOSIS  Diagnosis: UTI (urinary tract infection), bacterial  Assessment and Plan of Treatment: c/w Keflex      SECONDARY DISCHARGE DIAGNOSES  Diagnosis: Renal mass  Assessment and Plan of Treatment: pt wants to be conservative and monitor this lesion with surveillance imaging.    Diagnosis: Paroxysmal atrial fibrillation  Assessment and Plan of Treatment: c/w xarelto    Diagnosis: Seizure disorder  Assessment and Plan of Treatment: c/w OXcarbazepine    Diagnosis: Acute respiratory failure with hypoxia  Assessment and Plan of Treatment: c/w inhaler    Diagnosis: Bacteremia  Assessment and Plan of Treatment: c/w keflex    Diagnosis: UTI (urinary tract infection), bacterial  Assessment and Plan of Treatment: c/w keflex

## 2022-04-08 NOTE — PROGRESS NOTE ADULT - PROVIDER SPECIALTY LIST ADULT
Wound Care
Wound Care
Cardiology
Hospitalist
Cardiology
[Initial Evaluation] : an initial evaluation for
[FreeTextEntry2] : clogged  ears 
Hospitalist

## 2022-04-08 NOTE — PROGRESS NOTE ADULT - SUBJECTIVE AND OBJECTIVE BOX
Andre Reyes, M.D.  Pager: [phone]  Office: [phone]    Patient is a 89y old  Female who presents with a chief complaint of Generalized weakness, decreased po (08 Apr 2022 10:49)          SUBJECTIVE / OVERNIGHT EVENTS:    No acute overnight events.    ROS: ( - ) Fever, ( - )Chills,  ( - )Nausea/Vomiting, ( - ) Cough, ( - )Shortness of breath, ( - )Chest Pain    MEDICATIONS  (STANDING):  AQUAPHOR (petrolatum Ointment) 1 Application(s) Topical two times a day  ATENolol  Tablet 25 milliGRAM(s) Oral two times a day  atorvastatin 20 milliGRAM(s) Oral at bedtime  ceFAZolin   IVPB 1000 milliGRAM(s) IV Intermittent every 8 hours  cholecalciferol 1000 Unit(s) Oral daily  fluticasone furoate/umeclidinium/vilanterol 100-62.5-25 MICROgram(s) Inhaler 1 Puff(s) Inhalation daily  levothyroxine 75 MICROGram(s) Oral daily  multivitamin 1 Tablet(s) Oral daily  OXcarbazepine 150 milliGRAM(s) Oral two times a day  pantoprazole    Tablet 40 milliGRAM(s) Oral before breakfast  rivaroxaban 15 milliGRAM(s) Oral with dinner    MEDICATIONS  (PRN):  acetaminophen     Tablet .. 650 milliGRAM(s) Oral every 6 hours PRN Temp greater or equal to 38C (100.4F), Mild Pain (1 - 3), Moderate Pain (4 - 6)          T(C): 36.4 (04-08 @ 04:38), Max: 36.4 (04-07 @ 12:20)   HR: 93   BP: 151/85   RR: 18   SpO2: 94%    PHYSICAL EXAM:    CONSTITUTIONAL: NAD, well-developed, well-groomed  EYES: PERRLA; conjunctiva and sclera clear  ENMT: Moist oral mucosa, no pharyngeal injection or exudates; normal dentition  NECK: Supple, no palpable masses; no thyromegaly  RESPIRATORY: Normal respiratory effort; lungs are clear to auscultation bilaterally  CARDIOVASCULAR: Regular rate and rhythm, normal S1 and S2, no murmur/rub/gallop; No lower extremity edema; Peripheral pulses are 2+ bilaterally  ABDOMEN: Nontender to palpation, normoactive bowel sounds, no rebound/guarding; No hepatosplenomegaly  MUSCULOSKELETAL:  Normal gait; no clubbing or cyanosis of digits; no joint swelling or tenderness to palpation  PSYCH: A+O to person, place, and time; affect appropriate  NEUROLOGY: CN 2-12 are intact and symmetric; no gross sensory deficits   SKIN: No rashes; no palpable lesions      LABS:              CAPILLARY BLOOD GLUCOSE          RADIOLOGY & ADDITIONAL TESTS:    Imaging Personally Reviewed:  Consultant(s) Notes Reviewed:    Care Discussed with Consultants/Other Providers:   Andre Reyes, M.D.  Pager: [phone]  Office: [phone]    Patient is a 89y old  Female who presents with a chief complaint of Generalized weakness, decreased po (08 Apr 2022 10:49)          SUBJECTIVE / OVERNIGHT EVENTS:    No acute overnight events.  feels well    ROS: ( - ) Fever, ( - )Chills,  ( - )Nausea/Vomiting, ( - ) Cough, ( - )Shortness of breath, ( - )Chest Pain    MEDICATIONS  (STANDING):  AQUAPHOR (petrolatum Ointment) 1 Application(s) Topical two times a day  ATENolol  Tablet 25 milliGRAM(s) Oral two times a day  atorvastatin 20 milliGRAM(s) Oral at bedtime  ceFAZolin   IVPB 1000 milliGRAM(s) IV Intermittent every 8 hours  cholecalciferol 1000 Unit(s) Oral daily  fluticasone furoate/umeclidinium/vilanterol 100-62.5-25 MICROgram(s) Inhaler 1 Puff(s) Inhalation daily  levothyroxine 75 MICROGram(s) Oral daily  multivitamin 1 Tablet(s) Oral daily  OXcarbazepine 150 milliGRAM(s) Oral two times a day  pantoprazole    Tablet 40 milliGRAM(s) Oral before breakfast  rivaroxaban 15 milliGRAM(s) Oral with dinner    MEDICATIONS  (PRN):  acetaminophen     Tablet .. 650 milliGRAM(s) Oral every 6 hours PRN Temp greater or equal to 38C (100.4F), Mild Pain (1 - 3), Moderate Pain (4 - 6)          T(C): 36.4 (04-08 @ 04:38), Max: 36.4 (04-07 @ 12:20)   HR: 93   BP: 151/85   RR: 18   SpO2: 94%    PHYSICAL EXAM:    CONSTITUTIONAL: NAD, well-developed, well-groomed  EYES: PERRLA; conjunctiva and sclera clear  ENMT: Moist oral mucosa, no pharyngeal injection or exudates; normal dentition  NECK: Supple, no palpable masses; no thyromegaly  RESPIRATORY: Normal respiratory effort; lungs are clear to auscultation bilaterally  CARDIOVASCULAR: Regular rate and rhythm, normal S1 and S2, no murmur/rub/gallop; No lower extremity edema; Peripheral pulses are 2+ bilaterally  ABDOMEN: Nontender to palpation, normoactive bowel sounds, no rebound/guarding; No hepatosplenomegaly  MUSCULOSKELETAL:  Normal gait; no clubbing or cyanosis of digits; no joint swelling or tenderness to palpation  PSYCH: A+O to person, place, and time; affect appropriate  NEUROLOGY: CN 2-12 are intact and symmetric; no gross sensory deficits   SKIN: No rashes; no palpable lesions      LABS:              CAPILLARY BLOOD GLUCOSE          RADIOLOGY & ADDITIONAL TESTS:    Imaging Personally Reviewed:  Consultant(s) Notes Reviewed:    Care Discussed with Consultants/Other Providers:

## 2022-04-08 NOTE — PROGRESS NOTE ADULT - ASSESSMENT
1. sepsis secondary to UTI ?kidney mass ?pyelonephritis    2. PAF alternating with sinus bradycardia presently in afib wih controlled VR    3. diastolic heart failure MR moderate to severe normal LV function  patient is hemodynamically stable  Recommend  continue anticaogulation  continue present meds beta blocker and lasix  further evaluation of renal mass as per primary team urology consult  ID followup  physical therapy
89 yr old female with a pmh of HTN, HLD, hypothyoridism, ?CVA vs. seizure disorder, paroxysmal afib on Xarelto who presents with a one day history of weakness/inability to ambulate and poor oral intake.   pt was admitted with a UTI now with positive blood cultures.
A/P:  89 year old F with a history of HTN, HLD, hypothyroidism, CVA vs. seizure disorder, paroxysmal a fib on Xarelto who presents with weakness and decreased po intake, likely in the setting of sepsis secondary to urinary tract infection vs. viral pneumonia, less likely neurologic event.    Wound Consult requested to assist w/ management of BLE PVD  RLE wounds w/ cellulitis    RLE- improving continue Medihoney dressing QD  BLE elevation   Hold on Compression until RLE improves  Abx per Medicine- dc on po abx  Moisturize intact skin w/ Aquaphor cream BID and prn  Nutrition Consult for optimization         encourage high quality protein, MVI & Vit C to promote wound healing  Continue turning and positioning w/ offloading assistive devices as per protocol  Hetal BID and prn soiling, Continue w/ Pericare as per protocol  Waffle Cushion to chair when oob to chair  Continue w/ low air loss pressure redistribution bed surface   Care as per medicine, will follow w/ you  Upon discharge f/u as outpatient at Wound Center [address] [facility] [phone]  D/w sathish, team & RN   DANY ZamoranoC CWS [id number]  I spent 25minutes face to face w/ this pt of which more than 50% of the time was spent counseling & coordinating care of this pt.   
1. sepsis secondary to UTI ?kidney mass ?pyelonephritis-stable no further fever    2. PAF alternating with sinus bradycardia presently in afib wih controlled VR    3. diastolic heart failure MR moderate to severe normal LV function  patient is hemodynamically stable  Recommend  continue anticaogulation  continue present meds beta blocker and lasix  IID followup  physical therapy  discharge planning -?rehab
89 yr old female with a pmh of HTN, HLD, hypothyoridism, ?CVA vs. seizure disorder, paroxysmal afib on Xarelto who presents with a one day history of weakness/inability to ambulate and poor oral intake.   pt was admitted with a UTI now with positive blood cultures.
A/P:  89 year old F with a history of HTN, HLD, hypothyroidism, CVA vs. seizure disorder, paroxysmal a fib on Xarelto who presents with weakness and decreased po intake, likely in the setting of sepsis secondary to urinary tract infection vs. viral pneumonia, less likely neurologic event.    Wound Consult requested to assist w/ management of BLE PVD  RLE wounds w/ cellulitis    RLE- Medihoney dressing QD  BLE elevation   Hold on Compression until RLE improves  Abx per Medicine  Moisturize intact skin w/ Aquaphor cream BID and prn  Nutrition Consult for optimization         encourage high quality protein, MVI & Vit C to promote wound healing  Continue turning and positioning w/ offloading assistive devices as per protocol  Hetal BID and prn soiling, Continue w/ Pericare as per protocol  Waffle Cushion to chair when oob to chair  Continue w/ low air loss pressure redistribution bed surface   Care as per medicine, will follow w/ you  Upon discharge f/u as outpatient at Wound Center [address] [facility] [phone]  s/w Attng and D/w team & RN   Melvi Flaherty PA-C CWS [id number]  I spent 25minutes face to face w/ this pt of which more than 50% of the time was spent counseling & coordinating care of this pt. 
89 yr old female with a pmh of HTN, HLD, hypothyoridism, ?CVA vs. seizure disorder, paroxysmal afib on Xarelto who presents with a one day history of weakness/inability to ambulate and poor oral intake.   pt was admitted with a UTI now with positive blood cultures.

## 2022-04-08 NOTE — DISCHARGE NOTE PROVIDER - PROVIDER TOKENS
PROVIDER:[TOKEN:[[id number]],FOLLOWUP:[2 weeks]],PROVIDER:[TOKEN:[[id number]],FOLLOWUP:[2 weeks]],FREE:[LAST:[WoundCare],PHONE:[(   )    -],FAX:[(   )    -],ADDRESS:[[phone]]]

## 2022-04-08 NOTE — DISCHARGE NOTE NURSING/CASE MANAGEMENT/SOCIAL WORK - PATIENT PORTAL LINK FT
You can access the FollowMyHealth Patient Portal offered by [facility] by registering at the following website: [email or web address]. By joining [email or web address]’s FollowMyHealth portal, you will also be able to view your health information using other applications (apps) compatible with our system.

## 2022-04-08 NOTE — DISCHARGE NOTE NURSING/CASE MANAGEMENT/SOCIAL WORK - NSDCCRTYPESERV_GEN_ALL_CORE_FT
resuming HHA Monday, 4/11/22 as discussed, Messages left for Emma to Clarify. If not available family will assist as needed over w/e.

## 2022-04-10 LAB
CULTURE RESULTS: SIGNIFICANT CHANGE UP
SPECIMEN SOURCE: SIGNIFICANT CHANGE UP

## 2022-04-11 ENCOUNTER — NON-APPOINTMENT (OUTPATIENT)
Age: 87
End: 2022-04-11

## 2022-04-11 PROBLEM — Z11.59 SCREENING FOR VIRAL DISEASE: Status: ACTIVE | Noted: 2020-12-11

## 2022-04-12 ENCOUNTER — RX RENEWAL (OUTPATIENT)
Age: 87
End: 2022-04-12

## 2022-04-17 ENCOUNTER — RX RENEWAL (OUTPATIENT)
Age: 87
End: 2022-04-17

## 2022-04-19 ENCOUNTER — NON-APPOINTMENT (OUTPATIENT)
Age: 87
End: 2022-04-19

## 2022-04-19 ENCOUNTER — APPOINTMENT (OUTPATIENT)
Dept: CARDIOLOGY | Facility: CLINIC | Age: 87
End: 2022-04-19
Payer: MEDICARE

## 2022-04-19 VITALS
BODY MASS INDEX: 28 KG/M2 | WEIGHT: 158 LBS | HEART RATE: 90 BPM | OXYGEN SATURATION: 94 % | HEIGHT: 63 IN | SYSTOLIC BLOOD PRESSURE: 150 MMHG | DIASTOLIC BLOOD PRESSURE: 90 MMHG

## 2022-04-19 PROCEDURE — 99214 OFFICE O/P EST MOD 30 MIN: CPT

## 2022-04-19 PROCEDURE — 93000 ELECTROCARDIOGRAM COMPLETE: CPT

## 2022-04-19 NOTE — DISCUSSION/SUMMARY
[FreeTextEntry1] : 1.  Restart quinapril 20 mg a day instead of 20 twice a day\par 2.  Restart Lasix at 20 mg once a day with potassium replacement\par 3.  Continue atenolol 25 twice daily which could be increased to 50 in the morning and 20 5 at night depending on her heart rate\par 4.  Wound care follow-up\par \par 5.  Follow-up with me again in 2-month

## 2022-04-19 NOTE — ASSESSMENT
[FreeTextEntry1] : Sabi Cordova with a history of hypertension paroxysmal atrial fibrillation is actually doing amazingly well.  Her blood pressure is under good control and there is no evidence of overt CHF.  She does have chronic shortness of breath which she has had for some time which could be a combination of her body habitus perhaps diastolic dysfunction and some underlying airways disease.  The shortness of breath is a major complaint.  Recently her shortness of breath seems to have increased with some response to Spiriva and Trelegy.  She does have some edema of the lower extremities.  \par \par \par 1.  Essential hypertension relatively well controlled on present regimen of medication\par \par 2.  Diastolic dysfunction moderate mitral regurgitation and pulmonary hypertension probably the cause of her shortness of breath.  Her shortness of breath however is stable.  She probably has heart failure preserved ejection fraction contributing to her shortness of breath\par \par 3..  History of paroxysmal atrial fibrillation.  Initially she was in normal sinus rhythm and then later in the exam she was in atrial fibrillation with a relatively well-controlled ventricular response.  Patient initially today was in sinus rhythm but repeat EKG reveals atrial fibrillation with a relatively well-controlled ventricular rate.  She does have intermittent atrial fibrillation alternating with sinus rhythm\par \par 3. Recent hospitalization for urosepsis now off Jardiance and have been off Lasix\par \par 4.  Atrial fibrillation now chronic ventricular rate between 85 and 100 on atenolol 25 twice daily\par \par 5.  Edema of the lower extremities with some ulcerations being treated by home care\par \par \par \par

## 2022-04-19 NOTE — REASON FOR VISIT
[FreeTextEntry1] : Sabi Cordova  89 years old recently admitted to [facility] with fever possible sepsis and a renal mass.

## 2022-04-19 NOTE — PHYSICAL EXAM
[Normal Conjunctiva] : the conjunctiva exhibited no abnormalities [No Jugular Venous Bustillo A Waves] : no jugular venous bustillo A waves [] : no respiratory distress [Respiration, Rhythm And Depth] : normal respiratory rhythm and effort [Auscultation Breath Sounds / Voice Sounds] : lungs were clear to auscultation bilaterally [Murmurs] : no murmurs present [Abdomen Soft] : soft [Abdomen Tenderness] : non-tender [FreeTextEntry1] : Both lower extremities 1-2+ pitting edema right lower extremity is covered healing ulcer

## 2022-04-19 NOTE — HISTORY OF PRESENT ILLNESS
[FreeTextEntry1] : To review, Sabi is now 89 years old and well-known to me.  She has a history of paroxysmal atrial fibrillation but has reverted to normal sinus rhythm and remains in normal sinus rhythm with a sinus bradycardia.  She has been on anticoagulation for several years with Xarelto which she has tolerated.  She is also on aspirin which was given to her after a presumed CVA\par \par Several years ago she had an episode of confusion initially felt to be a possible TIA but subsequently felt possibly to be a seizure disorder.  She has been on seizure medications without any recurrence of these episodes and has remained on those medications including the Xarelto and aspirin\par \par She does have chronic shortness of breath which may recently have exacerbated\par Last echocardiogram 2019 revealed overall preserved LV function with moderate mitral regurgitation and mild pulmonary hypertension.\par \par \par She remains quite mentally intact, uses a walker but finds a difficulty walking with fatigue and discomfort in her legs\par \par Last visit, she had some degree of a cold and a cough with sputum production and felt increased shortness of breath.  She had no chest pain.  She took Spiriva with improvement and presently has no significant shortness of breath other than her chronic shortness of breath and fatigue when she is walking with a walker.  According to her daughter she has decreased her walking activity.  She continues to be mentally intact. \par \par She received the COVID-19 Moderna vaccine\par \par She continues to experience  exertional shortness of breath and fatigue no chest pain or palpitations.  She has had the symptoms for some time but this is her major complaint.  She denies palpitations dizziness or syncope\par \par She recently had something fall on her right leg and developed some increasing edema and some weeping.  It is slowly getting better and Lasix 20 mg was prescribed an increased to total of 60 mg a day\par \par During the exam she suddenly had an irregularly irregular heart rate probably compatible with A. fib\par \par Overall at age 89 she has been functioning fairly well.  She tries to keep as active as possible and mentally she remains quite sharp\par \par Recent blood test October 2021  LDL 66 triglycerides 77 HDL 78 creatinine 0.94 potassium 4.1 GFR 51 and hemoglobin A1c 5.9\par \par on previous visits she continued with shortness of breath which seems to have increased in severity with having to stop after a few feet and sit down with her walker.  She denies significant increase in edema of her lower extremities.  She feels that Spiriva and Trelegy do help.  She has no chest pain.  Occasionally she does have palpitations and feels that she is in atrial fibrillation she has had chronic edema of her lower extremities and has had some weeping from the legs\par \par Echo march 2021suggested moderate to severe MR normal LV function and pulmonary hypertension.  \par \par She was recently admitted to [facility] with fever urinary tract infection and I believe E. coli sepsis.  She was in atrial fibrillation during the admission.  She had evidence of a mass in the kidney which is being treated conservatively\par \par From a cardiac point of view she was stable in the hospital.  They stopped her Jardiance and Lasix and she was treated with aggressive antibiotics\par \par 2D echo dated April 6, 2022 in the hospital revealed normal left ventricular function moderate pulmonary hypertension mild to moderate mitral regurgitation moderate tricuspid insufficiency.\par \par She went home from the hospital.  She did have some wound infection of her lower extremity which is being treated by wound care.  She has chronic shortness of breath but overall feels well.  She has no fever or chills.  She is now off quinapril and Lasix\par \par

## 2022-04-22 ENCOUNTER — APPOINTMENT (OUTPATIENT)
Dept: INTERNAL MEDICINE | Facility: CLINIC | Age: 87
End: 2022-04-22

## 2022-04-23 ENCOUNTER — RX RENEWAL (OUTPATIENT)
Age: 87
End: 2022-04-23

## 2022-04-26 PROCEDURE — 84132 ASSAY OF SERUM POTASSIUM: CPT

## 2022-04-26 PROCEDURE — 0225U NFCT DS DNA&RNA 21 SARSCOV2: CPT

## 2022-04-26 PROCEDURE — 85610 PROTHROMBIN TIME: CPT

## 2022-04-26 PROCEDURE — 97161 PT EVAL LOW COMPLEX 20 MIN: CPT

## 2022-04-26 PROCEDURE — 82947 ASSAY GLUCOSE BLOOD QUANT: CPT

## 2022-04-26 PROCEDURE — 82565 ASSAY OF CREATININE: CPT

## 2022-04-26 PROCEDURE — 71250 CT THORAX DX C-: CPT

## 2022-04-26 PROCEDURE — 82803 BLOOD GASES ANY COMBINATION: CPT

## 2022-04-26 PROCEDURE — 94640 AIRWAY INHALATION TREATMENT: CPT

## 2022-04-26 PROCEDURE — 84295 ASSAY OF SERUM SODIUM: CPT

## 2022-04-26 PROCEDURE — 74174 CTA ABD&PLVS W/CONTRAST: CPT

## 2022-04-26 PROCEDURE — 84100 ASSAY OF PHOSPHORUS: CPT

## 2022-04-26 PROCEDURE — 87150 DNA/RNA AMPLIFIED PROBE: CPT

## 2022-04-26 PROCEDURE — 85018 HEMOGLOBIN: CPT

## 2022-04-26 PROCEDURE — 82330 ASSAY OF CALCIUM: CPT

## 2022-04-26 PROCEDURE — 82435 ASSAY OF BLOOD CHLORIDE: CPT

## 2022-04-26 PROCEDURE — 83605 ASSAY OF LACTIC ACID: CPT

## 2022-04-26 PROCEDURE — 76775 US EXAM ABDO BACK WALL LIM: CPT

## 2022-04-26 PROCEDURE — 97530 THERAPEUTIC ACTIVITIES: CPT

## 2022-04-26 PROCEDURE — 83735 ASSAY OF MAGNESIUM: CPT

## 2022-04-26 PROCEDURE — 81001 URINALYSIS AUTO W/SCOPE: CPT

## 2022-04-26 PROCEDURE — 87186 SC STD MICRODIL/AGAR DIL: CPT

## 2022-04-26 PROCEDURE — 80048 BASIC METABOLIC PNL TOTAL CA: CPT

## 2022-04-26 PROCEDURE — 80053 COMPREHEN METABOLIC PANEL: CPT

## 2022-04-26 PROCEDURE — 93005 ELECTROCARDIOGRAM TRACING: CPT

## 2022-04-26 PROCEDURE — 97116 GAIT TRAINING THERAPY: CPT

## 2022-04-26 PROCEDURE — 36415 COLL VENOUS BLD VENIPUNCTURE: CPT

## 2022-04-26 PROCEDURE — 97110 THERAPEUTIC EXERCISES: CPT

## 2022-04-26 PROCEDURE — 97164 PT RE-EVAL EST PLAN CARE: CPT

## 2022-04-26 PROCEDURE — 87040 BLOOD CULTURE FOR BACTERIA: CPT

## 2022-04-26 PROCEDURE — 93306 TTE W/DOPPLER COMPLETE: CPT

## 2022-04-26 PROCEDURE — 85730 THROMBOPLASTIN TIME PARTIAL: CPT

## 2022-04-26 PROCEDURE — 71045 X-RAY EXAM CHEST 1 VIEW: CPT

## 2022-04-26 PROCEDURE — 87086 URINE CULTURE/COLONY COUNT: CPT

## 2022-04-26 PROCEDURE — 85025 COMPLETE CBC W/AUTO DIFF WBC: CPT

## 2022-04-26 PROCEDURE — 87449 NOS EACH ORGANISM AG IA: CPT

## 2022-04-26 PROCEDURE — 85027 COMPLETE CBC AUTOMATED: CPT

## 2022-04-26 PROCEDURE — 85014 HEMATOCRIT: CPT

## 2022-04-26 PROCEDURE — 99285 EMERGENCY DEPT VISIT HI MDM: CPT

## 2022-05-04 ENCOUNTER — NON-APPOINTMENT (OUTPATIENT)
Age: 87
End: 2022-05-04

## 2022-05-04 ENCOUNTER — LABORATORY RESULT (OUTPATIENT)
Age: 87
End: 2022-05-04

## 2022-05-04 ENCOUNTER — APPOINTMENT (OUTPATIENT)
Dept: INTERNAL MEDICINE | Facility: CLINIC | Age: 87
End: 2022-05-04
Payer: MEDICARE

## 2022-05-04 VITALS
SYSTOLIC BLOOD PRESSURE: 120 MMHG | WEIGHT: 158 LBS | HEIGHT: 63 IN | DIASTOLIC BLOOD PRESSURE: 70 MMHG | BODY MASS INDEX: 28 KG/M2

## 2022-05-04 DIAGNOSIS — L03.119 CELLULITIS OF UNSPECIFIED PART OF LIMB: ICD-10-CM

## 2022-05-04 DIAGNOSIS — A41.9 SEPSIS, UNSPECIFIED ORGANISM: ICD-10-CM

## 2022-05-04 DIAGNOSIS — N39.0 SEPSIS, UNSPECIFIED ORGANISM: ICD-10-CM

## 2022-05-04 DIAGNOSIS — G40.909 EPILEPSY, UNSPECIFIED, NOT INTRACTABLE, W/OUT STATUS EPILEPTICUS: ICD-10-CM

## 2022-05-04 PROCEDURE — 99214 OFFICE O/P EST MOD 30 MIN: CPT | Mod: 25

## 2022-05-04 PROCEDURE — 36415 COLL VENOUS BLD VENIPUNCTURE: CPT

## 2022-05-04 PROCEDURE — 93000 ELECTROCARDIOGRAM COMPLETE: CPT

## 2022-05-04 RX ORDER — POTASSIUM CHLORIDE 600 MG/1
8 CAPSULE, EXTENDED RELEASE ORAL
Qty: 90 | Refills: 0 | Status: DISCONTINUED | COMMUNITY
Start: 2021-11-19 | End: 2022-05-04

## 2022-05-04 NOTE — ASSESSMENT
[FreeTextEntry1] : This is a complex 89-year-old female whose history has been reviewed above\par \par She is status post hospitalization for most likely urosepsis.  She has no dysuria.  We did do a urine analysis but in the absence of symptoms I am not sure whether or not this is of any note in addition we will do a urine culture\par \par Patient was started on Lasix once a day by her cardiologist we will increase this to twice daily.  Potassium was drawn and assessment on medication based on results\par \par She has a renal mass this was discussed this will be followed serially\par \par She has a history of hypothyroidism a TSH has been obtained medication changes predicated on the results\par \par She has a history of hypercholesterolemia she remains on a statin a cholesterol profile has been obtained medication changes predicated on the results\par \par Her lungs are clear but we will continue her on her inhaler which seemed to be somewhat effective\par \par She is back in atrial fibrillation her rate is a bit elevated but I am reluctant to increase her atenolol at this point as we are increasing her diuretic and I do not wish to decrease her blood pressure.\par \par I will ask her to follow-up with me and cardiology

## 2022-05-04 NOTE — HISTORY OF PRESENT ILLNESS
[FreeTextEntry1] : This is a 89-year-old female for evaluation and treatment of her status post sepsis probably urinary in origin.  Incidental finding of renal mass.  In addition she has hypertension seizure disorder shortness of breath pulmonary hypertension mild to moderate mitral regurgitation.  Her medications were altered in the hospital she was seen by cardiology where some of her medications were reinstituted [de-identified] : Patient is accompanied by her daughter she is bright alert.  She states that her shortness of breath is stable.  She does have some edema of her lower extremities she has no dysuria

## 2022-05-04 NOTE — PHYSICAL EXAM
[No Focal Deficits] : no focal deficits [Normal] : affect was normal and insight and judgment were intact [de-identified] : Irregular irregular [de-identified] : 1+ edema

## 2022-05-05 LAB
25(OH)D3 SERPL-MCNC: 39.8 NG/ML
ALBUMIN SERPL ELPH-MCNC: 4 G/DL
ALP BLD-CCNC: 130 U/L
ALT SERPL-CCNC: 50 U/L
ANION GAP SERPL CALC-SCNC: 13 MMOL/L
AST SERPL-CCNC: 41 U/L
BASOPHILS # BLD AUTO: 0.04 K/UL
BASOPHILS NFR BLD AUTO: 0.7 %
BILIRUB SERPL-MCNC: 0.3 MG/DL
BUN SERPL-MCNC: 25 MG/DL
CALCIUM SERPL-MCNC: 8.9 MG/DL
CHLORIDE SERPL-SCNC: 106 MMOL/L
CHOLEST SERPL-MCNC: 130 MG/DL
CO2 SERPL-SCNC: 23 MMOL/L
CREAT SERPL-MCNC: 1.01 MG/DL
EGFR: 51 ML/MIN/1.73M2
EOSINOPHIL # BLD AUTO: 0.11 K/UL
EOSINOPHIL NFR BLD AUTO: 1.9 %
ESTIMATED AVERAGE GLUCOSE: 123 MG/DL
GLUCOSE SERPL-MCNC: 99 MG/DL
HBA1C MFR BLD HPLC: 5.9 %
HCT VFR BLD CALC: 38.4 %
HDLC SERPL-MCNC: 57 MG/DL
HGB BLD-MCNC: 12.3 G/DL
IMM GRANULOCYTES NFR BLD AUTO: 0.3 %
LDLC SERPL CALC-MCNC: 52 MG/DL
LYMPHOCYTES # BLD AUTO: 1.27 K/UL
LYMPHOCYTES NFR BLD AUTO: 21.7 %
MAN DIFF?: NORMAL
MCHC RBC-ENTMCNC: 31.9 PG
MCHC RBC-ENTMCNC: 32 GM/DL
MCV RBC AUTO: 99.5 FL
MONOCYTES # BLD AUTO: 0.48 K/UL
MONOCYTES NFR BLD AUTO: 8.2 %
NEUTROPHILS # BLD AUTO: 3.94 K/UL
NEUTROPHILS NFR BLD AUTO: 67.2 %
NONHDLC SERPL-MCNC: 72 MG/DL
PLATELET # BLD AUTO: 197 K/UL
POTASSIUM SERPL-SCNC: 4.6 MMOL/L
PROT SERPL-MCNC: 6 G/DL
RBC # BLD: 3.86 M/UL
RBC # FLD: 16.3 %
SODIUM SERPL-SCNC: 142 MMOL/L
T3RU NFR SERPL: 1 TBI
T4 SERPL-MCNC: 6 UG/DL
TRIGL SERPL-MCNC: 103 MG/DL
TSH SERPL-ACNC: 5.14 UIU/ML
URATE SERPL-MCNC: 5.6 MG/DL
WBC # FLD AUTO: 5.86 K/UL

## 2022-05-08 LAB — BACTERIA UR CULT: ABNORMAL

## 2022-05-10 ENCOUNTER — APPOINTMENT (OUTPATIENT)
Dept: INTERNAL MEDICINE | Facility: CLINIC | Age: 87
End: 2022-05-10

## 2022-05-31 ENCOUNTER — RX RENEWAL (OUTPATIENT)
Age: 87
End: 2022-05-31

## 2022-05-31 NOTE — ED PROVIDER NOTE - NSCAREINITIATED _GEN_ER
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     -He did obtain relief from the previous steroid injections with IR in the past.     -I have referred him to IR at this time for steroid injection into the 4th and 5th TMT joints left foot.     -Patient's questions invited and answered. He was encouraged to call my office with any further questions or concerns.     Renato Cary DPM  [facility]/Foot [facility]le Surgery      HPI: Cali Gama was seen again today for left foot pain. The patient states he has noticed pain over the past few weeks along the dorsal lateral left foot. He did obtain 100% relief from the previous steroid injections with IR and would like to try this again.     Past Medical History:   Diagnosis Date     BPH with urinary obstruction      Chronic kidney disease      GERD (gastroesophageal reflux disease) 7/29/2015     Irritable bowel syndrome     Created by Conversion      Osteoarthrosis involving lower leg     Created by Conversion  Replacement Utility updated for latest IMO load     Stage 2 chronic kidney disease 7/29/2015     Thyroid nodule 8/15/2018    right side, 2 cm discovered on 8/15/18       Past Surgical History:   Procedure Laterality Date     CHOLECYSTECTOMY       HC TRANSURETHRAL ELEC-SURG PROSTATECTOM N/A 9/15/2020    Procedure: CYSTOSCOPY, BIPOLAR TRANSURETHRAL RESECTION OF PROSTATE;  Surgeon: Mario Reynolds MD;  Location: [facility];  Service: Urology     JOINT REPLACEMENT         Allergies   Allergen Reactions     Ciprofloxacin Other (See Comments)     tendonitis     Morphine Unknown     Patient states that he cannot urinate after taking morphine.     Vitamin A Unknown     Paba Derivatives         Current Outpatient Medications:      aspirin 81 MG EC tablet, Take 81 mg by mouth daily, Disp: , Rfl:       clindamycin (CLEOCIN T) 1 % external solution, APPLY TO AFFECTED AREAS EVERY MORNING TWICE DAILY AS NEEDED, Disp: , Rfl:      famotidine (FOR PEPCID) 10 MG tablet, Take 10 mg by mouth every evening , Disp: , Rfl:      fluocinonide (LIDEX) 0.05 % external solution, APPLY TO THE AFFECTED AREAS ON SCALP ONCE AT BEDTIME. TAPER WHEN CLEAR, Disp: , Rfl:      ketoconazole (NIZORAL) 2 % external shampoo, APPLY TO SCALP 3 TIMES A WEEK AS NEEDED AND RINSE WELL, Disp: , Rfl:     Family History   Problem Relation Age of Onset     Dementia Mother      Cancer Father         bladder     Aneurysm Father      No Known Problems Maternal Grandmother      No Known Problems Maternal Grandfather      No Known Problems Paternal Grandmother      No Known Problems Paternal Grandfather      No Known Problems Sister      No Known Problems Daughter      No Known Problems Maternal Aunt      No Known Problems Paternal Aunt      Hereditary Breast and Ovarian Cancer Syndrome No family hx of      Breast Cancer No family hx of      Colon Cancer No family hx of      Endometrial Cancer No family hx of      Ovarian Cancer No family hx of        Social History     Socioeconomic History     Marital status:      Spouse name: Not on file     Number of children: Not on file     Years of education: Not on file     Highest education level: Not on file   Occupational History     Not on file   Tobacco Use     Smoking status: Former Smoker     Smokeless tobacco: Never Used   Substance and Sexual Activity     Alcohol use: Yes     Alcohol/week: 2.0 standard drinks     Comment: 1-2 / week     Drug use: No     Sexual activity: Not on file   Other Topics Concern     Not on file   Social History Narrative     Not on file     Social Determinants of Health     Financial Resource Strain: Not on file   Food Insecurity: Not on file   Transportation Needs: Not on file   Physical Activity: Not on file   Stress: Not on file   Social Connections: Not on file   Intimate  Partner Violence: Not on file   Housing Stability: Not on file       10 point Review of Systems is negative except for left foot pain which is noted in HPI.     Pulse 72   Temp 97.6  F (36.4  C)   Wt 81.6 kg (180 lb)   BMI 26.58 kg/m      BMI= Body mass index is 26.58 kg/m .    OBJECTIVE:  General appearance: Patient is alert and fully cooperative with history & exam.  No sign of distress is noted during the visit.    Vascular: Dorsalis pedis and posterior tibial pulses are palpable. There is pedal hair growth left.  CFT < 3 sec from anterior tibial surface to distal digits left. There is no appreciable edema noted.  Dermatologic: Turgor and texture are within normal limits. No coloration or temperature changes. No primary or secondary lesions noted.  Neurologic: All epicritic and proprioceptive sensations are grossly intact left.  Musculoskeletal: Mild pain 4th and 5th TMT left foot.      Kasey Chacon(Resident)

## 2022-06-08 ENCOUNTER — APPOINTMENT (OUTPATIENT)
Dept: INTERNAL MEDICINE | Facility: CLINIC | Age: 87
End: 2022-06-08
Payer: MEDICARE

## 2022-06-08 ENCOUNTER — NON-APPOINTMENT (OUTPATIENT)
Age: 87
End: 2022-06-08

## 2022-06-08 VITALS
SYSTOLIC BLOOD PRESSURE: 120 MMHG | BODY MASS INDEX: 27.29 KG/M2 | DIASTOLIC BLOOD PRESSURE: 70 MMHG | WEIGHT: 154 LBS | HEIGHT: 63 IN

## 2022-06-08 PROCEDURE — 93000 ELECTROCARDIOGRAM COMPLETE: CPT

## 2022-06-08 PROCEDURE — 36415 COLL VENOUS BLD VENIPUNCTURE: CPT

## 2022-06-08 PROCEDURE — 99214 OFFICE O/P EST MOD 30 MIN: CPT | Mod: 25

## 2022-06-08 NOTE — HISTORY OF PRESENT ILLNESS
[FreeTextEntry1] : This is a 89-year-old female for evaluation and treatment of her COPD CHF peripheral edema seizure disorder and status post urinary sepsis [de-identified] : Patient remains complex but much improved.  She did have peripheral edema with breakdown.  This is dramatically improved she is following up with dermatology and she has healing eschars and decreased edema\par \par She continues to have shortness of breath but this is on exertion\par \par She has had no urinary symptoms

## 2022-06-08 NOTE — ASSESSMENT
[FreeTextEntry1] : This is a 89-year-old female whose history has been reviewed above\par \par She has a history of peripheral edema and breakdown.  We have increased her Lasix with excellent results she has no edema and the areas of breakdown are healing.  In addition she has been followed by dermatology who did do a biopsy of a secondary lesion\par \par She has a history of COPD she continues to have shortness of breath I do not believe this is failure.  She may benefit from oxygen she is on appropriate inhalers we will obtain a pulmonary consult\par \par She does have a history of congestive failure she currently seems to be euvolemic without edema and clear lungs no change in medication\par \par She has a history of atrial fibrillation.  Unfortunately she remains in atrial fibrillation her rate is 92 no intervention at this time\par \par I will ask her to see cardiology

## 2022-06-08 NOTE — PHYSICAL EXAM
[No JVD] : no jugular venous distention [Normal] : normal rate, regular rhythm, normal S1 and S2 and no murmur heard [de-identified] : No edema [de-identified] : Healing eschars

## 2022-06-09 LAB
BUN SERPL-MCNC: 26 MG/DL
CHLORIDE SERPL-SCNC: 103 MMOL/L
CO2 SERPL-SCNC: 26 MMOL/L
CREAT SERPL-MCNC: 1.06 MG/DL
EGFR: 48 ML/MIN/1.73M2
POTASSIUM SERPL-SCNC: 5.1 MMOL/L
SODIUM SERPL-SCNC: 140 MMOL/L

## 2022-06-19 ENCOUNTER — RX RENEWAL (OUTPATIENT)
Age: 87
End: 2022-06-19

## 2022-07-11 ENCOUNTER — RX RENEWAL (OUTPATIENT)
Age: 87
End: 2022-07-11

## 2022-07-12 ENCOUNTER — APPOINTMENT (OUTPATIENT)
Dept: CARDIOLOGY | Facility: CLINIC | Age: 87
End: 2022-07-12

## 2022-07-12 ENCOUNTER — NON-APPOINTMENT (OUTPATIENT)
Age: 87
End: 2022-07-12

## 2022-07-12 VITALS
DIASTOLIC BLOOD PRESSURE: 84 MMHG | SYSTOLIC BLOOD PRESSURE: 138 MMHG | BODY MASS INDEX: 28.17 KG/M2 | WEIGHT: 159 LBS | OXYGEN SATURATION: 93 % | HEART RATE: 96 BPM | HEIGHT: 63 IN

## 2022-07-12 DIAGNOSIS — R31.9 HEMATURIA, UNSPECIFIED: ICD-10-CM

## 2022-07-12 DIAGNOSIS — D64.9 ANEMIA, UNSPECIFIED: ICD-10-CM

## 2022-07-12 DIAGNOSIS — I34.0 NONRHEUMATIC MITRAL (VALVE) INSUFFICIENCY: ICD-10-CM

## 2022-07-12 PROCEDURE — 99214 OFFICE O/P EST MOD 30 MIN: CPT

## 2022-07-12 NOTE — REASON FOR VISIT
[FreeTextEntry1] : Sabi Cordova  [age]years old here for follow-up of her cardiac status.  She had been admitted to [facility] several months ago with sepsis and a renal mass

## 2022-07-12 NOTE — HISTORY OF PRESENT ILLNESS
[FreeTextEntry1] : To review, Sabi is now 89 years old and well-known to me.  She has a history of paroxysmal atrial fibrillation but has reverted to normal sinus rhythm and remains in normal sinus rhythm with a sinus bradycardia.  She has been on anticoagulation for several years with Xarelto which she has tolerated.  She is also on aspirin which was given to her after a presumed CVA\par \par Several years ago she had an episode of confusion initially felt to be a possible TIA but subsequently felt possibly to be a seizure disorder.  She has been on seizure medications without any recurrence of these episodes and has remained on those medications including the Xarelto and aspirin\par \par She does have chronic shortness of breath which may recently have exacerbated\par Last echocardiogram 2019 revealed overall preserved LV function with moderate mitral regurgitation and mild pulmonary hypertension.\par \par \par She remains quite mentally intact, uses a walker but finds a difficulty walking with fatigue and discomfort in her legs\par \par Last visit, she had some degree of a cold and a cough with sputum production and felt increased shortness of breath.  She had no chest pain.  She took Spiriva with improvement and presently has no significant shortness of breath other than her chronic shortness of breath and fatigue when she is walking with a walker.  According to her daughter she has decreased her walking activity.  She continues to be mentally intact. \par \par She received the COVID-19 Moderna vaccine\par \par She continues to experience  exertional shortness of breath and fatigue no chest pain or palpitations.  She has had the symptoms for some time but this is her major complaint.  She denies palpitations dizziness or syncope\par \par She recently had something fall on her right leg and developed some increasing edema and some weeping.  It is slowly getting better and Lasix 20 mg was prescribed an increased to total of 60 mg a day\par \par During the exam she suddenly had an irregularly irregular heart rate probably compatible with A. fib\par \par Overall at age 89 she has been functioning fairly well.  She tries to keep as active as possible and mentally she remains quite sharp\par \par Recent blood test October 2021  LDL 66 triglycerides 77 HDL 78 creatinine 0.94 potassium 4.1 GFR 51 and hemoglobin A1c 5.9\par \par on previous visits she continued with shortness of breath which seems to have increased in severity with having to stop after a few feet and sit down with her walker.  She denies significant increase in edema of her lower extremities.  She feels that Spiriva and Trelegy do help.  She has no chest pain.  Occasionally she does have palpitations and feels that she is in atrial fibrillation she has had chronic edema of her lower extremities and has had some weeping from the legs\par \par Echo march 2021suggested moderate to severe MR normal LV function and pulmonary hypertension.  \par \par She was admitted to [facility]  several months agowith fever urinary tract infection and I believe E. coli sepsis.  She was in atrial fibrillation during the admission.  She had evidence of a mass in the kidney which is being treated conservatively\par \par From a cardiac point of view she was stable in the hospital.  They stopped her Jardiance and Lasix and she was treated with aggressive antibiotics\par \par 2D echo dated April 6, 2022 in the hospital revealed normal left ventricular function moderate pulmonary hypertension mild to moderate mitral regurgitation moderate tricuspid insufficiency.\par \par She went home from the hospital.  She did have some wound infection of her lower extremity which is being treated by wound care.  She has chronic shortness of breath but overall feels well. \par \par  the patient has been relatively stable since the last visit and is now on quinapril and Lasix 20 once a day.\par \par   Her main complaint is been nasal congestion upper airway secretions and continued shortness of breath which may have worsened since the last visit.  She has chronic edema of her lower extremities\par \par  she denies palpitations dizziness or syncope.  She does use trelegy which is of some help for her shortness of breath\par \par  blood test January 2022\par : BUN\par  26 creatinine 1.06 potassium 5.1 hemoglobin 12.3 hematocrit 38.4\par  hemoglobin A1c 5.9\par \par \par \par

## 2022-07-12 NOTE — DISCUSSION/SUMMARY
[FreeTextEntry1] :  1.  I suggest that she increase Lasix to alternate 20 mg with 1 day and 40 mg the next day\par  2.  Continue all other medication including Xarelto 20\par 3.   Consultation with pulmonary and possibly ENT\par \par  overall the patient is doing amazingly well at age 89\par  routine follow with me again in 3 months

## 2022-07-18 ENCOUNTER — RX RENEWAL (OUTPATIENT)
Age: 87
End: 2022-07-18

## 2022-07-26 ENCOUNTER — NON-APPOINTMENT (OUTPATIENT)
Age: 87
End: 2022-07-26

## 2022-07-26 ENCOUNTER — APPOINTMENT (OUTPATIENT)
Dept: OPHTHALMOLOGY | Facility: CLINIC | Age: 87
End: 2022-07-26

## 2022-07-26 PROCEDURE — 92014 COMPRE OPH EXAM EST PT 1/>: CPT

## 2022-07-26 PROCEDURE — 92134 CPTRZ OPH DX IMG PST SGM RTA: CPT

## 2022-07-31 ENCOUNTER — INPATIENT (INPATIENT)
Facility: HOSPITAL | Age: 87
LOS: 4 days | Discharge: HOME HEALTH | DRG: 871 | End: 2022-08-05
Attending: INTERNAL MEDICINE | Admitting: INTERNAL MEDICINE
Payer: MEDICARE

## 2022-07-31 VITALS
HEIGHT: 61 IN | WEIGHT: 119.93 LBS | DIASTOLIC BLOOD PRESSURE: 118 MMHG | TEMPERATURE: 98 F | HEART RATE: 131 BPM | RESPIRATION RATE: 24 BRPM | OXYGEN SATURATION: 98 % | SYSTOLIC BLOOD PRESSURE: 153 MMHG

## 2022-07-31 DIAGNOSIS — G40.909 EPILEPSY, UNSPECIFIED, NOT INTRACTABLE, WITHOUT STATUS EPILEPTICUS: ICD-10-CM

## 2022-07-31 DIAGNOSIS — A41.89 OTHER SPECIFIED SEPSIS: ICD-10-CM

## 2022-07-31 DIAGNOSIS — J96.01 ACUTE RESPIRATORY FAILURE WITH HYPOXIA: ICD-10-CM

## 2022-07-31 DIAGNOSIS — E78.5 HYPERLIPIDEMIA, UNSPECIFIED: ICD-10-CM

## 2022-07-31 DIAGNOSIS — R65.10 SYSTEMIC INFLAMMATORY RESPONSE SYNDROME (SIRS) OF NON-INFECTIOUS ORIGIN WITHOUT ACUTE ORGAN DYSFUNCTION: ICD-10-CM

## 2022-07-31 DIAGNOSIS — I10 ESSENTIAL (PRIMARY) HYPERTENSION: ICD-10-CM

## 2022-07-31 DIAGNOSIS — I48.91 UNSPECIFIED ATRIAL FIBRILLATION: ICD-10-CM

## 2022-07-31 DIAGNOSIS — A41.9 SEPSIS, UNSPECIFIED ORGANISM: ICD-10-CM

## 2022-07-31 DIAGNOSIS — Z29.9 ENCOUNTER FOR PROPHYLACTIC MEASURES, UNSPECIFIED: ICD-10-CM

## 2022-07-31 LAB
ALBUMIN SERPL ELPH-MCNC: 3.3 G/DL — SIGNIFICANT CHANGE UP (ref 3.3–5)
ALP SERPL-CCNC: 214 U/L — HIGH (ref 40–120)
ALT FLD-CCNC: 64 U/L — HIGH (ref 10–45)
ANION GAP SERPL CALC-SCNC: 14 MMOL/L — SIGNIFICANT CHANGE UP (ref 5–17)
APPEARANCE UR: ABNORMAL
APTT BLD: 37.7 SEC — HIGH (ref 27.5–35.5)
AST SERPL-CCNC: 99 U/L — HIGH (ref 10–40)
BACTERIA # UR AUTO: NEGATIVE — SIGNIFICANT CHANGE UP
BASE EXCESS BLDV CALC-SCNC: -2.6 MMOL/L — LOW (ref -2–2)
BASE EXCESS BLDV CALC-SCNC: 0 MMOL/L — SIGNIFICANT CHANGE UP (ref -2–2)
BASOPHILS # BLD AUTO: 0.04 K/UL — SIGNIFICANT CHANGE UP (ref 0–0.2)
BASOPHILS NFR BLD AUTO: 0.2 % — SIGNIFICANT CHANGE UP (ref 0–2)
BILIRUB SERPL-MCNC: 1 MG/DL — SIGNIFICANT CHANGE UP (ref 0.2–1.2)
BILIRUB UR-MCNC: NEGATIVE — SIGNIFICANT CHANGE UP
BUN SERPL-MCNC: 19 MG/DL — SIGNIFICANT CHANGE UP (ref 7–23)
CA-I SERPL-SCNC: 1.1 MMOL/L — LOW (ref 1.15–1.33)
CA-I SERPL-SCNC: 1.11 MMOL/L — LOW (ref 1.15–1.33)
CALCIUM SERPL-MCNC: 8.7 MG/DL — SIGNIFICANT CHANGE UP (ref 8.4–10.5)
CHLORIDE BLDV-SCNC: 95 MMOL/L — LOW (ref 96–108)
CHLORIDE BLDV-SCNC: 97 MMOL/L — SIGNIFICANT CHANGE UP (ref 96–108)
CHLORIDE SERPL-SCNC: 97 MMOL/L — SIGNIFICANT CHANGE UP (ref 96–108)
CO2 BLDV-SCNC: 23 MMOL/L — SIGNIFICANT CHANGE UP (ref 22–26)
CO2 BLDV-SCNC: 25 MMOL/L — SIGNIFICANT CHANGE UP (ref 22–26)
CO2 SERPL-SCNC: 19 MMOL/L — LOW (ref 22–31)
COLOR SPEC: YELLOW — SIGNIFICANT CHANGE UP
CREAT SERPL-MCNC: 0.87 MG/DL — SIGNIFICANT CHANGE UP (ref 0.5–1.3)
DIFF PNL FLD: ABNORMAL
EGFR: 61 ML/MIN/1.73M2 — SIGNIFICANT CHANGE UP
EOSINOPHIL # BLD AUTO: 0 K/UL — SIGNIFICANT CHANGE UP (ref 0–0.5)
EOSINOPHIL NFR BLD AUTO: 0 % — SIGNIFICANT CHANGE UP (ref 0–6)
EPI CELLS # UR: 2 /HPF — SIGNIFICANT CHANGE UP
GAS PNL BLDV: 127 MMOL/L — LOW (ref 136–145)
GAS PNL BLDV: 128 MMOL/L — LOW (ref 136–145)
GAS PNL BLDV: SIGNIFICANT CHANGE UP
GLUCOSE BLDV-MCNC: 117 MG/DL — HIGH (ref 70–99)
GLUCOSE BLDV-MCNC: 125 MG/DL — HIGH (ref 70–99)
GLUCOSE SERPL-MCNC: 122 MG/DL — HIGH (ref 70–99)
GLUCOSE UR QL: NEGATIVE — SIGNIFICANT CHANGE UP
GRAM STN FLD: SIGNIFICANT CHANGE UP
GRAM STN FLD: SIGNIFICANT CHANGE UP
HCO3 BLDV-SCNC: 22 MMOL/L — SIGNIFICANT CHANGE UP (ref 22–29)
HCO3 BLDV-SCNC: 24 MMOL/L — SIGNIFICANT CHANGE UP (ref 22–29)
HCT VFR BLD CALC: 41.8 % — SIGNIFICANT CHANGE UP (ref 34.5–45)
HCT VFR BLDA CALC: 37 % — SIGNIFICANT CHANGE UP (ref 34.5–46.5)
HCT VFR BLDA CALC: 42 % — SIGNIFICANT CHANGE UP (ref 34.5–46.5)
HGB BLD CALC-MCNC: 12.3 G/DL — SIGNIFICANT CHANGE UP (ref 11.7–16.1)
HGB BLD CALC-MCNC: 13.9 G/DL — SIGNIFICANT CHANGE UP (ref 11.7–16.1)
HGB BLD-MCNC: 13.9 G/DL — SIGNIFICANT CHANGE UP (ref 11.5–15.5)
HYALINE CASTS # UR AUTO: 3 /LPF — HIGH (ref 0–2)
IMM GRANULOCYTES NFR BLD AUTO: 0.6 % — SIGNIFICANT CHANGE UP (ref 0–1.5)
INR BLD: 3.19 RATIO — HIGH (ref 0.88–1.16)
KETONES UR-MCNC: NEGATIVE — SIGNIFICANT CHANGE UP
LACTATE BLDV-MCNC: 1.1 MMOL/L — SIGNIFICANT CHANGE UP (ref 0.7–2)
LACTATE BLDV-MCNC: 2.3 MMOL/L — HIGH (ref 0.7–2)
LEUKOCYTE ESTERASE UR-ACNC: ABNORMAL
LYMPHOCYTES # BLD AUTO: 0.7 K/UL — LOW (ref 1–3.3)
LYMPHOCYTES # BLD AUTO: 3.4 % — LOW (ref 13–44)
MCHC RBC-ENTMCNC: 30.8 PG — SIGNIFICANT CHANGE UP (ref 27–34)
MCHC RBC-ENTMCNC: 33.3 GM/DL — SIGNIFICANT CHANGE UP (ref 32–36)
MCV RBC AUTO: 92.7 FL — SIGNIFICANT CHANGE UP (ref 80–100)
MONOCYTES # BLD AUTO: 0.4 K/UL — SIGNIFICANT CHANGE UP (ref 0–0.9)
MONOCYTES NFR BLD AUTO: 2 % — SIGNIFICANT CHANGE UP (ref 2–14)
MRSA PCR RESULT.: SIGNIFICANT CHANGE UP
NEUTROPHILS # BLD AUTO: 19.05 K/UL — HIGH (ref 1.8–7.4)
NEUTROPHILS NFR BLD AUTO: 93.8 % — HIGH (ref 43–77)
NITRITE UR-MCNC: NEGATIVE — SIGNIFICANT CHANGE UP
NRBC # BLD: 0 /100 WBCS — SIGNIFICANT CHANGE UP (ref 0–0)
NT-PROBNP SERPL-SCNC: 7337 PG/ML — HIGH (ref 0–300)
PCO2 BLDV: 35 MMHG — LOW (ref 39–42)
PCO2 BLDV: 36 MMHG — LOW (ref 39–42)
PH BLDV: 7.39 — SIGNIFICANT CHANGE UP (ref 7.32–7.43)
PH BLDV: 7.44 — HIGH (ref 7.32–7.43)
PH UR: 6 — SIGNIFICANT CHANGE UP (ref 5–8)
PLATELET # BLD AUTO: 269 K/UL — SIGNIFICANT CHANGE UP (ref 150–400)
PO2 BLDV: 67 MMHG — HIGH (ref 25–45)
PO2 BLDV: 80 MMHG — HIGH (ref 25–45)
POTASSIUM BLDV-SCNC: 4.4 MMOL/L — SIGNIFICANT CHANGE UP (ref 3.5–5.1)
POTASSIUM BLDV-SCNC: 4.6 MMOL/L — SIGNIFICANT CHANGE UP (ref 3.5–5.1)
POTASSIUM SERPL-MCNC: 4.4 MMOL/L — SIGNIFICANT CHANGE UP (ref 3.5–5.3)
POTASSIUM SERPL-SCNC: 4.4 MMOL/L — SIGNIFICANT CHANGE UP (ref 3.5–5.3)
PROT SERPL-MCNC: 6.8 G/DL — SIGNIFICANT CHANGE UP (ref 6–8.3)
PROT UR-MCNC: 100 — SIGNIFICANT CHANGE UP
PROTHROM AB SERPL-ACNC: 37.4 SEC — HIGH (ref 10.5–13.4)
RAPID RVP RESULT: SIGNIFICANT CHANGE UP
RBC # BLD: 4.51 M/UL — SIGNIFICANT CHANGE UP (ref 3.8–5.2)
RBC # FLD: 14.7 % — HIGH (ref 10.3–14.5)
RBC CASTS # UR COMP ASSIST: 33 /HPF — HIGH (ref 0–4)
S AUREUS DNA NOSE QL NAA+PROBE: SIGNIFICANT CHANGE UP
SAO2 % BLDV: 90.1 % — HIGH (ref 67–88)
SAO2 % BLDV: 96.2 % — HIGH (ref 67–88)
SARS-COV-2 RNA SPEC QL NAA+PROBE: SIGNIFICANT CHANGE UP
SODIUM SERPL-SCNC: 130 MMOL/L — LOW (ref 135–145)
SP GR SPEC: 1.02 — SIGNIFICANT CHANGE UP (ref 1.01–1.02)
SPECIMEN SOURCE: SIGNIFICANT CHANGE UP
SPECIMEN SOURCE: SIGNIFICANT CHANGE UP
TROPONIN T, HIGH SENSITIVITY RESULT: 33 NG/L — SIGNIFICANT CHANGE UP (ref 0–51)
TROPONIN T, HIGH SENSITIVITY RESULT: 38 NG/L — SIGNIFICANT CHANGE UP (ref 0–51)
UROBILINOGEN FLD QL: NEGATIVE — SIGNIFICANT CHANGE UP
WBC # BLD: 20.32 K/UL — HIGH (ref 3.8–10.5)
WBC # FLD AUTO: 20.32 K/UL — HIGH (ref 3.8–10.5)
WBC UR QL: 131 /HPF — HIGH (ref 0–5)

## 2022-07-31 PROCEDURE — 71045 X-RAY EXAM CHEST 1 VIEW: CPT | Mod: 26

## 2022-07-31 PROCEDURE — 99285 EMERGENCY DEPT VISIT HI MDM: CPT

## 2022-07-31 PROCEDURE — 93010 ELECTROCARDIOGRAM REPORT: CPT

## 2022-07-31 PROCEDURE — 99223 1ST HOSP IP/OBS HIGH 75: CPT | Mod: GC

## 2022-07-31 RX ORDER — ACETAMINOPHEN 500 MG
650 TABLET ORAL EVERY 6 HOURS
Refills: 0 | Status: DISCONTINUED | OUTPATIENT
Start: 2022-07-31 | End: 2022-08-05

## 2022-07-31 RX ORDER — CEFTRIAXONE 500 MG/1
INJECTION, POWDER, FOR SOLUTION INTRAMUSCULAR; INTRAVENOUS
Refills: 0 | Status: COMPLETED | OUTPATIENT
Start: 2022-07-31 | End: 2022-08-04

## 2022-07-31 RX ORDER — CHOLECALCIFEROL (VITAMIN D3) 125 MCG
1000 CAPSULE ORAL DAILY
Refills: 0 | Status: DISCONTINUED | OUTPATIENT
Start: 2022-07-31 | End: 2022-08-05

## 2022-07-31 RX ORDER — CEFTRIAXONE 500 MG/1
1000 INJECTION, POWDER, FOR SOLUTION INTRAMUSCULAR; INTRAVENOUS ONCE
Refills: 0 | Status: COMPLETED | OUTPATIENT
Start: 2022-07-31 | End: 2022-07-31

## 2022-07-31 RX ORDER — ATENOLOL 25 MG/1
25 TABLET ORAL
Refills: 0 | Status: DISCONTINUED | OUTPATIENT
Start: 2022-07-31 | End: 2022-08-05

## 2022-07-31 RX ORDER — VANCOMYCIN HCL 1 G
1000 VIAL (EA) INTRAVENOUS ONCE
Refills: 0 | Status: COMPLETED | OUTPATIENT
Start: 2022-07-31 | End: 2022-07-31

## 2022-07-31 RX ORDER — PIPERACILLIN AND TAZOBACTAM 4; .5 G/20ML; G/20ML
3.38 INJECTION, POWDER, LYOPHILIZED, FOR SOLUTION INTRAVENOUS ONCE
Refills: 0 | Status: COMPLETED | OUTPATIENT
Start: 2022-07-31 | End: 2022-07-31

## 2022-07-31 RX ORDER — RIVAROXABAN 15 MG-20MG
20 KIT ORAL DAILY
Refills: 0 | Status: DISCONTINUED | OUTPATIENT
Start: 2022-07-31 | End: 2022-08-05

## 2022-07-31 RX ORDER — ACETAMINOPHEN 500 MG
1000 TABLET ORAL ONCE
Refills: 0 | Status: COMPLETED | OUTPATIENT
Start: 2022-07-31 | End: 2022-07-31

## 2022-07-31 RX ORDER — FUROSEMIDE 40 MG
20 TABLET ORAL
Refills: 0 | Status: DISCONTINUED | OUTPATIENT
Start: 2022-07-31 | End: 2022-08-01

## 2022-07-31 RX ORDER — LEVOTHYROXINE SODIUM 125 MCG
75 TABLET ORAL DAILY
Refills: 0 | Status: DISCONTINUED | OUTPATIENT
Start: 2022-07-31 | End: 2022-08-05

## 2022-07-31 RX ORDER — POTASSIUM CHLORIDE 20 MEQ
1 PACKET (EA) ORAL
Qty: 0 | Refills: 0 | DISCHARGE

## 2022-07-31 RX ORDER — PIPERACILLIN AND TAZOBACTAM 4; .5 G/20ML; G/20ML
3.38 INJECTION, POWDER, LYOPHILIZED, FOR SOLUTION INTRAVENOUS EVERY 8 HOURS
Refills: 0 | Status: DISCONTINUED | OUTPATIENT
Start: 2022-07-31 | End: 2022-08-01

## 2022-07-31 RX ORDER — QUINAPRIL HYDROCHLORIDE 40 MG/1
1 TABLET, FILM COATED ORAL
Qty: 0 | Refills: 0 | DISCHARGE

## 2022-07-31 RX ORDER — ATORVASTATIN CALCIUM 80 MG/1
20 TABLET, FILM COATED ORAL AT BEDTIME
Refills: 0 | Status: DISCONTINUED | OUTPATIENT
Start: 2022-07-31 | End: 2022-08-05

## 2022-07-31 RX ORDER — OXCARBAZEPINE 300 MG/1
150 TABLET, FILM COATED ORAL
Refills: 0 | Status: DISCONTINUED | OUTPATIENT
Start: 2022-07-31 | End: 2022-08-05

## 2022-07-31 RX ORDER — IPRATROPIUM/ALBUTEROL SULFATE 18-103MCG
3 AEROSOL WITH ADAPTER (GRAM) INHALATION EVERY 6 HOURS
Refills: 0 | Status: DISCONTINUED | OUTPATIENT
Start: 2022-07-31 | End: 2022-08-03

## 2022-07-31 RX ORDER — FUROSEMIDE 40 MG
1 TABLET ORAL
Qty: 0 | Refills: 0 | DISCHARGE

## 2022-07-31 RX ORDER — CEFTRIAXONE 500 MG/1
1000 INJECTION, POWDER, FOR SOLUTION INTRAMUSCULAR; INTRAVENOUS EVERY 24 HOURS
Refills: 0 | Status: COMPLETED | OUTPATIENT
Start: 2022-08-01 | End: 2022-08-03

## 2022-07-31 RX ADMIN — Medication 3 MILLILITER(S): at 17:37

## 2022-07-31 RX ADMIN — CEFTRIAXONE 100 MILLIGRAM(S): 500 INJECTION, POWDER, FOR SOLUTION INTRAMUSCULAR; INTRAVENOUS at 21:28

## 2022-07-31 RX ADMIN — PIPERACILLIN AND TAZOBACTAM 200 GRAM(S): 4; .5 INJECTION, POWDER, LYOPHILIZED, FOR SOLUTION INTRAVENOUS at 09:34

## 2022-07-31 RX ADMIN — OXCARBAZEPINE 150 MILLIGRAM(S): 300 TABLET, FILM COATED ORAL at 21:38

## 2022-07-31 RX ADMIN — Medication 20 MILLIGRAM(S): at 21:27

## 2022-07-31 RX ADMIN — Medication 400 MILLIGRAM(S): at 09:34

## 2022-07-31 RX ADMIN — ATORVASTATIN CALCIUM 20 MILLIGRAM(S): 80 TABLET, FILM COATED ORAL at 21:28

## 2022-07-31 RX ADMIN — RIVAROXABAN 20 MILLIGRAM(S): KIT at 17:37

## 2022-07-31 RX ADMIN — Medication 250 MILLIGRAM(S): at 09:35

## 2022-07-31 NOTE — ED PROCEDURE NOTE - PROCEDURE ADDITIONAL DETAILS
Limited educational pocus heart/lungs    Lungs  - A line predominant, R moderate pleural effusion    Cardiac  - biatrial enlargement, mild-mod reduced EF, no sig pericardial effusion, IVC >2cm no resp variation    Follow up CXR and inpatient echo

## 2022-07-31 NOTE — ED ADULT NURSE NOTE - NSIMPLEMENTINTERV_GEN_ALL_ED
Implemented All Universal Safety Interventions:  [place] to call system. Call bell, personal items and telephone within reach. Instruct patient to call for assistance. Room bathroom lighting operational. Non-slip footwear when patient is off stretcher. Physically safe environment: no spills, clutter or unnecessary equipment. Stretcher in lowest position, wheels locked, appropriate side rails in place.

## 2022-07-31 NOTE — H&P ADULT - PROBLEM SELECTOR PLAN 3
episodes stopped after starting oxcarbazepine  - Continue home oxcarbazepine - Continue home Xarelto

## 2022-07-31 NOTE — ED PROVIDER NOTE - PHYSICAL EXAMINATION
Vitals: I have reviewed the patients vital signs  General: acutely dyspneic appearing  HEENT: Atraumatic, normocephalic, airway patent  Eyes: EOMI, tracking appropriately  Neck: no tracheal deviation, mild JVD  Chest/Lungs: no trauma, symmetric chest rise, speaking in complete sentences, no WOB  Heart: skin and extremities well perfused, tachy irregular rhythm, bilat LE edema  Neuro: A+Ox3, CN grossly intact, moving all extremities  MSK: global weakness  Skin: no cyanosis, no jaundice, +senile purpura

## 2022-07-31 NOTE — ED ADULT NURSE REASSESSMENT NOTE - NS ED NURSE REASSESS COMMENT FT1
Report received from Magy CHAU. Patient moved with respiratory therapist and RN to [facility] 51 on NRB. Patient tolerated well. As per MD Kimble, OK to titrate patient off to NC. Tolerating 3L NC at this time well. BiPAP on stand by. Patient is well appearing at this time, states she feels much better. Awaiting bed assignment. Safety and comfort provided.

## 2022-07-31 NOTE — ED PROVIDER NOTE - CLINICAL SUMMARY MEDICAL DECISION MAKING FREE TEXT BOX
Patient BIBEMS for SOB, in afib RVR (on AC, DOAC), with BL LE edema. Concerning for chf, however also found to be febrile increasing concern for sepsis ?PNA with pleural effusions. Will give abx, treat fever, no fluids as pt appears volume overloaded. Bipap. admit.

## 2022-07-31 NOTE — PROVIDER CONTACT NOTE (OTHER) - ASSESSMENT
Pt p/w telemetry event of prolonged QRS for 43 seconds. QRS interval was about 200 ms. Pt remains asymptomatic, denies chest pain, palpitations, and headache. VSS

## 2022-07-31 NOTE — ED ADULT NURSE NOTE - OBJECTIVE STATEMENT
89 y/o female PMH A fib, HTN, HLD and CHF presents to ED reporting SOB via EMS. EMS reports patient is coming from home, with one week of SOB worsening today. Patient reports worsening SOB today so coming to ED today. On exam, AOx3, answering questions. Tachypneic and tachycardic on arrival, MD Han at bedside to evaluate pt, on NRB mask per EMS. Abdomen soft, non-tender, non-distended. Pt denies CP, n/v/d at this time. CM in place. Heplock placed, labs sent.

## 2022-07-31 NOTE — H&P ADULT - PROBLEM SELECTOR PLAN 5
- Continue statin - Holding home atenolol, quinapril i/s/o sepsis with hypotension  - c/w Lasix 20mg daily

## 2022-07-31 NOTE — ED PROVIDER NOTE - NS_EDPROVIDERDISPOUSERTYPE_ED_A_ED
Progress Notes by Demetris Saini RDCS at 10/09/17 10:39 AM     Author:  Demetris Saini RDCS Service:  (none) Author Type:  Technician     Filed:  10/09/17 10:41 AM Encounter Date:  10/9/2017 Status:  Signed     :  Demetris Saini RDCS (Technician)            Echocardiogram results forwarded to the reading physician for review.[PW1.1T]   AS[PW1.1M]      Revision History        User Key Date/Time User Provider Type Action    > PW1.1 10/09/17 10:41 AM Demetris Saini RDCS Technician Sign    M - Manual, T - Template             Attending Attestation (For Attendings USE Only)...

## 2022-07-31 NOTE — ED PROVIDER NOTE - OBJECTIVE STATEMENT
89 y/o F hx AF, ?CHF, 89 y/o F hx AF, ?CHF, here now with SOB. Has longstanding diff breathing, worse the last week, worse more today. New fever as well today with weakness. Per daughter last time this happened she had a uti. No cp, abd pain, vomiting. Does have somewhat worsening bilat LE edema, normal uop.

## 2022-07-31 NOTE — ED PROVIDER NOTE - PROGRESS NOTE DETAILS
messi: restig comfortably on bipap, HR improved, admit to hosp. messi: resting comfortably on bipap, HR improved, admit to hosp.

## 2022-07-31 NOTE — H&P ADULT - NSHPPHYSICALEXAM_GEN_ALL_CORE
general: acutely dyspneic appearing  HEENT: Atraumatic, normocephalic, airway patent  Eyes: EOMI, tracking appropriately  Neck: no tracheal deviation, mild JVD  Chest/Lungs: no trauma, symmetric chest rise, speaking in complete sentences, no WOB  Heart: skin and extremities well perfused, tachy irregular rhythm, bilat LE edema  Neuro: A+Ox3, CN grossly intact, moving all extremities  MSK: global weakness  Skin: no cyanosis, no jaundice, +senile purpura general: acutely dyspneic appearing  HEENT: Atraumatic, normocephalic, airway patent  Eyes: EOMI, tracking appropriately  Neck: no tracheal deviation, mild JVD  Chest/Lungs: no trauma, symmetric chest rise, speaking in complete sentences, no WOB  Heart: skin and extremities well perfused, tachy irregular rhythm, + bilat LE pitting edema  Neuro: A+Ox3, CN grossly intact, moving all extremities  MSK: global weakness  Skin: no cyanosis, no jaundice, +senile purpura General: mild distress  HEENT: Atraumatic, normocephalic, airway patent  Eyes: EOMI, tracking appropriately  Neck: no tracheal deviation, mild JVD  Resp: CTA, no WOB, + dyspnea with exertion  Heart: skin and extremities well perfused, tachy irregular rhythm  Neuro: AOx3, CN grossly intact  MSK: + b/l LE pitting edema, moving all extremities  Skin: no cyanosis, no jaundice, +senile purpura

## 2022-07-31 NOTE — ED PROVIDER NOTE - NS ED ROS FT
Constitutional: (+) fever (-) vomiting  Eyes/ENT: (-) vision changes, (-) hearing changes  Cardiovascular: (-) chest pain, (-) wheezing  Respiratory: (-) cough, + shortness of breath  Gastrointestinal: (-) vomiting, (-) diarrhea, (-) abdominal pain  : (-) dysuria   Musculoskeletal: (-) back pain  Integumentary: (-) rash, (+) edema  Neurological: (-)loc  Allergic/Immunologic: (-) pruritus  Endocrine: No history of thyroid disease

## 2022-07-31 NOTE — ED ADULT TRIAGE NOTE - CHIEF COMPLAINT QUOTE
diff breathing x 2 days, O2 sat: 73% room air, NRB by EMS: O2 up to 94% diff breathing x 2 days, O2 sat: 73% room air, NRB by EMS: O2 up to 94%, afib: HR: 130's

## 2022-07-31 NOTE — ED ADULT NURSE REASSESSMENT NOTE - NS ED NURSE REASSESS COMMENT FT1
Team 1 MD at bedside evaluating patient. Patient's premafit replaced and diaper changed as premafit was not suctioning. Patient repositioned and made comfortable. Tolerated well. Safety and comfort provided.

## 2022-07-31 NOTE — ED ADULT NURSE REASSESSMENT NOTE - NS ED NURSE REASSESS COMMENT FT1
Patient repositioned in bed. BP is on the low side, KEY Juan contacted to notify. No interventions at this time. Patient is mentating well at this time, reading magazine.

## 2022-07-31 NOTE — ED PROVIDER NOTE - ATTENDING CONTRIBUTION TO CARE
Attending MD Han.  Agree with above.  Pt with acute resp distress on arrival to ED.  Initial report of hypoxia in field.  On arrival ORA pt with 88-99% sats.  O2 started for WOB and comfort.  Pt's WOB remains elevated despite improvement in O2 sat on NC.  Rescue bipap begun with marked improvement in WOB, reduction in BP, return to normal WOB.  Pt with bilateral small pleural effusions.  Concern for acute on chronic heart failure. Pt improved on Bipap.  Stable for admission to medicine. Attending MD Han.  Agree with above.  Pt with acute resp distress on arrival to ED.  Initial report of hypoxia in field.  On arrival ORA pt with 88-99% sats.  O2 started for WOB and comfort.  Pt's WOB remains elevated despite improvement in O2 sat on NC.  Rescue bipap begun with marked improvement in WOB, reduction in BP, return to normal WOB.  Pt with bilateral small pleural effusions.  Concern for acute on chronic heart failure. Pt improved on Bipap.  Stable for admission to medicine.    ** Of note, pt hypertensive in afib with RVR on arrival.  Following improvement in resp effort, HR has reduced without specific intervention.  Do not suspect RVR was etiology of SOB but more likely reactive.

## 2022-07-31 NOTE — H&P ADULT - ASSESSMENT
89 female PMH A fib, seizure disorder, HTN, HLD and CHF presented to ED with fever, weakness, and one week of worsening SOB and b/l LE edema concerning for acute on chronic HF and possible sepsis vs PNA with pleural effusion. Will give abx without IVF (appears volume overloaded) and continue on Bipap/nc. 89 female PMH A fib, seizure disorder, HTN, HLD and CHF presented to ED with fever, weakness, and one week of worsening SOB and b/l LE edema concerning for acute on chronic HF and possible sepsis vs PNA with pleural effusion. Will order UA, blood and urine cx, start ctx without IVF (appears volume overloaded) and continue on Bipap/nc and give home lasix 20mg daily.

## 2022-07-31 NOTE — H&P ADULT - PROBLEM SELECTOR PLAN 1
hypoxic on RA on presentation to the ED, on NC  Possibly 2/2 pulmonary hypertension or HFpEF or i/s/o sepsis 2/2 UTI vs. viral pneumonia  Do not suspect RVR was etiology of SOB but more likely reactive.   - Wean NC as tolerated  - Continuous pulse oximetry  - POCUS: lungs R moderate pleural effusion, cardiac biatrial enlargement, mild-mod reduced EF, no sig pericardial effusion, IVC >2cm no resp variation  - f/u CXR and echo hypoxic on RA on presentation to the ED, on NC  Possibly 2/2 pulmonary hypertension or HFpEF or i/s/o sepsis 2/2 UTI vs. viral pneumonia  Do not suspect RVR was etiology of SOB but more likely reactive.   - Wean NC as tolerated  - Continuous pulse oximetry  - POCUS: lungs R moderate pleural effusion, cardiac biatrial enlargement, mild-mod reduced EF, no sig pericardial effusion, IVC >2cm no resp variation  - f/u CXR and echo  - aspiration precautions WBC, fever, elevated procal  - f/u blood and urine cx, UA  - start CTX

## 2022-07-31 NOTE — H&P ADULT - PROBLEM SELECTOR PLAN 2
- Continue home Xarelto hypoxic 88-99% on RA on presentation to the ED, on NC  Possibly 2/2 AFib with RVR as etiology of SOB vs CHF i/s/o sepsis 2/2 UTI  - Wean NC as tolerated  - Continuous pulse ox  - POCUS: lungs R moderate pleural effusion, cardiac biatrial enlargement, mild-mod reduced EF  - CXR b/l pleural effusions  - f/u echo  - f/u pt's cardiologist

## 2022-07-31 NOTE — H&P ADULT - ATTENDING COMMENTS
89 year old female with PMH atrial fibrillation, seizure disorder and hypothyroidism who presents with fever, weakness, shortness of breath and lower extremity swelling. She initially required Bipap for work of breathing but has since been weaned to nasal cannula. Her ED POCUS showed small bilateral pleural effusions. Her most recent TTE in April showed an EF of 55% but moderate pulmonary HTN. Will continue with Lasix for now and consult her cardiologist Dr. Guilherme Cm. Additionally, the patient has a leukocytosis of 20.32 with procalcitonin of 0.26 and was febrile to 103.8 in the ED. When she was admitted in April, she had E. Coli bacteremia and UTI. For now, will start Ceftriaxone and follow up urine and blood cultures. Rest of plan as above.

## 2022-07-31 NOTE — H&P ADULT - NSHPREVIEWOFSYSTEMS_GEN_ALL_CORE
Constitutional: (+) fever (-) vomiting  Eyes/ENT: (-) vision changes, (-) hearing changes  Cardiovascular: (-) chest pain, (-) wheezing  Respiratory: (-) cough, + shortness of breath  Gastrointestinal: (-) vomiting, (-) diarrhea, (-) abdominal pain  : (-) dysuria   Musculoskeletal: (-) back pain  Integumentary: (-) rash, (+) edema  Neurological: (-)loc  Allergic/Immunologic: (-) pruritus  Endocrine: No history of thyroid diseas Constitutional: (+) fever (-) chills  Eyes/ENT: (-) vision changes, (-) hearing changes  Cardiovascular: (-) chest pain, (-) wheezing  Respiratory: (-) cough, + chronic shortness of breath  Gastrointestinal: (-) vomiting, (-) diarrhea, (-) abdominal pain  : (-) dysuria or hematuria  Musculoskeletal: (-) back pain  Integumentary: (-) rash, (+) edema  Neurological: (-)loc/numbness/tingling

## 2022-07-31 NOTE — H&P ADULT - HISTORY OF PRESENT ILLNESS
89 female PMH A fib w/ RVR (on AC, DOAC), seizure disorder, HTN, HLD and CHF presents to ED reporting SOB via EMS. EMS reports patient is coming from home, with one week of SOB worsening today. Patient reports worsening SOB and b/l LE edema today. Has longstanding diff breathing, worse the last week, worse more today. New fever as well today with weakness. Per daughter last time this happened she had a uti. On exam, AOx3, answering questions. Tachypneic and tachycardic on arrival,on NRB mask. Pt denies CP, abd pain, n/v/d at this time.    In the ED, patient presented with acute resp distress and hypoxia on arrival (RA 88-99% sats). O2 started for WOB and comfort.  Pt's WOB remains elevated despite improvement in O2 sat on NC. Rescue bipap begun with marked improvement in WOB, reduction in BP, return to normal WOB. POCUS demonstrated bilateral small pleural effusions. Pt improved on Bipap. Pt was also hypertensive in afib with RVR on arrival. Following improvement in resp effort, HR has reduced without specific intervention. 89 female PMH A fib w/ RVR (on AC, DOAC), CHF, seizure disorder, right hip replacement, shoulder rotator cuff surgery, HTN, HLD presents to ED reporting SOB via EMS. EMS reports patient is coming from home, with one week of SOB worsening today. Patient reports worsening SOB and b/l LE edema today. Has longstanding diff breathing, worse the last week, worse more today. Patient also reports fever (104) with generalized weakness. Per daughter, patient was admitted 2 months prior for fever and weakness 2/2 UTI. On exam, AOx3, answering questions. Tachypneic and tachycardic on arrival,on NRB mask. Pt denies CP, abd pain, n/v/d at this time.    In the ED, patient presented with acute resp distress and hypoxia on arrival (RA 88-99% sats). O2 started for WOB and comfort.  Pt's WOB remains elevated despite improvement in O2 sat on NC. Rescue bipap begun with marked improvement in WOB, reduction in BP, return to normal WOB. POCUS demonstrated bilateral small pleural effusions. Pt improved on Bipap. Pt was also hypertensive in afib with RVR on arrival. Following improvement in resp effort, HR has reduced without specific intervention.    Of note, patient lives at home alone with a daytime aid and denies tobacco/alcohol/drug use. 89F PMH of A fib w/ RVR (on Xarelto), CHF, seizure disorder, right hip replacement (2007), shoulder rotator cuff surgery (2008), spinal surgery, right foot neuropathy, HTN, HLD presents to ED reporting SOB via EMS. EMS reports patient is coming from home, with one week of SOB worsening today. Patient reports worsening SOB and b/l LE edema today. Has longstanding diff breathing, worse the last week, worse more today. Patient also reports fever (104) with generalized weakness. Per daughter, patient was admitted 2 months prior for fever and weakness 2/2 UTI. On exam, AOx3, answering questions. Tachypneic and tachycardic on arrival,on NRB mask. Pt denies CP, abd pain, n/v/d at this time.    In the ED, patient presented with acute resp distress and hypoxia on arrival (RA 88-99% sats). O2 started for WOB and comfort.  Pt's WOB remains elevated despite improvement in O2 sat on NC. Rescue bipap begun with marked improvement in WOB, reduction in BP, return to normal WOB. POCUS demonstrated bilateral small pleural effusions. Pt improved on Bipap. Pt was also hypertensive in afib with RVR on arrival. Following improvement in resp effort, HR has reduced without specific intervention.    Of note, patient lives at home alone with a daytime aid and denies tobacco/alcohol/drug use.    PCP: Dr. Kenneth Nolasco  Cardiologist: Dr. Guilherme Cm  Neurologist: Dr. Jong Quiles

## 2022-07-31 NOTE — ED ADULT NURSE REASSESSMENT NOTE - NS ED NURSE REASSESS COMMENT FT1
Admitting team at bedside. Team 1 MD Jaramillo made aware of soft pressures. No intervention needed at this time.

## 2022-08-01 LAB
ALBUMIN SERPL ELPH-MCNC: 2.8 G/DL — LOW (ref 3.3–5)
ALP SERPL-CCNC: 156 U/L — HIGH (ref 40–120)
ALT FLD-CCNC: 45 U/L — SIGNIFICANT CHANGE UP (ref 10–45)
ANION GAP SERPL CALC-SCNC: 14 MMOL/L — SIGNIFICANT CHANGE UP (ref 5–17)
AST SERPL-CCNC: 44 U/L — HIGH (ref 10–40)
BASOPHILS # BLD AUTO: 0.03 K/UL — SIGNIFICANT CHANGE UP (ref 0–0.2)
BASOPHILS NFR BLD AUTO: 0.2 % — SIGNIFICANT CHANGE UP (ref 0–2)
BILIRUB SERPL-MCNC: 0.3 MG/DL — SIGNIFICANT CHANGE UP (ref 0.2–1.2)
BUN SERPL-MCNC: 22 MG/DL — SIGNIFICANT CHANGE UP (ref 7–23)
CALCIUM SERPL-MCNC: 8.3 MG/DL — LOW (ref 8.4–10.5)
CHLORIDE SERPL-SCNC: 96 MMOL/L — SIGNIFICANT CHANGE UP (ref 96–108)
CO2 SERPL-SCNC: 23 MMOL/L — SIGNIFICANT CHANGE UP (ref 22–31)
CREAT SERPL-MCNC: 1.09 MG/DL — SIGNIFICANT CHANGE UP (ref 0.5–1.3)
E COLI DNA BLD POS QL NAA+NON-PROBE: SIGNIFICANT CHANGE UP
EGFR: 46 ML/MIN/1.73M2 — LOW
EOSINOPHIL # BLD AUTO: 0.04 K/UL — SIGNIFICANT CHANGE UP (ref 0–0.5)
EOSINOPHIL NFR BLD AUTO: 0.2 % — SIGNIFICANT CHANGE UP (ref 0–6)
GLUCOSE SERPL-MCNC: 96 MG/DL — SIGNIFICANT CHANGE UP (ref 70–99)
GRAM STN FLD: SIGNIFICANT CHANGE UP
GRAM STN FLD: SIGNIFICANT CHANGE UP
HCT VFR BLD CALC: 37 % — SIGNIFICANT CHANGE UP (ref 34.5–45)
HGB BLD-MCNC: 12.2 G/DL — SIGNIFICANT CHANGE UP (ref 11.5–15.5)
IMM GRANULOCYTES NFR BLD AUTO: 0.8 % — SIGNIFICANT CHANGE UP (ref 0–1.5)
LYMPHOCYTES # BLD AUTO: 1.26 K/UL — SIGNIFICANT CHANGE UP (ref 1–3.3)
LYMPHOCYTES # BLD AUTO: 6.4 % — LOW (ref 13–44)
MAGNESIUM SERPL-MCNC: 2 MG/DL — SIGNIFICANT CHANGE UP (ref 1.6–2.6)
MCHC RBC-ENTMCNC: 31 PG — SIGNIFICANT CHANGE UP (ref 27–34)
MCHC RBC-ENTMCNC: 33 GM/DL — SIGNIFICANT CHANGE UP (ref 32–36)
MCV RBC AUTO: 93.9 FL — SIGNIFICANT CHANGE UP (ref 80–100)
METHOD TYPE: SIGNIFICANT CHANGE UP
MONOCYTES # BLD AUTO: 1.22 K/UL — HIGH (ref 0–0.9)
MONOCYTES NFR BLD AUTO: 6.2 % — SIGNIFICANT CHANGE UP (ref 2–14)
NEUTROPHILS # BLD AUTO: 16.84 K/UL — HIGH (ref 1.8–7.4)
NEUTROPHILS NFR BLD AUTO: 86.2 % — HIGH (ref 43–77)
NRBC # BLD: 0 /100 WBCS — SIGNIFICANT CHANGE UP (ref 0–0)
PHOSPHATE SERPL-MCNC: 3.6 MG/DL — SIGNIFICANT CHANGE UP (ref 2.5–4.5)
PLATELET # BLD AUTO: 230 K/UL — SIGNIFICANT CHANGE UP (ref 150–400)
POTASSIUM SERPL-MCNC: 4.1 MMOL/L — SIGNIFICANT CHANGE UP (ref 3.5–5.3)
POTASSIUM SERPL-SCNC: 4.1 MMOL/L — SIGNIFICANT CHANGE UP (ref 3.5–5.3)
PROT SERPL-MCNC: 5.6 G/DL — LOW (ref 6–8.3)
RBC # BLD: 3.94 M/UL — SIGNIFICANT CHANGE UP (ref 3.8–5.2)
RBC # FLD: 14.8 % — HIGH (ref 10.3–14.5)
SODIUM SERPL-SCNC: 133 MMOL/L — LOW (ref 135–145)
WBC # BLD: 19.55 K/UL — HIGH (ref 3.8–10.5)
WBC # FLD AUTO: 19.55 K/UL — HIGH (ref 3.8–10.5)

## 2022-08-01 PROCEDURE — 99233 SBSQ HOSP IP/OBS HIGH 50: CPT | Mod: GC

## 2022-08-01 RX ORDER — FUROSEMIDE 40 MG
20 TABLET ORAL DAILY
Refills: 0 | Status: DISCONTINUED | OUTPATIENT
Start: 2022-08-01 | End: 2022-08-02

## 2022-08-01 RX ADMIN — Medication 75 MICROGRAM(S): at 04:45

## 2022-08-01 RX ADMIN — Medication 1 TABLET(S): at 13:45

## 2022-08-01 RX ADMIN — Medication 3 MILLILITER(S): at 13:44

## 2022-08-01 RX ADMIN — Medication 3 MILLILITER(S): at 00:00

## 2022-08-01 RX ADMIN — CEFTRIAXONE 100 MILLIGRAM(S): 500 INJECTION, POWDER, FOR SOLUTION INTRAMUSCULAR; INTRAVENOUS at 20:13

## 2022-08-01 RX ADMIN — OXCARBAZEPINE 150 MILLIGRAM(S): 300 TABLET, FILM COATED ORAL at 04:45

## 2022-08-01 RX ADMIN — Medication 3 MILLILITER(S): at 05:37

## 2022-08-01 RX ADMIN — RIVAROXABAN 20 MILLIGRAM(S): KIT at 13:44

## 2022-08-01 RX ADMIN — ATORVASTATIN CALCIUM 20 MILLIGRAM(S): 80 TABLET, FILM COATED ORAL at 22:34

## 2022-08-01 RX ADMIN — Medication 3 MILLILITER(S): at 18:07

## 2022-08-01 RX ADMIN — OXCARBAZEPINE 150 MILLIGRAM(S): 300 TABLET, FILM COATED ORAL at 21:06

## 2022-08-01 RX ADMIN — PIPERACILLIN AND TAZOBACTAM 25 GRAM(S): 4; .5 INJECTION, POWDER, LYOPHILIZED, FOR SOLUTION INTRAVENOUS at 04:45

## 2022-08-01 RX ADMIN — Medication 20 MILLIGRAM(S): at 04:44

## 2022-08-01 RX ADMIN — Medication 1000 UNIT(S): at 13:45

## 2022-08-01 RX ADMIN — Medication 20 MILLIGRAM(S): at 13:44

## 2022-08-01 RX ADMIN — PIPERACILLIN AND TAZOBACTAM 200 GRAM(S): 4; .5 INJECTION, POWDER, LYOPHILIZED, FOR SOLUTION INTRAVENOUS at 00:00

## 2022-08-01 NOTE — PROGRESS NOTE ADULT - ATTENDING COMMENTS
89F with PMH A fib, seizure disorder, HTN, HLD and CHF presented to ED with fever, weakness, and one week of worsening SOB and b/l LE edema concerning for acute on chronic HF found to have sepsis 2/2 UTI (E. Coli bacteremia) with pleural effusions.  I have called and spoken with the Tele tech and she did not find the documentation for the prolonged QRS.   Pt is currently on Ceftriaxone for E coli in blood . Of note, pt has E coli bacteremia during previous hosp in 4/2022 and was noted to have Left renal mass . Very likely , will get Uro for further evaluation and will repeat CT of abdomen and pelvis.          Joanie Davalos  Hospitalist   [phone]

## 2022-08-01 NOTE — PROGRESS NOTE ADULT - PROBLEM SELECTOR PLAN 1
WBC, fever, elevated procal  - f/u blood and urine cx, UA  - started CTX  - 8/1: Gm neg rods (has already rcv'd vanc, ctx and zosyn) WBC, fever, elevated procal  sepsis 2/2 UTI (E. Coli bacteremia)  - f/u blood and urine cx, UA  - started CTX, dc zosyn  - 8/1: Gm neg rods (has already rcv'd vanc, ctx and zosyn)

## 2022-08-01 NOTE — PROGRESS NOTE ADULT - ASSESSMENT
89 female PMH A fib, seizure disorder, HTN, HLD and CHF presented to ED with fever, weakness, and one week of worsening SOB and b/l LE edema concerning for acute on chronic HF and possible sepsis vs PNA with pleural effusion. Will order UA, blood and urine cx, start ctx without IVF (appears volume overloaded) and continue on Bipap/nc and c/w home lasix 20mg daily. 89F with PMH A fib, seizure disorder, HTN, HLD and CHF presented to ED with fever, weakness, and one week of worsening SOB and b/l LE edema concerning for acute on chronic HF found to have sepsis 2/2 UTI (E. Coli bacteremia) with pleural effusions.

## 2022-08-01 NOTE — PROGRESS NOTE ADULT - PROBLEM SELECTOR PLAN 2
hypoxic 88-99% on RA on presentation to the ED, on NC  Possibly 2/2 AFib with RVR as etiology of SOB vs CHF i/s/o sepsis 2/2 UTI  - Wean NC as tolerated  - Continuous pulse ox  - POCUS: lungs R moderate pleural effusion, cardiac biatrial enlargement, mild-mod reduced EF  - CXR b/l pleural effusions  - f/u echo  - f/u pt's cardiologist hypoxic 88-99% on RA on presentation to the ED, on NC  Possibly 2/2 AFib with RVR as etiology of SOB vs CHF i/s/o sepsis 2/2 UTI  - Wean NC as tolerated  - Continuous pulse ox  - POCUS: lungs R moderate pleural effusion, cardiac biatrial enlargement, mild-mod reduced EF  - CXR b/l pleural effusions  - f/u echo  - cardio recs (o/p Dr. Cm): c/w present abx and rx (including atenolol given rate controlled Afib and longstanding use)  - lasix iv 20 daily

## 2022-08-01 NOTE — CONSULT NOTE ADULT - SUBJECTIVE AND OBJECTIVE BOX
89 TYO woman well known to me admitted with fevere sob elevated WBC    HPI:  89F PMH of A fib w/ RVR (on Xarelto), CHF, seizure disorder, right hip replacement (2007), shoulder rotator cuff surgery (2008), spinal surgery, right foot neuropathy, HTN, HLD presents to ED reporting SOB via EMS. EMS reports patient is coming from home, with one week of SOB worsening today. Patient reports worsening SOB and b/l LE edema today. Has longstanding diff breathing, worse the last week, worse more today. Patient also reports fever (104) with generalized weakness. Per daughter, patient was admitted 2 months prior for fever and weakness 2/2 UTI. On exam, AOx3, answering questions. Tachypneic and tachycardic on arrival,on NRB mask. Pt denies CP, abd pain, n/v/d at this time.    In the ED, patient presented with acute resp distress and hypoxia on arrival (RA 88-99% sats). O2 started for WOB and comfort.  Pt's WOB remains elevated despite improvement in O2 sat on NC. Rescue bipap begun with marked improvement in WOB, reduction in BP, return to normal WOB. POCUS demonstrated bilateral small pleural effusions. Pt improved on Bipap. Pt was also hypertensive in afib with RVR on arrival. Following improvement in resp effort, HR has reduced without specific intervention.    Of note, patient lives at home alone with a daytime aid and denies tobacco/alcohol/drug use.  Patient found to have septicemia ecoli probably UTI? pyelo?      MEDICATIONS:  MEDICATIONS  (STANDING):  albuterol/ipratropium for Nebulization 3 milliLiter(s) Nebulizer every 6 hours  ATENolol  Tablet 25 milliGRAM(s) Oral two times a day  atorvastatin 20 milliGRAM(s) Oral at bedtime  cefTRIAXone   IVPB      cefTRIAXone   IVPB 1000 milliGRAM(s) IV Intermittent every 24 hours  cholecalciferol 1000 Unit(s) Oral daily  furosemide    Tablet 20 milliGRAM(s) Oral two times a day  levothyroxine 75 MICROGram(s) Oral daily  multivitamin 1 Tablet(s) Oral daily  OXcarbazepine 150 milliGRAM(s) Oral two times a day  piperacillin/tazobactam IVPB.. 3.375 Gram(s) IV Intermittent every 8 hours  rivaroxaban 20 milliGRAM(s) Oral daily      PHYSICAL EXAM:  T(C): 36.3 (08-01-22 @ 04:45), Max: 36.7 (07-31-22 @ 15:08)  HR: 79 (08-01-22 @ 04:45) (79 - 113)  BP: 103/66 (08-01-22 @ 04:45) (92/64 - 151/72)  RR: 16 (08-01-22 @ 04:45) (16 - 24)  SpO2: 97% (08-01-22 @ 04:45) (94% - 99%)  Wt(kg): --  I&O's Summary        Appearance: Normal	  HEENT:   Normal oral mucosa, PERRL, EOMI	  Cardiovascular: Normal S1 S2, No JVD, No murmurs ,  Respiratory: Lungs clear to auscultation, normal effort 	  Gastrointestinal:  Soft, Non-tender, + BS	  Skin: No rashes, No ecchymoses, No cyanosis, warm to touch  Musculoskeletal: Normal range of motion, normal strength  Psychiatry:  Mood & affect appropriate  Ext: No edema  Peripheral pulses palpable 2+ bilaterally      LABS:    CARDIAC MARKERS:                                12.2   19.55 )-----------( 230      ( 01 Aug 2022 07:23 )             37.0     08-01    133<L>  |  96  |  22  ----------------------------<  96  4.1   |  23  |  1.09    Ca    8.3<L>      01 Aug 2022 07:23  Phos  3.6     08-01  Mg     2.0     08-01    TPro  5.6<L>  /  Alb  2.8<L>  /  TBili  0.3  /  DBili  x   /  AST  44<H>  /  ALT  45  /  AlkPhos  156<H>  08-01    proBNP:   Lipid Profile:   HgA1c:   TSH:           TELEMETRY: 	    ECG:  	  RADIOLOGY:   DIAGNOSTIC TESTING:  [ ] Echocardiogram:  [ ]  Catheterization:  [ ] Stress Test:    OTHER: 	           89 TYO woman well known to me admitted with fevere sob elevated WBC    HPI:  89F PMH of A fib w/ RVR (on Xarelto), CHF, seizure disorder, right hip replacement (2007), shoulder rotator cuff surgery (2008), spinal surgery, right foot neuropathy, HTN, HLD presents to ED reporting SOB via EMS. EMS reports patient is coming from home, with one week of SOB worsening today. Patient reports worsening SOB and b/l LE edema today. Has longstanding diff breathing, worse the last week, worse more today. Patient also reports fever (104) with generalized weakness. Per daughter, patient was admitted 2 months prior for fever and weakness 2/2 UTI. On exam, AOx3, answering questions. Tachypneic and tachycardic on arrival,on NRB mask. Pt denies CP, abd pain, n/v/d at this time.    In the ED, patient presented with acute resp distress and hypoxia on arrival (RA 88-99% sats). O2 started for WOB and comfort.  Pt's WOB remains elevated despite improvement in O2 sat on NC. Rescue bipap begun with marked improvement in WOB, reduction in BP, return to normal WOB. POCUS demonstrated bilateral small pleural effusions. Pt improved on Bipap. Pt was also hypertensive in afib with RVR on arrival. Following improvement in resp effort, HR has reduced without specific intervention.    Of note, patient lives at home alone with a daytime aid and denies tobacco/alcohol/drug use.  Patient found to have septicemia ecoli probably UTI? pyelo?  hx of chronic sob moderate pulmonary hypertension low normal LVEF hx of afib on anticoagulation and hx of remote seizure disorder    MEDICATIONS:  MEDICATIONS  (STANDING):  albuterol/ipratropium for Nebulization 3 milliLiter(s) Nebulizer every 6 hours  ATENolol  Tablet 25 milliGRAM(s) Oral two times a day  atorvastatin 20 milliGRAM(s) Oral at bedtime  cefTRIAXone   IVPB      cefTRIAXone   IVPB 1000 milliGRAM(s) IV Intermittent every 24 hours  cholecalciferol 1000 Unit(s) Oral daily  furosemide    Tablet 20 milliGRAM(s) Oral two times a day  levothyroxine 75 MICROGram(s) Oral daily  multivitamin 1 Tablet(s) Oral daily  OXcarbazepine 150 milliGRAM(s) Oral two times a day  piperacillin/tazobactam IVPB.. 3.375 Gram(s) IV Intermittent every 8 hours  rivaroxaban 20 milliGRAM(s) Oral daily      PHYSICAL EXAM:  T(C): 36.3 (08-01-22 @ 04:45), Max: 36.7 (07-31-22 @ 15:08)  HR: 79 (08-01-22 @ 04:45) (79 - 113)  BP: 103/66 (08-01-22 @ 04:45) (92/64 - 151/72)  RR: 16 (08-01-22 @ 04:45) (16 - 24)  SpO2: 97% (08-01-22 @ 04:45) (94% - 99%)  Wt(kg): --  I&O's Summary        Appearance: Normal looks well for her stated age overweight	  HEENT:   Normal oral mucosa, PERRL, EOMI	  Cardiovascular: Normal S1 S2, No JVD, 1/6 murmur at apex ,irregular  Respiratory: Lungs clear to auscultation, normal effort 	  Gastrointestinal:  Soft, Non-tender, + BS	  Psychiatry:  Mood & affect appropriate fully oriented alert sharp  Ext:almost no edema  Peripheral pulses palpable 2+ bilaterally      LABS:    CARDIAC MARKERS:                                12.2   19.55 )-----------( 230      ( 01 Aug 2022 07:23 )             37.0     08-01    133<L>  |  96  |  22  ----------------------------<  96  4.1   |  23  |  1.09    Ca    8.3<L>      01 Aug 2022 07:23  Phos  3.6     08-01  Mg     2.0     08-01    TPro  5.6<L>  /  Alb  2.8<L>  /  TBili  0.3  /  DBili  x   /  AST  44<H>  /  ALT  45  /  AlkPhos  156<H>  08-01    proBNP:   Lipid Profile:   HgA1c:   TSH:           TELEMETRY: 	    ECG:  	< from: 12 Lead ECG (07.31.22 @ 09:18) >  Diagnosis Line ATRIAL FIBRILLATION WITH RAPID VENTRICULAR RESPONSE WITH PREMATURE VENTRICULAR OR ABERRANTLY CONDUCTED COMPLEXES -new  LEFT AXIS DEVIATION  NON-SPECIFIC INTRA-VENTRICULAR CONDUCTION BLOCK  MINIMAL VOLTAGE CRITERIA FOR LVH, MAY BE NORMAL VARIANT ( [facility] product )  ABNORMAL ECG  WHEN COMPARED WITH ECG OF 03-APR-2022 18:11,  SIGNIFICANT CHANGES HAVE OCCURRED  Confirmed by CHER MORELOS MD ([id number]) on 7/31/2022 10:22:11 AM      RADIOLOGY:   < from: Xray Chest 1 View- PORTABLE-Urgent (Xray Chest 1 View- PORTABLE-Urgent .) (07.31.22 @ 09:29) >  FINDINGS:  The heart size is not accurately assessed on this projection.  Bilateral pleural effusions, left greater than right.  No pneumothorax.  No acute osseous abnormalities.    IMPRESSION:  Bilateral pleural effusions.    -< from: Transthoracic Echocardiogram (04.06.22 @ 08:36) >  Conclusions:  1. Mild mitral annular calcification. Mitral annular  dilatation with normal leaflet opening. Mild-moderate  mitral regurgitation.  2. Calcified trileaflet aortic valve with decreased  opening. Peak transaortic valve gradient equals 10 mm Hg,  mean transaortic valve gradient equals 6 mm Hg, estimated  aortic valve area equals 1.3 sqcm (by continuity equation),  aortic valve velocity time integral equals 33 cm,  consistent with moderate aortic stenosis. Minimal aortic  regurgitation.  3. Patient in atrial fibrillation; ejection fraction varies  with R-R interval.Normal left ventricular systolic  function. No segmental wall motion abnormalities.  4. Mild right ventricular enlargement with normal right  ventricular systolic function.  5. Estimated pulmonary artery systolic pressure equals 52  mm Hg, assuming right atrial pressure equals 8 mm Hg,  consistent with moderate pulmonary pressures.  No recent echocardiogram for comparison.  ------------------------------------------------------------------------  Confirmed on  4/6/2022 - 10:51:59 by Gifty Lawler M.D.  ------------------------------------------------------------------------      [ ]  Catheterization:  [ ] Stress Test:    OTHER: 	    lt< from: US Renal (04.04.22 @ 18:02) >  FINDINGS:  Right kidney: 10.6 cm. No renal mass, hydronephrosis or calculi.   Increased echogenicity compatible with medical renal disease.    Left kidney: 11.6 cm. No hydronephrosis or calculi. Well-circumscribed   solid hypoechoic mass in the upper pole with vascularity measuring 3.5 x   2.8 x 2.6 cm. It is suspicious for renal neoplasm. Additional Midpole   cortical cyst with debris and septations measuring 6.0 x 4.7 x 4.7 cm.   Multiple additional cortical cysts.    Urinary bladder: Within normal limits.    IMPRESSION:  Left upper pole hypoechoic mass with increased vascularity. It is   suspicious for renal neoplasm. Recommend further evaluation with   contrast-enhanced CT or MRI as clinically indicated. Additional   complicated and simple left renal cysts.    Increased echogenicity of the right kidney compatible with medical renal   disease.      This document has been electronically signed. Apr 5 2022  4:50PM

## 2022-08-01 NOTE — PROGRESS NOTE ADULT - SUBJECTIVE AND OBJECTIVE BOX
***************************************************************  Betty Jaramillo MD (PGY-1)  Internal Medicine  Pager: [phone]  ***************************************************************    PROGRESS NOTE:     Patient is a 89y old  Female who presents with a chief complaint of     SUBJECTIVE / OVERNIGHT EVENTS: Patient seen and examined at bedside. Patient had episode of prolonged QRS for 43 seconds w/o sx and stable VS. This morning, she denies fevers, chills, N/V, chest pain, and abdominal pain.    MEDICATIONS  (STANDING):  albuterol/ipratropium for Nebulization 3 milliLiter(s) Nebulizer every 6 hours  ATENolol  Tablet 25 milliGRAM(s) Oral two times a day  atorvastatin 20 milliGRAM(s) Oral at bedtime  cefTRIAXone   IVPB      cefTRIAXone   IVPB 1000 milliGRAM(s) IV Intermittent every 24 hours  cholecalciferol 1000 Unit(s) Oral daily  furosemide    Tablet 20 milliGRAM(s) Oral two times a day  levothyroxine 75 MICROGram(s) Oral daily  multivitamin 1 Tablet(s) Oral daily  OXcarbazepine 150 milliGRAM(s) Oral two times a day  piperacillin/tazobactam IVPB.. 3.375 Gram(s) IV Intermittent every 8 hours  rivaroxaban 20 milliGRAM(s) Oral daily    MEDICATIONS  (PRN):  acetaminophen     Tablet .. 650 milliGRAM(s) Oral every 6 hours PRN Temp greater or equal to 38C (100.4F), Mild Pain (1 - 3)      CAPILLARY BLOOD GLUCOSE        I&O's Summary      PHYSICAL EXAM:  Vital Signs Last 24 Hrs  T(C): 36.3 (01 Aug 2022 04:45), Max: 39.9 (2022 09:38)  T(F): 97.4 (01 Aug 2022 04:45), Max: 103.8 (2022 09:38)  HR: 79 (01 Aug 2022 04:45) (79 - 131)  BP: 103/66 (01 Aug 2022 04:45) (92/64 - 153/118)  BP(mean): 68 (2022 15:08) (68 - 74)  RR: 16 (01 Aug 2022 04:45) (16 - 26)  SpO2: 97% (01 Aug 2022 04:45) (94% - 99%)    Parameters below as of 01 Aug 2022 04:45  Patient On (Oxygen Delivery Method): nasal cannula  O2 Flow (L/min): 3    General: no acute distress  HEENT: Atraumatic, normocephalic, airway patent  Eyes: EOMI, tracking appropriately  Neck: no tracheal deviation, mild JVD  Resp: CTA, no WOB, + dyspnea with exertion  Heart: skin and extremities well perfused, tachy irregular rhythm  Neuro: AOx3, CN grossly intact  MSK: + b/l LE pitting edema, moving all extremities  Skin: no cyanosis, no jaundice, +senile purpura    LABS:                        13.9   20.32 )-----------( 269      ( 2022 09:33 )             41.8     07-31    130<L>  |  97  |  19  ----------------------------<  122<H>  4.4   |  19<L>  |  0.87    Ca    8.7      2022 09:33    TPro  6.8  /  Alb  3.3  /  TBili  1.0  /  DBili  x   /  AST  99<H>  /  ALT  64<H>  /  AlkPhos  214<H>  07    PT/INR - ( 2022 09:33 )   PT: 37.4 sec;   INR: 3.19 ratio         PTT - ( 2022 09:33 )  PTT:37.7 sec      Urinalysis Basic - ( 2022 20:39 )    Color: Yellow / Appearance: Slightly Turbid / S.021 / pH: x  Gluc: x / Ketone: Negative  / Bili: Negative / Urobili: Negative   Blood: x / Protein: 100 / Nitrite: Negative   Leuk Esterase: Large / RBC: 33 /hpf /  /HPF   Sq Epi: x / Non Sq Epi: 2 /hpf / Bacteria: Negative        Culture - Blood (collected 2022 09:12)  Source: .Blood Blood-Venous  Gram Stain (01 Aug 2022 01:11):    Growth in aerobic bottle: Gram Negative Rods    Growth in anaerobic bottle: Gram Negative Rods  Preliminary Report (01 Aug 2022 01:11):    Growth in aerobic bottle: Gram Negative Rods    Growth in anaerobic bottle: Gram Negative Rods    ***Blood Panel PCR results on this specimen are available    approximately 3 hours after the Gram stain result.***    Gram stain, PCR, and/or culture results may not always    correspond due to difference in methodologies.    ************************************************************    This PCR assay was performed by multiplex PCR. This    Assay tests for 66 bacterial and resistance gene targets.    Please refer to the [facility] Labs test directory    at [email or web address] for details.  Organism: Blood Culture PCR (01 Aug 2022 01:19)  Organism: Blood Culture PCR (01 Aug 2022 01:19)    Culture - Blood (collected 2022 09:10)  Source: .Blood Blood-Peripheral  Gram Stain (01 Aug 2022 01:11):    Growth in aerobic bottle: Gram Negative Rods    Growth in anaerobic bottle: Gram Negative Rods  Preliminary Report (01 Aug 2022 01:11):    Growth in aerobic bottle: Gram Negative Rods    Growth in anaerobic bottle: Gram Negative Rods        RADIOLOGY & ADDITIONAL TESTS:  Results Reviewed:   Imaging Personally Reviewed:  Electrocardiogram Personally Reviewed:    COORDINATION OF CARE:  Care Discussed with Consultants/Other Providers [Y/N]:  Prior or Outpatient Records Reviewed [Y/N]:   ***************************************************************  Betty Jaramillo MD (PGY-1)  Internal Medicine  Pager: [phone]  ***************************************************************    PROGRESS NOTE:     Patient is a 89y old  Female who presents with a chief complaint of     SUBJECTIVE / OVERNIGHT EVENTS: Patient seen and examined at bedside. Patient had episode of prolonged QRS for 43 seconds w/o sx and stable VS. This morning, she reports SOB with exertion but denies fevers, chills, N/V, chest pain, and abdominal pain.    MEDICATIONS  (STANDING):  albuterol/ipratropium for Nebulization 3 milliLiter(s) Nebulizer every 6 hours  ATENolol  Tablet 25 milliGRAM(s) Oral two times a day  atorvastatin 20 milliGRAM(s) Oral at bedtime  cefTRIAXone   IVPB      cefTRIAXone   IVPB 1000 milliGRAM(s) IV Intermittent every 24 hours  cholecalciferol 1000 Unit(s) Oral daily  furosemide    Tablet 20 milliGRAM(s) Oral two times a day  levothyroxine 75 MICROGram(s) Oral daily  multivitamin 1 Tablet(s) Oral daily  OXcarbazepine 150 milliGRAM(s) Oral two times a day  piperacillin/tazobactam IVPB.. 3.375 Gram(s) IV Intermittent every 8 hours  rivaroxaban 20 milliGRAM(s) Oral daily    MEDICATIONS  (PRN):  acetaminophen     Tablet .. 650 milliGRAM(s) Oral every 6 hours PRN Temp greater or equal to 38C (100.4F), Mild Pain (1 - 3)      CAPILLARY BLOOD GLUCOSE        I&O's Summary      PHYSICAL EXAM:  Vital Signs Last 24 Hrs  T(C): 36.3 (01 Aug 2022 04:45), Max: 39.9 (2022 09:38)  T(F): 97.4 (01 Aug 2022 04:45), Max: 103.8 (2022 09:38)  HR: 79 (01 Aug 2022 04:45) (79 - 131)  BP: 103/66 (01 Aug 2022 04:45) (92/64 - 153/118)  BP(mean): 68 (2022 15:08) (68 - 74)  RR: 16 (01 Aug 2022 04:45) (16 - 26)  SpO2: 97% (01 Aug 2022 04:45) (94% - 99%)    Parameters below as of 01 Aug 2022 04:45  Patient On (Oxygen Delivery Method): nasal cannula  O2 Flow (L/min): 3    General: no acute distress  HEENT: Atraumatic, normocephalic, airway patent  Eyes: EOMI, tracking appropriately  Neck: no tracheal deviation, mild JVD  Resp: CTA, no WOB, + dyspnea with exertion  Heart: skin and extremities well perfused, tachy irregular rhythm  Neuro: AOx3, CN grossly intact  MSK: + b/l LE pitting edema, moving all extremities  Skin: no cyanosis, no jaundice, +senile purpura    LABS:                        13.9   20.32 )-----------( 269      ( 2022 09:33 )             41.8     07-    130<L>  |  97  |  19  ----------------------------<  122<H>  4.4   |  19<L>  |  0.87    Ca    8.7      2022 09:33    TPro  6.8  /  Alb  3.3  /  TBili  1.0  /  DBili  x   /  AST  99<H>  /  ALT  64<H>  /  AlkPhos  214<H>  07-31    PT/INR - ( 2022 09:33 )   PT: 37.4 sec;   INR: 3.19 ratio         PTT - ( 2022 09:33 )  PTT:37.7 sec      Urinalysis Basic - ( 2022 20:39 )    Color: Yellow / Appearance: Slightly Turbid / S.021 / pH: x  Gluc: x / Ketone: Negative  / Bili: Negative / Urobili: Negative   Blood: x / Protein: 100 / Nitrite: Negative   Leuk Esterase: Large / RBC: 33 /hpf /  /HPF   Sq Epi: x / Non Sq Epi: 2 /hpf / Bacteria: Negative        Culture - Blood (collected 2022 09:12)  Source: .Blood Blood-Venous  Gram Stain (01 Aug 2022 01:11):    Growth in aerobic bottle: Gram Negative Rods    Growth in anaerobic bottle: Gram Negative Rods  Preliminary Report (01 Aug 2022 01:11):    Growth in aerobic bottle: Gram Negative Rods    Growth in anaerobic bottle: Gram Negative Rods    ***Blood Panel PCR results on this specimen are available    approximately 3 hours after the Gram stain result.***    Gram stain, PCR, and/or culture results may not always    correspond due to difference in methodologies.    ************************************************************    This PCR assay was performed by multiplex PCR. This    Assay tests for 66 bacterial and resistance gene targets.    Please refer to the [facility] Labs test directory    at [email or web address] for details.  Organism: Blood Culture PCR (01 Aug 2022 01:19)  Organism: Blood Culture PCR (01 Aug 2022 01:19)    Culture - Blood (collected 2022 09:10)  Source: .Blood Blood-Peripheral  Gram Stain (01 Aug 2022 01:11):    Growth in aerobic bottle: Gram Negative Rods    Growth in anaerobic bottle: Gram Negative Rods  Preliminary Report (01 Aug 2022 01:11):    Growth in aerobic bottle: Gram Negative Rods    Growth in anaerobic bottle: Gram Negative Rods        RADIOLOGY & ADDITIONAL TESTS:  Results Reviewed:   Imaging Personally Reviewed:  Electrocardiogram Personally Reviewed:    COORDINATION OF CARE:  Care Discussed with Consultants/Other Providers [Y/N]:  Prior or Outpatient Records Reviewed [Y/N]:

## 2022-08-01 NOTE — CONSULT NOTE ADULT - ASSESSMENT
patient presents now with fever sob septicemia probably renal/UTI.  Cardiac wise she appears stable    1. septicemia  2. chronic sob multifactorial hypertension diastolic dysfunction ?weight- preenstl sat 99%  3. FRANCOISE?   Presently hemodynamically stable no acute cardiac issues    Recommend  continue present meds  antibiotics as per med and ID

## 2022-08-01 NOTE — PROVIDER CONTACT NOTE (CRITICAL VALUE NOTIFICATION) - SITUATION
Pt blood culture growth of gram negative rods in aerobic bottle. Pt has already received 1g vancomycin, 1g ceftriaxone, and 3.375g and zosyn.
Pt blood culture growth of gram negative rods in aerobic bottle for second set of blood culture. Pt has already received 1g vancomycin, 1g ceftriaxone, and 3.375g and zosyn

## 2022-08-02 LAB
-  AMIKACIN: SIGNIFICANT CHANGE UP
-  AMPICILLIN/SULBACTAM: SIGNIFICANT CHANGE UP
-  AMPICILLIN: SIGNIFICANT CHANGE UP
-  AZTREONAM: SIGNIFICANT CHANGE UP
-  CEFAZOLIN: SIGNIFICANT CHANGE UP
-  CEFEPIME: SIGNIFICANT CHANGE UP
-  CEFOXITIN: SIGNIFICANT CHANGE UP
-  CEFTRIAXONE: SIGNIFICANT CHANGE UP
-  CIPROFLOXACIN: SIGNIFICANT CHANGE UP
-  ERTAPENEM: SIGNIFICANT CHANGE UP
-  GENTAMICIN: SIGNIFICANT CHANGE UP
-  IMIPENEM: SIGNIFICANT CHANGE UP
-  LEVOFLOXACIN: SIGNIFICANT CHANGE UP
-  MEROPENEM: SIGNIFICANT CHANGE UP
-  PIPERACILLIN/TAZOBACTAM: SIGNIFICANT CHANGE UP
-  TOBRAMYCIN: SIGNIFICANT CHANGE UP
-  TRIMETHOPRIM/SULFAMETHOXAZOLE: SIGNIFICANT CHANGE UP
ALBUMIN SERPL ELPH-MCNC: 2.9 G/DL — LOW (ref 3.3–5)
ALP SERPL-CCNC: 154 U/L — HIGH (ref 40–120)
ALT FLD-CCNC: 63 U/L — HIGH (ref 10–45)
ANION GAP SERPL CALC-SCNC: 12 MMOL/L — SIGNIFICANT CHANGE UP (ref 5–17)
AST SERPL-CCNC: 59 U/L — HIGH (ref 10–40)
BASOPHILS # BLD AUTO: 0.03 K/UL — SIGNIFICANT CHANGE UP (ref 0–0.2)
BASOPHILS NFR BLD AUTO: 0.3 % — SIGNIFICANT CHANGE UP (ref 0–2)
BILIRUB SERPL-MCNC: 0.2 MG/DL — SIGNIFICANT CHANGE UP (ref 0.2–1.2)
BUN SERPL-MCNC: 24 MG/DL — HIGH (ref 7–23)
CALCIUM SERPL-MCNC: 8.6 MG/DL — SIGNIFICANT CHANGE UP (ref 8.4–10.5)
CHLORIDE SERPL-SCNC: 100 MMOL/L — SIGNIFICANT CHANGE UP (ref 96–108)
CO2 SERPL-SCNC: 25 MMOL/L — SIGNIFICANT CHANGE UP (ref 22–31)
CREAT SERPL-MCNC: 1.07 MG/DL — SIGNIFICANT CHANGE UP (ref 0.5–1.3)
CULTURE RESULTS: SIGNIFICANT CHANGE UP
CULTURE RESULTS: SIGNIFICANT CHANGE UP
EGFR: 47 ML/MIN/1.73M2 — LOW
EOSINOPHIL # BLD AUTO: 0.08 K/UL — SIGNIFICANT CHANGE UP (ref 0–0.5)
EOSINOPHIL NFR BLD AUTO: 0.8 % — SIGNIFICANT CHANGE UP (ref 0–6)
GLUCOSE SERPL-MCNC: 84 MG/DL — SIGNIFICANT CHANGE UP (ref 70–99)
HCT VFR BLD CALC: 38.9 % — SIGNIFICANT CHANGE UP (ref 34.5–45)
HGB BLD-MCNC: 13 G/DL — SIGNIFICANT CHANGE UP (ref 11.5–15.5)
IMM GRANULOCYTES NFR BLD AUTO: 0.5 % — SIGNIFICANT CHANGE UP (ref 0–1.5)
LYMPHOCYTES # BLD AUTO: 1.26 K/UL — SIGNIFICANT CHANGE UP (ref 1–3.3)
LYMPHOCYTES # BLD AUTO: 12 % — LOW (ref 13–44)
MAGNESIUM SERPL-MCNC: 2 MG/DL — SIGNIFICANT CHANGE UP (ref 1.6–2.6)
MCHC RBC-ENTMCNC: 31.1 PG — SIGNIFICANT CHANGE UP (ref 27–34)
MCHC RBC-ENTMCNC: 33.4 GM/DL — SIGNIFICANT CHANGE UP (ref 32–36)
MCV RBC AUTO: 93.1 FL — SIGNIFICANT CHANGE UP (ref 80–100)
METHOD TYPE: SIGNIFICANT CHANGE UP
MONOCYTES # BLD AUTO: 0.84 K/UL — SIGNIFICANT CHANGE UP (ref 0–0.9)
MONOCYTES NFR BLD AUTO: 8 % — SIGNIFICANT CHANGE UP (ref 2–14)
NEUTROPHILS # BLD AUTO: 8.25 K/UL — HIGH (ref 1.8–7.4)
NEUTROPHILS NFR BLD AUTO: 78.4 % — HIGH (ref 43–77)
NRBC # BLD: 0 /100 WBCS — SIGNIFICANT CHANGE UP (ref 0–0)
ORGANISM # SPEC MICROSCOPIC CNT: SIGNIFICANT CHANGE UP
PHOSPHATE SERPL-MCNC: 3.6 MG/DL — SIGNIFICANT CHANGE UP (ref 2.5–4.5)
PLATELET # BLD AUTO: 279 K/UL — SIGNIFICANT CHANGE UP (ref 150–400)
POTASSIUM SERPL-MCNC: 3.6 MMOL/L — SIGNIFICANT CHANGE UP (ref 3.5–5.3)
POTASSIUM SERPL-SCNC: 3.6 MMOL/L — SIGNIFICANT CHANGE UP (ref 3.5–5.3)
PROT SERPL-MCNC: 6.3 G/DL — SIGNIFICANT CHANGE UP (ref 6–8.3)
RBC # BLD: 4.18 M/UL — SIGNIFICANT CHANGE UP (ref 3.8–5.2)
RBC # FLD: 14.7 % — HIGH (ref 10.3–14.5)
SODIUM SERPL-SCNC: 137 MMOL/L — SIGNIFICANT CHANGE UP (ref 135–145)
SPECIMEN SOURCE: SIGNIFICANT CHANGE UP
SPECIMEN SOURCE: SIGNIFICANT CHANGE UP
WBC # BLD: 10.51 K/UL — HIGH (ref 3.8–10.5)
WBC # FLD AUTO: 10.51 K/UL — HIGH (ref 3.8–10.5)

## 2022-08-02 PROCEDURE — 76857 US EXAM PELVIC LIMITED: CPT | Mod: 26

## 2022-08-02 PROCEDURE — 76705 ECHO EXAM OF ABDOMEN: CPT | Mod: 26

## 2022-08-02 PROCEDURE — 99223 1ST HOSP IP/OBS HIGH 75: CPT

## 2022-08-02 PROCEDURE — 99233 SBSQ HOSP IP/OBS HIGH 50: CPT | Mod: GC

## 2022-08-02 RX ORDER — CHLORHEXIDINE GLUCONATE 213 G/1000ML
1 SOLUTION TOPICAL
Refills: 0 | Status: DISCONTINUED | OUTPATIENT
Start: 2022-08-02 | End: 2022-08-05

## 2022-08-02 RX ORDER — FUROSEMIDE 40 MG
20 TABLET ORAL
Refills: 0 | Status: DISCONTINUED | OUTPATIENT
Start: 2022-08-03 | End: 2022-08-05

## 2022-08-02 RX ADMIN — Medication 3 MILLILITER(S): at 17:19

## 2022-08-02 RX ADMIN — Medication 3 MILLILITER(S): at 00:00

## 2022-08-02 RX ADMIN — Medication 1000 UNIT(S): at 12:13

## 2022-08-02 RX ADMIN — ATORVASTATIN CALCIUM 20 MILLIGRAM(S): 80 TABLET, FILM COATED ORAL at 23:00

## 2022-08-02 RX ADMIN — Medication 3 MILLILITER(S): at 05:15

## 2022-08-02 RX ADMIN — RIVAROXABAN 20 MILLIGRAM(S): KIT at 12:14

## 2022-08-02 RX ADMIN — Medication 3 MILLILITER(S): at 12:12

## 2022-08-02 RX ADMIN — Medication 3 MILLILITER(S): at 23:00

## 2022-08-02 RX ADMIN — ATENOLOL 25 MILLIGRAM(S): 25 TABLET ORAL at 05:15

## 2022-08-02 RX ADMIN — CEFTRIAXONE 100 MILLIGRAM(S): 500 INJECTION, POWDER, FOR SOLUTION INTRAMUSCULAR; INTRAVENOUS at 19:03

## 2022-08-02 RX ADMIN — Medication 20 MILLIGRAM(S): at 05:15

## 2022-08-02 RX ADMIN — Medication 75 MICROGRAM(S): at 05:15

## 2022-08-02 RX ADMIN — ATENOLOL 25 MILLIGRAM(S): 25 TABLET ORAL at 17:19

## 2022-08-02 RX ADMIN — OXCARBAZEPINE 150 MILLIGRAM(S): 300 TABLET, FILM COATED ORAL at 17:18

## 2022-08-02 RX ADMIN — Medication 1 TABLET(S): at 12:13

## 2022-08-02 RX ADMIN — OXCARBAZEPINE 150 MILLIGRAM(S): 300 TABLET, FILM COATED ORAL at 05:15

## 2022-08-02 NOTE — PHYSICAL THERAPY INITIAL EVALUATION ADULT - PERTINENT HX OF CURRENT PROBLEM, REHAB EVAL
89F with PMH A fib, seizure disorder, HTN, HLD and CHF presented to ED with fever, weakness, and one week of worsening SOB and b/l LE edema concerning for acute on chronic HF with pleural effusions found to have sepsis 2/2 UTI (E. Coli bacteremia). Chest XRay(7/31): Bilateral pleural effusions.

## 2022-08-02 NOTE — PHYSICAL THERAPY INITIAL EVALUATION ADULT - DISCHARGE DISPOSITION, PT EVAL
Subacute rehab; however pt prefers home. If pt returns home will require home PT and assist/supervision with ALL functional mobility at this time. Pt owns RW and WC. Message left in d/c planning sheet.

## 2022-08-02 NOTE — PHYSICAL THERAPY INITIAL EVALUATION ADULT - GENERAL OBSERVATIONS, REHAB EVAL
Rec'd semisupine in bed, in NAD, +IVL, +tele, +external female catheter. Agreeable to PT. RN cleared pt to be seen.

## 2022-08-02 NOTE — PATIENT PROFILE ADULT - FUNCTIONAL ASSESSMENT - BASIC MOBILITY 6.
2-calculated by average/Not able to assess (calculate score using [facility] averaging method)

## 2022-08-02 NOTE — CONSULT NOTE ADULT - SUBJECTIVE AND OBJECTIVE BOX
HPI: 89F with PMHx of AFib on AC, seizure disorder, HTN, HLD and CHF presented to ED with fever, weakness, and one week of worsening SOB and b/l LE edema concerning for acute on chronic HF with pleural effusions found to have sepsis 2/2 UTI (E. Coli bacteremia). Pt with known L renal mass which  was consulted for on a prior admission, but family and pt decided to not intervene and wished to not undergo surgery. Pt admitted for UTI c/b E coli bacteremia this admission.  reconsulted for input on recurrent urinary infections. Pt seen and examined. Reports feeling much improved. voiding clear yellow urine without difficulty. no dysuria, hematuria, frequency. Had fevers upon admission, but now afebrile and no chills. No flank or suprapubic tenderness. no feelings of retention. Initially felt like it was difficult to walk, but is improving throughout her hospital course.     PAST MEDICAL & SURGICAL HISTORY:  HTN (hypertension)  Hyperlipemia  GERD (gastroesophageal reflux disease)  Hypothyroidism  TIA (transient ischemic attack)  CVA (cerebrovascular accident)  Afib  on pradaxa  Status post left hip replacement  H/O repair of left rotator cuff  S/P laminectomy  Bilateral cataracts      MEDICATIONS  (STANDING):  albuterol/ipratropium for Nebulization 3 milliLiter(s) Nebulizer every 6 hours  ATENolol  Tablet 25 milliGRAM(s) Oral two times a day  atorvastatin 20 milliGRAM(s) Oral at bedtime  cefTRIAXone   IVPB      cefTRIAXone   IVPB 1000 milliGRAM(s) IV Intermittent every 24 hours  chlorhexidine 2% Cloths 1 Application(s) Topical <User Schedule>  cholecalciferol 1000 Unit(s) Oral daily  levothyroxine 75 MICROGram(s) Oral daily  multivitamin 1 Tablet(s) Oral daily  OXcarbazepine 150 milliGRAM(s) Oral two times a day  rivaroxaban 20 milliGRAM(s) Oral daily    MEDICATIONS  (PRN):  acetaminophen     Tablet .. 650 milliGRAM(s) Oral every 6 hours PRN Temp greater or equal to 38C (100.4F), Mild Pain (1 - 3)      FAMILY HISTORY:      Allergies    No Known Allergies    Intolerances        SOCIAL HISTORY:    REVIEW OF SYSTEMS: Otherwise negative as stated in HPI    Physical Exam  Vital signs  T(C): 36.8 (22 @ 10:45), Max: 36.8 (22 @ 10:45)  HR: 109 (22 @ 17:26)  BP: 114/77 (22 @ 17:26)  SpO2: 94% (22 @ 17:26)    Output  OUT:    Voided (mL): 700 mL  Total OUT: 700 mL    Total NET: -700 mL      OUT:    Voided (mL): 800 mL  Total OUT: 800 mL    Total NET: -800 mL          Gen:NAD  Pulm:No respiratory distress  Abd:S/ND/NT  Back: no CVAT b/l  : nonpalp bladder. urine in primafit container clear yellow      LABS:               13.0   10.51 )-----------( 279      ( 02 Aug 2022 06:22 )             38.9         137  |  100  |  24<H>  ----------------------------<  84  3.6   |  25  |  1.07    Ca    8.6      02 Aug 2022 06:22  Phos  3.6       Mg     2.0         TPro  6.3  /  Alb  2.9<L>  /  TBili  0.2  /  DBili  x   /  AST  59<H>  /  ALT  63<H>  /  AlkPhos  154<H>        Urinalysis Basic - ( 2022 20:39 )    Color: Yellow / Appearance: Slightly Turbid / S.021 / pH: x  Gluc: x / Ketone: Negative  / Bili: Negative / Urobili: Negative   Blood: x / Protein: 100 / Nitrite: Negative   Leuk Esterase: Large / RBC: 33 /hpf /  /HPF   Sq Epi: x / Non Sq Epi: 2 /hpf / Bacteria: Negative    Urine Cx: pending  Blood Cx: Culture - Blood (22 @ 09:12)    -  Escherichia coli: Detec    Gram Stain:   Growth in aerobic bottle: Gram Negative Rods  Growth in anaerobic bottle: Gram Negative Rods    -  Amikacin: S <=16    -  Ampicillin: S <=8 These ampicillin results predict results for amoxicillin    -  Ampicillin/Sulbactam: S <=4/2 Enterobacter, Klebsiella aerogenes, Citrobacter, and Serratia may develop resistance during prolonged therapy (3-4 days)    -  Aztreonam: S <=4    -  Cefazolin: S <=2 Enterobacter, Klebsiella aerogenes, Citrobacter, and Serratia may develop resistance during prolonged therapy (3-4 days)    -  Cefepime: S <=2    -  Cefoxitin: S <=8    -  Ceftriaxone: S <=1 Enterobacter, Klebsiella aerogenes, Citrobacter, and Serratia may develop resistance during prolonged therapy    -  Ciprofloxacin: S <=0.25    -  Ertapenem: S <=0.5    -  Gentamicin: S <=2    -  Imipenem: S <=1    -  Levofloxacin: S <=0.5    -  Meropenem: S <=1    -  Piperacillin/Tazobactam: S <=8    -  Tobramycin: S <=2    -  Trimethoprim/Sulfamethoxazole: R >    Specimen Source: .Blood Blood-Venous    Organism: Blood Culture PCR    Organism: Escherichia coli    Culture Results:   Growth in aerobic and anaerobic bottles: Escherichia coli  ***Blood Panel PCR results on this specimen are available  approximately 3 hours after the Gram stain result.***  Gram stain, PCR, and/or culture results may not always  correspond due to difference in methodologies.  ************************************************************  This PCR assay was performed by multiplex PCR. This  Assay tests for 66 bacterial and resistance gene targets.  Please refer to the [facility] Labs test directory  at [email or web address] for details.    Organism Identification: Blood Culture PCR  Escherichia coli    Method Type: PCR    Method Type: HAMLET    RADIOLOGY:  Renal U/S pending     < from: CT Angio Abdomen and Pelvis w/ IV Cont (22 @ 19:00) >  IMPRESSION:  3 cm solid mass upper pole left kidney suspicious for renal cell   carcinoma.    < end of copied text >

## 2022-08-02 NOTE — PHYSICAL THERAPY INITIAL EVALUATION ADULT - MANUAL MUSCLE TESTING RESULTS, REHAB EVAL
3/5, except R ankle DF/eversion/grossly assessed due to 3/5, except R ankle DF/eversion - chronic/grossly assessed due to

## 2022-08-02 NOTE — PROGRESS NOTE ADULT - SUBJECTIVE AND OBJECTIVE BOX
***************************************************************  Betty Jaramillo MD (PGY-1)  Internal Medicine  Pager: [phone]  ***************************************************************    PROGRESS NOTE:     Patient is a 89y old  Female who presents with a chief complaint of fevere hypoxia sob (01 Aug 2022 10:40)      SUBJECTIVE / OVERNIGHT EVENTS: Patient seen and examined at bedside. No acute overnight events. No acute complaints. Denies fevers, chills, N/V/D, chest pain, SOB, abdominal pain.    MEDICATIONS  (STANDING):  albuterol/ipratropium for Nebulization 3 milliLiter(s) Nebulizer every 6 hours  ATENolol  Tablet 25 milliGRAM(s) Oral two times a day  atorvastatin 20 milliGRAM(s) Oral at bedtime  cefTRIAXone   IVPB      cefTRIAXone   IVPB 1000 milliGRAM(s) IV Intermittent every 24 hours  cholecalciferol 1000 Unit(s) Oral daily  furosemide   Injectable 20 milliGRAM(s) IV Push daily  levothyroxine 75 MICROGram(s) Oral daily  multivitamin 1 Tablet(s) Oral daily  OXcarbazepine 150 milliGRAM(s) Oral two times a day  rivaroxaban 20 milliGRAM(s) Oral daily    MEDICATIONS  (PRN):  acetaminophen     Tablet .. 650 milliGRAM(s) Oral every 6 hours PRN Temp greater or equal to 38C (100.4F), Mild Pain (1 - 3)      CAPILLARY BLOOD GLUCOSE        I&O's Summary    01 Aug 2022 07:01  -  02 Aug 2022 07:00  --------------------------------------------------------  IN: 240 mL / OUT: 700 mL / NET: -460 mL        PHYSICAL EXAM:  Vital Signs Last 24 Hrs  T(C): 36.3 (02 Aug 2022 05:00), Max: 36.6 (01 Aug 2022 19:43)  T(F): 97.4 (02 Aug 2022 05:00), Max: 97.8 (01 Aug 2022 19:43)  HR: 88 (02 Aug 2022 05:00) (68 - 100)  BP: 135/89 (02 Aug 2022 05:00) (96/61 - 142/84)  BP(mean): --  RR: 18 (02 Aug 2022 05:00) (16 - 18)  SpO2: 98% (02 Aug 2022 05:00) (97% - 99%)    Parameters below as of 02 Aug 2022 05:00  Patient On (Oxygen Delivery Method): nasal cannula  O2 Flow (L/min): 3      General: no acute distress  HEENT: Atraumatic, normocephalic, airway patent  Eyes: EOMI, tracking appropriately  Neck: no tracheal deviation, mild JVD  Resp: CTA, no WOB, + dyspnea with exertion  Heart: skin and extremities well perfused, tachy irregular rhythm  Neuro: AOx3, CN grossly intact  MSK: + b/l LE pitting edema, moving all extremities  Skin: no cyanosis, no jaundice, +senile purpura    LABS:                        13.0   10.51 )-----------( 279      ( 02 Aug 2022 06:22 )             38.9     08-02    137  |  100  |  24<H>  ----------------------------<  84  3.6   |  25  |  1.07    Ca    8.6      02 Aug 2022 06:22  Phos  3.6     08-02  Mg     2.0     08-02    TPro  6.3  /  Alb  2.9<L>  /  TBili  0.2  /  DBili  x   /  AST  59<H>  /  ALT  63<H>  /  AlkPhos  154<H>  08-02    PT/INR - ( 2022 09:33 )   PT: 37.4 sec;   INR: 3.19 ratio         PTT - ( 2022 09:33 )  PTT:37.7 sec      Urinalysis Basic - ( 2022 20:39 )    Color: Yellow / Appearance: Slightly Turbid / S.021 / pH: x  Gluc: x / Ketone: Negative  / Bili: Negative / Urobili: Negative   Blood: x / Protein: 100 / Nitrite: Negative   Leuk Esterase: Large / RBC: 33 /hpf /  /HPF   Sq Epi: x / Non Sq Epi: 2 /hpf / Bacteria: Negative        Culture - Blood (collected 2022 09:12)  Source: .Blood Blood-Venous  Gram Stain (01 Aug 2022 01:11):    Growth in aerobic bottle: Gram Negative Rods    Growth in anaerobic bottle: Gram Negative Rods  Preliminary Report (01 Aug 2022 20:11):    Growth in aerobic and anaerobic bottles: Escherichia coli    ***Blood Panel PCR results on this specimen are available    approximately 3 hours after the Gram stain result.***    Gram stain, PCR, and/or culture results may not always    correspond due to difference in methodologies.    ************************************************************    This PCR assay was performed by multiplex PCR. This    Assay tests for 66 bacterial and resistance gene targets.    Please refer to the [facility] Labs test directory    at [email or web address] for details.  Organism: Blood Culture PCR (01 Aug 2022 01:19)  Organism: Blood Culture PCR (01 Aug 2022 01:19)    Culture - Blood (collected 2022 09:10)  Source: .Blood Blood-Peripheral  Gram Stain (01 Aug 2022 01:11):    Growth in aerobic bottle: Gram Negative Rods    Growth in anaerobic bottle: Gram Negative Rods  Preliminary Report (01 Aug 2022 20:20):    Growth in aerobic and anaerobic bottles: Escherichia coli    See previous culture [id number]        RADIOLOGY & ADDITIONAL TESTS:  Results Reviewed:   Imaging Personally Reviewed:  Electrocardiogram Personally Reviewed:    COORDINATION OF CARE:  Care Discussed with Consultants/Other Providers [Y/N]:  Prior or Outpatient Records Reviewed [Y/N]:

## 2022-08-02 NOTE — PROGRESS NOTE ADULT - ASSESSMENT
89F with PMH A fib, seizure disorder, HTN, HLD and CHF presented to ED with fever, weakness, and one week of worsening SOB and b/l LE edema concerning for acute on chronic HF with pleural effusions found to have sepsis 2/2 UTI (E. Coli bacteremia)

## 2022-08-02 NOTE — CONSULT NOTE ADULT - ASSESSMENT
89F with PMHx of AFib on AC, seizure disorder, HTN, HLD and CHF presented to ED with fever, weakness, and one week of worsening SOB and bilateral LE edema concerning for acute on chronic HF with pleural effusions found to have sepsis 2/2 UTI (E. Coli bacteremia). Pt with known L renal mass which  was consulted for on a prior admission, but family and pt decided to not intervene and wished to not undergo surgery. Pt admitted for UTI c/b E coli bacteremia this admission.  reconsulted for input on recurrent urinary infections.    Recs:  - f/u renal us  - continue culture appropriate antibiotics   - f/u urine culture and repeat blood cx  - check pvr to ensure adequate emptying  - hydration  - no intervention on L renal mass per pt/family wishes  - consider ID input for uti ppx abx     can establish  care  89F with PMHx of AFib on AC, seizure disorder, HTN, HLD and CHF presented to ED with fever, weakness, and one week of worsening SOB and bilateral LE edema concerning for acute on chronic HF with pleural effusions found to have sepsis 2/2 UTI (E. Coli bacteremia). Pt with known L renal mass which  was consulted for on a prior admission, but family and pt decided to not intervene and wished to not undergo surgery. Pt admitted for UTI c/b E coli bacteremia this admission.  reconsulted for input on recurrent urinary infections.    Recs:  - f/u renal us  - continue culture appropriate antibiotics   - f/u urine culture and repeat blood cx  - check pvr to ensure adequate emptying  - hydration  - no intervention on L renal mass per pt/family wishes  - consider ID input for uti ppx abx     can establish  care   [facility]  [address] [place] [address], [address]  [place] [address] [phone]  89F with PMHx of AFib on AC, seizure disorder, HTN, HLD and CHF presented to ED with fever, weakness, and one week of worsening SOB and bilateral LE edema concerning for acute on chronic HF with pleural effusions found to have sepsis 2/2 UTI (E. Coli bacteremia). Pt with known L renal mass which  was consulted for on a prior admission, but family and pt decided to not intervene and wished to not undergo surgery. Pt admitted for UTI c/b E coli bacteremia this admission.  reconsulted for input on recurrent urinary infections.    Recs:  - F/u renal us  - Continue culture appropriate antibiotics   - F/u urine culture and repeat blood cx  - Check pvr to ensure adequate emptying  - Recommend vaginal estrogen  - Hydration  - No intervention on L renal mass per pt/family wishes, but can follow up with Dr. Mon  - Consider ID input for uti ppx abx    [facility]  [address] [place] [address], [address]  [place] [id number] [phone]    Case discussed with Dr. Mortensen

## 2022-08-02 NOTE — PROGRESS NOTE ADULT - PROBLEM SELECTOR PLAN 1
WBC, fever, elevated procal  sepsis 2/2 UTI (E. Coli bacteremia)  - f/u blood and urine cx, UA  - started CTX, dc zosyn  - 8/1: Gm neg rods (has already rcv'd vanc, ctx and zosyn) WBC, fever, elevated procal  sepsis 2/2 UTI (E. Coli bacteremia)  - f/u blood and urine cx, UA  - on CTX (has already rcv'd vanc and zosyn)  - Gm neg rods 8/1, repeat blood cx  - urology c/s  - renal and RUQ US (consider ctap after)

## 2022-08-02 NOTE — PROGRESS NOTE ADULT - ATTENDING COMMENTS
89F with PMH A fib, seizure disorder, HTN, HLD and CHF presented to ED with fever, weakness, and one week of worsening SOB and b/l LE edema concerning for acute on chronic HF found to have sepsis 2/2 UTI (E. Coli bacteremia) with pleural effusions.    Pt is currently on Ceftriaxone for E coli in blood . Of note, pt has E coli bacteremia during previous hosp in 4/2022 and was noted to have Left renal mass . Uro  consult is requested.  might  repeat CT of abdomen and pelvis.  cont current mgt .  Might change to oral diuretic in AM        Joanie Davalos  Hospitalist    89F with PMH A fib, seizure disorder, HTN, HLD and CHF presented to ED with fever, weakness, and one week of worsening SOB and b/l LE edema concerning for acute on chronic HF found to have sepsis 2/2 UTI (E. Coli bacteremia) with pleural effusions.       # E coli bacteremia      source is most likely from urine source , awaiting on UCX     UCX sent after pt has been on ABX     Previous E coli bacteremia from urine source from previous hosp admission '      F/up repeat blood cx     F/UP pTTE  # Left renal mass      Uro eval   # transaminitis      COuld be sepsis with organ Dysfn vs acute liver dysfunction from other etiology like meds , mets , or congestive haptopathy     f/up liver sonogram     trend LFT   # Acute on chronic heart failure      Legs were very swollen( as per daughter and patient, B/L pleural effusion )      was on IV Lasix with good response as per patient and daughter      MOnitor SPO2     will change to oral Lasix     Patient and daughter are updated  Joanie Davalos  Hospitalist

## 2022-08-02 NOTE — PATIENT PROFILE ADULT - FALL HARM RISK - HARM RISK INTERVENTIONS

## 2022-08-02 NOTE — PROGRESS NOTE ADULT - PROBLEM SELECTOR PLAN 2
hypoxic 88-99% on RA on presentation to the ED, on NC  Possibly 2/2 AFib with RVR as etiology of SOB vs CHF i/s/o sepsis 2/2 UTI  - Wean NC as tolerated  - Continuous pulse ox  - POCUS: lungs R moderate pleural effusion, cardiac biatrial enlargement, mild-mod reduced EF  - CXR b/l pleural effusions  - f/u echo  - cardio recs (o/p Dr. Cm): c/w present abx and rx (including atenolol given rate controlled Afib and longstanding use)  - lasix iv 20 daily hypoxic 88-99% on RA on presentation to the ED, on NC  Possibly 2/2 AFib with RVR as etiology of SOB vs CHF i/s/o sepsis 2/2 UTI  - Weaned to RA  - Continuous pulse ox  - POCUS: lungs R moderate pleural effusion, cardiac biatrial enlargement, mild-mod reduced EF  - CXR b/l pleural effusions  - f/u echo  - cardio recs (o/p Dr. Cm): c/w abx and rx (including atenolol given rate controlled Afib and longstanding use)  - lasix iv 20 daily

## 2022-08-03 LAB
ALBUMIN SERPL ELPH-MCNC: 2.8 G/DL — LOW (ref 3.3–5)
ALP SERPL-CCNC: 139 U/L — HIGH (ref 40–120)
ALT FLD-CCNC: 74 U/L — HIGH (ref 10–45)
ANION GAP SERPL CALC-SCNC: 13 MMOL/L — SIGNIFICANT CHANGE UP (ref 5–17)
AST SERPL-CCNC: 66 U/L — HIGH (ref 10–40)
BASOPHILS # BLD AUTO: 0.03 K/UL — SIGNIFICANT CHANGE UP (ref 0–0.2)
BASOPHILS NFR BLD AUTO: 0.3 % — SIGNIFICANT CHANGE UP (ref 0–2)
BILIRUB SERPL-MCNC: 0.2 MG/DL — SIGNIFICANT CHANGE UP (ref 0.2–1.2)
BUN SERPL-MCNC: 20 MG/DL — SIGNIFICANT CHANGE UP (ref 7–23)
CALCIUM SERPL-MCNC: 8.8 MG/DL — SIGNIFICANT CHANGE UP (ref 8.4–10.5)
CHLORIDE SERPL-SCNC: 98 MMOL/L — SIGNIFICANT CHANGE UP (ref 96–108)
CO2 SERPL-SCNC: 25 MMOL/L — SIGNIFICANT CHANGE UP (ref 22–31)
CREAT SERPL-MCNC: 0.9 MG/DL — SIGNIFICANT CHANGE UP (ref 0.5–1.3)
CULTURE RESULTS: NO GROWTH — SIGNIFICANT CHANGE UP
EGFR: 58 ML/MIN/1.73M2 — LOW
EOSINOPHIL # BLD AUTO: 0.1 K/UL — SIGNIFICANT CHANGE UP (ref 0–0.5)
EOSINOPHIL NFR BLD AUTO: 1.1 % — SIGNIFICANT CHANGE UP (ref 0–6)
GLUCOSE SERPL-MCNC: 89 MG/DL — SIGNIFICANT CHANGE UP (ref 70–99)
HCT VFR BLD CALC: 35.9 % — SIGNIFICANT CHANGE UP (ref 34.5–45)
HGB BLD-MCNC: 12 G/DL — SIGNIFICANT CHANGE UP (ref 11.5–15.5)
IMM GRANULOCYTES NFR BLD AUTO: 0.6 % — SIGNIFICANT CHANGE UP (ref 0–1.5)
LYMPHOCYTES # BLD AUTO: 1.52 K/UL — SIGNIFICANT CHANGE UP (ref 1–3.3)
LYMPHOCYTES # BLD AUTO: 16 % — SIGNIFICANT CHANGE UP (ref 13–44)
MAGNESIUM SERPL-MCNC: 1.9 MG/DL — SIGNIFICANT CHANGE UP (ref 1.6–2.6)
MCHC RBC-ENTMCNC: 31 PG — SIGNIFICANT CHANGE UP (ref 27–34)
MCHC RBC-ENTMCNC: 33.4 GM/DL — SIGNIFICANT CHANGE UP (ref 32–36)
MCV RBC AUTO: 92.8 FL — SIGNIFICANT CHANGE UP (ref 80–100)
MONOCYTES # BLD AUTO: 0.87 K/UL — SIGNIFICANT CHANGE UP (ref 0–0.9)
MONOCYTES NFR BLD AUTO: 9.2 % — SIGNIFICANT CHANGE UP (ref 2–14)
NEUTROPHILS # BLD AUTO: 6.91 K/UL — SIGNIFICANT CHANGE UP (ref 1.8–7.4)
NEUTROPHILS NFR BLD AUTO: 72.8 % — SIGNIFICANT CHANGE UP (ref 43–77)
NRBC # BLD: 0 /100 WBCS — SIGNIFICANT CHANGE UP (ref 0–0)
PHOSPHATE SERPL-MCNC: 2.9 MG/DL — SIGNIFICANT CHANGE UP (ref 2.5–4.5)
PLATELET # BLD AUTO: 274 K/UL — SIGNIFICANT CHANGE UP (ref 150–400)
POTASSIUM SERPL-MCNC: 3.5 MMOL/L — SIGNIFICANT CHANGE UP (ref 3.5–5.3)
POTASSIUM SERPL-SCNC: 3.5 MMOL/L — SIGNIFICANT CHANGE UP (ref 3.5–5.3)
PROT SERPL-MCNC: 5.9 G/DL — LOW (ref 6–8.3)
RBC # BLD: 3.87 M/UL — SIGNIFICANT CHANGE UP (ref 3.8–5.2)
RBC # FLD: 14.7 % — HIGH (ref 10.3–14.5)
SODIUM SERPL-SCNC: 136 MMOL/L — SIGNIFICANT CHANGE UP (ref 135–145)
SPECIMEN SOURCE: SIGNIFICANT CHANGE UP
WBC # BLD: 9.49 K/UL — SIGNIFICANT CHANGE UP (ref 3.8–10.5)
WBC # FLD AUTO: 9.49 K/UL — SIGNIFICANT CHANGE UP (ref 3.8–10.5)

## 2022-08-03 PROCEDURE — 99233 SBSQ HOSP IP/OBS HIGH 50: CPT | Mod: GC

## 2022-08-03 PROCEDURE — 93306 TTE W/DOPPLER COMPLETE: CPT | Mod: 26

## 2022-08-03 PROCEDURE — 93356 MYOCRD STRAIN IMG SPCKL TRCK: CPT

## 2022-08-03 PROCEDURE — 99222 1ST HOSP IP/OBS MODERATE 55: CPT | Mod: GC

## 2022-08-03 RX ORDER — FLUTICASONE PROPIONATE 50 MCG
1 SPRAY, SUSPENSION NASAL
Refills: 0 | Status: DISCONTINUED | OUTPATIENT
Start: 2022-08-03 | End: 2022-08-05

## 2022-08-03 RX ORDER — BUDESONIDE AND FORMOTEROL FUMARATE DIHYDRATE 160; 4.5 UG/1; UG/1
2 AEROSOL RESPIRATORY (INHALATION)
Refills: 0 | Status: DISCONTINUED | OUTPATIENT
Start: 2022-08-03 | End: 2022-08-05

## 2022-08-03 RX ORDER — TIOTROPIUM BROMIDE 18 UG/1
1 CAPSULE ORAL; RESPIRATORY (INHALATION) DAILY
Refills: 0 | Status: DISCONTINUED | OUTPATIENT
Start: 2022-08-03 | End: 2022-08-05

## 2022-08-03 RX ADMIN — Medication 1 SPRAY(S): at 17:13

## 2022-08-03 RX ADMIN — Medication 3 MILLILITER(S): at 05:18

## 2022-08-03 RX ADMIN — TIOTROPIUM BROMIDE 1 CAPSULE(S): 18 CAPSULE ORAL; RESPIRATORY (INHALATION) at 17:14

## 2022-08-03 RX ADMIN — OXCARBAZEPINE 150 MILLIGRAM(S): 300 TABLET, FILM COATED ORAL at 05:18

## 2022-08-03 RX ADMIN — Medication 75 MICROGRAM(S): at 05:18

## 2022-08-03 RX ADMIN — Medication 20 MILLIGRAM(S): at 17:14

## 2022-08-03 RX ADMIN — Medication 1000 UNIT(S): at 12:26

## 2022-08-03 RX ADMIN — ATENOLOL 25 MILLIGRAM(S): 25 TABLET ORAL at 17:15

## 2022-08-03 RX ADMIN — ATENOLOL 25 MILLIGRAM(S): 25 TABLET ORAL at 05:18

## 2022-08-03 RX ADMIN — Medication 1 TABLET(S): at 12:26

## 2022-08-03 RX ADMIN — RIVAROXABAN 20 MILLIGRAM(S): KIT at 12:26

## 2022-08-03 RX ADMIN — CEFTRIAXONE 100 MILLIGRAM(S): 500 INJECTION, POWDER, FOR SOLUTION INTRAMUSCULAR; INTRAVENOUS at 20:25

## 2022-08-03 RX ADMIN — BUDESONIDE AND FORMOTEROL FUMARATE DIHYDRATE 2 PUFF(S): 160; 4.5 AEROSOL RESPIRATORY (INHALATION) at 20:34

## 2022-08-03 RX ADMIN — ATORVASTATIN CALCIUM 20 MILLIGRAM(S): 80 TABLET, FILM COATED ORAL at 21:22

## 2022-08-03 RX ADMIN — Medication 600 MILLIGRAM(S): at 17:14

## 2022-08-03 RX ADMIN — Medication 20 MILLIGRAM(S): at 05:18

## 2022-08-03 RX ADMIN — OXCARBAZEPINE 150 MILLIGRAM(S): 300 TABLET, FILM COATED ORAL at 17:15

## 2022-08-03 NOTE — PROGRESS NOTE ADULT - PROBLEM SELECTOR PLAN 2
hypoxic 88-99% on RA on presentation to the ED, on NC  Possibly 2/2 AFib with RVR as etiology of SOB vs CHF i/s/o sepsis 2/2 UTI  - Weaned to RA  - Continuous pulse ox  - POCUS: lungs R moderate pleural effusion, cardiac biatrial enlargement, mild-mod reduced EF  - CXR b/l pleural effusions  - f/u echo  - cardio recs (o/p Dr. Cm): c/w abx and rx (including atenolol given rate controlled Afib and longstanding use)  - lasix iv 20 daily hypoxic 88-99% on RA on presentation to the ED, on NC  Possibly 2/2 AFib with RVR as etiology of SOB vs CHF i/s/o sepsis 2/2 UTI  - Weaned to RA  - Continuous pulse ox  - POCUS: lungs R moderate pleural effusion, cardiac biatrial enlargement, mild-mod reduced EF  - CXR b/l pleural effusions  - cardio recs (Dr. Cm): c/w abx and rx (inc. atenolol given rate controlled Afib and longstanding use)  - lasix po 20 daily  - f/u echo hypoxic 88-99% on RA on presentation to the ED, on NC  Possibly 2/2 AFib with RVR as etiology of SOB vs CHF i/s/o sepsis 2/2 UTI  - Weaned to RA  - Continuous pulse ox  - POCUS: lungs R moderate pleural effusion, cardiac biatrial enlargement, mild-mod reduced EF  - CXR b/l pleural effusions  - cardio recs (Dr. Cm): c/w abx and rx (inc. atenolol given rate controlled Afib and longstanding use)  - lasix po 20 daily  - TTE: RV enlargement w/ decreased RV systolic function, severe TR and pulm HTN

## 2022-08-03 NOTE — PROGRESS NOTE ADULT - PROBLEM SELECTOR PLAN 1
WBC, fever, elevated procal  sepsis 2/2 UTI (E. Coli bacteremia)  - f/u blood and urine cx, UA  - on CTX (has already rcv'd vanc and zosyn)  - Gm neg rods 8/1, repeat blood cx  - urology recs: payne s/p post-void bladder scan (340cc), no intervention on L renal mass per pt/family wishes, consider ID input for uti ppx abx  - renal and RUQ US: left upper pole mass suspicious for RCC, chronic renal parenchymal disease  - CTAP? WBC, fever, elevated procal - sepsis 2/2 UTI (E. Coli bacteremia)  - f/u blood and urine cx, UA  - on CTX (has already rcv'd vanc and zosyn)  - Gm neg rods 8/1, repeat blood cx  - uro recs: payne s/p post-void bladder scan (340cc), no intervention on L renal mass per pt/family wishes  - renal and RUQ US: left upper pole mass suspicious for RCC with chronic renal parenchymal disease, pt does not want bx of renal mass  - ID recs: ppx ? (e. coli uti x2) WBC, fever, elevated procal - sepsis 2/2 UTI (E. Coli bacteremia)  - uro recs: payne s/p post-void bladder scan, no intervention/bx of L renal mass per pt/family wishes  - renal and RUQ US: left upper pole mass suspicious for RCC with chronic renal parenchymal disease  - ID recs: bacteremia likely presumed from urinary source  BCx (7/31) Ecoli CTX sensitive, BCx (8/2) f/u (likely sterile because already on therapy)  UA grossly positive, no cultures, prior Cx Ecoli in 4/2022  S/p zosyn (7/31 - 8/1)  c/w CTX 1G qD (7/31-8/9) for 10 days total and d/c on PO cefpodoxime 200mg BID WBC, fever, elevated procal - sepsis 2/2 UTI (E. Coli bacteremia)  - uro recs: payne s/p post-void bladder scan, no intervention/bx of L renal mass per pt/family wishes  - renal and RUQ US: left upper pole mass suspicious for RCC with chronic renal parenchymal disease  - ID recs: bacteremia likely presumed from urinary source  BCx (7/31) Ecoli CTX sensitive, BCx (8/2) f/u (likely sterile because already on therapy)  UA grossly positive, no cultures, prior Cx Ecoli in 4/2022  S/p zosyn (7/31 - 8/1)  c/w CTX 1G qD (7/31-8/9) for 10 days total and d/c on PO cefpodoxime 200mg BID, can give Hiprex o/p but no chronic ppx abx

## 2022-08-03 NOTE — CONSULT NOTE ADULT - SUBJECTIVE AND OBJECTIVE BOX
Patient is a 89y old  Female who presents with a chief complaint of Fever and Hypoxia (03 Aug 2022 07:58)    HPI:  89F with Afib w/ RVR (on Xarelto), CHF, seizure disorder, right hip replacement (2007), shoulder rotator cuff surgery (2008), spinal surgery, right foot neuropathy, HTN, HLD presented with fever, weakness dyspnea and LE swelling, admitted for Acute on CHF exacerbation and sepsis secondary to Ecoli bacteremia and UTI.               PAST MEDICAL & SURGICAL HISTORY:  HTN (hypertension)  Hyperlipemia  GERD (gastroesophageal reflux disease)  Hypothyroidism  TIA (trasient ischemic attack)  CVA (cerebrovascular accident)  Afib  on pradaxa  Status post left hip replacement  H/O repair of left rotator cuff  S/P laminectomy  Bilateral cataracts      Allergies  No Known Allergies    ANTIMICROBIALS (past 90 days)  MEDICATIONS  (STANDING):    s/p Vanco (7/31), Zosyn (7/31 - 8/1)  cefTRIAXone   IVPB 1000 every 24 hours (7/31 --- )    MEDICATIONS  (STANDING):  acetaminophen     Tablet .. 650 every 6 hours PRN  ATENolol  Tablet 25 two times a day  atorvastatin 20 at bedtime  budesonide  80 MICROgram(s)/formoterol 4.5 MICROgram(s) Inhaler 2 two times a day  furosemide    Tablet 20 two times a day  guaiFENesin  every 12 hours  levothyroxine 75 daily  OXcarbazepine 150 two times a day  rivaroxaban 20 daily  tiotropium 18 MICROgram(s) Capsule 1 daily    SOCIAL HISTORY:       FAMILY HISTORY:    REVIEW OF SYSTEMS  [  ] ROS unobtainable because:    [  ] All other systems negative except as noted below:	    Constitutional:  [ ] fever [ ] chills  [ ] weight loss  [ ] weakness  Skin:  [ ] rash [ ] phlebitis	  Eyes: [ ] icterus [ ] pain  [ ] discharge	  ENMT: [ ] sore throat  [ ] thrush [ ] ulcers [ ] exudates  Respiratory: [ ] dyspnea [ ] hemoptysis [ ] cough [ ] sputum	  Cardiovascular:  [ ] chest pain [ ] palpitations [ ] edema	  Gastrointestinal:  [ ] nausea [ ] vomiting [ ] diarrhea [ ] constipation [ ] pain	  Genitourinary:  [ ] dysuria [ ] frequency [ ] hematuria [ ] discharge [ ] flank pain  [ ] incontinence  Musculoskeletal:  [ ] myalgias [ ] arthralgias [ ] arthritis  [ ] back pain  Neurological:  [ ] headache [ ] seizures  [ ] confusion/altered mental status  Psychiatric:  [ ] anxiety [ ] depression	  Hematology/Lymphatics:  [ ] lymphadenopathy  Endocrine:  [ ] adrenal [ ] thyroid  Allergic/Immunologic:	 [ ] transplant [ ] seasonal    Vital Signs Last 24 Hrs  T(F): 97.7 (08-03-22 @ 11:28), Max: 103.8 (07-31-22 @ 09:38)  Vital Signs Last 24 Hrs  HR: 91 (08-03-22 @ 11:28) (89 - 109)  BP: 133/86 (08-03-22 @ 11:28) (112/79 - 150/90)  RR: 18 (08-03-22 @ 11:28)  SpO2: 91% (08-03-22 @ 11:28) (91% - 94%)  Wt(kg): --    Physical Exam:  Constitutional:  well preserved, comfortable  Head/Eyes: no icterus, PERRL, EOMI  ENT:  supple; no thrush  LUNGS:  CTA  CVS:  normal S1, S2, no murmur  Abd:  soft, non-tender; non-distended  Ext:  no edema  Vascular:  IV site no erythema tenderness or discharge  MSK:  joints without swelling  Neuro: AAO X 3, non- focal                              12.0   9.49  )-----------( 274      ( 03 Aug 2022 07:31 )             35.9   08-03    136  |  98  |  20  ----------------------------<  89  3.5   |  25  |  0.90    Ca    8.8      03 Aug 2022 07:30  Phos  2.9     08-03  Mg     1.9     08-03    TPro  5.9<L>  /  Alb  2.8<L>  /  TBili  0.2  /  DBili  x   /  AST  66<H>  /  ALT  74<H>  /  AlkPhos  139<H>  08-03    MICROBIOLOGY:  Culture - Blood (collected 02 Aug 2022 06:22)  Source: .Blood Blood-Venous  Preliminary Report (03 Aug 2022 09:01):    No growth to date.    Culture - Blood (collected 02 Aug 2022 06:22)  Source: .Blood Blood-Peripheral  Preliminary Report (03 Aug 2022 09:01):    No growth to date.    Culture - Blood (collected 31 Jul 2022 09:12)  Source: .Blood Blood-Venous  Gram Stain (01 Aug 2022 01:11):    Growth in aerobic bottle: Gram Negative Rods    Growth in anaerobic bottle: Gram Negative Rods  Final Report (02 Aug 2022 18:31):    Growth in aerobic and anaerobic bottles: Escherichia coli    ***Blood Panel PCR results on this specimen are available    approximately 3 hours after the Gram stain result.***    Gram stain, PCR, and/or culture results may not always    correspond due to difference in methodologies.    ************************************************************    This PCR assay was performed by multiplex PCR. This    Assay tests for 66 bacterial and resistance gene targets.    Please refer to the [facility] test directory    at [email or web address] for details.  Organism: Blood Culture PCR  Escherichia coli (02 Aug 2022 18:31)  Organism: Escherichia coli (02 Aug 2022 18:31)      -  Amikacin: S <=16      -  Ampicillin: S <=8 These ampicillin results predict results for amoxicillin      -  Ampicillin/Sulbactam: S <=4/2 Enterobacter, Klebsiella aerogenes, Citrobacter, and Serratia may develop resistance during prolonged therapy (3-4 days)      -  Aztreonam: S <=4      -  Cefazolin: S <=2 Enterobacter, Klebsiella aerogenes, Citrobacter, and Serratia may develop resistance during prolonged therapy (3-4 days)      -  Cefepime: S <=2      -  Cefoxitin: S <=8      -  Ceftriaxone: S <=1 Enterobacter, Klebsiella aerogenes, Citrobacter, and Serratia may develop resistance during prolonged therapy      -  Ciprofloxacin: S <=0.25      -  Ertapenem: S <=0.5      -  Gentamicin: S <=2      -  Imipenem: S <=1      -  Levofloxacin: S <=0.5      -  Meropenem: S <=1      -  Piperacillin/Tazobactam: S <=8      -  Tobramycin: S <=2      -  Trimethoprim/Sulfamethoxazole: R >2/38      Method Type: HAMLET  Organism: Blood Culture PCR (02 Aug 2022 18:31)      -  Escherichia coli: Detec      Method Type: PCR    Culture - Blood (collected 31 Jul 2022 09:10)  Source: .Blood Blood-Peripheral  Gram Stain (01 Aug 2022 01:11):    Growth in aerobic bottle: Gram Negative Rods    Growth in anaerobic bottle: Gram Negative Rods  Final Report (02 Aug 2022 18:38):    Growth in aerobic and anaerobic bottles: Escherichia coli    See previous culture [id number]      Rapid RVP Result: NotDetec (07-31 @ 09:32)      RADIOLOGY:  imaging below personally reviewed and agree with findings  < from: US Urinary Bladder (08.02.22 @ 17:49) >    IMPRESSION:  Abdominal ultrasound:  1.  No sonographic evidence of cholelithiasis, acute cholecystitis or   biliary ductal dilatation.   2. Heterogeneous left upper pole mass measures 3.5 x 3.1 x 2.8 cm. is   suspicious for renalcell carcinoma.  Renal biopsy may be considered for   pathologic diagnosis.  3.  Both kidneys are echogenic compatible chronic renal parenchymal   disease.  4.  Pleural effusions are partially visualized bilaterally    Bladder ultrasound: No sonographic abnormality in the bladder.    < end of copied text >  < from: US Abdomen Upper Quadrant Right (08.02.22 @ 16:24) >  IMPRESSION:  Abdominal ultrasound:  1.  No sonographic evidence of cholelithiasis, acute cholecystitis or   biliary ductal dilatation.   2. Heterogeneous left upper pole mass measures 3.5 x 3.1 x 2.8 cm. is   suspicious for renalcell carcinoma.  Renal biopsy may be considered for   pathologic diagnosis.  3.  Both kidneys are echogenic compatible chronic renal parenchymal   disease.  4.  Pleural effusions are partially visualized bilaterally    Bladder ultrasound: No sonographic abnormality in the bladder.        < end of copied text >  < from: Xray Chest 1 View- PORTABLE-Urgent (Xray Chest 1 View- PORTABLE-Urgent .) (07.31.22 @ 09:29) >  IMPRESSION:  Bilateral pleural effusions.    < end of copied text >   Patient is a 89y old  Female who presents with a chief complaint of Fever and Hypoxia (03 Aug 2022 07:58)    HPI:  89F with Afib w/ RVR (on Xarelto), CHF, seizure disorder, right hip replacement (2007), shoulder rotator cuff surgery (2008), spinal surgery, right foot neuropathy, HTN, HLD presented with fever, weakness dyspnea and LE swelling, admitted for Acute on CHF exacerbation and sepsis secondary to Ecoli bacteremia and UTI.      In ED febrile 103.8F Tmax, but since been afebirle  WBC 20 now 9  BMP normal, mild AST/ALT elevated  CXR with b/l pleural effusion  US abdomen with L sided 3cm renal mass, CT with same renal mass in April 2022  BCx (7/31) 4 out of 4 Ecoli CTX sensitive   BCx (8/2) NGTD  MRSA negative, COVID negative  UA grossly positive, no cultures, prior Cx Ecoli in April 2022    S/p CTX 1G qD (7/31 - 8/1) and zosyn (7/31 - 8/1)  Patient states feeling much better. Denies any further fever or weakness. Dyspnea was always present but chronic. Denies frequent UTI infections but was recently admitted for UTI in April.          PAST MEDICAL & SURGICAL HISTORY:  HTN (hypertension)  Hyperlipemia  GERD (gastroesophageal reflux disease)  Hypothyroidism  TIA (trasient ischemic attack)  CVA (cerebrovascular accident)  Afib  on pradaxa  Status post left hip replacement  H/O repair of left rotator cuff  S/P laminectomy  Bilateral cataracts      Allergies  No Known Allergies    ANTIMICROBIALS (past 90 days)  MEDICATIONS  (STANDING):    s/p Vanco (7/31), Zosyn (7/31 - 8/1)  cefTRIAXone   IVPB 1000 every 24 hours (7/31 --- )    MEDICATIONS  (STANDING):  acetaminophen     Tablet .. 650 every 6 hours PRN  ATENolol  Tablet 25 two times a day  atorvastatin 20 at bedtime  budesonide  80 MICROgram(s)/formoterol 4.5 MICROgram(s) Inhaler 2 two times a day  furosemide    Tablet 20 two times a day  guaiFENesin  every 12 hours  levothyroxine 75 daily  OXcarbazepine 150 two times a day  rivaroxaban 20 daily  tiotropium 18 MICROgram(s) Capsule 1 daily    SOCIAL HISTORY:  former smoker, no alcohol or IVDU, currently lives alone     FAMILY HISTORY: no family history of cancer    REVIEW OF SYSTEMS  [x] All other systems negative except as noted below:	    Constitutional:  [ ] fever [ ] chills  [ ] weight loss  [ ] weakness  Skin:  [ ] rash [ ] phlebitis	  Eyes: [ ] icterus [ ] pain  [ ] discharge	  ENMT: [ ] sore throat  [ ] thrush [ ] ulcers [ ] exudates  Respiratory: [ ] dyspnea [ ] hemoptysis [ ] cough [ ] sputum	  Cardiovascular:  [ ] chest pain [ ] palpitations [ ] edema	  Gastrointestinal:  [ ] nausea [ ] vomiting [ ] diarrhea [ ] constipation [ ] pain	  Genitourinary:  [ ] dysuria [ ] frequency [ ] hematuria [ ] discharge [ ] flank pain  [ ] incontinence  Musculoskeletal:  [ ] myalgias [ ] arthralgias [ ] arthritis  [ ] back pain  Neurological:  [ ] headache [ ] seizures  [ ] confusion/altered mental status  Psychiatric:  [ ] anxiety [ ] depression	  Hematology/Lymphatics:  [ ] lymphadenopathy  Endocrine:  [ ] adrenal [ ] thyroid  Allergic/Immunologic:	 [ ] transplant [ ] seasonal    Vital Signs Last 24 Hrs  T(F): 97.7 (08-03-22 @ 11:28), Max: 103.8 (07-31-22 @ 09:38)  Vital Signs Last 24 Hrs  HR: 91 (08-03-22 @ 11:28) (89 - 109)  BP: 133/86 (08-03-22 @ 11:28) (112/79 - 150/90)  RR: 18 (08-03-22 @ 11:28)  SpO2: 91% (08-03-22 @ 11:28) (91% - 94%)  Wt(kg): --    Physical Exam:  Constitutional:  elderly female, comfortable  Head/Eyes: no icterus, PERRL, EOMI  ENT:  supple; no thrush  LUNGS:  crackles bibasilar   CVS:  normal S1, S2, no murmur  Abd:  soft, non-tender; non-distended, no CVA tenderness  Ext:  2+ LE edema bilaterally  Vascular:  IV site no erythema tenderness or discharge  MSK:  joints without swelling  Neuro: AAO X 3, non- focal                              12.0   9.49  )-----------( 274      ( 03 Aug 2022 07:31 )             35.9   08-03    136  |  98  |  20  ----------------------------<  89  3.5   |  25  |  0.90    Ca    8.8      03 Aug 2022 07:30  Phos  2.9     08-03  Mg     1.9     08-03    TPro  5.9<L>  /  Alb  2.8<L>  /  TBili  0.2  /  DBili  x   /  AST  66<H>  /  ALT  74<H>  /  AlkPhos  139<H>  08-03    MICROBIOLOGY:  Culture - Blood (collected 02 Aug 2022 06:22)  Source: .Blood Blood-Venous  Preliminary Report (03 Aug 2022 09:01):    No growth to date.    Culture - Blood (collected 02 Aug 2022 06:22)  Source: .Blood Blood-Peripheral  Preliminary Report (03 Aug 2022 09:01):    No growth to date.    Culture - Blood (collected 31 Jul 2022 09:12)  Source: .Blood Blood-Venous  Gram Stain (01 Aug 2022 01:11):    Growth in aerobic bottle: Gram Negative Rods    Growth in anaerobic bottle: Gram Negative Rods  Final Report (02 Aug 2022 18:31):    Growth in aerobic and anaerobic bottles: Escherichia coli    ***Blood Panel PCR results on this specimen are available    approximately 3 hours after the Gram stain result.***    Gram stain, PCR, and/or culture results may not always    correspond due to difference in methodologies.    ************************************************************    This PCR assay was performed by multiplex PCR. This    Assay tests for 66 bacterial and resistance gene targets.    Please refer to the [facility] test directory    at [email or web address] for details.  Organism: Blood Culture PCR  Escherichia coli (02 Aug 2022 18:31)  Organism: Escherichia coli (02 Aug 2022 18:31)      -  Amikacin: S <=16      -  Ampicillin: S <=8 These ampicillin results predict results for amoxicillin      -  Ampicillin/Sulbactam: S <=4/2 Enterobacter, Klebsiella aerogenes, Citrobacter, and Serratia may develop resistance during prolonged therapy (3-4 days)      -  Aztreonam: S <=4      -  Cefazolin: S <=2 Enterobacter, Klebsiella aerogenes, Citrobacter, and Serratia may develop resistance during prolonged therapy (3-4 days)      -  Cefepime: S <=2      -  Cefoxitin: S <=8      -  Ceftriaxone: S <=1 Enterobacter, Klebsiella aerogenes, Citrobacter, and Serratia may develop resistance during prolonged therapy      -  Ciprofloxacin: S <=0.25      -  Ertapenem: S <=0.5      -  Gentamicin: S <=2      -  Imipenem: S <=1      -  Levofloxacin: S <=0.5      -  Meropenem: S <=1      -  Piperacillin/Tazobactam: S <=8      -  Tobramycin: S <=2      -  Trimethoprim/Sulfamethoxazole: R >2/38      Method Type: HAMLET  Organism: Blood Culture PCR (02 Aug 2022 18:31)      -  Escherichia coli: Detec      Method Type: PCR    Culture - Blood (collected 31 Jul 2022 09:10)  Source: .Blood Blood-Peripheral  Gram Stain (01 Aug 2022 01:11):    Growth in aerobic bottle: Gram Negative Rods    Growth in anaerobic bottle: Gram Negative Rods  Final Report (02 Aug 2022 18:38):    Growth in aerobic and anaerobic bottles: Escherichia coli    See previous culture [id number]      Rapid RVP Result: NotDetec (07-31 @ 09:32)      RADIOLOGY:  imaging below personally reviewed and agree with findings  < from: US Urinary Bladder (08.02.22 @ 17:49) >    IMPRESSION:  Abdominal ultrasound:  1.  No sonographic evidence of cholelithiasis, acute cholecystitis or   biliary ductal dilatation.   2. Heterogeneous left upper pole mass measures 3.5 x 3.1 x 2.8 cm. is   suspicious for renalcell carcinoma.  Renal biopsy may be considered for   pathologic diagnosis.  3.  Both kidneys are echogenic compatible chronic renal parenchymal   disease.  4.  Pleural effusions are partially visualized bilaterally    Bladder ultrasound: No sonographic abnormality in the bladder.    < end of copied text >  < from: US Abdomen Upper Quadrant Right (08.02.22 @ 16:24) >  IMPRESSION:  Abdominal ultrasound:  1.  No sonographic evidence of cholelithiasis, acute cholecystitis or   biliary ductal dilatation.   2. Heterogeneous left upper pole mass measures 3.5 x 3.1 x 2.8 cm. is   suspicious for renalcell carcinoma.  Renal biopsy may be considered for   pathologic diagnosis.  3.  Both kidneys are echogenic compatible chronic renal parenchymal   disease.  4.  Pleural effusions are partially visualized bilaterally    Bladder ultrasound: No sonographic abnormality in the bladder.        < end of copied text >  < from: Xray Chest 1 View- PORTABLE-Urgent (Xray Chest 1 View- PORTABLE-Urgent .) (07.31.22 @ 09:29) >  IMPRESSION:  Bilateral pleural effusions.    < end of copied text >   Patient is a 89y old  Female who presents with a chief complaint of Fever and Hypoxia (03 Aug 2022 07:58)    HPI:  89F with Afib w/ RVR (on Xarelto), CHF, seizure disorder, right hip replacement (2007), shoulder rotator cuff surgery (2008), spinal surgery, right foot neuropathy, HTN, HLD presented with fever, weakness dyspnea and LE swelling, admitted for Acute on CHF exacerbation and sepsis secondary to Ecoli bacteremia and UTI.      In ED febrile 103.8F Tmax, but since been afebirle  WBC 20 now 9  BMP normal, mild AST/ALT elevated  CXR with b/l pleural effusion  US abdomen with L sided 3cm renal mass, CT with same renal mass in April 2022  BCx (7/31) 4 out of 4 Ecoli CTX sensitive   BCx (8/2) NGTD  MRSA negative, COVID negative  UA grossly positive, no cultures, prior Cx Ecoli in April 2022    S/p CTX 1G qD (7/31 - 8/1) and zosyn (7/31 - 8/1)  Patient states feeling much better. Denies any further fever or weakness. Dyspnea was always present but chronic. Denies frequent UTI infections but was recently admitted for Ecoli UTI and bacteremia in April, was treated with ancef 1G q8, discharged with keflex 500 BID to complete for 10d.          PAST MEDICAL & SURGICAL HISTORY:  HTN (hypertension)  Hyperlipemia  GERD (gastroesophageal reflux disease)  Hypothyroidism  TIA (trasient ischemic attack)  CVA (cerebrovascular accident)  Afib  on pradaxa  Status post left hip replacement  H/O repair of left rotator cuff  S/P laminectomy  Bilateral cataracts      Allergies  No Known Allergies    ANTIMICROBIALS (past 90 days)  MEDICATIONS  (STANDING):    s/p Vanco (7/31), Zosyn (7/31 - 8/1)  cefTRIAXone   IVPB 1000 every 24 hours (7/31 --- )    MEDICATIONS  (STANDING):  acetaminophen     Tablet .. 650 every 6 hours PRN  ATENolol  Tablet 25 two times a day  atorvastatin 20 at bedtime  budesonide  80 MICROgram(s)/formoterol 4.5 MICROgram(s) Inhaler 2 two times a day  furosemide    Tablet 20 two times a day  guaiFENesin  every 12 hours  levothyroxine 75 daily  OXcarbazepine 150 two times a day  rivaroxaban 20 daily  tiotropium 18 MICROgram(s) Capsule 1 daily    SOCIAL HISTORY:  former smoker, no alcohol or IVDU, currently lives alone     FAMILY HISTORY: no family history of cancer    REVIEW OF SYSTEMS  [x] All other systems negative except as noted below:	    Constitutional:  [ ] fever [ ] chills  [ ] weight loss  [ ] weakness  Skin:  [ ] rash [ ] phlebitis	  Eyes: [ ] icterus [ ] pain  [ ] discharge	  ENMT: [ ] sore throat  [ ] thrush [ ] ulcers [ ] exudates  Respiratory: [ ] dyspnea [ ] hemoptysis [ ] cough [ ] sputum	  Cardiovascular:  [ ] chest pain [ ] palpitations [ ] edema	  Gastrointestinal:  [ ] nausea [ ] vomiting [ ] diarrhea [ ] constipation [ ] pain	  Genitourinary:  [ ] dysuria [ ] frequency [ ] hematuria [ ] discharge [ ] flank pain  [ ] incontinence  Musculoskeletal:  [ ] myalgias [ ] arthralgias [ ] arthritis  [ ] back pain  Neurological:  [ ] headache [ ] seizures  [ ] confusion/altered mental status  Psychiatric:  [ ] anxiety [ ] depression	  Hematology/Lymphatics:  [ ] lymphadenopathy  Endocrine:  [ ] adrenal [ ] thyroid  Allergic/Immunologic:	 [ ] transplant [ ] seasonal    Vital Signs Last 24 Hrs  T(F): 97.7 (08-03-22 @ 11:28), Max: 103.8 (07-31-22 @ 09:38)  Vital Signs Last 24 Hrs  HR: 91 (08-03-22 @ 11:28) (89 - 109)  BP: 133/86 (08-03-22 @ 11:28) (112/79 - 150/90)  RR: 18 (08-03-22 @ 11:28)  SpO2: 91% (08-03-22 @ 11:28) (91% - 94%)  Wt(kg): --    Physical Exam:  Constitutional:  elderly female, comfortable  Head/Eyes: no icterus, PERRL, EOMI  ENT:  supple; no thrush  LUNGS:  crackles bibasilar   CVS:  normal S1, S2, no murmur  Abd:  soft, non-tender; non-distended, no CVA tenderness  Ext:  2+ LE edema bilaterally  Vascular:  IV site no erythema tenderness or discharge  MSK:  joints without swelling  Neuro: AAO X 3, non- focal                              12.0   9.49  )-----------( 274      ( 03 Aug 2022 07:31 )             35.9   08-03    136  |  98  |  20  ----------------------------<  89  3.5   |  25  |  0.90    Ca    8.8      03 Aug 2022 07:30  Phos  2.9     08-03  Mg     1.9     08-03    TPro  5.9<L>  /  Alb  2.8<L>  /  TBili  0.2  /  DBili  x   /  AST  66<H>  /  ALT  74<H>  /  AlkPhos  139<H>  08-03    MICROBIOLOGY:  Culture - Blood (collected 02 Aug 2022 06:22)  Source: .Blood Blood-Venous  Preliminary Report (03 Aug 2022 09:01):    No growth to date.    Culture - Blood (collected 02 Aug 2022 06:22)  Source: .Blood Blood-Peripheral  Preliminary Report (03 Aug 2022 09:01):    No growth to date.    Culture - Blood (collected 31 Jul 2022 09:12)  Source: .Blood Blood-Venous  Gram Stain (01 Aug 2022 01:11):    Growth in aerobic bottle: Gram Negative Rods    Growth in anaerobic bottle: Gram Negative Rods  Final Report (02 Aug 2022 18:31):    Growth in aerobic and anaerobic bottles: Escherichia coli    ***Blood Panel PCR results on this specimen are available    approximately 3 hours after the Gram stain result.***    Gram stain, PCR, and/or culture results may not always    correspond due to difference in methodologies.    ************************************************************    This PCR assay was performed by multiplex PCR. This    Assay tests for 66 bacterial and resistance gene targets.    Please refer to the [facility] test directory    at [email or web address] for details.  Organism: Blood Culture PCR  Escherichia coli (02 Aug 2022 18:31)  Organism: Escherichia coli (02 Aug 2022 18:31)      -  Amikacin: S <=16      -  Ampicillin: S <=8 These ampicillin results predict results for amoxicillin      -  Ampicillin/Sulbactam: S <=4/2 Enterobacter, Klebsiella aerogenes, Citrobacter, and Serratia may develop resistance during prolonged therapy (3-4 days)      -  Aztreonam: S <=4      -  Cefazolin: S <=2 Enterobacter, Klebsiella aerogenes, Citrobacter, and Serratia may develop resistance during prolonged therapy (3-4 days)      -  Cefepime: S <=2      -  Cefoxitin: S <=8      -  Ceftriaxone: S <=1 Enterobacter, Klebsiella aerogenes, Citrobacter, and Serratia may develop resistance during prolonged therapy      -  Ciprofloxacin: S <=0.25      -  Ertapenem: S <=0.5      -  Gentamicin: S <=2      -  Imipenem: S <=1      -  Levofloxacin: S <=0.5      -  Meropenem: S <=1      -  Piperacillin/Tazobactam: S <=8      -  Tobramycin: S <=2      -  Trimethoprim/Sulfamethoxazole: R >2/38      Method Type: HAMLET  Organism: Blood Culture PCR (02 Aug 2022 18:31)      -  Escherichia coli: Detec      Method Type: PCR    Culture - Blood (collected 31 Jul 2022 09:10)  Source: .Blood Blood-Peripheral  Gram Stain (01 Aug 2022 01:11):    Growth in aerobic bottle: Gram Negative Rods    Growth in anaerobic bottle: Gram Negative Rods  Final Report (02 Aug 2022 18:38):    Growth in aerobic and anaerobic bottles: Escherichia coli    See previous culture [id number]      Rapid RVP Result: NotDetec (07-31 @ 09:32)      RADIOLOGY:  imaging below personally reviewed and agree with findings  < from: US Urinary Bladder (08.02.22 @ 17:49) >    IMPRESSION:  Abdominal ultrasound:  1.  No sonographic evidence of cholelithiasis, acute cholecystitis or   biliary ductal dilatation.   2. Heterogeneous left upper pole mass measures 3.5 x 3.1 x 2.8 cm. is   suspicious for renalcell carcinoma.  Renal biopsy may be considered for   pathologic diagnosis.  3.  Both kidneys are echogenic compatible chronic renal parenchymal   disease.  4.  Pleural effusions are partially visualized bilaterally    Bladder ultrasound: No sonographic abnormality in the bladder.    < end of copied text >  < from: US Abdomen Upper Quadrant Right (08.02.22 @ 16:24) >  IMPRESSION:  Abdominal ultrasound:  1.  No sonographic evidence of cholelithiasis, acute cholecystitis or   biliary ductal dilatation.   2. Heterogeneous left upper pole mass measures 3.5 x 3.1 x 2.8 cm. is   suspicious for renalcell carcinoma.  Renal biopsy may be considered for   pathologic diagnosis.  3.  Both kidneys are echogenic compatible chronic renal parenchymal   disease.  4.  Pleural effusions are partially visualized bilaterally    Bladder ultrasound: No sonographic abnormality in the bladder.        < end of copied text >  < from: Xray Chest 1 View- PORTABLE-Urgent (Xray Chest 1 View- PORTABLE-Urgent .) (07.31.22 @ 09:29) >  IMPRESSION:  Bilateral pleural effusions.    < end of copied text >   Patient is a 89y old  Female who presents with a chief complaint of Fever and Hypoxia (03 Aug 2022 07:58)    HPI:  89F with Afib w/ RVR (on Xarelto), CHF, seizure disorder, right hip replacement (2007), shoulder rotator cuff surgery (2008), spinal surgery, right foot neuropathy, HTN, HLD presented with fever, weakness dyspnea and LE swelling, admitted for Acute on CHF exacerbation and sepsis secondary to Ecoli bacteremia and UTI.      In ED febrile 103.8F Tmax, but since been afebirle  WBC 20 now 9  BMP normal, mild AST/ALT elevated  CXR with b/l pleural effusion  US abdomen with L sided 3cm renal mass, CT with same renal mass in April 2022  BCx (7/31) 4 out of 4 Ecoli CTX sensitive   BCx (8/2) NGTD  MRSA negative, COVID negative  UA grossly positive, no cultures, prior Cx Ecoli in April 2022    S/p CTX 1G qD (7/31 - 8/1) and zosyn (7/31 - 8/1)  Patient states feeling much better. Denies any further fever or weakness. Dyspnea was always present but chronic. Denies frequent UTI infections but was recently admitted for Ecoli UTI and bacteremia in April, was treated with ancef 1G q8, discharged with keflex 500 BID to complete for 10d.          PAST MEDICAL & SURGICAL HISTORY:  HTN (hypertension)  Hyperlipemia  GERD (gastroesophageal reflux disease)  Hypothyroidism  TIA (trasient ischemic attack)  CVA (cerebrovascular accident)  Afib  on pradaxa  Status post left hip replacement  H/O repair of left rotator cuff  S/P laminectomy  Bilateral cataracts      Allergies  No Known Allergies    ANTIMICROBIALS (past 90 days)  MEDICATIONS  (STANDING):    s/p Vanco (7/31), Zosyn (7/31 - 8/1)  cefTRIAXone   IVPB 1000 every 24 hours (7/31 --- )    MEDICATIONS  (STANDING):  acetaminophen     Tablet .. 650 every 6 hours PRN  ATENolol  Tablet 25 two times a day  atorvastatin 20 at bedtime  budesonide  80 MICROgram(s)/formoterol 4.5 MICROgram(s) Inhaler 2 two times a day  furosemide    Tablet 20 two times a day  guaiFENesin  every 12 hours  levothyroxine 75 daily  OXcarbazepine 150 two times a day  rivaroxaban 20 daily  tiotropium 18 MICROgram(s) Capsule 1 daily    SOCIAL HISTORY:  former smoker, no alcohol or IVDU, currently lives alone     FAMILY HISTORY: no family history of cancer    REVIEW OF SYSTEMS  [x] All other systems negative except as noted below:	    Constitutional:  [ ] fever [ ] chills  [ ] weight loss  [ ] weakness  Skin:  [ ] rash [ ] phlebitis	  Eyes: [ ] icterus [ ] pain  [ ] discharge	  ENMT: [ ] sore throat  [ ] thrush [ ] ulcers [ ] exudates  Respiratory: [ ] dyspnea [ ] hemoptysis [ ] cough [ ] sputum	  Cardiovascular:  [ ] chest pain [ ] palpitations [ ] edema	  Gastrointestinal:  [ ] nausea [ ] vomiting [ ] diarrhea [ ] constipation [ ] pain	  Genitourinary:  [ ] dysuria [ ] frequency [ ] hematuria [ ] discharge [ ] flank pain  [ ] incontinence  Musculoskeletal:  [ ] myalgias [ ] arthralgias [ ] arthritis  [ ] back pain  Neurological:  [ ] headache [ ] seizures  [ ] confusion/altered mental status  Psychiatric:  [ ] anxiety [ ] depression	  Hematology/Lymphatics:  [ ] lymphadenopathy  Endocrine:  [ ] adrenal [ ] thyroid  Allergic/Immunologic:	 [ ] transplant [ ] seasonal    Vital Signs Last 24 Hrs  T(F): 97.7 (08-03-22 @ 11:28), Max: 103.8 (07-31-22 @ 09:38)  Vital Signs Last 24 Hrs  HR: 91 (08-03-22 @ 11:28) (89 - 109)  BP: 133/86 (08-03-22 @ 11:28) (112/79 - 150/90)  RR: 18 (08-03-22 @ 11:28)  SpO2: 91% (08-03-22 @ 11:28) (91% - 94%)  Wt(kg): --    Physical Exam:  Constitutional:  elderly female, comfortable  Head/Eyes: no icterus, PERRL, EOMI  ENT:  supple; no thrush  LUNGS:  crackles bibasilar   CVS:  normal S1, S2, no murmur  Abd:  soft, non-tender; non-distended, no CVA tenderness  Ext:  2+ LE edema bilaterally, RUE ecchymosis   Vascular:  IV site no erythema tenderness or discharge  MSK:  joints without swelling  Neuro: AAO X 3, non- focal                              12.0   9.49  )-----------( 274      ( 03 Aug 2022 07:31 )             35.9   08-03    136  |  98  |  20  ----------------------------<  89  3.5   |  25  |  0.90    Ca    8.8      03 Aug 2022 07:30  Phos  2.9     08-03  Mg     1.9     08-03    TPro  5.9<L>  /  Alb  2.8<L>  /  TBili  0.2  /  DBili  x   /  AST  66<H>  /  ALT  74<H>  /  AlkPhos  139<H>  08-03    MICROBIOLOGY:  Culture - Blood (collected 02 Aug 2022 06:22)  Source: .Blood Blood-Venous  Preliminary Report (03 Aug 2022 09:01):    No growth to date.    Culture - Blood (collected 02 Aug 2022 06:22)  Source: .Blood Blood-Peripheral  Preliminary Report (03 Aug 2022 09:01):    No growth to date.    Culture - Blood (collected 31 Jul 2022 09:12)  Source: .Blood Blood-Venous  Gram Stain (01 Aug 2022 01:11):    Growth in aerobic bottle: Gram Negative Rods    Growth in anaerobic bottle: Gram Negative Rods  Final Report (02 Aug 2022 18:31):    Growth in aerobic and anaerobic bottles: Escherichia coli    ***Blood Panel PCR results on this specimen are available    approximately 3 hours after the Gram stain result.***    Gram stain, PCR, and/or culture results may not always    correspond due to difference in methodologies.    ************************************************************    This PCR assay was performed by multiplex PCR. This    Assay tests for 66 bacterial and resistance gene targets.    Please refer to the [facility] test directory    at [email or web address] for details.  Organism: Blood Culture PCR  Escherichia coli (02 Aug 2022 18:31)  Organism: Escherichia coli (02 Aug 2022 18:31)      -  Amikacin: S <=16      -  Ampicillin: S <=8 These ampicillin results predict results for amoxicillin      -  Ampicillin/Sulbactam: S <=4/2 Enterobacter, Klebsiella aerogenes, Citrobacter, and Serratia may develop resistance during prolonged therapy (3-4 days)      -  Aztreonam: S <=4      -  Cefazolin: S <=2 Enterobacter, Klebsiella aerogenes, Citrobacter, and Serratia may develop resistance during prolonged therapy (3-4 days)      -  Cefepime: S <=2      -  Cefoxitin: S <=8      -  Ceftriaxone: S <=1 Enterobacter, Klebsiella aerogenes, Citrobacter, and Serratia may develop resistance during prolonged therapy      -  Ciprofloxacin: S <=0.25      -  Ertapenem: S <=0.5      -  Gentamicin: S <=2      -  Imipenem: S <=1      -  Levofloxacin: S <=0.5      -  Meropenem: S <=1      -  Piperacillin/Tazobactam: S <=8      -  Tobramycin: S <=2      -  Trimethoprim/Sulfamethoxazole: R >2/38      Method Type: HAMLET  Organism: Blood Culture PCR (02 Aug 2022 18:31)      -  Escherichia coli: Detec      Method Type: PCR    Culture - Blood (collected 31 Jul 2022 09:10)  Source: .Blood Blood-Peripheral  Gram Stain (01 Aug 2022 01:11):    Growth in aerobic bottle: Gram Negative Rods    Growth in anaerobic bottle: Gram Negative Rods  Final Report (02 Aug 2022 18:38):    Growth in aerobic and anaerobic bottles: Escherichia coli    See previous culture [id number]      Rapid RVP Result: NotDetec (07-31 @ 09:32)      RADIOLOGY:  imaging below personally reviewed and agree with findings  < from: US Urinary Bladder (08.02.22 @ 17:49) >    IMPRESSION:  Abdominal ultrasound:  1.  No sonographic evidence of cholelithiasis, acute cholecystitis or   biliary ductal dilatation.   2. Heterogeneous left upper pole mass measures 3.5 x 3.1 x 2.8 cm. is   suspicious for renalcell carcinoma.  Renal biopsy may be considered for   pathologic diagnosis.  3.  Both kidneys are echogenic compatible chronic renal parenchymal   disease.  4.  Pleural effusions are partially visualized bilaterally    Bladder ultrasound: No sonographic abnormality in the bladder.    < end of copied text >  < from: US Abdomen Upper Quadrant Right (08.02.22 @ 16:24) >  IMPRESSION:  Abdominal ultrasound:  1.  No sonographic evidence of cholelithiasis, acute cholecystitis or   biliary ductal dilatation.   2. Heterogeneous left upper pole mass measures 3.5 x 3.1 x 2.8 cm. is   suspicious for renalcell carcinoma.  Renal biopsy may be considered for   pathologic diagnosis.  3.  Both kidneys are echogenic compatible chronic renal parenchymal   disease.  4.  Pleural effusions are partially visualized bilaterally    Bladder ultrasound: No sonographic abnormality in the bladder.        < end of copied text >  < from: Xray Chest 1 View- PORTABLE-Urgent (Xray Chest 1 View- PORTABLE-Urgent .) (07.31.22 @ 09:29) >  IMPRESSION:  Bilateral pleural effusions.    < end of copied text >   Patient is a 89y old  Female who presents with a chief complaint of Fever and Hypoxia (03 Aug 2022 07:58)    HPI:  89F with Afib w/ RVR (on Xarelto), CHF, seizure disorder, right hip replacement (2007), shoulder rotator cuff surgery (2008), spinal surgery, right foot neuropathy, HTN, HLD presented with fever, weakness dyspnea and LE swelling, admitted for Acute on CHF exacerbation and sepsis secondary to Ecoli bacteremia and UTI.      In ED febrile 103.8F Tmax, but since been afebirle  WBC 20 now 9  BMP normal, mild AST/ALT elevated  CXR with b/l pleural effusion  US abdomen with L sided 3cm renal mass, CT with same renal mass in April 2022  BCx (7/31) 4 out of 4 Ecoli CTX sensitive   BCx (8/2) NGTD  MRSA negative, COVID negative  UA grossly positive, no cultures, prior Cx Ecoli in April 2022    S/p CTX 1G qD (7/31 - 8/1) and zosyn (7/31 - 8/1)  Patient states feeling much better. Denies any further fever or weakness. Dyspnea was always present but chronic. Denies frequent UTI infections but was recently admitted for Ecoli UTI and bacteremia in April, was treated with ancef 1G q8, discharged with keflex 500 BID to complete for 10d.          PAST MEDICAL & SURGICAL HISTORY:  HTN (hypertension)  Hyperlipemia  GERD (gastroesophageal reflux disease)  Hypothyroidism  TIA (trasient ischemic attack)  CVA (cerebrovascular accident)  Afib  on pradaxa  Status post left hip replacement  H/O repair of left rotator cuff  S/P laminectomy  Bilateral cataracts      Allergies  No Known Allergies    ANTIMICROBIALS (past 90 days)  MEDICATIONS  (STANDING):    s/p Vanco (7/31), Zosyn (7/31 - 8/1)  cefTRIAXone   IVPB 1000 every 24 hours (7/31 --- )    MEDICATIONS  (STANDING):  acetaminophen     Tablet .. 650 every 6 hours PRN  ATENolol  Tablet 25 two times a day  atorvastatin 20 at bedtime  budesonide  80 MICROgram(s)/formoterol 4.5 MICROgram(s) Inhaler 2 two times a day  furosemide    Tablet 20 two times a day  guaiFENesin  every 12 hours  levothyroxine 75 daily  OXcarbazepine 150 two times a day  rivaroxaban 20 daily  tiotropium 18 MICROgram(s) Capsule 1 daily    SOCIAL HISTORY:  former smoker, no alcohol or IVDU, currently lives alone     FAMILY HISTORY: no recent febrile illness in family members    REVIEW OF SYSTEMS  [x] All other systems negative except as noted below:	    Constitutional:  [ ] fever [ ] chills  [ ] weight loss  [ ] weakness  Skin:  [ ] rash [ ] phlebitis	  Eyes: [ ] icterus [ ] pain  [ ] discharge	  ENMT: [ ] sore throat  [ ] thrush [ ] ulcers [ ] exudates  Respiratory: [ ] dyspnea [ ] hemoptysis [ ] cough [ ] sputum	  Cardiovascular:  [ ] chest pain [ ] palpitations [ ] edema	  Gastrointestinal:  [ ] nausea [ ] vomiting [ ] diarrhea [ ] constipation [ ] pain	  Genitourinary:  [ ] dysuria [ ] frequency [ ] hematuria [ ] discharge [ ] flank pain  [ ] incontinence  Musculoskeletal:  [ ] myalgias [ ] arthralgias [ ] arthritis  [ ] back pain  Neurological:  [ ] headache [ ] seizures  [ ] confusion/altered mental status  Psychiatric:  [ ] anxiety [ ] depression	  Hematology/Lymphatics:  [ ] lymphadenopathy  Endocrine:  [ ] adrenal [ ] thyroid  Allergic/Immunologic:	 [ ] transplant [ ] seasonal    Vital Signs Last 24 Hrs  T(F): 97.7 (08-03-22 @ 11:28), Max: 103.8 (07-31-22 @ 09:38)  Vital Signs Last 24 Hrs  HR: 91 (08-03-22 @ 11:28) (89 - 109)  BP: 133/86 (08-03-22 @ 11:28) (112/79 - 150/90)  RR: 18 (08-03-22 @ 11:28)  SpO2: 91% (08-03-22 @ 11:28) (91% - 94%)  Wt(kg): --    Physical Exam:  Constitutional: NAD, not toxic, sitting on a chair  Head: atraumatic, normocephalic  Eyes: no icterus, PERRL, EOMI  ENT:  supple; no thrush  LUNGS:  crackles bibasilar   CVS:  normal S1, S2, no murmur  Abd:  soft, non-tender; non-distended  : no suprapubic or CVA tenderness  skin: ecchymosis of L hand and R forearm, no rash  Ext:  2+ LE edema bilaterally, RUE ecchymosis   Vascular:  IV site no erythema tenderness or discharge  MSK:  joints without swelling  Neuro: AAO X 3, non- focal  psych: normal affect                              12.0   9.49  )-----------( 274      ( 03 Aug 2022 07:31 )             35.9   08-03    136  |  98  |  20  ----------------------------<  89  3.5   |  25  |  0.90    Ca    8.8      03 Aug 2022 07:30  Phos  2.9     08-03  Mg     1.9     08-03    TPro  5.9<L>  /  Alb  2.8<L>  /  TBili  0.2  /  DBili  x   /  AST  66<H>  /  ALT  74<H>  /  AlkPhos  139<H>  08-03    MICROBIOLOGY:  Culture - Blood (collected 02 Aug 2022 06:22)  Source: .Blood Blood-Venous  Preliminary Report (03 Aug 2022 09:01):    No growth to date.    Culture - Blood (collected 02 Aug 2022 06:22)  Source: .Blood Blood-Peripheral  Preliminary Report (03 Aug 2022 09:01):    No growth to date.    Culture - Blood (collected 31 Jul 2022 09:12)  Source: .Blood Blood-Venous  Gram Stain (01 Aug 2022 01:11):    Growth in aerobic bottle: Gram Negative Rods    Growth in anaerobic bottle: Gram Negative Rods  Final Report (02 Aug 2022 18:31):    Growth in aerobic and anaerobic bottles: Escherichia coli    ***Blood Panel PCR results on this specimen are available    approximately 3 hours after the Gram stain result.***    Gram stain, PCR, and/or culture results may not always    correspond due to difference in methodologies.    ************************************************************    This PCR assay was performed by multiplex PCR. This    Assay tests for 66 bacterial and resistance gene targets.    Please refer to the [facility] test directory    at [email or web address] for details.  Organism: Blood Culture PCR  Escherichia coli (02 Aug 2022 18:31)  Organism: Escherichia coli (02 Aug 2022 18:31)      -  Amikacin: S <=16      -  Ampicillin: S <=8 These ampicillin results predict results for amoxicillin      -  Ampicillin/Sulbactam: S <=4/2 Enterobacter, Klebsiella aerogenes, Citrobacter, and Serratia may develop resistance during prolonged therapy (3-4 days)      -  Aztreonam: S <=4      -  Cefazolin: S <=2 Enterobacter, Klebsiella aerogenes, Citrobacter, and Serratia may develop resistance during prolonged therapy (3-4 days)      -  Cefepime: S <=2      -  Cefoxitin: S <=8      -  Ceftriaxone: S <=1 Enterobacter, Klebsiella aerogenes, Citrobacter, and Serratia may develop resistance during prolonged therapy      -  Ciprofloxacin: S <=0.25      -  Ertapenem: S <=0.5      -  Gentamicin: S <=2      -  Imipenem: S <=1      -  Levofloxacin: S <=0.5      -  Meropenem: S <=1      -  Piperacillin/Tazobactam: S <=8      -  Tobramycin: S <=2      -  Trimethoprim/Sulfamethoxazole: R >2/38      Method Type: HAMLET  Organism: Blood Culture PCR (02 Aug 2022 18:31)      -  Escherichia coli: Detec      Method Type: PCR    Culture - Blood (collected 31 Jul 2022 09:10)  Source: .Blood Blood-Peripheral  Gram Stain (01 Aug 2022 01:11):    Growth in aerobic bottle: Gram Negative Rods    Growth in anaerobic bottle: Gram Negative Rods  Final Report (02 Aug 2022 18:38):    Growth in aerobic and anaerobic bottles: Escherichia coli    See previous culture [id number]      Rapid RVP Result: NotDetec (07-31 @ 09:32)      RADIOLOGY:  imaging below personally reviewed and agree with findings  < from: US Urinary Bladder (08.02.22 @ 17:49) >    IMPRESSION:  Abdominal ultrasound:  1.  No sonographic evidence of cholelithiasis, acute cholecystitis or   biliary ductal dilatation.   2. Heterogeneous left upper pole mass measures 3.5 x 3.1 x 2.8 cm. is   suspicious for renalcell carcinoma.  Renal biopsy may be considered for   pathologic diagnosis.  3.  Both kidneys are echogenic compatible chronic renal parenchymal   disease.  4.  Pleural effusions are partially visualized bilaterally    Bladder ultrasound: No sonographic abnormality in the bladder.    < end of copied text >  < from: US Abdomen Upper Quadrant Right (08.02.22 @ 16:24) >  IMPRESSION:  Abdominal ultrasound:  1.  No sonographic evidence of cholelithiasis, acute cholecystitis or   biliary ductal dilatation.   2. Heterogeneous left upper pole mass measures 3.5 x 3.1 x 2.8 cm. is   suspicious for renalcell carcinoma.  Renal biopsy may be considered for   pathologic diagnosis.  3.  Both kidneys are echogenic compatible chronic renal parenchymal   disease.  4.  Pleural effusions are partially visualized bilaterally    Bladder ultrasound: No sonographic abnormality in the bladder.        < end of copied text >  < from: Xray Chest 1 View- PORTABLE-Urgent (Xray Chest 1 View- PORTABLE-Urgent .) (07.31.22 @ 09:29) >  IMPRESSION:  Bilateral pleural effusions.    < end of copied text >

## 2022-08-03 NOTE — PROGRESS NOTE ADULT - SUBJECTIVE AND OBJECTIVE BOX
***************************************************************  Betty Jaramillo MD (PGY-1)  Internal Medicine  Pager: [phone]  ***************************************************************    PROGRESS NOTE:     Patient is a 89y old  Female who presents with a chief complaint of fever and hypoxia (02 Aug 2022 07:52)      SUBJECTIVE / OVERNIGHT EVENTS: Patient seen and examined at bedside. Patient had postvoid scan with Harris placed. This morning, the patient is comfortable and doing well. No acute complaints. Denies fevers, chills, N/V/D, chest pain, SOB, abdominal pain.    MEDICATIONS  (STANDING):  albuterol/ipratropium for Nebulization 3 milliLiter(s) Nebulizer every 6 hours  ATENolol  Tablet 25 milliGRAM(s) Oral two times a day  atorvastatin 20 milliGRAM(s) Oral at bedtime  cefTRIAXone   IVPB      cefTRIAXone   IVPB 1000 milliGRAM(s) IV Intermittent every 24 hours  chlorhexidine 2% Cloths 1 Application(s) Topical <User Schedule>  cholecalciferol 1000 Unit(s) Oral daily  furosemide    Tablet 20 milliGRAM(s) Oral two times a day  levothyroxine 75 MICROGram(s) Oral daily  multivitamin 1 Tablet(s) Oral daily  OXcarbazepine 150 milliGRAM(s) Oral two times a day  rivaroxaban 20 milliGRAM(s) Oral daily    MEDICATIONS  (PRN):  acetaminophen     Tablet .. 650 milliGRAM(s) Oral every 6 hours PRN Temp greater or equal to 38C (100.4F), Mild Pain (1 - 3)      CAPILLARY BLOOD GLUCOSE        I&O's Summary    02 Aug 2022 07:01  -  03 Aug 2022 07:00  --------------------------------------------------------  IN: 480 mL / OUT: 1400 mL / NET: -920 mL        PHYSICAL EXAM:  Vital Signs Last 24 Hrs  T(C): 36.4 (03 Aug 2022 04:44), Max: 36.8 (02 Aug 2022 10:45)  T(F): 97.5 (03 Aug 2022 04:44), Max: 98.2 (02 Aug 2022 10:45)  HR: 89 (03 Aug 2022 04:44) (77 - 109)  BP: 150/90 (03 Aug 2022 04:44) (112/79 - 150/90)  BP(mean): --  RR: 18 (03 Aug 2022 04:44) (18 - 20)  SpO2: 92% (03 Aug 2022 04:44) (87% - 94%)    Parameters below as of 03 Aug 2022 04:44  Patient On (Oxygen Delivery Method): room air      General: no acute distress  HEENT: Atraumatic, normocephalic, airway patent  Eyes: EOMI, tracking appropriately  Neck: no tracheal deviation, mild JVD  Resp: CTA, no WOB, + dyspnea with exertion  Heart: skin and extremities well perfused, tachy irregular rhythm  Neuro: AOx3, CN grossly intact  MSK: + b/l LE pitting edema, moving all extremities  Skin: no cyanosis, no jaundice, +senile purpura  LABS:                        12.0   9.49  )-----------( 274      ( 03 Aug 2022 07:31 )             35.9     08-02    137  |  100  |  24<H>  ----------------------------<  84  3.6   |  25  |  1.07    Ca    8.6      02 Aug 2022 06:22  Phos  3.6     08-02  Mg     2.0     08-02    TPro  6.3  /  Alb  2.9<L>  /  TBili  0.2  /  DBili  x   /  AST  59<H>  /  ALT  63<H>  /  AlkPhos  154<H>  08-02              Culture - Blood (collected 31 Jul 2022 09:12)  Source: .Blood Blood-Venous  Gram Stain (01 Aug 2022 01:11):    Growth in aerobic bottle: Gram Negative Rods    Growth in anaerobic bottle: Gram Negative Rods  Final Report (02 Aug 2022 18:31):    Growth in aerobic and anaerobic bottles: Escherichia coli    ***Blood Panel PCR results on this specimen are available    approximately 3 hours after the Gram stain result.***    Gram stain, PCR, and/or culture results may not always    correspond due to difference in methodologies.    ************************************************************    This PCR assay was performed by multiplex PCR. This    Assay tests for 66 bacterial and resistance gene targets.    Please refer to the [facility] test directory    at [email or web address] for details.  Organism: Blood Culture PCR  Escherichia coli (02 Aug 2022 18:31)  Organism: Escherichia coli (02 Aug 2022 18:31)  Organism: Blood Culture PCR (02 Aug 2022 18:31)    Culture - Blood (collected 31 Jul 2022 09:10)  Source: .Blood Blood-Peripheral  Gram Stain (01 Aug 2022 01:11):    Growth in aerobic bottle: Gram Negative Rods    Growth in anaerobic bottle: Gram Negative Rods  Final Report (02 Aug 2022 18:38):    Growth in aerobic and anaerobic bottles: Escherichia coli    See previous culture [id number]        RADIOLOGY & ADDITIONAL TESTS:  Results Reviewed:   Imaging Personally Reviewed:  Electrocardiogram Personally Reviewed:    COORDINATION OF CARE:  Care Discussed with Consultants/Other Providers [Y/N]:  Prior or Outpatient Records Reviewed [Y/N]:   ***************************************************************  Betty Jaramillo MD (PGY-1)  Internal Medicine  Pager: [phone]  ***************************************************************    PROGRESS NOTE:     Patient is a 89y old  Female who presents with a chief complaint of fever and hypoxia (02 Aug 2022 07:52)      SUBJECTIVE / OVERNIGHT EVENTS: Patient seen and examined at bedside. This morning, the patient is comfortable and doing well. She states that was able to walk to the bathroom without SOB and had a normal BM this morning. Denies fevers, chills, N/V/D, chest pain, SOB, abdominal pain.    MEDICATIONS  (STANDING):  albuterol/ipratropium for Nebulization 3 milliLiter(s) Nebulizer every 6 hours  ATENolol  Tablet 25 milliGRAM(s) Oral two times a day  atorvastatin 20 milliGRAM(s) Oral at bedtime  cefTRIAXone   IVPB      cefTRIAXone   IVPB 1000 milliGRAM(s) IV Intermittent every 24 hours  chlorhexidine 2% Cloths 1 Application(s) Topical <User Schedule>  cholecalciferol 1000 Unit(s) Oral daily  furosemide    Tablet 20 milliGRAM(s) Oral two times a day  levothyroxine 75 MICROGram(s) Oral daily  multivitamin 1 Tablet(s) Oral daily  OXcarbazepine 150 milliGRAM(s) Oral two times a day  rivaroxaban 20 milliGRAM(s) Oral daily    MEDICATIONS  (PRN):  acetaminophen     Tablet .. 650 milliGRAM(s) Oral every 6 hours PRN Temp greater or equal to 38C (100.4F), Mild Pain (1 - 3)      CAPILLARY BLOOD GLUCOSE        I&O's Summary    02 Aug 2022 07:01  -  03 Aug 2022 07:00  --------------------------------------------------------  IN: 480 mL / OUT: 1400 mL / NET: -920 mL        PHYSICAL EXAM:  Vital Signs Last 24 Hrs  T(C): 36.4 (03 Aug 2022 04:44), Max: 36.8 (02 Aug 2022 10:45)  T(F): 97.5 (03 Aug 2022 04:44), Max: 98.2 (02 Aug 2022 10:45)  HR: 89 (03 Aug 2022 04:44) (77 - 109)  BP: 150/90 (03 Aug 2022 04:44) (112/79 - 150/90)  BP(mean): --  RR: 18 (03 Aug 2022 04:44) (18 - 20)  SpO2: 92% (03 Aug 2022 04:44) (87% - 94%)    Parameters below as of 03 Aug 2022 04:44  Patient On (Oxygen Delivery Method): room air      General: no acute distress  HEENT: Atraumatic, normocephalic, airway patent  Eyes: EOMI, tracking appropriately  Neck: no tracheal deviation, mild JVD  Resp: CTA, no WOB, + dyspnea with exertion  Heart: skin and extremities well perfused, tachy irregular rhythm  Neuro: AOx3, CN grossly intact  MSK: + b/l LE pitting edema, moving all extremities  Skin: no cyanosis, no jaundice, +senile purpura  LABS:                        12.0   9.49  )-----------( 274      ( 03 Aug 2022 07:31 )             35.9     08-02    137  |  100  |  24<H>  ----------------------------<  84  3.6   |  25  |  1.07    Ca    8.6      02 Aug 2022 06:22  Phos  3.6     08-02  Mg     2.0     08-02    TPro  6.3  /  Alb  2.9<L>  /  TBili  0.2  /  DBili  x   /  AST  59<H>  /  ALT  63<H>  /  AlkPhos  154<H>  08-02              Culture - Blood (collected 31 Jul 2022 09:12)  Source: .Blood Blood-Venous  Gram Stain (01 Aug 2022 01:11):    Growth in aerobic bottle: Gram Negative Rods    Growth in anaerobic bottle: Gram Negative Rods  Final Report (02 Aug 2022 18:31):    Growth in aerobic and anaerobic bottles: Escherichia coli    ***Blood Panel PCR results on this specimen are available    approximately 3 hours after the Gram stain result.***    Gram stain, PCR, and/or culture results may not always    correspond due to difference in methodologies.    ************************************************************    This PCR assay was performed by multiplex PCR. This    Assay tests for 66 bacterial and resistance gene targets.    Please refer to the [facility] test directory    at [email or web address] for details.  Organism: Blood Culture PCR  Escherichia coli (02 Aug 2022 18:31)  Organism: Escherichia coli (02 Aug 2022 18:31)  Organism: Blood Culture PCR (02 Aug 2022 18:31)    Culture - Blood (collected 31 Jul 2022 09:10)  Source: .Blood Blood-Peripheral  Gram Stain (01 Aug 2022 01:11):    Growth in aerobic bottle: Gram Negative Rods    Growth in anaerobic bottle: Gram Negative Rods  Final Report (02 Aug 2022 18:38):    Growth in aerobic and anaerobic bottles: Escherichia coli    See previous culture [id number]        RADIOLOGY & ADDITIONAL TESTS:  Results Reviewed:   Imaging Personally Reviewed:  Electrocardiogram Personally Reviewed:    COORDINATION OF CARE:  Care Discussed with Consultants/Other Providers [Y/N]:  Prior or Outpatient Records Reviewed [Y/N]:

## 2022-08-03 NOTE — PROGRESS NOTE ADULT - ATTENDING COMMENTS
89F with PMH A fib, seizure disorder, HTN, HLD and CHF presented to ED with fever, weakness, and one week of worsening SOB and b/l LE edema concerning for acute on chronic HF found to have sepsis with E. Coli bacteremia with pyuria and no bacteria in urine, with pleural effusions.     # E coli bacteremia     source is most likely from urine source , but Urine CX was not collected prior to ABX,  awaiting on UCX      Previous E coli bacteremia from urine source from previous hosp admission in 4/2022       Repeat blood cx 8/2/2022       F/up TTE       Pt has received 3 doses of Zosyn and 3 days of ceftriaxone and WBC has promptly decreased now to normal , but pt was on Ceftriaxone 1 gram     instead of 2 gram  # Left renal mass      Uro eval is noted and patient refuses further evaluation and patient is of sound mind.    # transaminitis      COuld be sepsis with organ Dysfn vs acute liver dysfunction from other etiology like meds , mets , or congestive haptopathy        liver  is stable from US     trend LFT   # Acute on chronic heart failure      Legs were very swollen( as per daughter and patient, B/L pleural effusion )      was on IV Lasix with good response as per patient and daughter and now on oral Lasix       MOnitor SPO2   # Acute on chronic resp failure could be sec to CHF VS Emphysema ( from CT in April 2022 ) or other etiologies      Monitor SPo2 , might need home O2  Rest is as per Resident above     Patient and daughter are updated on 8/2   Joanie Davalos  Hospitalist

## 2022-08-03 NOTE — CONSULT NOTE ADULT - ASSESSMENT
DIAGNOSIS and IMPRESSION                  PT TO BE SEEN. PRELIM NOTE  PENDING RECS. PLEASE WAIT FOR FINAL RECS AFTER DISCUSSION WITH ATTENDING#    Albanmary beth Clark DO, PGY-4   ID fellow  [employer] Preferred  After 5pm/weekends call [phone]   DIAGNOSIS and IMPRESSION  89F with Afib w/ RVR (on Xarelto), CHF, seizure disorder, right hip replacement (2007), shoulder rotator cuff surgery (2008), spinal surgery, right foot neuropathy, HTN, HLD presented with fever, weakness dyspnea and LE swelling, admitted for Acute on CHF exacerbation and sepsis secondary to Ecoli bacteremia and UTI.      #Ecoli Bacteremia   #Pyelonephritis   #Left Renal Mass  - febrile 103.8F Tmax, but since been afebirle  - WBC 20 now 9  - US abdomen with L sided 3cm renal mass, CT with same renal mass in April 2022  - BCx (7/31) 4 out of 4 Ecoli CTX sensitive   - BCx (8/2) NGTD  - UA grossly positive, no cultures, prior Cx Ecoli in April 2022  - S/p CTX 1G qD (7/31 - 8/1) and zosyn (7/31 - 8/1)  PLAN  - bacteremia likely presumed from urinary source  - noted to have L renal mass, not suggestive of Abscess  - afebrile now feeling much better  - CTX 2G q12, likely for 14 days total from (7/31)  - f/up UCx (8/2) but likely sterile because already on therapy              PT TO BE SEEN. PRELIM NOTE  PENDING RECS. PLEASE WAIT FOR FINAL RECS AFTER DISCUSSION WITH ATTENDINGRoddy Clark DO, PGY-4   ID fellow  [employer] Teams Preferred  After 5pm/weekends call [phone]   DIAGNOSIS and IMPRESSION  89F with Afib w/ RVR (on Xarelto), CHF, seizure disorder, right hip replacement (2007), shoulder rotator cuff surgery (2008), spinal surgery, right foot neuropathy, HTN, HLD presented with fever, weakness dyspnea and LE swelling, admitted for Acute on CHF exacerbation and sepsis secondary to Ecoli bacteremia and UTI.      #Ecoli Bacteremia   #Pyelonephritis   #Left Renal Mass  - recent admission in 4/2022 with Ecoli UTI and Bacteremia treated ancef 1G q8 and discharged on Keflex 500mg BID total 10d  - this admission, febrile 103.8F Tmax, but since been afebirle  - WBC 20 now 9  - US abdomen with L sided 3cm renal mass, CT with same renal mass in April 2022  - BCx (7/31) 4 out of 4 Ecoli CTX sensitive   - BCx (8/2) NGTD  - UA grossly positive, no cultures, prior Cx Ecoli in April 2022  - S/p CTX 1G qD (7/31 - 8/1) and zosyn (7/31 - 8/1)  PLAN  - noted to have L renal mass, not suggestive of Abscess  - bacteremia likely presumed from urinary source vs ?incomplete treatment of Ecoli Bacteremia from 4/2022  - afebrile now feeling much better  - CTX 2G q12, likely for 14 days total from (7/31)  - f/up UCx (8/2) but likely sterile because already on therapy              PT TO BE SEEN. PRELIM NOTE  PENDING RECS. PLEASE WAIT FOR FINAL RECS AFTER DISCUSSION WITH ATTENDINGRoddy Clark DO, PGY-4   ID fellow  [employer] Preferred  After 5pm/weekends call [phone]   DIAGNOSIS and IMPRESSION  89F with Afib w/ RVR (on Xarelto), CHF, seizure disorder, right hip replacement (2007), shoulder rotator cuff surgery (2008), spinal surgery, right foot neuropathy, HTN, HLD presented with fever, weakness dyspnea and LE swelling, admitted for Acute on CHF exacerbation and sepsis secondary to Ecoli bacteremia and UTI.      #Ecoli Bacteremia   #Pyelonephritis   #Left Renal Mass  - recent admission in 4/2022 with Ecoli UTI and Bacteremia treated ancef 1G q8 and discharged on Keflex 500mg BID total 10d  - this admission, febrile 103.8F Tmax, but since been afebirle  - WBC 20 now 9  - US abdomen with L sided 3cm renal mass, CT with same renal mass in April 2022  - BCx (7/31) 4 out of 4 Ecoli CTX sensitive   - BCx (8/2) NGTD  - UA grossly positive, no cultures, prior Cx Ecoli in April 2022  - S/p CTX 1G qD (7/31 - 8/1) and zosyn (7/31 - 8/1)  PLAN  - noted to have L renal mass, not suggestive of Abscess  - bacteremia likely presumed from urinary source   - afebrile now feeling much better  - c/w CTX 1G qD, likely for 10 days total from (7/31), can d/c on PO cefpodoxime 200mg BID  - f/up UCx (8/2) but likely sterile because already on therapy              PT TO BE SEEN. PRELIM NOTE  PENDING RECS. PLEASE WAIT FOR FINAL RECS AFTER DISCUSSION WITH ATTENDINGRoddy Clark DO, PGY-4   ID fellow  [employer] Preferred  After 5pm/weekends call [phone]   DIAGNOSIS and IMPRESSION  89F with Afib w/ RVR (on Xarelto), CHF, seizure disorder, right hip replacement (2007), shoulder rotator cuff surgery (2008), spinal surgery, right foot neuropathy, HTN, HLD presented with fever, weakness dyspnea and LE swelling, admitted for Acute on CHF exacerbation and sepsis secondary to Ecoli bacteremia and UTI.      #Ecoli Bacteremia   #Pyelonephritis   #Left Renal Mass  - recent admission in 4/2022 with Ecoli UTI and Bacteremia treated ancef 1G q8 and discharged on Keflex 500mg BID total 10d  - this admission, febrile 103.8F Tmax, but since been afebirle  - WBC 20 now 9  - US abdomen with L sided 3cm renal mass, CT with same renal mass in April 2022  - BCx (7/31) 4 out of 4 Ecoli CTX sensitive   - BCx (8/2) NGTD  - UA grossly positive, no cultures, prior Cx Ecoli in April 2022  - S/p CTX 1G qD (7/31 - 8/1) and zosyn (7/31 - 8/1)  PLAN  - noted to have L renal mass, not suggestive of Abscess  - bacteremia likely presumed from urinary source   - afebrile now feeling much better  - c/w CTX 1G qD, likely for 10 days total from (7/31), can d/c on PO cefpodoxime 200mg BID  - do no recommend chronic Antibiotic ppx medication due to side effects, especially given patient's age and only 2 recurrence of UTI   - recommend Hiprex outpatient for UTI ppx  - f/up UCx (8/2) but likely sterile because already on therapy      D/w Attending    Alban Clark DO, PGY-4   ID fellow  [employer] Preferred  After 5pm/weekends call [phone]

## 2022-08-03 NOTE — PROGRESS NOTE ADULT - ASSESSMENT
89F with PMH A fib, seizure disorder, HTN, HLD and CHF presented to ED with fever, weakness, and one week of worsening SOB and b/l LE edema concerning for acute on chronic HF with pleural effusions found to have sepsis 2/2 UTI (E. Coli bacteremia) 89F with PMH A fib, seizure disorder, HTN, HLD and CHF presented to ED with fever, weakness, and one week of worsening SOB and b/l LE edema concerning for acute on chronic HF with pleural effusions found to have sepsis 2/2 UTI (E. Coli bacteremia).

## 2022-08-04 ENCOUNTER — TRANSCRIPTION ENCOUNTER (OUTPATIENT)
Age: 87
End: 2022-08-04

## 2022-08-04 LAB
ALBUMIN SERPL ELPH-MCNC: 2.9 G/DL — LOW (ref 3.3–5)
ALP SERPL-CCNC: 137 U/L — HIGH (ref 40–120)
ALT FLD-CCNC: 79 U/L — HIGH (ref 10–45)
ANION GAP SERPL CALC-SCNC: 12 MMOL/L — SIGNIFICANT CHANGE UP (ref 5–17)
AST SERPL-CCNC: 61 U/L — HIGH (ref 10–40)
BASOPHILS # BLD AUTO: 0.05 K/UL — SIGNIFICANT CHANGE UP (ref 0–0.2)
BASOPHILS NFR BLD AUTO: 0.7 % — SIGNIFICANT CHANGE UP (ref 0–2)
BILIRUB SERPL-MCNC: 0.2 MG/DL — SIGNIFICANT CHANGE UP (ref 0.2–1.2)
BUN SERPL-MCNC: 21 MG/DL — SIGNIFICANT CHANGE UP (ref 7–23)
CALCIUM SERPL-MCNC: 8.7 MG/DL — SIGNIFICANT CHANGE UP (ref 8.4–10.5)
CHLORIDE SERPL-SCNC: 99 MMOL/L — SIGNIFICANT CHANGE UP (ref 96–108)
CO2 SERPL-SCNC: 26 MMOL/L — SIGNIFICANT CHANGE UP (ref 22–31)
CREAT SERPL-MCNC: 0.95 MG/DL — SIGNIFICANT CHANGE UP (ref 0.5–1.3)
EGFR: 54 ML/MIN/1.73M2 — LOW
EOSINOPHIL # BLD AUTO: 0.14 K/UL — SIGNIFICANT CHANGE UP (ref 0–0.5)
EOSINOPHIL NFR BLD AUTO: 1.8 % — SIGNIFICANT CHANGE UP (ref 0–6)
GLUCOSE SERPL-MCNC: 96 MG/DL — SIGNIFICANT CHANGE UP (ref 70–99)
HCT VFR BLD CALC: 36.9 % — SIGNIFICANT CHANGE UP (ref 34.5–45)
HGB BLD-MCNC: 12.2 G/DL — SIGNIFICANT CHANGE UP (ref 11.5–15.5)
IMM GRANULOCYTES NFR BLD AUTO: 0.4 % — SIGNIFICANT CHANGE UP (ref 0–1.5)
LYMPHOCYTES # BLD AUTO: 1.54 K/UL — SIGNIFICANT CHANGE UP (ref 1–3.3)
LYMPHOCYTES # BLD AUTO: 20.3 % — SIGNIFICANT CHANGE UP (ref 13–44)
MAGNESIUM SERPL-MCNC: 1.8 MG/DL — SIGNIFICANT CHANGE UP (ref 1.6–2.6)
MCHC RBC-ENTMCNC: 30.7 PG — SIGNIFICANT CHANGE UP (ref 27–34)
MCHC RBC-ENTMCNC: 33.1 GM/DL — SIGNIFICANT CHANGE UP (ref 32–36)
MCV RBC AUTO: 92.7 FL — SIGNIFICANT CHANGE UP (ref 80–100)
MONOCYTES # BLD AUTO: 0.61 K/UL — SIGNIFICANT CHANGE UP (ref 0–0.9)
MONOCYTES NFR BLD AUTO: 8 % — SIGNIFICANT CHANGE UP (ref 2–14)
NEUTROPHILS # BLD AUTO: 5.21 K/UL — SIGNIFICANT CHANGE UP (ref 1.8–7.4)
NEUTROPHILS NFR BLD AUTO: 68.8 % — SIGNIFICANT CHANGE UP (ref 43–77)
NRBC # BLD: 0 /100 WBCS — SIGNIFICANT CHANGE UP (ref 0–0)
PHOSPHATE SERPL-MCNC: 3.4 MG/DL — SIGNIFICANT CHANGE UP (ref 2.5–4.5)
PLATELET # BLD AUTO: 292 K/UL — SIGNIFICANT CHANGE UP (ref 150–400)
POTASSIUM SERPL-MCNC: 3.4 MMOL/L — LOW (ref 3.5–5.3)
POTASSIUM SERPL-SCNC: 3.4 MMOL/L — LOW (ref 3.5–5.3)
PROT SERPL-MCNC: 6 G/DL — SIGNIFICANT CHANGE UP (ref 6–8.3)
RBC # BLD: 3.98 M/UL — SIGNIFICANT CHANGE UP (ref 3.8–5.2)
RBC # FLD: 14.6 % — HIGH (ref 10.3–14.5)
SODIUM SERPL-SCNC: 137 MMOL/L — SIGNIFICANT CHANGE UP (ref 135–145)
WBC # BLD: 7.58 K/UL — SIGNIFICANT CHANGE UP (ref 3.8–10.5)
WBC # FLD AUTO: 7.58 K/UL — SIGNIFICANT CHANGE UP (ref 3.8–10.5)

## 2022-08-04 PROCEDURE — 99233 SBSQ HOSP IP/OBS HIGH 50: CPT | Mod: GC

## 2022-08-04 PROCEDURE — 99223 1ST HOSP IP/OBS HIGH 75: CPT | Mod: GC

## 2022-08-04 PROCEDURE — 99232 SBSQ HOSP IP/OBS MODERATE 35: CPT

## 2022-08-04 RX ORDER — METHENAMINE MANDELATE 1 G
1 TABLET ORAL
Qty: 60 | Refills: 0
Start: 2022-08-04 | End: 2022-09-02

## 2022-08-04 RX ORDER — ESTROGENS, CONJUGATED 0.625 MG/G
0.62 CREAM WITH APPLICATOR VAGINAL
Qty: 0.08 | Refills: 0
Start: 2022-08-04 | End: 2022-09-02

## 2022-08-04 RX ORDER — ESTROGENS, CONJUGATED 0.625 MG/G
1 CREAM WITH APPLICATOR VAGINAL AT BEDTIME
Refills: 0 | Status: DISCONTINUED | OUTPATIENT
Start: 2022-08-04 | End: 2022-08-05

## 2022-08-04 RX ORDER — ESTROGENS, CONJUGATED 0.625 MG/G
0.62 CREAM WITH APPLICATOR VAGINAL
Qty: 0 | Refills: 0 | DISCHARGE
Start: 2022-08-04

## 2022-08-04 RX ORDER — CEFTRIAXONE 500 MG/1
1000 INJECTION, POWDER, FOR SOLUTION INTRAMUSCULAR; INTRAVENOUS EVERY 24 HOURS
Refills: 0 | Status: DISCONTINUED | OUTPATIENT
Start: 2022-08-04 | End: 2022-08-05

## 2022-08-04 RX ADMIN — OXCARBAZEPINE 150 MILLIGRAM(S): 300 TABLET, FILM COATED ORAL at 05:08

## 2022-08-04 RX ADMIN — OXCARBAZEPINE 150 MILLIGRAM(S): 300 TABLET, FILM COATED ORAL at 17:04

## 2022-08-04 RX ADMIN — BUDESONIDE AND FORMOTEROL FUMARATE DIHYDRATE 2 PUFF(S): 160; 4.5 AEROSOL RESPIRATORY (INHALATION) at 09:26

## 2022-08-04 RX ADMIN — ATENOLOL 25 MILLIGRAM(S): 25 TABLET ORAL at 05:06

## 2022-08-04 RX ADMIN — BUDESONIDE AND FORMOTEROL FUMARATE DIHYDRATE 2 PUFF(S): 160; 4.5 AEROSOL RESPIRATORY (INHALATION) at 21:33

## 2022-08-04 RX ADMIN — Medication 20 MILLIGRAM(S): at 05:07

## 2022-08-04 RX ADMIN — ATORVASTATIN CALCIUM 20 MILLIGRAM(S): 80 TABLET, FILM COATED ORAL at 21:34

## 2022-08-04 RX ADMIN — Medication 600 MILLIGRAM(S): at 05:08

## 2022-08-04 RX ADMIN — RIVAROXABAN 20 MILLIGRAM(S): KIT at 12:17

## 2022-08-04 RX ADMIN — Medication 650 MILLIGRAM(S): at 20:03

## 2022-08-04 RX ADMIN — Medication 20 MILLIGRAM(S): at 17:04

## 2022-08-04 RX ADMIN — Medication 600 MILLIGRAM(S): at 17:02

## 2022-08-04 RX ADMIN — Medication 75 MICROGRAM(S): at 05:08

## 2022-08-04 RX ADMIN — Medication 1000 UNIT(S): at 12:18

## 2022-08-04 RX ADMIN — Medication 1 SPRAY(S): at 17:03

## 2022-08-04 RX ADMIN — ATENOLOL 25 MILLIGRAM(S): 25 TABLET ORAL at 17:03

## 2022-08-04 RX ADMIN — Medication 650 MILLIGRAM(S): at 20:33

## 2022-08-04 RX ADMIN — Medication 1 TABLET(S): at 12:18

## 2022-08-04 RX ADMIN — Medication 1 SPRAY(S): at 05:07

## 2022-08-04 RX ADMIN — CHLORHEXIDINE GLUCONATE 1 APPLICATION(S): 213 SOLUTION TOPICAL at 05:07

## 2022-08-04 RX ADMIN — TIOTROPIUM BROMIDE 1 CAPSULE(S): 18 CAPSULE ORAL; RESPIRATORY (INHALATION) at 12:18

## 2022-08-04 NOTE — CONSULT NOTE ADULT - ATTENDING COMMENTS
89F PMH of A fib w/ RVR (on Xarelto), CHF, seizure disorder, right hip replacement (2007), shoulder rotator cuff surgery (2008), spinal surgery, right foot neuropathy, HTN, HLD presents with sepsis 2/2 UTI and ADHF.  She has pulmonary hypertension on echo and significant left heart disease with moderate AS, mild to moderate MR.  On exam has singificant LE edema.  Impression:  decompensated heart failure in setting of sepsis and rapid atrial fibrillation.  Would continue to diurese, Desaturated to 85% at rest at room air today.  will require oxygen at home on discharge.  Agree with plan as outlined above.
89F with HTN, Afib w/ RVR (on Xarelto), CHF, seizure disorder, right hip replacement (2007), spinal surgery, right foot neuropathy, E-coli bacteremia and UTI 4/2022 and found to have a renal mass now p/w fever and weakness but no other symptoms  here febrile to 103.8, tachycardic, hypotension, WBC: 19.55  renal u/s: No cholelithiasis, acute cholecystitis or biliary ductal dilatation.  Heterogeneous left upper pole mass measures 3.5 x 3.1 x 2.8 cm suspicious for renal cell carcinoma  u/a positive but  no urine cx done, blood cx with E-coli (just R to bactrim)    fever, leukocytosis, hypotension, tachycardia, sepsis due to E-coli bacteremia and pyelonephritis  repeat blood cx negative 8/2  * pt clinically well, afebrile and WBC normalzied  * c/w ceftriaxone 1 qd will complete a 10 day course  * on dishcarge switch to PO cefpodoxime 200 bid to complete the course  * I don't believed pt will benefit from suppressive antibitoics as the risks outweight the benefit, she only had another UTI 4 months ago  * can do Hiprex to prevent further UTIs    The above assessment and plan was discussed with the primary team    Keyana Alexander MD  contact on teams  After 5pm and on weekends call [phone]

## 2022-08-04 NOTE — PROVIDER CONTACT NOTE (OTHER) - SITUATION
Pt p/w telemetry event of prolonged QRS for 43 seconds. QRS interval was about 200 ms. Pt remains asymptomatic, denies chest pain, palpitations, and headache. VSS
drug dosing weight and standing weight significant difference
patient desats to 85% on room air during ambulation

## 2022-08-04 NOTE — PROGRESS NOTE ADULT - PROBLEM SELECTOR PLAN 1
WBC, fever, elevated procal - sepsis 2/2 UTI (E. Coli bacteremia)  - uro recs: payne s/p post-void bladder scan, no intervention/bx of L renal mass per pt/family wishes  - renal and RUQ US: left upper pole mass suspicious for RCC with chronic renal parenchymal disease  - ID recs: bacteremia likely presumed from urinary source  BCx (7/31) Ecoli CTX sensitive, BCx (8/2) f/u (likely sterile because already on therapy)  UA grossly positive, no cultures, prior Cx Ecoli in 4/2022  S/p zosyn (7/31 - 8/1)  c/w CTX 1G qD (7/31-8/9) for 10 days total and d/c on PO cefpodoxime 200mg BID, can give Hiprex o/p but no chronic ppx abx WBC, fever, elevated procal - sepsis 2/2 UTI (E. Coli bacteremia)  - uro recs: payne s/p post-void bladder scan, no intervention/bx of L renal mass per pt/family wishes  - renal and RUQ US: left upper pole mass suspicious for RCC with chronic renal parenchymal disease  - ID recs: bacteremia likely presumed from urinary source  BCx (7/31) Ecoli CTX sensitive, BCx (8/2) ngtd (likely sterile because already on therapy)  UA grossly positive, no cultures, prior Cx Ecoli in 4/2022  S/p zosyn (7/31 - 8/1)  c/w CTX 1G qD (7/31-8/9) for 10 days total and d/c on PO cefpodoxime 200mg BID, can give Hiprex o/p but no chronic ppx abx

## 2022-08-04 NOTE — CHART NOTE - NSCHARTNOTEFT_GEN_A_CORE
My patient was seen today for dx of chronic heart failure. While in a chronic and stable state my patient is still hypoxic d/t her chronic heart failure. SPO2 90% while at rest. SPO2 88% with exertion on room air. With _ LMP O2 nasal cannula SPO2 at rest improved to 93%. Patient will require O2 supplementation 24 hours. My patient was seen today for dx of chronic heart failure. While in a chronic and stable state my patient is still hypoxic d/t her chronic heart failure. SPO2 90% while at rest. SPO2 85% with exertion on room air. With 2L O2 nasal cannula SPO2 at rest improved to 93%. Patient will require O2 supplementation 24 hours. My patient was seen today for dx of chronic heart failure. While in a chronic and stable state my patient is still hypoxic d/t her chronic heart failure. SPO2 94% while at rest. SPO2 81% with exertion on room air. With 2LPM O2 via nasal cannula SPO2 with exertion improved to 93%. Patient will require O2 supplementation 24 hours. My patient was seen today for dx of chronic heart failure. While in a chronic and stable state my patient is still hypoxic d/t her chronic heart failure. SPO2 94% on room air while at rest. SPO2 81% with exertion on room air. With 2LPM O2 via nasal cannula SPO2 with exertion improved to 93%. Patient will require O2 supplementation 24 hours.

## 2022-08-04 NOTE — PROGRESS NOTE ADULT - SUBJECTIVE AND OBJECTIVE BOX
Follow Up:  bacteremia UTI    Interval History: pt afebrile and feels wee    ROS:      All other systems negative    Constitutional: no fever, no chills    Cardiovascular:  no chest pain, no palpitation  Respiratory:  no SOB, no cough  GI:  no abd pain, no vomiting, no diarrhea  urinary: no dysuria, no hematuria, no flank pain  musculoskeletal:  no joint pain, no joint swelling  skin:  no rash  neurology:  no headache, no seizure    Allergies  No Known Allergies        ANTIMICROBIALS:  cefTRIAXone   IVPB 1000 every 24 hours      OTHER MEDS:  acetaminophen     Tablet .. 650 milliGRAM(s) Oral every 6 hours PRN  ATENolol  Tablet 25 milliGRAM(s) Oral two times a day  atorvastatin 20 milliGRAM(s) Oral at bedtime  budesonide  80 MICROgram(s)/formoterol 4.5 MICROgram(s) Inhaler 2 Puff(s) Inhalation two times a day  chlorhexidine 2% Cloths 1 Application(s) Topical <User Schedule>  cholecalciferol 1000 Unit(s) Oral daily  estrogens  Cream 1 Gram(s) Vaginal at bedtime PRN  fluticasone propionate 50 MICROgram(s)/spray Nasal Spray 1 Spray(s) Both Nostrils two times a day  furosemide    Tablet 20 milliGRAM(s) Oral two times a day  guaiFENesin  milliGRAM(s) Oral every 12 hours  levothyroxine 75 MICROGram(s) Oral daily  multivitamin 1 Tablet(s) Oral daily  OXcarbazepine 150 milliGRAM(s) Oral two times a day  rivaroxaban 20 milliGRAM(s) Oral daily  tiotropium 18 MICROgram(s) Capsule 1 Capsule(s) Inhalation daily      Vital Signs Last 24 Hrs  T(C): 36.3 (04 Aug 2022 11:17), Max: 37.2 (03 Aug 2022 17:10)  T(F): 97.4 (04 Aug 2022 11:17), Max: 99 (03 Aug 2022 17:10)  HR: 98 (04 Aug 2022 11:17) (77 - 103)  BP: 135/83 (04 Aug 2022 11:17) (129/86 - 148/91)  BP(mean): --  RR: 18 (04 Aug 2022 11:17) (16 - 18)  SpO2: 93% (04 Aug 2022 11:17) (90% - 95%)    Parameters below as of 04 Aug 2022 11:17  Patient On (Oxygen Delivery Method): room air        Physical Exam:  General:    NAD,  non toxic  Respiratory:    comfortable on RA  abd:     soft,   BS +,   no tenderness  :   no CVAT,  no suprapubic tenderness,   no  payne  Musculoskeletal:   no joint swelling  vascular: no phlebitis  Skin:    no rash                          12.2   7.58  )-----------( 292      ( 04 Aug 2022 06:08 )             36.9       08-04    137  |  99  |  21  ----------------------------<  96  3.4<L>   |  26  |  0.95    Ca    8.7      04 Aug 2022 06:10  Phos  3.4     08-04  Mg     1.8     08-04    TPro  6.0  /  Alb  2.9<L>  /  TBili  0.2  /  DBili  x   /  AST  61<H>  /  ALT  79<H>  /  AlkPhos  137<H>  08-04          MICROBIOLOGY:  v  Clean Catch Clean Catch (Midstream)  08-02-22   No growth  --  --      .Blood Blood-Peripheral  08-02-22   No growth to date.  --  --      .Blood Blood-Venous  07-31-22   Growth in aerobic and anaerobic bottles: Escherichia coli  ***Blood Panel PCR results on this specimen are available  approximately 3 hours after the Gram stain result.***  Gram stain, PCR, and/or culture results may not always  correspond due to difference in methodologies.  ************************************************************  This PCR assay was performed by multiplex PCR. This  Assay tests for 66 bacterial and resistance gene targets.  Please refer to the [facility] test directory  at [email or web address] for details.  --  Blood Culture PCR  Escherichia coli      .Blood Blood-Peripheral  07-31-22   Growth in aerobic and anaerobic bottles: Escherichia coli  See previous culture [id number]  --    Growth in aerobic bottle: Gram Negative Rods  Growth in anaerobic bottle: Gram Negative Rods          Rapid RVP Result: NotDetec (07-31 @ 09:32)        RADIOLOGY:  Images independently visualized and reviewed personally, findings as below  < from: US Urinary Bladder (08.02.22 @ 17:49) >    IMPRESSION:  Abdominal ultrasound:  1.  No sonographic evidence of cholelithiasis, acute cholecystitis or   biliary ductal dilatation.   2. Heterogeneous left upper pole mass measures 3.5 x 3.1 x 2.8 cm. is   suspicious for renalcell carcinoma.  Renal biopsy may be considered for   pathologic diagnosis.  3.  Both kidneys are echogenic compatible chronic renal parenchymal   disease.  4.  Pleural effusions are partially visualized bilaterally      < end of copied text >  < from: Xray Chest 1 View- PORTABLE-Urgent (Xray Chest 1 View- PORTABLE-Urgent .) (07.31.22 @ 09:29) >  Bilateral pleural effusions.      < end of copied text >

## 2022-08-04 NOTE — DISCHARGE NOTE PROVIDER - NSDCFUSCHEDAPPT_GEN_ALL_CORE_FT
[facility]  Ultrasound [address]  Scheduled Appointment: 08/24/2022    [facility]  Ultrasound [address]  Scheduled Appointment: 08/24/2022    Kenneth Nolasco  [facility]  INTMED [id number] [facility]  Scheduled Appointment: 09/14/2022    Giulherme Cm  [facility]  CARDIOLOGY [address]   Scheduled Appointment: 10/12/2022

## 2022-08-04 NOTE — DISCHARGE NOTE PROVIDER - PROVIDER TOKENS
PROVIDER:[TOKEN:[[id number]],SCHEDULEDAPPT:[10/12/2022],SCHEDULEDAPPTTIME:[01:00 PM],ESTABLISHEDPATIENT:[T]],PROVIDER:[TOKEN:[[id number]],SCHEDULEDAPPT:[09/14/2022],SCHEDULEDAPPTTIME:[11:00 AM],ESTABLISHEDPATIENT:[T]]

## 2022-08-04 NOTE — PROVIDER CONTACT NOTE (OTHER) - ACTION/TREATMENT ORDERED:
Pipe Saab made aware. Will pass on information to cardiology. Orders to continue monitoring.
MD aware as per MD ok to update wt
place pt on o2, continue to monitor

## 2022-08-04 NOTE — DISCHARGE NOTE PROVIDER - HOSPITAL COURSE
89F PMH of A fib w/ RVR (on Xarelto), CHF, seizure disorder, right hip replacement (2007), shoulder rotator cuff surgery (2008), spinal surgery, right foot neuropathy, HTN, HLD presents to ED reporting SOB via EMS. EMS reports patient is coming from home, with one week of SOB worsening today. Patient reports worsening SOB and b/l LE edema today. Has longstanding diff breathing, worse the last week, worse more today. Patient also reports fever (104) with generalized weakness. Per daughter, patient was admitted 2 months prior for fever and weakness 2/2 UTI. On exam, AOx3, answering questions. Tachypneic and tachycardic on arrival,on NRB mask. Pt denies CP, abd pain, n/v/d at this time.    In the ED, patient presented with acute resp distress and hypoxia on arrival (RA 88-99% sats). O2 started for WOB and comfort.  Pt's WOB remains elevated despite improvement in O2 sat on NC. Rescue bipap begun with marked improvement in WOB, reduction in BP, return to normal WOB. POCUS demonstrated bilateral small pleural effusions. Pt improved on Bipap. Pt was also hypertensive in afib with RVR on arrival. Following improvement in resp effort, HR has reduced without specific intervention.    Of note, patient lives at home alone with a daytime aid and denies tobacco/alcohol/drug use. 89F PMH of A fib w/ RVR (on Xarelto), CHF, seizure disorder, right hip replacement (2007), shoulder rotator cuff surgery (2008), spinal surgery, right foot neuropathy, HTN, HLD presents to ED reporting SOB via EMS. EMS reported patient is coming from home, with one week of  SOB worsening today. Patient reports worsening SOB and b/l LE edema. She has longstanding diff breathing, worse the last week, and immediately before admission. Patient also reported fever (104) with generalized weakness. Per daughter, patient was admitted 2 months prior for fever and weakness 2/2 UTI. On exam, AOx3, answering questions. Tachypneic and tachycardic on arrival,on NRB mask. In the ED, patient presented with acute hypoxic respiratory failure and hypoxia(RA 88-99% sats). O2 started for WOB and comfort.  Pt's WOB remained elevated despite improvement in O2 sat on NC. Rescue bipap begun in ED with marked improvement in WOB, reduction in BP, and subsequent return to normal WOB. POCUS demonstrated bilateral small pleural effusions. Pt improved on Bipap. Pt was also hypertensive in afib with RVR on arrival.     Patient was admitted and remained on NC during the admission. Her ambulatory O2 dropped to SpO2 85% and required 2L on ambulation. Pulmonary recommended diuresing patient and she is now euvolemic and will follow up with Dr. Cm for outpatient diuresis as needed. Following improvement in resp effort, HR has reduced without specific intervention.    Of note, patient lives at home alone with a daytime aid and denies tobacco/alcohol/drug use. 89F PMH of A fib w/ RVR (on Xarelto), CHF, seizure disorder, right hip replacement (2007), shoulder rotator cuff surgery (2008), spinal surgery, right foot neuropathy, HTN, HLD presents to ED reporting SOB via EMS. EMS reported patient is coming from home, with one week of  SOB worsening today. Patient reports worsening SOB and b/l LE edema. She has longstanding diff breathing, worse the last week, and immediately before admission. Patient also reported fever (104) with generalized weakness. Per daughter, patient was admitted 2 months prior for fever and weakness 2/2 UTI. On exam, AOx3, answering questions. Tachypneic and tachycardic on arrival,on NRB mask. In the ED, patient presented with acute hypoxic respiratory failure and hypoxia(RA 88-99% sats). O2 started for WOB and comfort.  Pt's WOB remained elevated despite improvement in O2 sat on NC. Rescue bipap begun in ED with marked improvement in WOB, reduction in BP, and subsequent return to normal WOB. POCUS demonstrated bilateral small pleural effusions. Pt improved on Bipap. Pt was also hypertensive in afib with RVR on arrival. Following improvement in resp effort, HR has reduced without specific intervention.    Patient was diagnosed with E Coli bacteremia, with presumed source urinary (however, UCx was not collected until after starting antibiotics and was negative). ID was consulted and agreed with presumptive source of urinary tract. She was treated with  CTX and will be switched to cefpodoxime 200 BID to complete a 10 day course of antibiotics after blood cultures were negative on 8/2.  She will also be discharged on Hiprex (an anti-microbial) to prevent future UTIs. Patient was admitted and remained on NC during the admission. Her ambulatory O2 dropped to SpO2 85% and required 2L on ambulation. Patient will be discharged on home oxygen. Pulmonary recommended diuresing patient and she is now euvolemic and will follow up with Dr. Cm for outpatient diuresis as needed. Renal and RUQ US chronic renal parenchymal disease with left upper pole mass suspicious for RCC. She was advised to get a biopsy, but declined further work up of the renal cell mass.  The day of discharge, patient had R foot neuropathy (which is chronic and self-resolves in a few days). An x-ray showed no fractures.    Patient is HD stable and will be discharged home with home PT. PT had recommended FREDDY but patient declined and will be discharged home. She will need to complete a 10 days course of antibiotics for the E Coli bacteremia with cefpodoxime.

## 2022-08-04 NOTE — CONSULT NOTE ADULT - ASSESSMENT
89F PMH of A fib w/ RVR (on Xarelto), CHF, seizure disorder, right hip replacement (2007), shoulder rotator cuff surgery (2008), spinal surgery, right foot neuropathy, HTN, HLD presents with sepsis 2/2 UTI and ADHF    #pHTN  #HFpEF  TTE with elevated PASP, severe TR, mild-moderate AS and MR w EF 55%. She remains clinically volume overloaded with edema, elevated JVP (noted confounding due to TR), desaturation with exertion, continued O2 requirement and evidenced by TTE as well.   - Continue diuresis, recommend IV lasix for continued negative fluid balance prior to discharge  - Strict I&O, daily standing weights, monitor electrolytes  - Can f/u with pulmonology outpt w Dr. Maciel when ready for discharge  - Do not recommend further w/u inpt for pHTN

## 2022-08-04 NOTE — DISCHARGE NOTE PROVIDER - NSDCMRMEDTOKEN_GEN_ALL_CORE_FT
atenolol 25 mg oral tablet: 1 tab(s) orally 2 times a day  Lasix 20 mg oral tablet: 1 milligram(s) orally 2 times a day  Lipitor 20 mg oral tablet: 1 tab(s) orally once a day (at bedtime)  multivitamin: 1 tab(s) orally once a day  OXcarbazepine 150 mg oral tablet: 1 tab(s) orally 2 times a day  PreserVision AREDS oral capsule: 1 cap(s) orally once a day  quinapril 20 mg oral tablet: 1 tab(s) orally once a day  Synthroid 75 mcg (0.075 mg) oral tablet: 1 tab(s) orally once a day  Trelegy Ellipta 100 mcg-62.5 mcg-25 mcg/inh inhalation powder: 1 puff(s) inhaled once a day, As Needed  Vitamin D3 25 mcg (1000 intl units) oral tablet: 1 tab(s) orally once a day  Xarelto 20 mg oral tablet: 1 tab(s) orally once a day (in the evening)   atenolol 25 mg oral tablet: 1 tab(s) orally 2 times a day  Hiprex 1 g oral tablet: 1 tab(s) orally 2 times a day after completion of antibiotics post-hospital admission  Home oxygen: ICD-10-Z99.81  Lasix 20 mg oral tablet: 1 milligram(s) orally 2 times a day  Lipitor 20 mg oral tablet: 1 tab(s) orally once a day (at bedtime)  multivitamin: 1 tab(s) orally once a day  OXcarbazepine 150 mg oral tablet: 1 tab(s) orally 2 times a day  PreserVision AREDS oral capsule: 1 cap(s) orally once a day  quinapril 20 mg oral tablet: 1 tab(s) orally once a day  Synthroid 75 mcg (0.075 mg) oral tablet: 1 tab(s) orally once a day  Trelegy Ellipta 100 mcg-62.5 mcg-25 mcg/inh inhalation powder: 1 puff(s) inhaled once a day, As Needed  Vitamin D3 25 mcg (1000 intl units) oral tablet: 1 tab(s) orally once a day  Xarelto 20 mg oral tablet: 1 tab(s) orally once a day (in the evening)   atenolol 25 mg oral tablet: 1 tab(s) orally 2 times a day  conjugated estrogens 0.625 mg/g vaginal cream with applicator: 0.625 milligram(s) vaginal once a day (at bedtime)  Hiprex 1 g oral tablet: 1 tab(s) orally 2 times a day after completion of antibiotics post-hospital admission  Home oxygen: ICD-10-Z99.81  Lasix 20 mg oral tablet: 1 milligram(s) orally 2 times a day  Lipitor 20 mg oral tablet: 1 tab(s) orally once a day (at bedtime)  multivitamin: 1 tab(s) orally once a day  OXcarbazepine 150 mg oral tablet: 1 tab(s) orally 2 times a day  PreserVision AREDS oral capsule: 1 cap(s) orally once a day  quinapril 20 mg oral tablet: 1 tab(s) orally once a day  Synthroid 75 mcg (0.075 mg) oral tablet: 1 tab(s) orally once a day  Trelegy Ellipta 100 mcg-62.5 mcg-25 mcg/inh inhalation powder: 1 puff(s) inhaled once a day, As Needed  Vitamin D3 25 mcg (1000 intl units) oral tablet: 1 tab(s) orally once a day  Xarelto 20 mg oral tablet: 1 tab(s) orally once a day (in the evening)   atenolol 25 mg oral tablet: 1 tab(s) orally 2 times a day  cefpodoxime 200 mg oral tablet: 1 tab(s) orally 3 times a day   conjugated estrogens 0.625 mg/g vaginal cream with applicator: 0.625 milligram(s) vaginal once a day (at bedtime)  Hiprex 1 g oral tablet: 1 tab(s) orally 2 times a day after completion of antibiotics post-hospital admission  Home oxygen: ICD-10-Z99.81  Lasix 20 mg oral tablet: 1 milligram(s) orally 2 times a day  Lipitor 20 mg oral tablet: 1 tab(s) orally once a day (at bedtime)  multivitamin: 1 tab(s) orally once a day  OXcarbazepine 150 mg oral tablet: 1 tab(s) orally 2 times a day  PreserVision AREDS oral capsule: 1 cap(s) orally once a day  quinapril 20 mg oral tablet: 1 tab(s) orally once a day  Synthroid 75 mcg (0.075 mg) oral tablet: 1 tab(s) orally once a day  Trelegy Ellipta 100 mcg-62.5 mcg-25 mcg/inh inhalation powder: 1 puff(s) inhaled once a day, As Needed  Vitamin D3 25 mcg (1000 intl units) oral tablet: 1 tab(s) orally once a day  Xarelto 20 mg oral tablet: 1 tab(s) orally once a day (in the evening)

## 2022-08-04 NOTE — PROGRESS NOTE ADULT - SUBJECTIVE AND OBJECTIVE BOX
***************************************************************  Betty Jaramillo MD (PGY-1)  Internal Medicine  Pager: [phone]  ***************************************************************    PROGRESS NOTE:     Patient is a 89y old  Female who presents with a chief complaint of Fever and Hypoxia (03 Aug 2022 07:58)      SUBJECTIVE / OVERNIGHT EVENTS: Patient seen and examined at bedside. No acute overnight events. This morning, the patient is comfortable and doing well. Denies fevers, chills, N/V/D, chest pain, SOB, abdominal pain.    MEDICATIONS  (STANDING):  ATENolol  Tablet 25 milliGRAM(s) Oral two times a day  atorvastatin 20 milliGRAM(s) Oral at bedtime  budesonide  80 MICROgram(s)/formoterol 4.5 MICROgram(s) Inhaler 2 Puff(s) Inhalation two times a day  chlorhexidine 2% Cloths 1 Application(s) Topical <User Schedule>  cholecalciferol 1000 Unit(s) Oral daily  fluticasone propionate 50 MICROgram(s)/spray Nasal Spray 1 Spray(s) Both Nostrils two times a day  furosemide    Tablet 20 milliGRAM(s) Oral two times a day  guaiFENesin  milliGRAM(s) Oral every 12 hours  levothyroxine 75 MICROGram(s) Oral daily  multivitamin 1 Tablet(s) Oral daily  OXcarbazepine 150 milliGRAM(s) Oral two times a day  rivaroxaban 20 milliGRAM(s) Oral daily  tiotropium 18 MICROgram(s) Capsule 1 Capsule(s) Inhalation daily    MEDICATIONS  (PRN):  acetaminophen     Tablet .. 650 milliGRAM(s) Oral every 6 hours PRN Temp greater or equal to 38C (100.4F), Mild Pain (1 - 3)      CAPILLARY BLOOD GLUCOSE        I&O's Summary    03 Aug 2022 07:01  -  04 Aug 2022 07:00  --------------------------------------------------------  IN: 480 mL / OUT: 1175 mL / NET: -695 mL        PHYSICAL EXAM:  Vital Signs Last 24 Hrs  T(C): 36.4 (04 Aug 2022 03:52), Max: 37.2 (03 Aug 2022 17:10)  T(F): 97.6 (04 Aug 2022 03:52), Max: 99 (03 Aug 2022 17:10)  HR: 80 (04 Aug 2022 03:52) (80 - 103)  BP: 129/86 (04 Aug 2022 03:52) (129/86 - 140/84)  BP(mean): --  RR: 18 (04 Aug 2022 03:52) (16 - 18)  SpO2: 90% (04 Aug 2022 03:52) (90% - 94%)    Parameters below as of 04 Aug 2022 03:52  Patient On (Oxygen Delivery Method): room air        General: no acute distress  HEENT: Atraumatic, normocephalic, airway patent  Eyes: EOMI, tracking appropriately  Neck: no tracheal deviation, mild JVD  Resp: CTA, no WOB, + dyspnea with exertion  Heart: skin and extremities well perfused, tachy irregular rhythm  Neuro: AOx3, CN grossly intact  MSK: + b/l LE pitting edema, moving all extremities  Skin: no cyanosis, no jaundice, +senile purpura    LABS:                        12.2   7.58  )-----------( 292      ( 04 Aug 2022 06:08 )             36.9     08-04    137  |  99  |  21  ----------------------------<  96  3.4<L>   |  26  |  0.95    Ca    8.7      04 Aug 2022 06:10  Phos  3.4     08-04  Mg     1.8     08-04    TPro  6.0  /  Alb  2.9<L>  /  TBili  0.2  /  DBili  x   /  AST  61<H>  /  ALT  79<H>  /  AlkPhos  137<H>  08-04              Culture - Urine (collected 02 Aug 2022 18:55)  Source: Clean Catch Clean Catch (Midstream)  Final Report (03 Aug 2022 22:19):    No growth    Culture - Blood (collected 02 Aug 2022 06:22)  Source: .Blood Blood-Venous  Preliminary Report (03 Aug 2022 09:01):    No growth to date.    Culture - Blood (collected 02 Aug 2022 06:22)  Source: .Blood Blood-Peripheral  Preliminary Report (03 Aug 2022 09:01):    No growth to date.        RADIOLOGY & ADDITIONAL TESTS:  Results Reviewed:   Imaging Personally Reviewed:  Electrocardiogram Personally Reviewed:    COORDINATION OF CARE:  Care Discussed with Consultants/Other Providers [Y/N]:  Prior or Outpatient Records Reviewed [Y/N]:   ***************************************************************  Betty Jaramillo MD (PGY-1)  Internal Medicine  Pager: [phone]  ***************************************************************    PROGRESS NOTE:     Patient is a 89y old  Female who presents with a chief complaint of Fever and Hypoxia (03 Aug 2022 07:58)      SUBJECTIVE / OVERNIGHT EVENTS: Patient seen and examined at bedside. No acute overnight events. This morning, the patient is comfortable sitting in a chair. She denies fevers, chills, N/V/D, chest pain, SOB, abdominal pain.    MEDICATIONS  (STANDING):  ATENolol  Tablet 25 milliGRAM(s) Oral two times a day  atorvastatin 20 milliGRAM(s) Oral at bedtime  budesonide  80 MICROgram(s)/formoterol 4.5 MICROgram(s) Inhaler 2 Puff(s) Inhalation two times a day  chlorhexidine 2% Cloths 1 Application(s) Topical <User Schedule>  cholecalciferol 1000 Unit(s) Oral daily  fluticasone propionate 50 MICROgram(s)/spray Nasal Spray 1 Spray(s) Both Nostrils two times a day  furosemide    Tablet 20 milliGRAM(s) Oral two times a day  guaiFENesin  milliGRAM(s) Oral every 12 hours  levothyroxine 75 MICROGram(s) Oral daily  multivitamin 1 Tablet(s) Oral daily  OXcarbazepine 150 milliGRAM(s) Oral two times a day  rivaroxaban 20 milliGRAM(s) Oral daily  tiotropium 18 MICROgram(s) Capsule 1 Capsule(s) Inhalation daily    MEDICATIONS  (PRN):  acetaminophen     Tablet .. 650 milliGRAM(s) Oral every 6 hours PRN Temp greater or equal to 38C (100.4F), Mild Pain (1 - 3)      CAPILLARY BLOOD GLUCOSE        I&O's Summary    03 Aug 2022 07:01  -  04 Aug 2022 07:00  --------------------------------------------------------  IN: 480 mL / OUT: 1175 mL / NET: -695 mL        PHYSICAL EXAM:  Vital Signs Last 24 Hrs  T(C): 36.4 (04 Aug 2022 03:52), Max: 37.2 (03 Aug 2022 17:10)  T(F): 97.6 (04 Aug 2022 03:52), Max: 99 (03 Aug 2022 17:10)  HR: 80 (04 Aug 2022 03:52) (80 - 103)  BP: 129/86 (04 Aug 2022 03:52) (129/86 - 140/84)  BP(mean): --  RR: 18 (04 Aug 2022 03:52) (16 - 18)  SpO2: 90% (04 Aug 2022 03:52) (90% - 94%)    Parameters below as of 04 Aug 2022 03:52  Patient On (Oxygen Delivery Method): room air        General: no acute distress  HEENT: Atraumatic, normocephalic, airway patent  Eyes: EOMI, tracking appropriately  Neck: no tracheal deviation, mild JVD  Resp: CTA, no WOB, + dyspnea with exertion  Heart: skin and extremities well perfused, tachy irregular rhythm  Neuro: AOx3, CN grossly intact  MSK: + b/l LE pitting edema, moving all extremities  Skin: no cyanosis, no jaundice, +senile purpura    LABS:                        12.2   7.58  )-----------( 292      ( 04 Aug 2022 06:08 )             36.9     08-04    137  |  99  |  21  ----------------------------<  96  3.4<L>   |  26  |  0.95    Ca    8.7      04 Aug 2022 06:10  Phos  3.4     08-04  Mg     1.8     08-04    TPro  6.0  /  Alb  2.9<L>  /  TBili  0.2  /  DBili  x   /  AST  61<H>  /  ALT  79<H>  /  AlkPhos  137<H>  08-04              Culture - Urine (collected 02 Aug 2022 18:55)  Source: Clean Catch Clean Catch (Midstream)  Final Report (03 Aug 2022 22:19):    No growth    Culture - Blood (collected 02 Aug 2022 06:22)  Source: .Blood Blood-Venous  Preliminary Report (03 Aug 2022 09:01):    No growth to date.    Culture - Blood (collected 02 Aug 2022 06:22)  Source: .Blood Blood-Peripheral  Preliminary Report (03 Aug 2022 09:01):    No growth to date.        RADIOLOGY & ADDITIONAL TESTS:  Results Reviewed:   Imaging Personally Reviewed:  Electrocardiogram Personally Reviewed:    COORDINATION OF CARE:  Care Discussed with Consultants/Other Providers [Y/N]:  Prior or Outpatient Records Reviewed [Y/N]:

## 2022-08-04 NOTE — PROGRESS NOTE ADULT - ASSESSMENT
89F with PMH A fib, seizure disorder, HTN, HLD and CHF presented to ED with fever, weakness, and one week of worsening SOB and b/l LE edema concerning for acute on chronic HF with pleural effusions found to have sepsis 2/2 UTI (E. Coli bacteremia).

## 2022-08-04 NOTE — PROGRESS NOTE ADULT - ASSESSMENT
89F with HTN, Afib w/ RVR (on Xarelto), CHF, seizure disorder, right hip replacement (2007), spinal surgery, right foot neuropathy, E-coli bacteremia and UTI 4/2022 and found to have a renal mass now p/w fever and weakness but no other symptoms  here febrile to 103.8, tachycardic, hypotension, WBC: 19.55  renal u/s: No cholelithiasis, acute cholecystitis or biliary ductal dilatation.  Heterogeneous left upper pole mass measures 3.5 x 3.1 x 2.8 cm suspicious for renal cell carcinoma  u/a positive but  no urine cx done, blood cx with E-coli (just R to bactrim)    fever, leukocytosis, hypotension, tachycardia, sepsis due to E-coli bacteremia and pyelonephritis  repeat blood cx negative 8/2  * pt clinically well, afebrile and WBC normalized  * c/w ceftriaxone 1 qd while in the hospital, now day 3 post negative blood cx, will complete a 10 day course  * on discharge switch to PO cefpodoxime 200 bid to complete the course  * I don't believe pt will benefit from suppressive antibiotics as the risks outweigh the benefit, she only had another UTI 4 months ago  * can do Hiprex to prevent further UTIs    The above assessment and plan was discussed with the primary team    Keyana Alexander MD  contact on teams  After 5pm and on weekends call [phone]

## 2022-08-04 NOTE — PROGRESS NOTE ADULT - ATTENDING COMMENTS
89F with PMH A fib, seizure disorder, HTN, HLD and CHF presented to ED with fever, weakness, and one week of worsening SOB and b/l LE edema concerning for acute on chronic HF found to have sepsis with E. Coli bacteremia with pyuria and no bacteria in urine, with pleural effusions.     # E coli bacteremia     source is most likely from urine source , but Urine CX was not collected prior to ABX,  awaiting on UCX      Previous E coli bacteremia from urine source from previous hosp admission in 4/2022       Repeat blood cx 8/2/2022 neg       UCX is neg as expected     Pt has received 3 doses of Zosyn and 3 days of ceftriaxone and WBC has promptly decreased now to normal, ID rec is appreciated , will most likely   DC home on oral ABX once ready   # Pulm HTN        TTE results is noted with Ef of 55% , severe pulm HTN        Monitor SPO2         Awaiting on DC with HOME O2         Await on Pulm rec   # transaminitis      COuld be sepsis with organ Dysfn vs acute liver dysfunction from other etiology like meds , mets , or congestive haptopathy        liver  is stable from US     trend LFT   # Acute on chronic heart failure      Legs were very swollen( as per daughter and patient, B/L pleural effusion )      was on IV Lasix with good response as per patient and daughter and now on oral Lasix       MOnitor SPO2   # Acute on chronic resp failure could be sec to CHF VS Emphysema ( from CT in April 2022 ) or other etiologies      Monitor SPo2 , might need home O2  Rest is as per Resident above     Patient and daughter are updated on 8/2   Joanie Davalos  Hospitalist

## 2022-08-04 NOTE — CHART NOTE - NSCHARTNOTEFT_GEN_A_CORE
89F with PMHx of AFib on AC, seizure disorder, HTN, HLD and CHF presented to ED with fever, weakness, and one week of worsening SOB and bilateral LE edema concerning for acute on chronic HF with pleural effusions found to have sepsis 2/2 UTI (E. Coli bacteremia). Pt with known L renal mass which  was consulted for on a prior admission, but family and pt decided to not intervene and wished to not undergo surgery. Pt admitted for UTI c/b E coli bacteremia this admission.  reconsulted for input on recurrent urinary infections.    Recs:  - F/u renal us without hydronephrosis. Known left renal mass. No intervention per pt/family wishes.   - Increased hydration   - Patient currently on culture appropriate antibiotics, finish course per ID recommendation   - Vaginal Estrogen can be helpful in the setting of  syndrome of menopause   - , no intervention   - No further urologic intervention     Please call with future questions or concerns   Edwige Moreno

## 2022-08-04 NOTE — CONSULT NOTE ADULT - SUBJECTIVE AND OBJECTIVE BOX
CHIEF COMPLAINT:Patient is a 89y old  Female who presents with a chief complaint of Fever and Hypoxia (03 Aug 2022 07:58)      HPI:  89F PMH of A fib w/ RVR (on Xarelto), CHF, seizure disorder, right hip replacement (2007), shoulder rotator cuff surgery (2008), spinal surgery, right foot neuropathy, HTN, HLD presents to ED reporting SOB via EMS. EMS reports patient is coming from home, with one week of SOB worsening today. Patient reports worsening SOB and b/l LE edema today. Has longstanding diff breathing, worse the last week, worse more today. Patient also reports fever (104) with generalized weakness. Per daughter, patient was admitted 2 months prior for fever and weakness 2/2 UTI. On exam, AOx3, answering questions. Tachypneic and tachycardic on arrival,on NRB mask. Pt denies CP, abd pain, n/v/d at this time.    In the ED, patient presented with acute resp distress and hypoxia on arrival (RA 88-99% sats). O2 started for WOB and comfort.  Pt's WOB remains elevated despite improvement in O2 sat on NC. Rescue bipap begun with marked improvement in WOB, reduction in BP, return to normal WOB. POCUS demonstrated bilateral small pleural effusions. Pt improved on Bipap. Pt was also hypertensive in afib with RVR on arrival. Following improvement in resp effort, HR has reduced without specific intervention.    Of note, patient lives at home alone with a daytime aid and denies tobacco/alcohol/drug use.    PCP: Dr. Kenneth Nolasco  Cardiologist: Dr. Guilherme Cm  Neurologist: Dr. Jong Quiles   (31 Jul 2022 15:40)    Pt was treated this hospitalization for sepsis 2/2 UTI w zosyn ->CTX->cefpodoxime on d/c. Was bacteremic with e. coli. Noted h/o CHF, O2 saturation as low as high 80s in ED upon admission, pt has been weaned to RA but remains dyspneic on ambulation. TTE was obtained that revealed RV enlargement, decreased RV systolic function, severe TR and PASP 70-75mmHg which was performed yesterday. Prior TTE PASP 52, mild RV enlargement, moderate TR in 4/22, 3/14 TTE with severe right atrial enlargement RVSP ~ 61  mm Hg, assuming right atrial pressure equals 10 mm Hg, consistent with severe pulmonary hypertension, Mild tricuspid regurgitation. No prior RHC seen in [facility] after the year 2000. Admission CXR with b/l pleural effusions present.    PAST MEDICAL & SURGICAL HISTORY:  HTN (hypertension)      Hyperlipemia      GERD (gastroesophageal reflux disease)      Hypothyroidism      TIA (transient ischemic attack)      CVA (cerebrovascular accident)      Afib  on pradaxa      Status post left hip replacement      H/O repair of left rotator cuff      S/P laminectomy      Bilateral cataracts          FAMILY HISTORY:      SOCIAL HISTORY:  Smoking: [ ] Never Smoked [ ] Former Smoker (__ packs x ___ years) [ ] Current Smoker  (__ packs x ___ years)  Substance Use: [ ] Never Used [ ] Used ____  EtOH Use:  Marital Status: [ ] Single [ ]  [ ]  [ ]   Sexual History:   Occupation:  Recent Travel:  Country of Birth:  Advance Directives:    Allergies    No Known Allergies    Intolerances        HOME MEDICATIONS:  Home Medications:  atenolol 25 mg oral tablet: 1 tab(s) orally 2 times a day (31 Jul 2022 18:20)  Lasix 20 mg oral tablet: 1 milligram(s) orally 2 times a day (31 Jul 2022 18:20)  Lipitor 20 mg oral tablet: 1 tab(s) orally once a day (at bedtime) (31 Jul 2022 18:20)  multivitamin: 1 tab(s) orally once a day (31 Jul 2022 18:20)  OXcarbazepine 150 mg oral tablet: 1 tab(s) orally 2 times a day (31 Jul 2022 18:20)  PreserVision AREDS oral capsule: 1 cap(s) orally once a day (31 Jul 2022 18:20)  quinapril 20 mg oral tablet: 1 tab(s) orally once a day (31 Jul 2022 18:20)  Synthroid 75 mcg (0.075 mg) oral tablet: 1 tab(s) orally once a day (31 Jul 2022 18:20)  Trelegy Ellipta 100 mcg-62.5 mcg-25 mcg/inh inhalation powder: 1 puff(s) inhaled once a day, As Needed (31 Jul 2022 18:20)  Vitamin D3 25 mcg (1000 intl units) oral tablet: 1 tab(s) orally once a day (31 Jul 2022 18:20)  Xarelto 20 mg oral tablet: 1 tab(s) orally once a day (in the evening) (31 Jul 2022 18:20)      REVIEW OF SYSTEMS:  Constitutional: [ ] negative [ ] fevers [ ] chills [ ] weight loss [ ] weight gain  HEENT: [ ] negative [ ] dry eyes [ ] eye irritation [ ] postnasal drip [ ] nasal congestion  CV: [ ] negative  [ ] chest pain [ ] orthopnea [ ] palpitations [ ] murmur  Resp: [ ] negative [ ] cough [ ] shortness of breath [ ] dyspnea [ ] wheezing [ ] sputum [ ] hemoptysis  GI: [ ] negative [ ] nausea [ ] vomiting [ ] diarrhea [ ] constipation [ ] abd pain [ ] dysphagia   : [ ] negative [ ] dysuria [ ] nocturia [ ] hematuria [ ] increased urinary frequency  Musculoskeletal: [ ] negative [ ] back pain [ ] myalgias [ ] arthralgias [ ] fracture  Skin: [ ] negative [ ] rash [ ] itch  Neurological: [ ] negative [ ] headache [ ] dizziness [ ] syncope [ ] weakness [ ] numbness  Psychiatric: [ ] negative [ ] anxiety [ ] depression  Endocrine: [ ] negative [ ] diabetes [ ] thyroid problem  Hematologic/Lymphatic: [ ] negative [ ] anemia [ ] bleeding problem  Allergic/Immunologic: [ ] negative [ ] itchy eyes [ ] nasal discharge [ ] hives [ ] angioedema  [ ] All other systems negative  [ ] Unable to assess ROS because ________    OBJECTIVE:  ICU Vital Signs Last 24 Hrs  T(C): 36.3 (04 Aug 2022 11:17), Max: 37.2 (03 Aug 2022 17:10)  T(F): 97.4 (04 Aug 2022 11:17), Max: 99 (03 Aug 2022 17:10)  HR: 98 (04 Aug 2022 11:17) (77 - 103)  BP: 135/83 (04 Aug 2022 11:17) (129/86 - 148/91)  BP(mean): --  ABP: --  ABP(mean): --  RR: 18 (04 Aug 2022 11:17) (16 - 18)  SpO2: 93% (04 Aug 2022 11:17) (90% - 95%)    O2 Parameters below as of 04 Aug 2022 11:17  Patient On (Oxygen Delivery Method): room air              08-03 @ 07:01 - 08-04 @ 07:00  --------------------------------------------------------  IN: 480 mL / OUT: 1175 mL / NET: -695 mL    08-04 @ 07:01 - 08-04 @ 13:18  --------------------------------------------------------  IN: 360 mL / OUT: 0 mL / NET: 360 mL      CAPILLARY BLOOD GLUCOSE          PHYSICAL EXAM:  GENERAL: NAD  HEAD:  Atraumatic, Normocephalic  EYES:   ENMT:   NECK:   CHEST/LUNG:   HEART:   ABDOMEN: Soft, Nontender, Nondistended  VASCULAR:    SKIN: No rashes or lesions  NERVOUS SYSTEM:  Alert & Oriented X3    HOSPITAL MEDICATIONS:  Standing Meds:  ATENolol  Tablet 25 milliGRAM(s) Oral two times a day  atorvastatin 20 milliGRAM(s) Oral at bedtime  budesonide  80 MICROgram(s)/formoterol 4.5 MICROgram(s) Inhaler 2 Puff(s) Inhalation two times a day  cefTRIAXone   IVPB 1000 milliGRAM(s) IV Intermittent every 24 hours  chlorhexidine 2% Cloths 1 Application(s) Topical <User Schedule>  cholecalciferol 1000 Unit(s) Oral daily  fluticasone propionate 50 MICROgram(s)/spray Nasal Spray 1 Spray(s) Both Nostrils two times a day  furosemide    Tablet 20 milliGRAM(s) Oral two times a day  guaiFENesin  milliGRAM(s) Oral every 12 hours  levothyroxine 75 MICROGram(s) Oral daily  multivitamin 1 Tablet(s) Oral daily  OXcarbazepine 150 milliGRAM(s) Oral two times a day  rivaroxaban 20 milliGRAM(s) Oral daily  tiotropium 18 MICROgram(s) Capsule 1 Capsule(s) Inhalation daily      PRN Meds:  acetaminophen     Tablet .. 650 milliGRAM(s) Oral every 6 hours PRN      LABS:    The Labs were reviewed by me   The Radiology was reviewed by me    EKG tracing reviewed by me    08-04    137  |  99  |  21  ----------------------------<  96  3.4<L>   |  26  |  0.95  08-03    136  |  98  |  20  ----------------------------<  89  3.5   |  25  |  0.90  08-02    137  |  100  |  24<H>  ----------------------------<  84  3.6   |  25  |  1.07    Ca    8.7      04 Aug 2022 06:10  Ca    8.8      03 Aug 2022 07:30  Ca    8.6      02 Aug 2022 06:22  Phos  3.4     08-04  Mg     1.8     08-04    TPro  6.0  /  Alb  2.9<L>  /  TBili  0.2  /  DBili  x   /  AST  61<H>  /  ALT  79<H>  /  AlkPhos  137<H>  08-04  TPro  5.9<L>  /  Alb  2.8<L>  /  TBili  0.2  /  DBili  x   /  AST  66<H>  /  ALT  74<H>  /  AlkPhos  139<H>  08-03  TPro  6.3  /  Alb  2.9<L>  /  TBili  0.2  /  DBili  x   /  AST  59<H>  /  ALT  63<H>  /  AlkPhos  154<H>  08-02    Magnesium, Serum: 1.8 mg/dL (08-04-22 @ 06:10)  Magnesium, Serum: 1.9 mg/dL (08-03-22 @ 07:30)  Magnesium, Serum: 2.0 mg/dL (08-02-22 @ 06:22)    Phosphorus Level, Serum: 3.4 mg/dL (08-04-22 @ 06:10)  Phosphorus Level, Serum: 2.9 mg/dL (08-03-22 @ 07:30)  Phosphorus Level, Serum: 3.6 mg/dL (08-02-22 @ 06:22)                                              12.2   7.58  )-----------( 292      ( 04 Aug 2022 06:08 )             36.9                         12.0   9.49  )-----------( 274      ( 03 Aug 2022 07:31 )             35.9                         13.0   10.51 )-----------( 279      ( 02 Aug 2022 06:22 )             38.9     CAPILLARY BLOOD GLUCOSE            MICROBIOLOGY:     Culture - Urine (collected 02 Aug 2022 18:55)  Source: Clean Catch Clean Catch (Midstream)  Final Report (03 Aug 2022 22:19):    No growth    Culture - Blood (collected 02 Aug 2022 06:22)  Source: .Blood Blood-Venous  Preliminary Report (03 Aug 2022 09:01):    No growth to date.    Culture - Blood (collected 02 Aug 2022 06:22)  Source: .Blood Blood-Peripheral  Preliminary Report (03 Aug 2022 09:01):    No growth to date.        RADIOLOGY:  [ ] Reviewed and interpreted by me    PULMONARY FUNCTION TESTS:    EKG:   CHIEF COMPLAINT:Patient is a 89y old  Female who presents with a chief complaint of Fever and Hypoxia (03 Aug 2022 07:58)      HPI:  89F PMH of A fib w/ RVR (on Xarelto), CHF, seizure disorder, right hip replacement (2007), shoulder rotator cuff surgery (2008), spinal surgery, right foot neuropathy, HTN, HLD presents to ED reporting SOB via EMS. EMS reports patient is coming from home, with one week of SOB worsening today. Patient reports worsening SOB and b/l LE edema today. Has longstanding diff breathing, worse the last week, worse more today. Patient also reports fever (104) with generalized weakness. Per daughter, patient was admitted 2 months prior for fever and weakness 2/2 UTI. On exam, AOx3, answering questions. Tachypneic and tachycardic on arrival,on NRB mask. Pt denies CP, abd pain, n/v/d at this time.    In the ED, patient presented with acute resp distress and hypoxia on arrival (RA 88-99% sats). O2 started for WOB and comfort.  Pt's WOB remains elevated despite improvement in O2 sat on NC. Rescue bipap begun with marked improvement in WOB, reduction in BP, return to normal WOB. POCUS demonstrated bilateral small pleural effusions. Pt improved on Bipap. Pt was also hypertensive in afib with RVR on arrival. Following improvement in resp effort, HR has reduced without specific intervention.    Of note, patient lives at home alone with a daytime aid and denies tobacco/alcohol/drug use.    PCP: Dr. Kenneth Nolasco  Cardiologist: Dr. Guilherme Cm  Neurologist: Dr. Jong Quiles   (31 Jul 2022 15:40)    Pt was treated this hospitalization for sepsis 2/2 UTI w zosyn ->CTX->cefpodoxime on d/c. Was bacteremic with e. coli. Noted h/o CHF, O2 saturation as low as high 80s in ED upon admission, pt has been weaned to RA but remains dyspneic on ambulation. TTE was obtained that revealed RV enlargement, decreased RV systolic function, severe TR and PASP 70-75mmHg which was performed yesterday. Prior TTE PASP 52, mild RV enlargement, moderate TR in 4/22, 3/14 TTE with severe right atrial enlargement RVSP ~ 61  mm Hg, assuming right atrial pressure equals 10 mm Hg, consistent with severe pulmonary hypertension, Mild tricuspid regurgitation. No prior RHC seen in [facility] after the year 2000. Admission CXR with b/l pleural effusions present. Pt notably desaturated to 85% after moving from bed to chair and attempting to eat dinner although reports significant improvement in dyspnea since her presentation and decreased size of lower extremities.    PAST MEDICAL & SURGICAL HISTORY:  HTN (hypertension)      Hyperlipemia      GERD (gastroesophageal reflux disease)      Hypothyroidism      TIA (transient ischemic attack)      CVA (cerebrovascular accident)      Afib  on pradaxa      Status post left hip replacement      H/O repair of left rotator cuff      S/P laminectomy      Bilateral cataracts          FAMILY HISTORY:      SOCIAL HISTORY:  Smoking: [ ] Never Smoked [x ] Former Smoker (__ packs x ___ years) [ ] Current Smoker  (__ packs x ___ years)  Substance Use: [ ] Never Used [ ] Used ____  EtOH Use:  Marital Status: [ ] Single [ ]  [ ]  [ ]   Sexual History:   Occupation:  Recent Travel:  Country of Birth:  Advance Directives:    Allergies    No Known Allergies    Intolerances        HOME MEDICATIONS:  Home Medications:  atenolol 25 mg oral tablet: 1 tab(s) orally 2 times a day (31 Jul 2022 18:20)  Lasix 20 mg oral tablet: 1 milligram(s) orally 2 times a day (31 Jul 2022 18:20)  Lipitor 20 mg oral tablet: 1 tab(s) orally once a day (at bedtime) (31 Jul 2022 18:20)  multivitamin: 1 tab(s) orally once a day (31 Jul 2022 18:20)  OXcarbazepine 150 mg oral tablet: 1 tab(s) orally 2 times a day (31 Jul 2022 18:20)  PreserVision AREDS oral capsule: 1 cap(s) orally once a day (31 Jul 2022 18:20)  quinapril 20 mg oral tablet: 1 tab(s) orally once a day (31 Jul 2022 18:20)  Synthroid 75 mcg (0.075 mg) oral tablet: 1 tab(s) orally once a day (31 Jul 2022 18:20)  Trelegy Ellipta 100 mcg-62.5 mcg-25 mcg/inh inhalation powder: 1 puff(s) inhaled once a day, As Needed (31 Jul 2022 18:20)  Vitamin D3 25 mcg (1000 intl units) oral tablet: 1 tab(s) orally once a day (31 Jul 2022 18:20)  Xarelto 20 mg oral tablet: 1 tab(s) orally once a day (in the evening) (31 Jul 2022 18:20)      REVIEW OF SYSTEMS:  Constitutional: [x ] negative [ ] fevers [ ] chills [ ] weight loss [ ] weight gain  HEENT: [x ] negative [ ] dry eyes [ ] eye irritation [ ] postnasal drip [ ] nasal congestion  CV: [x ] negative  [ ] chest pain [ ] orthopnea [ ] palpitations [ ] murmur  Resp: [x ] negative [ ] cough [ ] shortness of breath [ ] dyspnea [ ] wheezing [ ] sputum [ ] hemoptysis  GI: [ ] negative [ ] nausea [ ] vomiting [ ] diarrhea [ ] constipation [ ] abd pain [ ] dysphagia   : [ ] negative [ ] dysuria [ ] nocturia [ ] hematuria [ ] increased urinary frequency  Musculoskeletal: [ ] negative [ ] back pain [ ] myalgias [ ] arthralgias [ ] fracture [x ] Leg swelling  Skin: [ ] negative [ ] rash [ ] itch  Neurological: [ ] negative [ ] headache [ ] dizziness [ ] syncope [ ] weakness [ ] numbness  Psychiatric: [ ] negative [ ] anxiety [ ] depression  Endocrine: [ ] negative [ ] diabetes [ ] thyroid problem  Hematologic/Lymphatic: [ ] negative [ ] anemia [ ] bleeding problem  Allergic/Immunologic: [ ] negative [ ] itchy eyes [ ] nasal discharge [ ] hives [ ] angioedema  [x ] All other systems negative  [ ] Unable to assess ROS because ________    OBJECTIVE:  ICU Vital Signs Last 24 Hrs  T(C): 36.3 (04 Aug 2022 11:17), Max: 37.2 (03 Aug 2022 17:10)  T(F): 97.4 (04 Aug 2022 11:17), Max: 99 (03 Aug 2022 17:10)  HR: 98 (04 Aug 2022 11:17) (77 - 103)  BP: 135/83 (04 Aug 2022 11:17) (129/86 - 148/91)  BP(mean): --  ABP: --  ABP(mean): --  RR: 18 (04 Aug 2022 11:17) (16 - 18)  SpO2: 93% (04 Aug 2022 11:17) (90% - 95%)    O2 Parameters below as of 04 Aug 2022 11:17  Patient On (Oxygen Delivery Method): room air              08-03 @ 07:01 - 08-04 @ 07:00  --------------------------------------------------------  IN: 480 mL / OUT: 1175 mL / NET: -695 mL    08-04 @ 07:01 - 08-04 @ 13:18  --------------------------------------------------------  IN: 360 mL / OUT: 0 mL / NET: 360 mL      CAPILLARY BLOOD GLUCOSE          PHYSICAL EXAM:  GENERAL: NAD  HEAD:  Atraumatic, Normocephalic  EYES: PERRL  ENMT: mmm  NECK: Bustillo A waves  CHEST/LUNG: bibasilar rales  HEART:  irregular, 2/6 systolic murmur heard best LSB  ABDOMEN: Soft, Nontender, Nondistended  VASCULAR: 1+ BLE pitting edema  SKIN: No rashes or lesions  NERVOUS SYSTEM:  Alert & Oriented X3    HOSPITAL MEDICATIONS:  Standing Meds:  ATENolol  Tablet 25 milliGRAM(s) Oral two times a day  atorvastatin 20 milliGRAM(s) Oral at bedtime  budesonide  80 MICROgram(s)/formoterol 4.5 MICROgram(s) Inhaler 2 Puff(s) Inhalation two times a day  cefTRIAXone   IVPB 1000 milliGRAM(s) IV Intermittent every 24 hours  chlorhexidine 2% Cloths 1 Application(s) Topical <User Schedule>  cholecalciferol 1000 Unit(s) Oral daily  fluticasone propionate 50 MICROgram(s)/spray Nasal Spray 1 Spray(s) Both Nostrils two times a day  furosemide    Tablet 20 milliGRAM(s) Oral two times a day  guaiFENesin  milliGRAM(s) Oral every 12 hours  levothyroxine 75 MICROGram(s) Oral daily  multivitamin 1 Tablet(s) Oral daily  OXcarbazepine 150 milliGRAM(s) Oral two times a day  rivaroxaban 20 milliGRAM(s) Oral daily  tiotropium 18 MICROgram(s) Capsule 1 Capsule(s) Inhalation daily      PRN Meds:  acetaminophen     Tablet .. 650 milliGRAM(s) Oral every 6 hours PRN      LABS:    The Labs were reviewed by me   The Radiology was reviewed by me    EKG tracing reviewed by me    08-04    137  |  99  |  21  ----------------------------<  96  3.4<L>   |  26  |  0.95  08-03    136  |  98  |  20  ----------------------------<  89  3.5   |  25  |  0.90  08-02    137  |  100  |  24<H>  ----------------------------<  84  3.6   |  25  |  1.07    Ca    8.7      04 Aug 2022 06:10  Ca    8.8      03 Aug 2022 07:30  Ca    8.6      02 Aug 2022 06:22  Phos  3.4     08-04  Mg     1.8     08-04    TPro  6.0  /  Alb  2.9<L>  /  TBili  0.2  /  DBili  x   /  AST  61<H>  /  ALT  79<H>  /  AlkPhos  137<H>  08-04  TPro  5.9<L>  /  Alb  2.8<L>  /  TBili  0.2  /  DBili  x   /  AST  66<H>  /  ALT  74<H>  /  AlkPhos  139<H>  08-03  TPro  6.3  /  Alb  2.9<L>  /  TBili  0.2  /  DBili  x   /  AST  59<H>  /  ALT  63<H>  /  AlkPhos  154<H>  08-02    Magnesium, Serum: 1.8 mg/dL (08-04-22 @ 06:10)  Magnesium, Serum: 1.9 mg/dL (08-03-22 @ 07:30)  Magnesium, Serum: 2.0 mg/dL (08-02-22 @ 06:22)    Phosphorus Level, Serum: 3.4 mg/dL (08-04-22 @ 06:10)  Phosphorus Level, Serum: 2.9 mg/dL (08-03-22 @ 07:30)  Phosphorus Level, Serum: 3.6 mg/dL (08-02-22 @ 06:22)                                              12.2   7.58  )-----------( 292      ( 04 Aug 2022 06:08 )             36.9                         12.0   9.49  )-----------( 274      ( 03 Aug 2022 07:31 )             35.9                         13.0   10.51 )-----------( 279      ( 02 Aug 2022 06:22 )             38.9     CAPILLARY BLOOD GLUCOSE            MICROBIOLOGY:     Culture - Urine (collected 02 Aug 2022 18:55)  Source: Clean Catch Clean Catch (Midstream)  Final Report (03 Aug 2022 22:19):    No growth    Culture - Blood (collected 02 Aug 2022 06:22)  Source: .Blood Blood-Venous  Preliminary Report (03 Aug 2022 09:01):    No growth to date.    Culture - Blood (collected 02 Aug 2022 06:22)  Source: .Blood Blood-Peripheral  Preliminary Report (03 Aug 2022 09:01):    No growth to date.        RADIOLOGY:  [ ] Reviewed and interpreted by me    PULMONARY FUNCTION TESTS:    EKG:

## 2022-08-04 NOTE — PROGRESS NOTE ADULT - PROBLEM SELECTOR PLAN 2
hypoxic 88-99% on RA on presentation to the ED, on NC  Possibly 2/2 AFib with RVR as etiology of SOB vs CHF i/s/o sepsis 2/2 UTI  - Weaned to RA  - Continuous pulse ox  - POCUS: lungs R moderate pleural effusion, cardiac biatrial enlargement, mild-mod reduced EF  - CXR b/l pleural effusions  - cardio recs (Dr. Cm): c/w abx and rx (inc. atenolol given rate controlled Afib and longstanding use)  - lasix po 20 daily  - TTE: RV enlargement w/ decreased RV systolic function, severe TR and pulm HTN hypoxic 88-99% on RA on presentation to the ED, on NC  Possibly 2/2 AFib with RVR as etiology of SOB vs CHF i/s/o sepsis 2/2 UTI  - Weaned to RA  - Continuous pulse ox  - POCUS: lungs R moderate pleural effusion, cardiac biatrial enlargement, mild-mod reduced EF  - CXR b/l pleural effusions  - cardio recs (Dr. Cm): c/w abx and rx (inc. atenolol given rate controlled Afib and longstanding use)  - lasix po 20 daily  - TTE: RV enlargement w/ decreased RV systolic function, severe TR and pulm HTN  - pulm recs: c/w vanc and cefepime for now, mrsa swab and urine ag, standing nebs and bid pulmicort, if ventilator req'd -> rcu/micu hypoxic 88-99% on RA on presentation to the ED, on NC  Possibly 2/2 AFib with RVR as etiology of SOB vs CHF i/s/o sepsis 2/2 UTI  - Weaned to RA  - Continuous pulse ox  - POCUS: lungs R moderate pleural effusion, cardiac biatrial enlargement, mild-mod reduced EF  - CXR b/l pleural effusions  - cardio recs (Dr. Cm): c/w abx and rx (inc. atenolol given rate controlled Afib and longstanding use)  - lasix po 20 daily  - TTE: RV enlargement w/ decreased RV systolic function, severe TR and pulm HTN  - pulm c/s

## 2022-08-04 NOTE — DISCHARGE NOTE PROVIDER - CARE PROVIDER_API CALL
Guilherme Cm)  Cardiovascular Disease; Internal Medicine; Interventional Cardiology  [address] [place] [address]  Phone: [phone]  Fax: [phone]  Established Patient  Scheduled Appointment: 10/12/2022 01:00 PM    Kenneth Nolasco)  Internal Medicine; Nephrology  [address] [facility], [address]  [place] [address]  Phone: [phone]  Fax: [phone]  Established Patient  Scheduled Appointment: 09/14/2022 11:00 AM

## 2022-08-04 NOTE — DISCHARGE NOTE PROVIDER - CARE PROVIDERS DIRECT ADDRESSES
Stable. ,DirectAddress_Unknown,[email or web address]

## 2022-08-04 NOTE — DISCHARGE NOTE PROVIDER - NSDCCPCAREPLAN_GEN_ALL_CORE_FT
PRINCIPAL DISCHARGE DIAGNOSIS  Diagnosis: Sepsis  Assessment and Plan of Treatment: You came to the hospital with fever, weakness and worksening shortness of breath. You were found to have a blood infection with a bacteria called E Coli (which we believe came from a urinary tract infection) and were treated with intravenous antibiotics and will complete oral antibiotics when you leave the hospital. If you have fevers at home or feel unsteady please return to the hospital. Please follow up with your primary care physician within 3 days of leaving the hospital. We are discharging you on a medication to prevent future urinary tract infections.      SECONDARY DISCHARGE DIAGNOSES  Diagnosis: Acute respiratory failure with hypoxia  Assessment and Plan of Treatment: You came to the hospital with shortness of breath and swelling in your lower extremities. We believe this could be from heart failure vs pulmonary hypertension. We diuresed you while you were in the hospital and your swelling improved. Please follow up with Dr. Cm within 3 days of hospital discharge for additional diuresis. Additionally, please follow up with Dr. [email or web address] for management of your pulmonary hypertension within 3 days of leaving the hospital. You are discharged on home oxygen. If you get worsening shortness of breath or swelling in your feet, please return to the hospital.

## 2022-08-05 ENCOUNTER — TRANSCRIPTION ENCOUNTER (OUTPATIENT)
Age: 87
End: 2022-08-05

## 2022-08-05 VITALS
HEART RATE: 92 BPM | TEMPERATURE: 98 F | DIASTOLIC BLOOD PRESSURE: 84 MMHG | OXYGEN SATURATION: 91 % | RESPIRATION RATE: 18 BRPM | SYSTOLIC BLOOD PRESSURE: 132 MMHG

## 2022-08-05 LAB
ALBUMIN SERPL ELPH-MCNC: 2.6 G/DL — LOW (ref 3.3–5)
ALP SERPL-CCNC: 121 U/L — HIGH (ref 40–120)
ALT FLD-CCNC: 68 U/L — HIGH (ref 10–45)
ANION GAP SERPL CALC-SCNC: 14 MMOL/L — SIGNIFICANT CHANGE UP (ref 5–17)
AST SERPL-CCNC: 49 U/L — HIGH (ref 10–40)
BASOPHILS # BLD AUTO: 0.03 K/UL — SIGNIFICANT CHANGE UP (ref 0–0.2)
BASOPHILS NFR BLD AUTO: 0.3 % — SIGNIFICANT CHANGE UP (ref 0–2)
BILIRUB SERPL-MCNC: 0.3 MG/DL — SIGNIFICANT CHANGE UP (ref 0.2–1.2)
BUN SERPL-MCNC: 21 MG/DL — SIGNIFICANT CHANGE UP (ref 7–23)
CALCIUM SERPL-MCNC: 8.2 MG/DL — LOW (ref 8.4–10.5)
CHLORIDE SERPL-SCNC: 98 MMOL/L — SIGNIFICANT CHANGE UP (ref 96–108)
CO2 SERPL-SCNC: 26 MMOL/L — SIGNIFICANT CHANGE UP (ref 22–31)
CREAT SERPL-MCNC: 1.07 MG/DL — SIGNIFICANT CHANGE UP (ref 0.5–1.3)
EGFR: 47 ML/MIN/1.73M2 — LOW
EOSINOPHIL # BLD AUTO: 0.05 K/UL — SIGNIFICANT CHANGE UP (ref 0–0.5)
EOSINOPHIL NFR BLD AUTO: 0.5 % — SIGNIFICANT CHANGE UP (ref 0–6)
GLUCOSE SERPL-MCNC: 78 MG/DL — SIGNIFICANT CHANGE UP (ref 70–99)
HCT VFR BLD CALC: 35.3 % — SIGNIFICANT CHANGE UP (ref 34.5–45)
HGB BLD-MCNC: 11.7 G/DL — SIGNIFICANT CHANGE UP (ref 11.5–15.5)
IMM GRANULOCYTES NFR BLD AUTO: 0.3 % — SIGNIFICANT CHANGE UP (ref 0–1.5)
LYMPHOCYTES # BLD AUTO: 1.12 K/UL — SIGNIFICANT CHANGE UP (ref 1–3.3)
LYMPHOCYTES # BLD AUTO: 11.3 % — LOW (ref 13–44)
MAGNESIUM SERPL-MCNC: 1.8 MG/DL — SIGNIFICANT CHANGE UP (ref 1.6–2.6)
MCHC RBC-ENTMCNC: 30.8 PG — SIGNIFICANT CHANGE UP (ref 27–34)
MCHC RBC-ENTMCNC: 33.1 GM/DL — SIGNIFICANT CHANGE UP (ref 32–36)
MCV RBC AUTO: 92.9 FL — SIGNIFICANT CHANGE UP (ref 80–100)
MONOCYTES # BLD AUTO: 0.97 K/UL — HIGH (ref 0–0.9)
MONOCYTES NFR BLD AUTO: 9.8 % — SIGNIFICANT CHANGE UP (ref 2–14)
NEUTROPHILS # BLD AUTO: 7.67 K/UL — HIGH (ref 1.8–7.4)
NEUTROPHILS NFR BLD AUTO: 77.8 % — HIGH (ref 43–77)
NRBC # BLD: 0 /100 WBCS — SIGNIFICANT CHANGE UP (ref 0–0)
PHOSPHATE SERPL-MCNC: 3.2 MG/DL — SIGNIFICANT CHANGE UP (ref 2.5–4.5)
PLATELET # BLD AUTO: 311 K/UL — SIGNIFICANT CHANGE UP (ref 150–400)
POTASSIUM SERPL-MCNC: 3.3 MMOL/L — LOW (ref 3.5–5.3)
POTASSIUM SERPL-SCNC: 3.3 MMOL/L — LOW (ref 3.5–5.3)
PROT SERPL-MCNC: 5.6 G/DL — LOW (ref 6–8.3)
RBC # BLD: 3.8 M/UL — SIGNIFICANT CHANGE UP (ref 3.8–5.2)
RBC # FLD: 14.6 % — HIGH (ref 10.3–14.5)
SODIUM SERPL-SCNC: 138 MMOL/L — SIGNIFICANT CHANGE UP (ref 135–145)
WBC # BLD: 9.87 K/UL — SIGNIFICANT CHANGE UP (ref 3.8–10.5)
WBC # FLD AUTO: 9.87 K/UL — SIGNIFICANT CHANGE UP (ref 3.8–10.5)

## 2022-08-05 PROCEDURE — 84295 ASSAY OF SERUM SODIUM: CPT

## 2022-08-05 PROCEDURE — 87640 STAPH A DNA AMP PROBE: CPT

## 2022-08-05 PROCEDURE — 97116 GAIT TRAINING THERAPY: CPT

## 2022-08-05 PROCEDURE — 76705 ECHO EXAM OF ABDOMEN: CPT

## 2022-08-05 PROCEDURE — 85018 HEMOGLOBIN: CPT

## 2022-08-05 PROCEDURE — 84132 ASSAY OF SERUM POTASSIUM: CPT

## 2022-08-05 PROCEDURE — 99233 SBSQ HOSP IP/OBS HIGH 50: CPT | Mod: GC

## 2022-08-05 PROCEDURE — 99232 SBSQ HOSP IP/OBS MODERATE 35: CPT

## 2022-08-05 PROCEDURE — 83880 ASSAY OF NATRIURETIC PEPTIDE: CPT

## 2022-08-05 PROCEDURE — 85025 COMPLETE CBC W/AUTO DIFF WBC: CPT

## 2022-08-05 PROCEDURE — 97110 THERAPEUTIC EXERCISES: CPT

## 2022-08-05 PROCEDURE — 99239 HOSP IP/OBS DSCHRG MGMT >30: CPT

## 2022-08-05 PROCEDURE — 81001 URINALYSIS AUTO W/SCOPE: CPT

## 2022-08-05 PROCEDURE — 73630 X-RAY EXAM OF FOOT: CPT | Mod: 26,RT

## 2022-08-05 PROCEDURE — 82803 BLOOD GASES ANY COMBINATION: CPT

## 2022-08-05 PROCEDURE — 93306 TTE W/DOPPLER COMPLETE: CPT

## 2022-08-05 PROCEDURE — 87641 MR-STAPH DNA AMP PROBE: CPT

## 2022-08-05 PROCEDURE — 87086 URINE CULTURE/COLONY COUNT: CPT

## 2022-08-05 PROCEDURE — 84145 PROCALCITONIN (PCT): CPT

## 2022-08-05 PROCEDURE — 73630 X-RAY EXAM OF FOOT: CPT

## 2022-08-05 PROCEDURE — 97161 PT EVAL LOW COMPLEX 20 MIN: CPT

## 2022-08-05 PROCEDURE — 83605 ASSAY OF LACTIC ACID: CPT

## 2022-08-05 PROCEDURE — 87040 BLOOD CULTURE FOR BACTERIA: CPT

## 2022-08-05 PROCEDURE — 93356 MYOCRD STRAIN IMG SPCKL TRCK: CPT

## 2022-08-05 PROCEDURE — 80053 COMPREHEN METABOLIC PANEL: CPT

## 2022-08-05 PROCEDURE — 87150 DNA/RNA AMPLIFIED PROBE: CPT

## 2022-08-05 PROCEDURE — 94660 CPAP INITIATION&MGMT: CPT

## 2022-08-05 PROCEDURE — 36415 COLL VENOUS BLD VENIPUNCTURE: CPT

## 2022-08-05 PROCEDURE — 84100 ASSAY OF PHOSPHORUS: CPT

## 2022-08-05 PROCEDURE — 85610 PROTHROMBIN TIME: CPT

## 2022-08-05 PROCEDURE — 87186 SC STD MICRODIL/AGAR DIL: CPT

## 2022-08-05 PROCEDURE — 94640 AIRWAY INHALATION TREATMENT: CPT

## 2022-08-05 PROCEDURE — 85014 HEMATOCRIT: CPT

## 2022-08-05 PROCEDURE — 83735 ASSAY OF MAGNESIUM: CPT

## 2022-08-05 PROCEDURE — 93005 ELECTROCARDIOGRAM TRACING: CPT

## 2022-08-05 PROCEDURE — 85730 THROMBOPLASTIN TIME PARTIAL: CPT

## 2022-08-05 PROCEDURE — 84484 ASSAY OF TROPONIN QUANT: CPT

## 2022-08-05 PROCEDURE — 82947 ASSAY GLUCOSE BLOOD QUANT: CPT

## 2022-08-05 PROCEDURE — 99291 CRITICAL CARE FIRST HOUR: CPT | Mod: 25

## 2022-08-05 PROCEDURE — 76857 US EXAM PELVIC LIMITED: CPT

## 2022-08-05 PROCEDURE — 71045 X-RAY EXAM CHEST 1 VIEW: CPT

## 2022-08-05 PROCEDURE — 82330 ASSAY OF CALCIUM: CPT

## 2022-08-05 PROCEDURE — 82435 ASSAY OF BLOOD CHLORIDE: CPT

## 2022-08-05 PROCEDURE — 96374 THER/PROPH/DIAG INJ IV PUSH: CPT

## 2022-08-05 PROCEDURE — 96375 TX/PRO/DX INJ NEW DRUG ADDON: CPT

## 2022-08-05 PROCEDURE — 0225U NFCT DS DNA&RNA 21 SARSCOV2: CPT

## 2022-08-05 RX ORDER — POTASSIUM CHLORIDE 20 MEQ
20 PACKET (EA) ORAL
Refills: 0 | Status: COMPLETED | OUTPATIENT
Start: 2022-08-05 | End: 2022-08-05

## 2022-08-05 RX ORDER — CEFPODOXIME PROXETIL 100 MG
1 TABLET ORAL
Qty: 24 | Refills: 0
Start: 2022-08-05 | End: 2022-08-12

## 2022-08-05 RX ADMIN — CEFTRIAXONE 100 MILLIGRAM(S): 500 INJECTION, POWDER, FOR SOLUTION INTRAMUSCULAR; INTRAVENOUS at 05:56

## 2022-08-05 RX ADMIN — Medication 1 TABLET(S): at 12:00

## 2022-08-05 RX ADMIN — RIVAROXABAN 20 MILLIGRAM(S): KIT at 12:00

## 2022-08-05 RX ADMIN — Medication 75 MICROGRAM(S): at 05:56

## 2022-08-05 RX ADMIN — Medication 600 MILLIGRAM(S): at 05:56

## 2022-08-05 RX ADMIN — Medication 20 MILLIEQUIVALENT(S): at 11:59

## 2022-08-05 RX ADMIN — Medication 20 MILLIEQUIVALENT(S): at 08:09

## 2022-08-05 RX ADMIN — Medication 20 MILLIGRAM(S): at 14:13

## 2022-08-05 RX ADMIN — CHLORHEXIDINE GLUCONATE 1 APPLICATION(S): 213 SOLUTION TOPICAL at 06:01

## 2022-08-05 RX ADMIN — ATENOLOL 25 MILLIGRAM(S): 25 TABLET ORAL at 05:56

## 2022-08-05 RX ADMIN — Medication 1000 UNIT(S): at 12:00

## 2022-08-05 RX ADMIN — ATENOLOL 25 MILLIGRAM(S): 25 TABLET ORAL at 17:04

## 2022-08-05 RX ADMIN — Medication 1 SPRAY(S): at 17:04

## 2022-08-05 RX ADMIN — Medication 1 SPRAY(S): at 05:57

## 2022-08-05 RX ADMIN — OXCARBAZEPINE 150 MILLIGRAM(S): 300 TABLET, FILM COATED ORAL at 05:56

## 2022-08-05 RX ADMIN — Medication 20 MILLIGRAM(S): at 05:56

## 2022-08-05 RX ADMIN — TIOTROPIUM BROMIDE 1 CAPSULE(S): 18 CAPSULE ORAL; RESPIRATORY (INHALATION) at 12:00

## 2022-08-05 RX ADMIN — OXCARBAZEPINE 150 MILLIGRAM(S): 300 TABLET, FILM COATED ORAL at 17:04

## 2022-08-05 RX ADMIN — BUDESONIDE AND FORMOTEROL FUMARATE DIHYDRATE 2 PUFF(S): 160; 4.5 AEROSOL RESPIRATORY (INHALATION) at 08:09

## 2022-08-05 NOTE — PROGRESS NOTE ADULT - ASSESSMENT
1. urosepsis- WBC improved no fevere asymptomatic  2. severe pulmonary hypertension-chronic probably related to diastolic dysfunction  3. atrial fibrillation HR controlled    Recommend  continue present cardiac regimen and anticoagulation  discharge to home when OK with ID  no further cardiac workup  followup rayna (Dr. Cm) 2-3 weeks post discharge

## 2022-08-05 NOTE — DISCHARGE NOTE NURSING/CASE MANAGEMENT/SOCIAL WORK - PATIENT PORTAL LINK FT
You can access the FollowMyHealth Patient Portal offered by [facility] by registering at the following website: [email or web address]. By joining [employer]’s FollowMyHealth portal, you will also be able to view your health information using other applications (apps) compatible with our system.

## 2022-08-05 NOTE — PROGRESS NOTE ADULT - PROBLEM SELECTOR PLAN 4
episodes stopped after starting oxcarbazepine  - Continue home oxcarbazepine

## 2022-08-05 NOTE — PROGRESS NOTE ADULT - PROBLEM SELECTOR PLAN 3
- Continue home Xarelto

## 2022-08-05 NOTE — PROGRESS NOTE ADULT - SUBJECTIVE AND OBJECTIVE BOX
Follow Up:  bacteremia UTI    Interval History: pt afebrile and feels well, plan for discharge    ROS:      All other systems negative    Constitutional: no fever, no chills    Cardiovascular:  no chest pain, no palpitation  Respiratory:  no SOB, no cough  GI:  no abd pain, no vomiting, no diarrhea  urinary: no dysuria, no hematuria, no flank pain  musculoskeletal:  no joint pain, no joint swelling  skin:  no rash  neurology:  no headache, no seizure      Allergies  No Known Allergies        ANTIMICROBIALS:  cefTRIAXone   IVPB 1000 every 24 hours      OTHER MEDS:  acetaminophen     Tablet .. 650 milliGRAM(s) Oral every 6 hours PRN  ATENolol  Tablet 25 milliGRAM(s) Oral two times a day  atorvastatin 20 milliGRAM(s) Oral at bedtime  budesonide  80 MICROgram(s)/formoterol 4.5 MICROgram(s) Inhaler 2 Puff(s) Inhalation two times a day  chlorhexidine 2% Cloths 1 Application(s) Topical <User Schedule>  cholecalciferol 1000 Unit(s) Oral daily  estrogens  Cream 1 Gram(s) Vaginal at bedtime PRN  fluticasone propionate 50 MICROgram(s)/spray Nasal Spray 1 Spray(s) Both Nostrils two times a day  furosemide    Tablet 20 milliGRAM(s) Oral two times a day  levothyroxine 75 MICROGram(s) Oral daily  multivitamin 1 Tablet(s) Oral daily  OXcarbazepine 150 milliGRAM(s) Oral two times a day  rivaroxaban 20 milliGRAM(s) Oral daily  tiotropium 18 MICROgram(s) Capsule 1 Capsule(s) Inhalation daily      Vital Signs Last 24 Hrs  T(C): 36.5 (05 Aug 2022 11:23), Max: 36.9 (04 Aug 2022 17:00)  T(F): 97.7 (05 Aug 2022 11:23), Max: 98.4 (04 Aug 2022 17:00)  HR: 92 (05 Aug 2022 11:23) (71 - 92)  BP: 132/84 (05 Aug 2022 11:23) (128/81 - 160/90)  BP(mean): --  RR: 18 (05 Aug 2022 11:23) (17 - 20)  SpO2: 91% (05 Aug 2022 11:23) (85% - 97%)    Parameters below as of 05 Aug 2022 11:23  Patient On (Oxygen Delivery Method): nasal cannula  O2 Flow (L/min): 2      Physical Exam:  General:    NAD,  non toxic  Respiratory:    comfortable on RA  abd:     soft,   BS +,   no tenderness  :   no CVAT,  no suprapubic tenderness,   no  payne  Musculoskeletal:   no joint swelling  vascular: no phlebitis  Skin:    no rash                          11.7   9.87  )-----------( 311      ( 05 Aug 2022 06:11 )             35.3       08-05    138  |  98  |  21  ----------------------------<  78  3.3<L>   |  26  |  1.07    Ca    8.2<L>      05 Aug 2022 06:11  Phos  3.2     08-05  Mg     1.8     08-05    TPro  5.6<L>  /  Alb  2.6<L>  /  TBili  0.3  /  DBili  x   /  AST  49<H>  /  ALT  68<H>  /  AlkPhos  121<H>  08-05          MICROBIOLOGY:  v  Clean Catch Clean Catch (Midstream)  08-02-22   No growth  --  --      .Blood Blood-Peripheral  08-02-22   No growth to date.  --  --      .Blood Blood-Venous  07-31-22   Growth in aerobic and anaerobic bottles: Escherichia coli  ***Blood Panel PCR results on this specimen are available  approximately 3 hours after the Gram stain result.***  Gram stain, PCR, and/or culture results may not always  correspond due to difference in methodologies.  ************************************************************  This PCR assay was performed by multiplex PCR. This  Assay tests for 66 bacterial and resistance gene targets.  Please refer to the [facility] test directory  at [email or web address] for details.  --  Blood Culture PCR  Escherichia coli      .Blood Blood-Peripheral  07-31-22   Growth in aerobic and anaerobic bottles: Escherichia coli  See previous culture [id number]  --    Growth in aerobic bottle: Gram Negative Rods  Growth in anaerobic bottle: Gram Negative Rods          Rapid RVP Result: NotDetec (07-31 @ 09:32)        RADIOLOGY:  Images independently visualized and reviewed personally, findings as below  < from: Xray Foot AP + Lateral + Oblique, Right (08.05.22 @ 11:46) >  IMPRESSION:  Old healed distal 5th metatarsal shaft/neck fracture deformity. No   dislocations or acute appearing fractures.    Tarsometatarsal alignment maintained without evidence for a Lisfranc   injury.    Hallux valgus deformity with associated bunion. Preserved remaining   visualized joint spaces and no joint margin erosions.    Generalized osteopenia otherwise no discrete lytic or blastic lesions.    < end of copied text >  < from: US Urinary Bladder (08.02.22 @ 17:49) >  IMPRESSION:  Abdominal ultrasound:  1.  No sonographic evidence of cholelithiasis, acute cholecystitis or   biliary ductal dilatation.   2. Heterogeneous left upper pole mass measures 3.5 x 3.1 x 2.8 cm. is   suspicious for renalcell carcinoma.  Renal biopsy may be considered for   pathologic diagnosis.  3.  Both kidneys are echogenic compatible chronic renal parenchymal   disease.  4.  Pleural effusions are partially visualized bilaterally    Bladder ultrasound: No sonographic abnormality in the bladder.    < end of copied text >

## 2022-08-05 NOTE — PROGRESS NOTE ADULT - ATTENDING COMMENTS
89F PMH of A fib w/ RVR (on Xarelto), CHF, seizure disorder, right hip replacement (2007), shoulder rotator cuff surgery (2008), spinal surgery, right foot neuropathy, HTN, HLD presents with sepsis 2/2 UTI and ADHF.  She has pulmonary hypertension on echo and significant left heart disease with moderate AS, mild to moderate MR.  On exam has singificant LE edema.  Impression:  decompensated heart failure in setting of sepsis and rapid atrial fibrillation.  She is improving with diuresis.  Would continue diuretics, oxygen to maintain saturation >89% at rest or with ambulation.  Agree with plan as outlined above.

## 2022-08-05 NOTE — DISCHARGE NOTE NURSING/CASE MANAGEMENT/SOCIAL WORK - NSDCPEFALRISK_GEN_ALL_CORE
For information on Fall & Injury Prevention, visit: [email or web address] OR  [email or web address] OR  https://www.cdc.gov/steadi/patient.html

## 2022-08-05 NOTE — PROGRESS NOTE ADULT - ATTENDING COMMENTS
89F with PMH A fib, seizure disorder, HTN, HLD and CHF presented to ED with fever, weakness, and one week of worsening SOB and b/l LE edema concerning for acute on chronic HF found to have sepsis with E. Coli bacteremia with pyuria and no bacteria in urine, with pleural effusions.     # E coli bacteremia     source is most likely from urine source , but Urine CX was not collected prior to ABX,  awaiting on UCX      Previous E coli bacteremia from urine source from previous hosp admission in 4/2022       Repeat blood cx 8/2/2022 neg       UCX is neg as expected     Pt has received 3 doses of Zosyn and 3 days of ceftriaxone and WBC has promptly decreased now to normal, ID rec is appreciated , will most likely   DC home on oral ABX once ready   # Pulm HTN        TTE results is noted with Ef of 55% , severe pulm HTN        Monitor SPO2         Awaiting on DC with HOME O2          Pulm rec is appreciated    # transaminitis      COuld be sepsis with organ Dysfn vs acute liver dysfunction from other etiology like meds , mets , or congestive haptopathy        liver  is stable from US     trend LFT   # Acute on chronic heart failure      Legs were very swollen( as per daughter and patient, B/L pleural effusion )      was on IV Lasix with good response as per patient and daughter and now on oral Lasix       MOnitor SPO2   # Acute on chronic resp failure could be sec to CHF VS Emphysema ( from CT in April 2022 ) or other etiologies      Monitor SPo2, will go home on O2   Rest is as per Resident above     Joanie Davalos  Hospitalist   [phone]

## 2022-08-05 NOTE — PROGRESS NOTE ADULT - PROBLEM SELECTOR PLAN 7
DVT ppx: Xarelto  Diet: CC/DASH diet  Dispo: Pending PT consult

## 2022-08-05 NOTE — PROGRESS NOTE ADULT - SUBJECTIVE AND OBJECTIVE BOX
***************************************************************  Betty Jaramillo MD (PGY-1)  Internal Medicine  Pager: [phone]  ***************************************************************    PROGRESS NOTE:     Patient is a 89y old  Female who presents with a chief complaint of fever and hypoxia (04 Aug 2022 07:17)      SUBJECTIVE / OVERNIGHT EVENTS: Patient seen and examined at bedside. No acute overnight events. This morning, the patient is comfortable and doing well. No acute complaints. Denies fevers, chills, N/V/D, chest pain, SOB, abdominal pain.    MEDICATIONS  (STANDING):  ATENolol  Tablet 25 milliGRAM(s) Oral two times a day  atorvastatin 20 milliGRAM(s) Oral at bedtime  budesonide  80 MICROgram(s)/formoterol 4.5 MICROgram(s) Inhaler 2 Puff(s) Inhalation two times a day  cefTRIAXone   IVPB 1000 milliGRAM(s) IV Intermittent every 24 hours  chlorhexidine 2% Cloths 1 Application(s) Topical <User Schedule>  cholecalciferol 1000 Unit(s) Oral daily  fluticasone propionate 50 MICROgram(s)/spray Nasal Spray 1 Spray(s) Both Nostrils two times a day  furosemide    Tablet 20 milliGRAM(s) Oral two times a day  levothyroxine 75 MICROGram(s) Oral daily  multivitamin 1 Tablet(s) Oral daily  OXcarbazepine 150 milliGRAM(s) Oral two times a day  potassium chloride    Tablet ER 20 milliEquivalent(s) Oral every 2 hours  rivaroxaban 20 milliGRAM(s) Oral daily  tiotropium 18 MICROgram(s) Capsule 1 Capsule(s) Inhalation daily    MEDICATIONS  (PRN):  acetaminophen     Tablet .. 650 milliGRAM(s) Oral every 6 hours PRN Temp greater or equal to 38C (100.4F), Mild Pain (1 - 3)  estrogens  Cream 1 Gram(s) Vaginal at bedtime PRN Dryness      CAPILLARY BLOOD GLUCOSE        I&O's Summary    04 Aug 2022 07:01  -  05 Aug 2022 07:00  --------------------------------------------------------  IN: 360 mL / OUT: 1750 mL / NET: -1390 mL        PHYSICAL EXAM:  Vital Signs Last 24 Hrs  T(C): 36.3 (05 Aug 2022 04:00), Max: 36.9 (04 Aug 2022 17:00)  T(F): 97.4 (05 Aug 2022 04:00), Max: 98.4 (04 Aug 2022 17:00)  HR: 71 (05 Aug 2022 04:00) (71 - 98)  BP: 128/81 (05 Aug 2022 04:00) (128/81 - 160/90)  BP(mean): --  RR: 18 (05 Aug 2022 04:00) (17 - 20)  SpO2: 95% (05 Aug 2022 04:00) (85% - 97%)    Parameters below as of 05 Aug 2022 04:00  Patient On (Oxygen Delivery Method): nasal cannula  O2 Flow (L/min): 2      General: no acute distress  HEENT: Atraumatic, normocephalic, airway patent  Eyes: EOMI, tracking appropriately  Neck: no tracheal deviation, mild JVD  Resp: CTA, no WOB, + dyspnea with exertion  Heart: skin and extremities well perfused, tachy irregular rhythm  Neuro: AOx3, CN grossly intact  MSK: + b/l LE pitting edema, moving all extremities  Skin: no cyanosis, no jaundice, +senile purpura    LABS:                        11.7   9.87  )-----------( 311      ( 05 Aug 2022 06:11 )             35.3     08-05    138  |  98  |  21  ----------------------------<  78  3.3<L>   |  26  |  1.07    Ca    8.2<L>      05 Aug 2022 06:11  Phos  3.2     08-05  Mg     1.8     08-05    TPro  5.6<L>  /  Alb  2.6<L>  /  TBili  0.3  /  DBili  x   /  AST  49<H>  /  ALT  68<H>  /  AlkPhos  121<H>  08-05              Culture - Urine (collected 02 Aug 2022 18:55)  Source: Clean Catch Clean Catch (Midstream)  Final Report (03 Aug 2022 22:19):    No growth        RADIOLOGY & ADDITIONAL TESTS:  Results Reviewed:   Imaging Personally Reviewed:  Electrocardiogram Personally Reviewed:    COORDINATION OF CARE:  Care Discussed with Consultants/Other Providers [Y/N]:  Prior or Outpatient Records Reviewed [Y/N]:   ***************************************************************  Betty Jaramillo MD (PGY-1)  Internal Medicine  Pager: [phone]  ***************************************************************    PROGRESS NOTE:     Patient is a 89y old  Female who presents with a chief complaint of fever and hypoxia (04 Aug 2022 07:17)      SUBJECTIVE / OVERNIGHT EVENTS: Patient seen and examined at bedside. No acute overnight events. This morning, the patient reports right foot pain d/t foot neuropathy. Denies fevers, chills, N/V/D, chest pain, SOB, abdominal pain.    MEDICATIONS  (STANDING):  ATENolol  Tablet 25 milliGRAM(s) Oral two times a day  atorvastatin 20 milliGRAM(s) Oral at bedtime  budesonide  80 MICROgram(s)/formoterol 4.5 MICROgram(s) Inhaler 2 Puff(s) Inhalation two times a day  cefTRIAXone   IVPB 1000 milliGRAM(s) IV Intermittent every 24 hours  chlorhexidine 2% Cloths 1 Application(s) Topical <User Schedule>  cholecalciferol 1000 Unit(s) Oral daily  fluticasone propionate 50 MICROgram(s)/spray Nasal Spray 1 Spray(s) Both Nostrils two times a day  furosemide    Tablet 20 milliGRAM(s) Oral two times a day  levothyroxine 75 MICROGram(s) Oral daily  multivitamin 1 Tablet(s) Oral daily  OXcarbazepine 150 milliGRAM(s) Oral two times a day  potassium chloride    Tablet ER 20 milliEquivalent(s) Oral every 2 hours  rivaroxaban 20 milliGRAM(s) Oral daily  tiotropium 18 MICROgram(s) Capsule 1 Capsule(s) Inhalation daily    MEDICATIONS  (PRN):  acetaminophen     Tablet .. 650 milliGRAM(s) Oral every 6 hours PRN Temp greater or equal to 38C (100.4F), Mild Pain (1 - 3)  estrogens  Cream 1 Gram(s) Vaginal at bedtime PRN Dryness      CAPILLARY BLOOD GLUCOSE        I&O's Summary    04 Aug 2022 07:01  -  05 Aug 2022 07:00  --------------------------------------------------------  IN: 360 mL / OUT: 1750 mL / NET: -1390 mL        PHYSICAL EXAM:  Vital Signs Last 24 Hrs  T(C): 36.3 (05 Aug 2022 04:00), Max: 36.9 (04 Aug 2022 17:00)  T(F): 97.4 (05 Aug 2022 04:00), Max: 98.4 (04 Aug 2022 17:00)  HR: 71 (05 Aug 2022 04:00) (71 - 98)  BP: 128/81 (05 Aug 2022 04:00) (128/81 - 160/90)  BP(mean): --  RR: 18 (05 Aug 2022 04:00) (17 - 20)  SpO2: 95% (05 Aug 2022 04:00) (85% - 97%)    Parameters below as of 05 Aug 2022 04:00  Patient On (Oxygen Delivery Method): nasal cannula  O2 Flow (L/min): 2      General: no acute distress  HEENT: Atraumatic, normocephalic, airway patent  Eyes: EOMI, tracking appropriately  Neck: no tracheal deviation, no JVD  Resp: CTA, no WOB, + dyspnea with exertion  Heart: skin and extremities well perfused, tachy irregular rhythm  Neuro: AOx3, CN grossly intact  MSK: + b/l LE pitting edema, moving all extremities, + right foot ttp  Skin: no cyanosis, no jaundice, +senile purpura    LABS:                        11.7   9.87  )-----------( 311      ( 05 Aug 2022 06:11 )             35.3     08-05    138  |  98  |  21  ----------------------------<  78  3.3<L>   |  26  |  1.07    Ca    8.2<L>      05 Aug 2022 06:11  Phos  3.2     08-05  Mg     1.8     08-05    TPro  5.6<L>  /  Alb  2.6<L>  /  TBili  0.3  /  DBili  x   /  AST  49<H>  /  ALT  68<H>  /  AlkPhos  121<H>  08-05              Culture - Urine (collected 02 Aug 2022 18:55)  Source: Clean Catch Clean Catch (Midstream)  Final Report (03 Aug 2022 22:19):    No growth        RADIOLOGY & ADDITIONAL TESTS:  Results Reviewed:   Imaging Personally Reviewed:  Electrocardiogram Personally Reviewed:    COORDINATION OF CARE:  Care Discussed with Consultants/Other Providers [Y/N]:  Prior or Outpatient Records Reviewed [Y/N]:

## 2022-08-05 NOTE — PROGRESS NOTE ADULT - SUBJECTIVE AND OBJECTIVE BOX
Subjective: Patient seen and examined. No new events except as noted.   Patient feels well and anxious to go home  No fever or chills  echo preserved LV function with moderat to severe pulmonary hypertension  MEDICATIONS:  MEDICATIONS  (STANDING):  ATENolol  Tablet 25 milliGRAM(s) Oral two times a day  atorvastatin 20 milliGRAM(s) Oral at bedtime  budesonide  80 MICROgram(s)/formoterol 4.5 MICROgram(s) Inhaler 2 Puff(s) Inhalation two times a day  cefTRIAXone   IVPB 1000 milliGRAM(s) IV Intermittent every 24 hours  chlorhexidine 2% Cloths 1 Application(s) Topical <User Schedule>  cholecalciferol 1000 Unit(s) Oral daily  fluticasone propionate 50 MICROgram(s)/spray Nasal Spray 1 Spray(s) Both Nostrils two times a day  furosemide    Tablet 20 milliGRAM(s) Oral two times a day  levothyroxine 75 MICROGram(s) Oral daily  multivitamin 1 Tablet(s) Oral daily  OXcarbazepine 150 milliGRAM(s) Oral two times a day  potassium chloride    Tablet ER 20 milliEquivalent(s) Oral every 2 hours  rivaroxaban 20 milliGRAM(s) Oral daily  tiotropium 18 MICROgram(s) Capsule 1 Capsule(s) Inhalation daily      PHYSICAL EXAM:  T(C): 36.3 (08-05-22 @ 04:00), Max: 36.9 (08-04-22 @ 17:00)  HR: 71 (08-05-22 @ 04:00) (71 - 98)  BP: 128/81 (08-05-22 @ 04:00) (128/81 - 160/90)  RR: 18 (08-05-22 @ 04:00) (17 - 20)  SpO2: 95% (08-05-22 @ 04:00) (85% - 97%)  Wt(kg): --  I&O's Summary    04 Aug 2022 07:01  -  05 Aug 2022 07:00  --------------------------------------------------------  IN: 360 mL / OUT: 1750 mL / NET: -1390 mL    05 Aug 2022 07:01  -  05 Aug 2022 09:51  --------------------------------------------------------  IN: 120 mL / OUT: 0 mL / NET: 120 mL          Appearance: Normal	looks well for her stated age of 89  HEENT:   Normal oral mucosa, PERRL, EOMI	  Cardiovascular: Normal S1 S2, irregularly irregular  Respiratory: Lungs clear to auscultation, normal effort 	  Gastrointestinal:  Soft, Non-tender, + BS	  Skin: No rashes, No ecchymoses, No cyanosis, warm to touch  Musculoskeletal: Normal range of motion, normal strength  Psychiatry:  Mood & affect appropriate  Ext: trace edema        LABS:    CARDIAC MARKERS:                                11.7   9.87  )-----------( 311      ( 05 Aug 2022 06:11 )             35.3     08-05    138  |  98  |  21  ----------------------------<  78  3.3<L>   |  26  |  1.07    Ca    8.2<L>      05 Aug 2022 06:11  Phos  3.2     08-05  Mg     1.8     08-05    TPro  5.6<L>  /  Alb  2.6<L>  /  TBili  0.3  /  DBili  x   /  AST  49<H>  /  ALT  68<H>  /  AlkPhos  121<H>  08-05    proBNP:   Lipid Profile:   HgA1c:   TSH:           TELEMETRY: 	    ECG:  	  RADIOLOGY:   DIAGNOSTIC TESTING:  [ ] Echocardiogram:  [ ]  Catheterization:  [ ] Stress Test:    OTHER:

## 2022-08-05 NOTE — PROGRESS NOTE ADULT - PROVIDER SPECIALTY LIST ADULT
Infectious Disease
Pulmonology
Cardiology
Infectious Disease
Internal Medicine

## 2022-08-05 NOTE — PROGRESS NOTE ADULT - PROBLEM SELECTOR PLAN 2
hypoxic 88-99% on RA on presentation to the ED, on NC  Possibly 2/2 AFib with RVR as etiology of SOB vs CHF i/s/o sepsis 2/2 UTI  - Weaned to RA  - Continuous pulse ox  - POCUS: lungs R moderate pleural effusion, cardiac biatrial enlargement, mild-mod reduced EF  - CXR b/l pleural effusions  - cardio recs (Dr. Cm): c/w abx and rx (inc. atenolol given rate controlled Afib and longstanding use)  - lasix po 20 daily  - TTE: RV enlargement w/ decreased RV systolic function, severe TR and pulm HTN  - pulm recs: Continue diuresis, recommend IV lasix for continued negative fluid balance prior to d/c  - Strict I&O, daily standing weights hypoxic 88-99% on RA on presentation to the ED, on NC  Possibly 2/2 AFib with RVR as etiology of SOB vs CHF i/s/o sepsis 2/2 UTI  - Weaned to RA  - Continuous pulse ox  - POCUS: lungs R moderate pleural effusion, cardiac biatrial enlargement, mild-mod reduced EF  - CXR b/l pleural effusions  - cardio recs (Dr. Cm): c/w abx and rx (inc. atenolol given rate controlled Afib and longstanding use)  - lasix po 20 daily  - TTE: RV enlargement w/ decreased RV systolic function, severe TR and pulm HTN  - pulm recs: Continue diuresis, IV lasix for continued negative fluid balance prior to d/c  - Strict I&O, daily standing weights

## 2022-08-05 NOTE — PROGRESS NOTE ADULT - REASON FOR ADMISSION
fevee urosepsis afib pulmonary hypertension
Fever and Hypoxia
fever and hypoxia
hypoxia and fever
fever and hypoxia

## 2022-08-05 NOTE — PROGRESS NOTE ADULT - PROBLEM SELECTOR PLAN 5
- Holding home atenolol, quinapril i/s/o sepsis with hypotension  - c/w Lasix 20mg daily

## 2022-08-05 NOTE — PROGRESS NOTE ADULT - ASSESSMENT
89F with HTN, Afib w/ RVR (on Xarelto), CHF, seizure disorder, right hip replacement (2007), spinal surgery, right foot neuropathy, E-coli bacteremia and UTI 4/2022 and found to have a renal mass now p/w fever and weakness but no other symptoms  here febrile to 103.8, tachycardic, hypotension, WBC: 19.55  renal u/s: No cholelithiasis, acute cholecystitis or biliary ductal dilatation.  Heterogeneous left upper pole mass measures 3.5 x 3.1 x 2.8 cm suspicious for renal cell carcinoma  u/a positive but  no urine cx done, blood cx with E-coli (just R to bactrim)    fever, leukocytosis, hypotension, tachycardia, sepsis due to E-coli bacteremia and pyelonephritis  repeat blood cx negative 8/2  * pt clinically well, afebrile and WBC normalized  * c/w ceftriaxone 1 qd while in the hospital, now day 4 post negative blood cx, will complete a 10 day course  * on discharge switch to PO cefpodoxime 200 bid to complete the course  * I don't believe pt will benefit from suppressive antibiotics as the risks outweigh the benefit, she only had another UTI 4 months ago  * can do Hiprex to prevent further UTIs    The above assessment and plan was discussed with the primary team    Keyana Alexander MD  contact on teams  After 5pm and on weekends call [phone]

## 2022-08-05 NOTE — PROGRESS NOTE ADULT - PROBLEM SELECTOR PLAN 1
WBC, fever, elevated procal - sepsis 2/2 UTI (E. Coli bacteremia)  - uro recs: payne s/p post-void bladder scan, no intervention/bx of L renal mass per pt/family wishes  - renal and RUQ US: left upper pole mass suspicious for RCC with chronic renal parenchymal disease  - ID recs: bacteremia likely presumed from urinary source  BCx (7/31) Ecoli CTX sensitive, BCx (8/2) ngtd (likely sterile because already on therapy)  UA grossly positive, no cultures, prior Cx Ecoli in 4/2022  S/p zosyn (7/31 - 8/1)  c/w CTX 1G qD (7/31-8/9) for 10 days total and d/c on PO cefpodoxime 200mg BID, can give Hiprex o/p but no chronic ppx abx WBC, fever, elevated procal - sepsis 2/2 UTI (E. Coli bacteremia)  - uro recs: payne s/p post-void bladder scan, no intervention/bx of L renal mass per pt/family wishes  - renal and RUQ US: left upper pole mass suspicious for RCC with chronic renal parenchymal disease  - ID recs: bacteremia likely presumed from urinary source  BCx (7/31) Ecoli CTX sensitive, BCx (8/2) ngtd (likely sterile because already on therapy)  UA grossly positive, no cultures, prior Cx Ecoli in 4/2022  S/p zosyn (7/31 - 8/1)  c/w CTX qD (7/31-8/9) for 10 days total and d/c on PO cefpodoxime 200mg BID, can give Hiprex o/p

## 2022-08-05 NOTE — PROGRESS NOTE ADULT - PROBLEM SELECTOR PROBLEM 1
Systemic inflammatory response syndrome (SIRS)

## 2022-08-06 ENCOUNTER — RX RENEWAL (OUTPATIENT)
Age: 87
End: 2022-08-06

## 2022-08-06 ENCOUNTER — TRANSCRIPTION ENCOUNTER (OUTPATIENT)
Age: 87
End: 2022-08-06

## 2022-08-08 ENCOUNTER — NON-APPOINTMENT (OUTPATIENT)
Age: 87
End: 2022-08-08

## 2022-08-08 LAB
CULTURE RESULTS: SIGNIFICANT CHANGE UP
CULTURE RESULTS: SIGNIFICANT CHANGE UP
SPECIMEN SOURCE: SIGNIFICANT CHANGE UP
SPECIMEN SOURCE: SIGNIFICANT CHANGE UP

## 2022-08-10 ENCOUNTER — RX RENEWAL (OUTPATIENT)
Age: 87
End: 2022-08-10

## 2022-08-10 ENCOUNTER — APPOINTMENT (OUTPATIENT)
Dept: INTERNAL MEDICINE | Facility: CLINIC | Age: 87
End: 2022-08-10

## 2022-08-10 RX ORDER — POTASSIUM CHLORIDE 600 MG/1
8 TABLET, FILM COATED, EXTENDED RELEASE ORAL
Qty: 90 | Refills: 0 | Status: ACTIVE | COMMUNITY
Start: 2022-04-23 | End: 1900-01-01

## 2022-08-24 ENCOUNTER — APPOINTMENT (OUTPATIENT)
Dept: ULTRASOUND IMAGING | Facility: HOSPITAL | Age: 87
End: 2022-08-24

## 2022-09-06 ENCOUNTER — NON-APPOINTMENT (OUTPATIENT)
Age: 87
End: 2022-09-06

## 2022-09-06 ENCOUNTER — APPOINTMENT (OUTPATIENT)
Dept: OPHTHALMOLOGY | Facility: CLINIC | Age: 87
End: 2022-09-06

## 2022-09-06 PROCEDURE — 92015 DETERMINE REFRACTIVE STATE: CPT

## 2022-09-14 ENCOUNTER — LABORATORY RESULT (OUTPATIENT)
Age: 87
End: 2022-09-14

## 2022-09-14 ENCOUNTER — APPOINTMENT (OUTPATIENT)
Dept: INTERNAL MEDICINE | Facility: CLINIC | Age: 87
End: 2022-09-14

## 2022-09-14 ENCOUNTER — NON-APPOINTMENT (OUTPATIENT)
Age: 87
End: 2022-09-14

## 2022-09-14 VITALS
WEIGHT: 148 LBS | DIASTOLIC BLOOD PRESSURE: 70 MMHG | SYSTOLIC BLOOD PRESSURE: 110 MMHG | BODY MASS INDEX: 29.06 KG/M2 | HEIGHT: 60 IN

## 2022-09-14 DIAGNOSIS — N28.89 OTHER SPECIFIED DISORDERS OF KIDNEY AND URETER: ICD-10-CM

## 2022-09-14 PROCEDURE — 99214 OFFICE O/P EST MOD 30 MIN: CPT | Mod: 25

## 2022-09-14 PROCEDURE — 36415 COLL VENOUS BLD VENIPUNCTURE: CPT

## 2022-09-14 PROCEDURE — G0008: CPT

## 2022-09-14 PROCEDURE — 90662 IIV NO PRSV INCREASED AG IM: CPT

## 2022-09-14 PROCEDURE — 93000 ELECTROCARDIOGRAM COMPLETE: CPT | Mod: 59

## 2022-09-14 RX ORDER — CEPHALEXIN 500 MG/1
500 CAPSULE ORAL TWICE DAILY
Qty: 20 | Refills: 0 | Status: DISCONTINUED | COMMUNITY
Start: 2022-05-11 | End: 2022-09-14

## 2022-09-14 RX ORDER — FUROSEMIDE 20 MG/1
20 TABLET ORAL
Qty: 180 | Refills: 3 | Status: DISCONTINUED | COMMUNITY
End: 2022-09-14

## 2022-09-14 RX ORDER — DOXYCYCLINE HYCLATE 100 MG/1
100 CAPSULE ORAL
Qty: 20 | Refills: 0 | Status: DISCONTINUED | COMMUNITY
Start: 2022-03-13 | End: 2022-09-14

## 2022-09-14 NOTE — HISTORY OF PRESENT ILLNESS
[FreeTextEntry1] : This is a 89-year-old female for evaluation and treatment of her paroxysmal atrial fibrillation seizure disorder shortness of breath peripheral edema and status post hospitalization for urinary tract infection and sepsis .  She also suffers from diastolic dysfunction and pulmonary hypertension\par Edition she has a renal mass [de-identified] : Patient seems to be in her usual state of health.  However she is complaining of more dyspnea on exertion.  She is now using a wheelchair rather than a cane because of dyspnea\par \par She continues to have peripheral edema and is status post wound secondary to a Mohs surgery\par \par She did have a seizure she saw neurology but has had no follow-up

## 2022-09-14 NOTE — ASSESSMENT
[FreeTextEntry1] : This is a remarkable 89-year-old female who is intellectually intact\par \par She has had a history of urinary tract infection and sepsis she is currently followed by urology.  She did have a small renal mass on CT during her hospitalization.  I will make sure that her urologist is aware\par \par She has a history of increasing shortness of breath this is multifactorial she does have diastolic dysfunction moderate aortic stenosis moderate mitral regurgitation and pulmonary hypertension.  Her lungs are clear at this time I will not increase her diuretics.\par \par She continues to have peripheral edema she has had Mohs surgery and is followed by the plastic service however I will refer her to vascular\par \par She did have a seizure she saw neurology she had an EEG nothing has been changed there has been no response she has had no further activity we will continue her current regime\par \par She now is in atrial fibrillation with a left bundle branch block\par

## 2022-09-14 NOTE — REVIEW OF SYSTEMS
[Fatigue] : fatigue [Lower Ext Edema] : lower extremity edema [Dyspnea on Exertion] : dyspnea on exertion [Negative] : Psychiatric [FreeTextEntry5] : Dyspnea on exertion

## 2022-09-14 NOTE — PHYSICAL EXAM
[Normal] : no acute distress, well nourished, well developed and well-appearing [No JVD] : no jugular venous distention [No Focal Deficits] : no focal deficits [de-identified] : Lungs are surprisingly clear [de-identified] : Irregularly irregular [de-identified] : Asymmetric edema, wound right leg

## 2022-09-15 LAB
ALBUMIN SERPL ELPH-MCNC: 4 G/DL
ALP BLD-CCNC: 141 U/L
ALT SERPL-CCNC: 29 U/L
ANION GAP SERPL CALC-SCNC: 13 MMOL/L
AST SERPL-CCNC: 30 U/L
BASOPHILS # BLD AUTO: 0.04 K/UL
BASOPHILS NFR BLD AUTO: 0.5 %
BILIRUB SERPL-MCNC: 0.4 MG/DL
BUN SERPL-MCNC: 26 MG/DL
CALCIUM SERPL-MCNC: 9.2 MG/DL
CHLORIDE SERPL-SCNC: 102 MMOL/L
CHOLEST SERPL-MCNC: 145 MG/DL
CO2 SERPL-SCNC: 26 MMOL/L
CREAT SERPL-MCNC: 0.95 MG/DL
EGFR: 54 ML/MIN/1.73M2
EOSINOPHIL # BLD AUTO: 0.05 K/UL
EOSINOPHIL NFR BLD AUTO: 0.7 %
ESTIMATED AVERAGE GLUCOSE: 131 MG/DL
GLUCOSE SERPL-MCNC: 101 MG/DL
HBA1C MFR BLD HPLC: 6.2 %
HCT VFR BLD CALC: 39.6 %
HDLC SERPL-MCNC: 61 MG/DL
HGB BLD-MCNC: 12.5 G/DL
IMM GRANULOCYTES NFR BLD AUTO: 0.3 %
LDLC SERPL CALC-MCNC: 69 MG/DL
LYMPHOCYTES # BLD AUTO: 1.1 K/UL
LYMPHOCYTES NFR BLD AUTO: 14.3 %
MAN DIFF?: NORMAL
MCHC RBC-ENTMCNC: 31.3 PG
MCHC RBC-ENTMCNC: 31.6 GM/DL
MCV RBC AUTO: 99.2 FL
MONOCYTES # BLD AUTO: 0.63 K/UL
MONOCYTES NFR BLD AUTO: 8.2 %
NEUTROPHILS # BLD AUTO: 5.83 K/UL
NEUTROPHILS NFR BLD AUTO: 76 %
NONHDLC SERPL-MCNC: 83 MG/DL
PLATELET # BLD AUTO: 275 K/UL
POTASSIUM SERPL-SCNC: 4.4 MMOL/L
PROT SERPL-MCNC: 6.2 G/DL
RBC # BLD: 3.99 M/UL
RBC # FLD: 18.2 %
SODIUM SERPL-SCNC: 141 MMOL/L
T3RU NFR SERPL: 0.9 TBI
T4 SERPL-MCNC: 5.6 UG/DL
TRIGL SERPL-MCNC: 72 MG/DL
TSH SERPL-ACNC: 8.13 UIU/ML
URATE SERPL-MCNC: 7.2 MG/DL
WBC # FLD AUTO: 7.67 K/UL

## 2022-09-24 ENCOUNTER — RX RENEWAL (OUTPATIENT)
Age: 87
End: 2022-09-24

## 2022-09-24 RX ORDER — MUPIROCIN 20 MG/G
2 OINTMENT TOPICAL
Qty: 22 | Refills: 0 | Status: ACTIVE | COMMUNITY
Start: 2018-01-28 | End: 1900-01-01

## 2022-09-28 ENCOUNTER — APPOINTMENT (OUTPATIENT)
Dept: CARDIOLOGY | Facility: CLINIC | Age: 87
End: 2022-09-28

## 2022-09-28 VITALS
WEIGHT: 150 LBS | HEART RATE: 102 BPM | SYSTOLIC BLOOD PRESSURE: 140 MMHG | DIASTOLIC BLOOD PRESSURE: 80 MMHG | OXYGEN SATURATION: 93 % | BODY MASS INDEX: 29.3 KG/M2

## 2022-09-28 DIAGNOSIS — I10 ESSENTIAL (PRIMARY) HYPERTENSION: ICD-10-CM

## 2022-09-28 PROCEDURE — 99214 OFFICE O/P EST MOD 30 MIN: CPT

## 2022-09-28 NOTE — REASON FOR VISIT
[FreeTextEntry1] : Sabi Cordova 89 years old was seen urgently for evaluation of coughing shortness of breath sputum production.  She has a long history of diastolic dysfunction, atrial fibrillation and severe pulm hypertension on her most recent echo

## 2022-09-28 NOTE — HISTORY OF PRESENT ILLNESS
[FreeTextEntry1] : To review, Sabi is now 89 years old and well-known to me.  She has a history of paroxysmal atrial fibrillation but has reverted to normal sinus rhythm and remains in normal sinus rhythm with a sinus bradycardia.  She has been on anticoagulation for several years with Xarelto which she has tolerated.  She is also on aspirin which was given to her after a presumed CVA\par \par Several years ago she had an episode of confusion initially felt to be a possible TIA but subsequently felt possibly to be a seizure disorder.  She has been on seizure medications without any recurrence of these episodes and has remained on those medications including the Xarelto and aspirin\par \par She does have chronic shortness of breath which may recently have exacerbated\par echocardiogram 2019 revealed overall preserved LV function with moderate mitral regurgitation and mild pulmonary hypertension.\par \par \par She remains quite mentally intact, uses a walker but finds a difficulty walking with fatigue and discomfort in her legs\par \par  January 2022, she had some degree of a cold and a cough with sputum production and felt increased shortness of breath.  She had no chest pain.  She took Spiriva with improvement and presently has no significant shortness of breath other than her chronic shortness of breath and fatigue when she is walking with a walker.  According to her daughter she has decreased her walking activity.  She continues to be mentally intact. \par \par She received the COVID-19 Moderna vaccine\par \par She continues to experience  exertional shortness of breath and fatigue no chest pain or palpitations.  She has had the symptoms for some time but this is her major complaint.  She denies palpitations dizziness or syncope\par \par She recently had something fall on her right leg and developed some increasing edema and some weeping.  It is slowly getting better and Lasix 20 mg was prescribed an increased to total of 60 mg a day\par \par During the exam she suddenly had an irregularly irregular heart rate probably compatible with A. fib\par \par on previous visits she continued with shortness of breath which seems to have increased in severity with having to stop after a few feet and sit down with her walker.  She denies significant increase in edema of her lower extremities.  She feels that Spiriva and Trelegy do help.  She has no chest pain.  Occasionally she does have palpitations and feels that she is in atrial fibrillation she has had chronic edema of her lower extremities and has had some weeping from the legs\par \par Echo march 2021suggested moderate to severe MR normal LV function and pulmonary hypertension.  \par \par  in 2022She was admitted to [facility]  several months ago with fever urinary tract infection and I believe E. coli sepsis.  She was in atrial fibrillation during the admission.  She had evidence of a mass in the kidney which is being treated conservatively\par \par From a cardiac point of view she was stable in the hospital.  They stopped her Jardiance and Lasix and she was treated with aggressive antibiotics\par \par 2D echo dated April 6, 2022 in the hospital revealed normal left ventricular function moderate pulmonary hypertension mild to moderate mitral regurgitation moderate tricuspid insufficiency.\par \par She went home from the hospital.  She did have some wound infection of her lower extremity which is being treated by wound care.  She has chronic shortness of breath but overall feels well. \par \par  the patient has been relatively stable since the last visit and is now on quinapril and Lasix 20 once a day.\par \par   Her main complaint is been nasal congestion upper airway secretions and continued shortness of breath which may have worsened since the last visit.  She has chronic edema of her lower extremities\par \par  she had a subsequent hospitalization  in the summer 2022 with weakness dizziness shortness of breath   And apparently a urinary tract infection.  2D echo at that time again revealed normal left ventricular function but severe pulmonary hypertension.\par \par   She has been treated off and on with various antibiotics\par .  SeptemberSeptember -28, 2022 she returns with increasing shortness of breath over the last few days some wheezing sputum production lot of upper airway secretions.  The edema of the lower extremities seems to be less\par \par  EKG from Dr. Noalsco  September 14 2022  atrial fibrillation with controlled ventricular response left anterior hemiblock no acute changes\par \par \par \par  she denies palpitations dizziness or syncope.  She does use trelegy which is of some help for her shortness of breath\par \par  blood test January 2022\par : BUN\par  26 creatinine 1.06 potassium 5.1 hemoglobin 12.3 hematocrit 38.4\par  hemoglobin A1c 5.9\par \par \par \par

## 2022-09-28 NOTE — DISCUSSION/SUMMARY
[FreeTextEntry1] :  1.  I do not think her major problem presently is cardiac in nature though she does have chronic diastolic dysfunction and pulmonary hypertension and fluid retention with edema\par \par  2.  If the secretions continue and there is wheezing either adding an antibiotic or a Medrol Dosepak might be needed.\par \par \par 3.  I did tell her for the next few days to increase the Lasix to 60 mg a day

## 2022-09-28 NOTE — ASSESSMENT
[FreeTextEntry1] : Sabi Cordova with a history of hypertension paroxysmal atrial fibrillation is actually doing amazingly well.  Her blood pressure is under good control and there is no evidence of overt CHF.  She does have chronic shortness of breath which she has had for some time which could be a combination of her body habitus perhaps diastolic dysfunction and some underlying airways disease.  The shortness of breath is a major complaint.  Recently her shortness of breath seems to have increased with some response to Spiriva and Trelegy.   presently seen on SeptemberSeptember 28, 2022 for increasing shortness of breath with upper airway secretions but normal saturation normal blood pressure and relatively clear lungs\par \par \par 1.  Essential hypertension relatively well controlled on present regimen of medication\par \par 2.  Diastolic dysfunction moderate mitral regurgitation and pulmonary hypertension probably the cause of her shortness of breath.  Her shortness of breath however is stable.  She probably has heart failure preserved ejection fraction contributing to her shortness of breath  Along with underlying pulmonary disease\par \par 3..  History of paroxysmal atrial fibrillation.  Initially she was in normal sinus rhythm and then later in the exam she was in atrial fibrillation with a relatively well-controlled ventricular response.   patient is presently in chronic atrial fibrillation with a controlled ventricular response\par \par  4.  Upper airway secretion nasal congestion- this is a recurrent chronic problem and recurred over the last few days. patient may have an exacerbation of acute bronchitis\par \par 5.   Severe pulmonary hypertension on recent echo probably related to diastolic dysfunction\par \par \par \par

## 2022-09-28 NOTE — PHYSICAL EXAM
[Normal Conjunctiva] : the conjunctiva exhibited no abnormalities [No Jugular Venous Bustillo A Waves] : no jugular venous bustillo A waves [] : no respiratory distress [Respiration, Rhythm And Depth] : normal respiratory rhythm and effort [Auscultation Breath Sounds / Voice Sounds] : lungs were clear to auscultation bilaterally [Murmurs] : no murmurs present [Abdomen Soft] : soft [Abdomen Tenderness] : non-tender [FreeTextEntry1] : Both lower extremities 1+ pitting edema right lower extremity  edema is less than the pain

## 2022-09-29 ENCOUNTER — RX RENEWAL (OUTPATIENT)
Age: 87
End: 2022-09-29

## 2022-10-10 ENCOUNTER — RX RENEWAL (OUTPATIENT)
Age: 87
End: 2022-10-10

## 2022-10-12 ENCOUNTER — APPOINTMENT (OUTPATIENT)
Dept: CARDIOLOGY | Facility: CLINIC | Age: 87
End: 2022-10-12

## 2022-10-22 ENCOUNTER — RX RENEWAL (OUTPATIENT)
Age: 87
End: 2022-10-22

## 2022-10-22 NOTE — PHARMACOTHERAPY INTERVENTION NOTE - INTERVENTION CATEGORIES
COPD EDUCATION by COPD CLINICAL EDUCATOR  10/22/2022 at 7:01 AM by Kala Sawyer, RRT     Patient reviewed by COPD education team. Patient does not have a history or diagnosis of COPD and has never smoked.  Therefore, patient does not qualify for the COPD program. She is an established patient  of [facility] for mild Asthma and complex Sleep disorder. Next appointment 11/9/2022 @ 2:40 pm.   ASP

## 2022-10-24 ENCOUNTER — APPOINTMENT (OUTPATIENT)
Dept: INTERNAL MEDICINE | Facility: CLINIC | Age: 87
End: 2022-10-24

## 2022-11-05 ENCOUNTER — RX RENEWAL (OUTPATIENT)
Age: 87
End: 2022-11-05

## 2022-11-15 ENCOUNTER — APPOINTMENT (OUTPATIENT)
Dept: INTERNAL MEDICINE | Facility: CLINIC | Age: 87
End: 2022-11-15

## 2022-11-15 ENCOUNTER — LABORATORY RESULT (OUTPATIENT)
Age: 87
End: 2022-11-15

## 2022-11-15 ENCOUNTER — APPOINTMENT (OUTPATIENT)
Dept: UROGYNECOLOGY | Facility: CLINIC | Age: 87
End: 2022-11-15

## 2022-11-15 VITALS
HEIGHT: 60 IN | SYSTOLIC BLOOD PRESSURE: 120 MMHG | DIASTOLIC BLOOD PRESSURE: 72 MMHG | BODY MASS INDEX: 31.22 KG/M2 | WEIGHT: 159 LBS

## 2022-11-15 PROCEDURE — 36415 COLL VENOUS BLD VENIPUNCTURE: CPT | Mod: 59

## 2022-11-15 PROCEDURE — 99214 OFFICE O/P EST MOD 30 MIN: CPT | Mod: 25

## 2022-11-15 RX ORDER — TRIAMCINOLONE ACETONIDE 1 MG/G
0.1 OINTMENT TOPICAL
Qty: 15 | Refills: 0 | Status: ACTIVE | COMMUNITY
Start: 2022-09-07

## 2022-11-15 RX ORDER — METHENAMINE HIPPURATE 1 G/1
1 TABLET ORAL
Qty: 60 | Refills: 0 | Status: DISCONTINUED | COMMUNITY
Start: 2022-08-04 | End: 2022-11-15

## 2022-11-15 RX ORDER — CONJUGATED ESTROGENS 0.62 MG/G
0.62 CREAM VAGINAL
Qty: 30 | Refills: 0 | Status: DISCONTINUED | COMMUNITY
Start: 2022-08-04 | End: 2022-11-15

## 2022-11-15 NOTE — ASSESSMENT
[FreeTextEntry1] : This is a 89-year-old female whose history has been reviewed above\par \par She has a history of atrial fibrillation she remains on relative\par \par She has history of hypothyroidism she remains on Synthroid a TSH has been obtained medication changes predicated on the results\par \par She does have COPD she is taking trilogy with some benefit\par \par She does have peripheral edema this is complicated by low blood pressure.  We will decrease the quinapril to 10 mg twice a day and increase the Lasix to twice a day\par \par She has a history of hypercholesterolemia she remains on a statin a cholesterol profile has been obtained medication changes predicated on the results\par \par For the seizure disorder she remains on appropriate medications no seizure activity\par \par Did ask her to weigh herself which I doubt she will do I did ask her to see vascular which she will do I did ask her to call me in 2 weeks about the edema on the new regime

## 2022-11-15 NOTE — HISTORY OF PRESENT ILLNESS
[FreeTextEntry1] : This is a 89-year-old female for evaluation and treatment of her hypertension diastolic dysfunction pulmonary hypertension seizure disorder atrial fibrillation and COPD [de-identified] : Patient continues to have dyspnea on exertion but her major complaint is weeping from her legs

## 2022-11-15 NOTE — REVIEW OF SYSTEMS
[Fatigue] : fatigue [Lower Ext Edema] : lower extremity edema [Shortness Of Breath] : shortness of breath [Negative] : Psychiatric

## 2022-11-15 NOTE — PHYSICAL EXAM
[Normal] : normal rate, regular rhythm, normal S1 and S2 and no murmur heard [de-identified] : 2-3+ edema weeping

## 2022-11-16 ENCOUNTER — RX RENEWAL (OUTPATIENT)
Age: 87
End: 2022-11-16

## 2022-11-16 LAB
ALBUMIN SERPL ELPH-MCNC: 3.4 G/DL
ALP BLD-CCNC: 140 U/L
ALT SERPL-CCNC: 42 U/L
ANION GAP SERPL CALC-SCNC: 9 MMOL/L
AST SERPL-CCNC: 49 U/L
BASOPHILS # BLD AUTO: 0.04 K/UL
BASOPHILS NFR BLD AUTO: 0.5 %
BILIRUB SERPL-MCNC: 0.4 MG/DL
BUN SERPL-MCNC: 34 MG/DL
CALCIUM SERPL-MCNC: 9.1 MG/DL
CHLORIDE SERPL-SCNC: 104 MMOL/L
CHOLEST SERPL-MCNC: 133 MG/DL
CO2 SERPL-SCNC: 26 MMOL/L
CREAT SERPL-MCNC: 1.03 MG/DL
EGFR: 49 ML/MIN/1.73M2
EOSINOPHIL # BLD AUTO: 0.05 K/UL
EOSINOPHIL NFR BLD AUTO: 0.7 %
ESTIMATED AVERAGE GLUCOSE: 131 MG/DL
GLUCOSE SERPL-MCNC: 105 MG/DL
HBA1C MFR BLD HPLC: 6.2 %
HCT VFR BLD CALC: 39.4 %
HDLC SERPL-MCNC: 58 MG/DL
HGB BLD-MCNC: 12.7 G/DL
IMM GRANULOCYTES NFR BLD AUTO: 0.3 %
LDLC SERPL CALC-MCNC: 60 MG/DL
LYMPHOCYTES # BLD AUTO: 1.12 K/UL
LYMPHOCYTES NFR BLD AUTO: 15.2 %
MAN DIFF?: NORMAL
MCHC RBC-ENTMCNC: 30 PG
MCHC RBC-ENTMCNC: 32.2 GM/DL
MCV RBC AUTO: 93.1 FL
MONOCYTES # BLD AUTO: 0.66 K/UL
MONOCYTES NFR BLD AUTO: 9 %
NEUTROPHILS # BLD AUTO: 5.47 K/UL
NEUTROPHILS NFR BLD AUTO: 74.3 %
NONHDLC SERPL-MCNC: 75 MG/DL
PLATELET # BLD AUTO: 243 K/UL
POTASSIUM SERPL-SCNC: 5.2 MMOL/L
PROT SERPL-MCNC: 5.9 G/DL
RBC # BLD: 4.23 M/UL
RBC # FLD: 17.4 %
SODIUM SERPL-SCNC: 139 MMOL/L
T3RU NFR SERPL: 0.9 TBI
T4 SERPL-MCNC: 4.7 UG/DL
TRIGL SERPL-MCNC: 77 MG/DL
TSH SERPL-ACNC: 13.4 UIU/ML
URATE SERPL-MCNC: 8 MG/DL
WBC # FLD AUTO: 7.36 K/UL

## 2022-11-28 ENCOUNTER — APPOINTMENT (OUTPATIENT)
Dept: VASCULAR SURGERY | Facility: CLINIC | Age: 87
End: 2022-11-28
Payer: MEDICARE

## 2022-11-28 VITALS
WEIGHT: 148 LBS | HEIGHT: 60 IN | DIASTOLIC BLOOD PRESSURE: 78 MMHG | SYSTOLIC BLOOD PRESSURE: 112 MMHG | HEART RATE: 108 BPM | BODY MASS INDEX: 29.06 KG/M2 | TEMPERATURE: 97.5 F

## 2022-11-28 PROCEDURE — 99203 OFFICE O/P NEW LOW 30 MIN: CPT | Mod: 25

## 2022-11-28 PROCEDURE — 93970 EXTREMITY STUDY: CPT

## 2022-11-28 PROCEDURE — 99213 OFFICE O/P EST LOW 20 MIN: CPT | Mod: 25

## 2022-11-28 PROCEDURE — 29580 STRAPPING UNNA BOOT: CPT | Mod: RT

## 2022-11-28 PROCEDURE — 29580 STRAPPING UNNA BOOT: CPT | Mod: 50

## 2022-11-29 NOTE — ASSESSMENT
[FreeTextEntry1] : In summary, Mrs. Cordova presents with bilateral lower extremity edema. A lower extremity venous duplex ultrasound was performed today, which showed no evidence of DVT/SVT. We placed bilateral Unna boots with Extrasorb and ordered VNS for twice weekly dressing changes. Her cardiologist Dr. Cm indicated that there was no contraindication to Unna boot therpay. She will return to follow up in one month and we will transition her to regular or velcro compression stockings at that time. I instructed her to keep her legs elevated whenever possible.\par \par Thank you for allowing me to participate in the care of this nice lady. Please do not hesitate to call with any questions.\par  \par

## 2022-11-29 NOTE — HISTORY OF PRESENT ILLNESS
[FreeTextEntry1] : I have just had the pleasure of seeing Mrs. Sabi Cordova ( 24) in consultation for lower extremity venous insufficiency. \par \par Mrs. Cordova is a pleasant 89-year-old lady who is accompanied by her daughter today. She was last evaluated at our office five years ago. At the time, she was treated for right leg cellulitis and edema. Compression therapy was also prescribed, but she has been non-compliant with this. \par \par She presents today with copious lymphatic drainage from pin point ulcers of bilateral lower extremities. Her daughter reports that she soaks the bed sheets nightly. She spends a significant part of the day sitting with her legs in a dependent position. She denies any constitutional symptoms. She is minimally ambulatory. \par \par Her medical and surgical history is otherwise significant for TIA (difficulty with speech; seven years ago; her workup was negative), hypothyroidism, macular degeneration, anemia, hypertension, hyperlipidemia, and osteoarthritis\par \par Medications: Xarelto, Aspirin, Lipitor, Hydrochlorothiazide, Oxcarbazepine, Synthroid, Prilosec, Quinapril\par \par Allergies: NKDA\par \par Social history: Non-smoker \par \par FH: NC\par

## 2022-11-29 NOTE — PHYSICAL EXAM
[de-identified] : On physical examination the patient is NAD, AAOx3. Neurologically intact. The lungs are clear and the heart has an irregular rate and rhythm. The abdomen is soft, without tenderness or pulsatile mass. Bilateral femoral pulses are palpable. Pedal pulses are non-palpable. Bilateral lower extremity edema is present. There is constant drainage of lymphatic fluid from both legs. No signs of infection present.

## 2022-12-01 ENCOUNTER — APPOINTMENT (OUTPATIENT)
Dept: VASCULAR SURGERY | Facility: CLINIC | Age: 87
End: 2022-12-01
Payer: MEDICARE

## 2022-12-01 PROCEDURE — 99212 OFFICE O/P EST SF 10 MIN: CPT | Mod: 25

## 2022-12-01 PROCEDURE — 29580 STRAPPING UNNA BOOT: CPT | Mod: 50

## 2022-12-01 NOTE — HISTORY OF PRESENT ILLNESS
[FreeTextEntry1] : I have just had the pleasure of seeing Mrs. Sabi Cordova ( 24) in consultation for lower extremity venous insufficiency. \par \par Mrs. Cordova is a pleasant 89-year-old lady who is accompanied by her daughter today. She was last evaluated at our office five years ago. At the time, she was treated for right leg cellulitis and edema. Compression therapy was also prescribed, but she has been non-compliant with this. \par \par She presents today with copious lymphatic drainage from pin point ulcers of bilateral lower extremities. Her daughter reports that she soaks the bed sheets nightly. She spends a significant part of the day sitting with her legs in a dependent position. She denies any constitutional symptoms. She is minimally ambulatory. \par \par Her medical and surgical history is otherwise significant for TIA (difficulty with speech; seven years ago; her workup was negative), hypothyroidism, macular degeneration, anemia, hypertension, hyperlipidemia, and osteoarthritis\par \par Medications: Xarelto, Aspirin, Lipitor, Hydrochlorothiazide, Oxcarbazepine, Synthroid, Prilosec, Quinapril\par \par Allergies: NKDA\par \par Social history: Non-smoker \par \par FH: NC\par  [de-identified] : Patient/daughter attempted to set up VNS but nursing was not available in their area to place Unna boot until 12/12/22. Patient returns for bilateral Unna boot dressing change. Patient and daughter have noted significant improvement in bilateral leg edema and drainage since initiating Unna boot therapy.

## 2022-12-01 NOTE — ASSESSMENT
[FreeTextEntry1] : In summary, Mrs. Cordova returns for Unna boot dressing change, performed on both lower extremities. VNS to assume care with next dressing change. She will follow up in one month (rx for compression stockings-velcro vs regular will be provided at that time).

## 2022-12-01 NOTE — PHYSICAL EXAM
[de-identified] : On physical examination the patient is NAD, AAOx3. Neurologically intact. The lungs are clear and the heart has an irregular rate and rhythm. The abdomen is soft, without tenderness or pulsatile mass. Bilateral femoral pulses are palpable. Pedal pulses are non-palpable. Bilateral lower extremity edema is present. There is decreased drainage of lymphatic fluid from both legs. No signs of infection present.

## 2022-12-13 ENCOUNTER — RX RENEWAL (OUTPATIENT)
Age: 87
End: 2022-12-13

## 2022-12-13 RX ORDER — FLUTICASONE FUROATE, UMECLIDINIUM BROMIDE AND VILANTEROL TRIFENATATE 100; 62.5; 25 UG/1; UG/1; UG/1
100-62.5-25 POWDER RESPIRATORY (INHALATION) DAILY
Qty: 1 | Refills: 4 | Status: ACTIVE | COMMUNITY
Start: 2022-12-13 | End: 1900-01-01

## 2022-12-22 ENCOUNTER — APPOINTMENT (OUTPATIENT)
Dept: CARDIOLOGY | Facility: CLINIC | Age: 87
End: 2022-12-22
Payer: MEDICARE

## 2022-12-22 DIAGNOSIS — I48.0 PAROXYSMAL ATRIAL FIBRILLATION: ICD-10-CM

## 2022-12-22 DIAGNOSIS — R26.89 OTHER ABNORMALITIES OF GAIT AND MOBILITY: ICD-10-CM

## 2022-12-22 DIAGNOSIS — R06.02 SHORTNESS OF BREATH: ICD-10-CM

## 2022-12-22 DIAGNOSIS — I27.20 PULMONARY HYPERTENSION, UNSPECIFIED: ICD-10-CM

## 2022-12-22 DIAGNOSIS — R06.00 DYSPNEA, UNSPECIFIED: ICD-10-CM

## 2022-12-22 DIAGNOSIS — E78.00 PURE HYPERCHOLESTEROLEMIA, UNSPECIFIED: ICD-10-CM

## 2022-12-22 DIAGNOSIS — R60.9 EDEMA, UNSPECIFIED: ICD-10-CM

## 2022-12-22 PROCEDURE — 99203 OFFICE O/P NEW LOW 30 MIN: CPT | Mod: 95

## 2022-12-22 PROCEDURE — 99213 OFFICE O/P EST LOW 20 MIN: CPT | Mod: 95

## 2022-12-22 NOTE — HISTORY OF PRESENT ILLNESS
[FreeTextEntry1] : To review, Sabi is now 89 years old and well-known to me.  She has a history of paroxysmal atrial fibrillation but has reverted to normal sinus rhythm and remains in normal sinus rhythm with a sinus bradycardia.  She has been on anticoagulation for several years with Xarelto which she has tolerated.  She is also on aspirin which was given to her after a presumed CVA\par \par Several years ago she had an episode of confusion initially felt to be a possible TIA but subsequently felt possibly to be a seizure disorder.  She has been on seizure medications without any recurrence of these episodes and has remained on those medications including the Xarelto and aspirin\par \par She does have chronic shortness of breath which may recently have exacerbated\par Last echocardiogram 2019 revealed overall preserved LV function with moderate mitral regurgitation and mild pulmonary hypertension.\par \par \par She remains quite mentally intact, uses a walker but finds a difficulty walking with fatigue and discomfort in her legs\par \par Last visit, she had some degree of a cold and a cough with sputum production and felt increased shortness of breath.  She had no chest pain.  She took Spiriva with improvement and presently has no significant shortness of breath other than her chronic shortness of breath and fatigue when she is walking with a walker.  According to her daughter she has decreased her walking activity.  She continues to be mentally intact. \par \par She received the COVID-19 Moderna vaccine\par \par She continues to experience  exertional shortness of breath and fatigue no chest pain or palpitations.  She has had the symptoms for some time but this is her major complaint.  She denies palpitations dizziness or syncope\par \par She recently had something fall on her right leg and developed some increasing edema and some weeping.  It is slowly getting better and Lasix 20 mg was prescribed an increased to total of 60 mg a day\par \par During the exam she suddenly had an irregularly irregular heart rate probably compatible with A. fib\par \par Overall at age 89 she has been functioning fairly well.  She tries to keep as active as possible and mentally she remains quite sharp\par \par Recent blood test October 2021  LDL 66 triglycerides 77 HDL 78 creatinine 0.94 potassium 4.1 GFR 51 and hemoglobin A1c 5.9\par \par on previous visits she continued with shortness of breath which seems to have increased in severity with having to stop after a few feet and sit down with her walker.  She denies significant increase in edema of her lower extremities.  She feels that Spiriva and Trelegy do help.  She has no chest pain.  Occasionally she does have palpitations and feels that she is in atrial fibrillation she has had chronic edema of her lower extremities and has had some weeping from the legs\par \par Echo march 202 1suggested moderate to severe MR normal LV function and pulmonary hypertension.  \par \par She was admitted to [facility]  several months agowith fever urinary tract infection and I believe E. coli sepsis.  She was in atrial fibrillation during the admission.  She had evidence of a mass in the kidney which is being treated conservatively\par \par From a cardiac point of view she was stable in the hospital.  They stopped her Jardiance and Lasix and she was treated with aggressive antibiotics\par \par 2D echo dated April 6, 2022 in the hospital revealed normal left ventricular function moderate pulmonary hypertension mild to moderate mitral regurgitation moderate tricuspid insufficiency.\par \par She went home from the hospital.  She did have some wound infection of her lower extremity which is being treated by wound care.  She has chronic shortness of breath but overall feels well. \par \par  the patient has been relatively stable since the last visit and is now on quinapril and Lasix 20 once a day.\par \par   Her main complaint is been nasal congestion upper airway secretions and continued shortness of breath which may have worsened since the last visit.  She has chronic edema of her lower extremities\par \par  she denies palpitations dizziness or syncope.  She does use trelegy which is of some help for her shortness of breath\par \par 2D echo August 3, 2022: Normal left ventricular function no segmental wall motion abnormality decreased right ventricular function with severe pulmonary hypertension with severe tricuspid insufficiency.  Mild to moderate mitral regurgitation\par \par Patient was last seen on September 28, 2022 patient was treated with antibiotics off and on after hospitalization in the summer 2022.\par \par Subsequent to this she has been complaining of severe leg swelling with ulcerations and exudation of fluid.  She has subsequently seen vascular who has been caring for her and feels that a good deal of this is lymphatic in nature and has a follow-up visit in the near future\par \par Telemedicine visit December 22, 2022:: The patient overall has been doing well but does have a chronic cough.  She is COVID-negative.  She brings up some sputum but it is whitish in nature.  She has no fever or chills.  Her oxygen saturation runs between 90 and 93% off oxygen\par \par The edema of her lower extremities has improved significantly with some support stocking and raising her legs.  Looking had a legs via the video her legs are much less swollen than on previous visit.\par \par In addition to her chronic cough, the patient complains of shortness of breath with mild degrees of exertion walking from the bed to the kitchen.  This is really not changed and is chronic.  She does have severe pulmonary hypertension normal left ventricular function and severe tricuspid insufficiency.  This could be related to some underlying pulmonary etiology.  What bothers her the most is this cough which is better with cough medicines.\par \par Her medications are stable.  She takes Lasix 20 once a day and occasionally takes an extra Lasix perhaps every 3 to 4 days.  She remains on atenolol 25 twice daily and her atrial fibrillation ventricular rate is controlled\par \par Blood test November 15, 2022\par Creatinine 1.03 BUN 34 potassium 5.2 alkaline phosphatase 140\par Lipid panel LDL 60 triglycerides 77 HDL 58\par Uric acid 8.0\par \par \par

## 2022-12-22 NOTE — REASON FOR VISIT
[FreeTextEntry1] : Sabi Cordova date of birth August 21, 1924 89 years old.  She is here today for a telemedicine visit.  The patient was at her home and I was in my office presently at [address] [place] [address].  The daughter was present during the telemedicine visit and they were agreeable to a telemedicine visit.

## 2022-12-22 NOTE — ASSESSMENT
[FreeTextEntry1] : \par \par 1.  Essential hypertension relatively well controlled on present regimen of medication.  Apparently during nurses visits blood pressure is normal\par \par 2.  Diastolic dysfunction moderate mitral regurgitation and pulmonary hypertension probably the cause of her shortness of breath.  Her shortness of breath however is stable.  She probably has heart failure preserved ejection fraction contributing to her shortness of breath  Along with underlying pulmonary disease.  Shortness of breath with mild exertion is a constant complaint but has not changed but does limit her activity.  She only takes Lasix 20 mg once a day\par \par 3..  History of paroxysmal atrial fibrillation.  Initially she was in normal sinus rhythm and then later in the exam she was in atrial fibrillation with a relatively well-controlled ventricular response.   patient is presently in chronic atrial fibrillation with a controlled ventricular response\par \par  4.  Chronic cough compatible with a chronic bronchitiis on a cough suppressant\par \par 5.  Edema of the lower extremities-this is probably a combination of diastolic dysfunction and lymphatics markedly improved with elevation low-dose Lasix and some support stockings followed carefully by vascular surgery\par \par Overall at age 89 she is stable though quite limited in her activity because of the shortness of breath and still remains with a chronic cough\par \par \par

## 2022-12-22 NOTE — DISCUSSION/SUMMARY
[FreeTextEntry1] : 1.  Increase Lasix to take 20 once a day and the second day 40 mg\par 2.  Continue all other medications\par 3.  If the cough continues, perhaps a chest x-ray or CT of the chest and pulmonary consultation may be needed\par 4.  Continue Xarelto for atrial fibrillation\par \par Overall cardiac status appears to be stable.

## 2022-12-27 ENCOUNTER — RX RENEWAL (OUTPATIENT)
Age: 87
End: 2022-12-27

## 2022-12-27 RX ORDER — ATENOLOL 25 MG/1
25 TABLET ORAL
Qty: 180 | Refills: 3 | Status: ACTIVE | COMMUNITY
Start: 2019-12-01 | End: 1900-01-01

## 2022-12-28 ENCOUNTER — NON-APPOINTMENT (OUTPATIENT)
Age: 87
End: 2022-12-28

## 2022-12-28 ENCOUNTER — LABORATORY RESULT (OUTPATIENT)
Age: 87
End: 2022-12-28

## 2022-12-28 ENCOUNTER — APPOINTMENT (OUTPATIENT)
Dept: INTERNAL MEDICINE | Facility: CLINIC | Age: 87
End: 2022-12-28
Payer: MEDICARE

## 2022-12-28 VITALS — SYSTOLIC BLOOD PRESSURE: 138 MMHG | DIASTOLIC BLOOD PRESSURE: 78 MMHG

## 2022-12-28 VITALS — BODY MASS INDEX: 29.06 KG/M2 | HEIGHT: 60 IN | WEIGHT: 148 LBS

## 2022-12-28 DIAGNOSIS — Z87.898 PERSONAL HISTORY OF OTHER SPECIFIED CONDITIONS: ICD-10-CM

## 2022-12-28 DIAGNOSIS — E03.9 HYPOTHYROIDISM, UNSPECIFIED: ICD-10-CM

## 2022-12-28 DIAGNOSIS — J44.9 CHRONIC OBSTRUCTIVE PULMONARY DISEASE, UNSPECIFIED: ICD-10-CM

## 2022-12-28 PROCEDURE — 99214 OFFICE O/P EST MOD 30 MIN: CPT | Mod: 25

## 2022-12-28 PROCEDURE — 36415 COLL VENOUS BLD VENIPUNCTURE: CPT

## 2022-12-28 PROCEDURE — 93000 ELECTROCARDIOGRAM COMPLETE: CPT | Mod: 59

## 2022-12-28 RX ORDER — FLUTICASONE FUROATE, UMECLIDINIUM BROMIDE AND VILANTEROL TRIFENATATE 100; 62.5; 25 UG/1; UG/1; UG/1
100-62.5-25 POWDER RESPIRATORY (INHALATION) DAILY
Qty: 1 | Refills: 5 | Status: DISCONTINUED | COMMUNITY
Start: 2021-11-19 | End: 2022-12-28

## 2022-12-28 RX ORDER — BENZONATATE 100 MG/1
100 CAPSULE ORAL 3 TIMES DAILY
Qty: 30 | Refills: 0 | Status: ACTIVE | COMMUNITY
Start: 2022-12-28 | End: 1900-01-01

## 2022-12-28 NOTE — PHYSICAL EXAM
[No JVD] : no jugular venous distention [de-identified] : Mild distress [de-identified] : Surprisingly clear [de-identified] : 3+ edema

## 2022-12-28 NOTE — HISTORY OF PRESENT ILLNESS
[FreeTextEntry8] : This is a 89-year-old female who was brought in by her daughter for increasing cough and sleepiness\par \par Patient states she has had a cough for over a week she has increasing edema.  No fever chills myalgias and she did test for COVID at home\par \par She was recently seen by cardiology.  At that time he felt her shortness of breath was stable.  She does have diastolic dysfunction and pulmonary hypertension

## 2022-12-28 NOTE — ASSESSMENT
[FreeTextEntry1] : This is a 88-year-old female whose history has been reviewed above\par \par She has a history of COPD and diastolic dysfunction as well as pulmonary hypertension.\par \par She is complaining of shortness of breath and cough which she states has been over a week she did test negative for COVID at home but this was about 6 days ago.  However she is complaining of no systemic symptoms\par \par Lungs had some wheezing at first but cleared.  She does have very significant edema\par \par KG shows that she is back in atrial fibrillation\par \par At this point we will increase her Lasix to 3 times a day she will restrict her salt.\par \par Potassium supplementation pending results also pending results of COVID testing\par \par

## 2022-12-28 NOTE — REVIEW OF SYSTEMS
[Fatigue] : fatigue [Shortness Of Breath] : shortness of breath [Cough] : cough [Negative] : Psychiatric

## 2022-12-30 LAB
ALBUMIN SERPL ELPH-MCNC: 3.1 G/DL
ALP BLD-CCNC: 188 U/L
ALT SERPL-CCNC: 40 U/L
ANION GAP SERPL CALC-SCNC: 12 MMOL/L
AST SERPL-CCNC: 45 U/L
BASOPHILS # BLD AUTO: 0.03 K/UL
BASOPHILS NFR BLD AUTO: 0.4 %
BILIRUB SERPL-MCNC: 0.3 MG/DL
BUN SERPL-MCNC: 33 MG/DL
CALCIUM SERPL-MCNC: 8.5 MG/DL
CHLORIDE SERPL-SCNC: 100 MMOL/L
CHOLEST SERPL-MCNC: 123 MG/DL
CO2 SERPL-SCNC: 27 MMOL/L
CREAT SERPL-MCNC: 1.06 MG/DL
EGFR: 47 ML/MIN/1.73M2
EOSINOPHIL # BLD AUTO: 0.03 K/UL
EOSINOPHIL NFR BLD AUTO: 0.4 %
ESTIMATED AVERAGE GLUCOSE: 140 MG/DL
GLUCOSE SERPL-MCNC: 90 MG/DL
HBA1C MFR BLD HPLC: 6.5 %
HCT VFR BLD CALC: 44.1 %
HDLC SERPL-MCNC: 63 MG/DL
HGB BLD-MCNC: 13.8 G/DL
IMM GRANULOCYTES NFR BLD AUTO: 0.4 %
LDLC SERPL CALC-MCNC: 47 MG/DL
LYMPHOCYTES # BLD AUTO: 0.68 K/UL
LYMPHOCYTES NFR BLD AUTO: 8.1 %
MAN DIFF?: NORMAL
MCHC RBC-ENTMCNC: 29.2 PG
MCHC RBC-ENTMCNC: 31.3 GM/DL
MCV RBC AUTO: 93.4 FL
MONOCYTES # BLD AUTO: 0.46 K/UL
MONOCYTES NFR BLD AUTO: 5.5 %
NEUTROPHILS # BLD AUTO: 7.21 K/UL
NEUTROPHILS NFR BLD AUTO: 85.2 %
NONHDLC SERPL-MCNC: 61 MG/DL
NT-PROBNP SERPL-MCNC: 3420 PG/ML
PLATELET # BLD AUTO: 167 K/UL
POTASSIUM SERPL-SCNC: 4.2 MMOL/L
PROT SERPL-MCNC: 5.7 G/DL
RAPID RVP RESULT: NOT DETECTED
RBC # BLD: 4.72 M/UL
RBC # FLD: 17.8 %
SARS-COV-2 RNA PNL RESP NAA+PROBE: NOT DETECTED
SODIUM SERPL-SCNC: 139 MMOL/L
T3RU NFR SERPL: 0.9 TBI
T4 SERPL-MCNC: 3.9 UG/DL
TRIGL SERPL-MCNC: 70 MG/DL
TSH SERPL-ACNC: 22.8 UIU/ML
URATE SERPL-MCNC: 9.3 MG/DL
WBC # FLD AUTO: 8.44 K/UL

## 2023-01-03 ENCOUNTER — APPOINTMENT (OUTPATIENT)
Dept: INTERNAL MEDICINE | Facility: CLINIC | Age: 88
End: 2023-01-03

## 2023-01-09 ENCOUNTER — APPOINTMENT (OUTPATIENT)
Dept: WOUND CARE | Facility: CLINIC | Age: 88
End: 2023-01-09
Payer: MEDICARE

## 2023-01-09 ENCOUNTER — APPOINTMENT (OUTPATIENT)
Dept: VASCULAR SURGERY | Facility: CLINIC | Age: 88
End: 2023-01-09
Payer: MEDICARE

## 2023-01-09 VITALS
BODY MASS INDEX: 31.22 KG/M2 | SYSTOLIC BLOOD PRESSURE: 110 MMHG | WEIGHT: 159 LBS | TEMPERATURE: 97.6 F | HEART RATE: 54 BPM | DIASTOLIC BLOOD PRESSURE: 64 MMHG | HEIGHT: 60 IN

## 2023-01-09 DIAGNOSIS — L97.912 NON-PRESSURE CHRONIC ULCER OF UNSPECIFIED PART OF RIGHT LOWER LEG WITH FAT LAYER EXPOSED: ICD-10-CM

## 2023-01-09 DIAGNOSIS — S41.102A UNSPECIFIED OPEN WOUND OF LEFT UPPER ARM, INITIAL ENCOUNTER: ICD-10-CM

## 2023-01-09 PROCEDURE — 11042 DBRDMT SUBQ TIS 1ST 20SQCM/<: CPT

## 2023-01-09 PROCEDURE — 99213 OFFICE O/P EST LOW 20 MIN: CPT

## 2023-01-09 RX ORDER — COLLAGENASE SANTYL 250 [ARB'U]/G
250 OINTMENT TOPICAL DAILY
Qty: 1 | Refills: 2 | Status: ACTIVE | COMMUNITY
Start: 2023-01-09 | End: 1900-01-01

## 2023-01-09 RX ORDER — FUROSEMIDE 20 MG/1
20 TABLET ORAL TWICE DAILY
Qty: 180 | Refills: 3 | Status: DISCONTINUED | COMMUNITY
Start: 2021-10-25 | End: 2023-01-09

## 2023-01-09 RX ORDER — FUROSEMIDE 80 MG/1
TABLET ORAL
Refills: 0 | Status: ACTIVE | COMMUNITY

## 2023-01-09 NOTE — HISTORY OF PRESENT ILLNESS
[FreeTextEntry1] : I have just had the pleasure of seeing Mrs. Sabi Cordova ( 24) in consultation for lower extremity venous insufficiency. \par \par Mrs. Cordova is a pleasant 89-year-old lady who is accompanied by her daughter today. She was last evaluated at our office five years ago. At the time, she was treated for right leg cellulitis and edema. Compression therapy was also prescribed, but she has been non-compliant with this. \par \par She presents today with copious lymphatic drainage from pin point ulcers of bilateral lower extremities. Her daughter reports that she soaks the bed sheets nightly. She spends a significant part of the day sitting with her legs in a dependent position. She denies any constitutional symptoms. She is minimally ambulatory. \par \par Her medical and surgical history is otherwise significant for TIA (difficulty with speech; seven years ago; her workup was negative), hypothyroidism, macular degeneration, anemia, hypertension, hyperlipidemia, and osteoarthritis\par \par Medications: Xarelto, Aspirin, Lipitor, Hydrochlorothiazide, Oxcarbazepine, Synthroid, Prilosec, Quinapril\par \par Allergies: NKDA\par \par Social history: Non-smoker \par \par FH: NC\par  [de-identified] : Previously, Unna boot therapy was ordered via VNS. Patient's daughter could not coordinate with VNS and with short course of compression, wounds healed. Patient now returns with weeping wounds of both legs and from the left upper extremity. Daughter reports she has also not obtained compression stockings for patient as she did not take patient with her to the store and legs could not be measured.

## 2023-01-09 NOTE — PHYSICAL EXAM
[de-identified] : On physical examination the patient is NAD, AAOx3. Neurologically intact. The lungs are clear and the heart has an irregular rate and rhythm. The abdomen is soft, without tenderness or pulsatile mass. Bilateral femoral pulses are palpable. Pedal pulses are non-palpable. Bilateral lower extremity edema is present. There is drainage of lymphatic fluid from both legs. No signs of infection present. Left forearm abrasion with lymphatic fluid.

## 2023-01-09 NOTE — ASSESSMENT
[FreeTextEntry1] : In summary, Mrs. Cordova presents with bilateral lower extremity edema with lymphorrhea. I will refer her to the [facility] for ongoing care. \par  \par

## 2023-01-15 PROBLEM — L97.912 LEG ULCER, RIGHT, WITH FAT LAYER EXPOSED: Status: ACTIVE | Noted: 2023-01-15

## 2023-01-15 PROBLEM — S41.102A: Status: ACTIVE | Noted: 2023-01-09

## 2023-01-15 NOTE — PHYSICAL EXAM
[0] : left 0 [de-identified] : On physical examination the patient is NAD, AAOx3. Neurologically intact. The lungs are clear and the heart has an irregular rate and rhythm. The abdomen is soft, without tenderness or pulsatile mass. Bilateral femoral pulses are palpable. Pedal pulses are non-palpable. Bilateral lower extremity edema is present. There is drainage of lymphatic fluid from both legs. No signs of infection present. Left forearm abrasion with lymphatic fluid.

## 2023-01-15 NOTE — PLAN
[FreeTextEntry1] : Plan\par Left arm - santyl, gauze, rolled gauze\par Bilateral legs - ace wrap until get the TERRY done\par TERRY/PVR ordered\par Supplies ordered\par Return in 2 weeks

## 2023-01-15 NOTE — ASSESSMENT
[FreeTextEntry1] : In summary, Mrs. Cordova presents with bilateral lower extremity edema with lymphorrhea, left arm wound\par \par Wound Assessment and Plan:\par \par The patient presents with a wound to the left arm -.  Swelling noted to the extremity.  \par TERRY/PVR and standing venous reflux study scheduled.\par Patient underwent evaluation for peripheral arterial disease using a [employer] diagnostic machine.  Ankle brachial index was obtained using blood pressure cuffs of the ankle and brachial segments of both legs.  A distal pulse volume recording was noted digitally on the device.  The procedure was supervised and interpreted by the physician.  TERRY of the right lower extremity PAD.   TERRY of the left lower extremity PAD.  Waveform noted to be(monophasic).   Patient tolerated procedure well in the office.  Results discussed with the patient. \par s/p excisional debridement of upper leg wound. Patient being followed by Dr. Wilson\par \par No clinical sign of infection\par Recommendation:\par Apply lidocaine or topical anesthetic if needed to reduce pain upon washing the wound.\par Wash wound with --Dove skin sensitive soap and clean water \par Apply ---- santyl, gauze, ming\par Apply ----ACE bandage on bilateral legs\par Change dressing ---daily\par Leg elevation as tolerated\par Encouraged ambulation or exercise.\par Optimization of nutrition.\par Offloading to the wound site.\par \par Supplies ordered

## 2023-01-15 NOTE — HISTORY OF PRESENT ILLNESS
[FreeTextEntry1] : I have just had the pleasure of seeing Mrs. Sabi Cordova ( 24) in consultation for lower extremity venous insufficiency. \par \par Mrs. Cordova is a pleasant 89-year-old lady who is accompanied by her daughter today. She was last evaluated at our office five years ago. At the time, she was treated for right leg cellulitis and edema.Patient was on  Compression therapy , but no pulses palpable today, and PAD on screening TERRY tool\par She presents today with copious lymphatic drainage from pin point ulcers of bilateral lower extremities. Her daughter reports that she soaks the bed sheets nightly. She spends a significant part of the day sitting with her legs in a dependent position. She denies any constitutional symptoms. She is minimally ambulatory. \par \par Her medical and surgical history is otherwise significant for TIA (difficulty with speech; seven years ago; her workup was negative), hypothyroidism, macular degeneration, anemia, hypertension, hyperlipidemia, and osteoarthritis\par LEft arm wound open as result of swelling\par \par Medications: Xarelto, Aspirin, Lipitor, Hydrochlorothiazide, Oxcarbazepine, Synthroid, Prilosec, Quinapril\par \par  [de-identified] : Previously, Unna boot therapy multilayer was ordered via VNS. Patient's daughter could not coordinate with VNS and with short course of compression, wounds healed. Patient now returns with weeping wounds of both legs and from the left upper extremity. Daughter reports she has also not obtained compression stockings for patient as she did not take patient with her to the store and legs could not be measured.

## 2023-01-20 ENCOUNTER — NON-APPOINTMENT (OUTPATIENT)
Age: 88
End: 2023-01-20

## 2023-01-25 ENCOUNTER — APPOINTMENT (OUTPATIENT)
Dept: VASCULAR SURGERY | Facility: CLINIC | Age: 88
End: 2023-01-25
Payer: MEDICARE

## 2023-01-25 ENCOUNTER — APPOINTMENT (OUTPATIENT)
Dept: WOUND CARE | Facility: CLINIC | Age: 88
End: 2023-01-25
Payer: MEDICARE

## 2023-01-25 VITALS — TEMPERATURE: 97.3 F

## 2023-01-25 DIAGNOSIS — S51.812D LACERATION W/OUT FOREIGN BODY OF LEFT FOREARM, SUBSEQUENT ENCOUNTER: ICD-10-CM

## 2023-01-25 PROCEDURE — 93923 UPR/LXTR ART STDY 3+ LVLS: CPT

## 2023-01-25 PROCEDURE — 11042 DBRDMT SUBQ TIS 1ST 20SQCM/<: CPT

## 2023-01-31 PROBLEM — S51.812D SKIN TEAR OF FOREARM WITHOUT COMPLICATION, LEFT, SUBSEQUENT ENCOUNTER: Status: ACTIVE | Noted: 2023-01-31

## 2023-01-31 NOTE — PLAN
[FreeTextEntry1] : 1/25/23\par Plan - kerramax was shipped in order, daughter advised to look in box, if not call supply company\par wash legs, apply super absorber/ming/ace, can change every 2 days\par follow up 3 weeks

## 2023-01-31 NOTE — HISTORY OF PRESENT ILLNESS
[FreeTextEntry1] : I have just had the pleasure of seeing Mrs. Sabi Cordova ( 24) in consultation for lower extremity venous insufficiency. \par \par Mrs. Cordova is a pleasant 89-year-old lady who is accompanied by her daughter today. She was last evaluated at our office five years ago. At the time, she was treated for right leg cellulitis and edema.Patient was on  Compression therapy , but no pulses palpable today, and PAD on screening TERRY tool\par She presents today with copious lymphatic drainage from pin point ulcers of bilateral lower extremities. Her daughter reports that she soaks the bed sheets nightly. She spends a significant part of the day sitting with her legs in a dependent position. She denies any constitutional symptoms. She is minimally ambulatory. \par \par Her medical and surgical history is otherwise significant for TIA (difficulty with speech; seven years ago; her workup was negative), hypothyroidism, macular degeneration, anemia, hypertension, hyperlipidemia, and osteoarthritis\par LEft arm wound open as result of swelling\par \par Medications: Xarelto, Aspirin, Lipitor, Hydrochlorothiazide, Oxcarbazepine, Synthroid, Prilosec, Quinapril\par \par  [de-identified] : Previously, Unna boot therapy multilayer was ordered via VNS. Patient's daughter could not coordinate with VNS and with short course of compression, wounds healed. Patient now returns with weeping wounds of both legs and from the left upper extremity. Daughter reports she has also not obtained compression stockings for patient as she did not take patient with her to the store and legs could not be measured.

## 2023-01-31 NOTE — PHYSICAL EXAM
[0] : left 0 [de-identified] : On physical examination the patient is NAD, AAOx3. Neurologically intact. The lungs are clear and the heart has an irregular rate and rhythm. The abdomen is soft, without tenderness or pulsatile mass. Bilateral femoral pulses are palpable. Pedal pulses are non-palpable. Bilateral lower extremity edema is present. There is drainage of lymphatic fluid from both legs. No signs of infection present. Left forearm abrasion with lymphatic fluid.

## 2023-01-31 NOTE — ASSESSMENT
[FreeTextEntry1] : In summary, Mrs. Cordova presents with bilateral lower extremity edema with lymphorrhea, left arm wound\par \par Wound Assessment and Plan:\par \par The patient presents with a wound to the left arm -.  Swelling noted to the extremity.  \par TERRY/PVR and standing venous reflux study scheduled.\par Patient underwent evaluation for peripheral arterial disease using a [employer] diagnostic machine.  Ankle brachial index was obtained using blood pressure cuffs of the ankle and brachial segments of both legs.  A distal pulse volume recording was noted digitally on the device.  The procedure was supervised and interpreted by the physician.  TERRY of the right lower extremity PAD.   TERRY of the left lower extremity PAD.  Waveform noted to be(monophasic).   Patient tolerated procedure well in the office.  Results discussed with the patient. \par s/p excisional debridement of upper leg wound. Patient being followed by Dr. Wilson\par \par 1/25/23\par Bilateral legs weeping, legs edematous, draining moderate - large amounts clear fluid\par going for PVR today\par s/p excisional debridement left forearm of yellow slough

## 2023-02-01 RX ORDER — SULFAMETHOXAZOLE AND TRIMETHOPRIM 800; 160 MG/1; MG/1
800-160 TABLET ORAL
Qty: 14 | Refills: 0 | Status: ACTIVE | COMMUNITY
Start: 2023-02-01 | End: 1900-01-01

## 2023-02-10 ENCOUNTER — RX RENEWAL (OUTPATIENT)
Age: 88
End: 2023-02-10

## 2023-02-13 DIAGNOSIS — R53.1 WEAKNESS: ICD-10-CM

## 2023-02-13 DIAGNOSIS — I50.32 CHRONIC DIASTOLIC (CONGESTIVE) HEART FAILURE: ICD-10-CM

## 2023-02-15 ENCOUNTER — INPATIENT (INPATIENT)
Facility: HOSPITAL | Age: 88
LOS: 8 days | Discharge: NURSING, REHAB OR LONG-TERM CARE | DRG: 291 | End: 2023-02-24
Attending: HOSPITALIST | Admitting: STUDENT IN AN ORGANIZED HEALTH CARE EDUCATION/TRAINING PROGRAM
Payer: MEDICARE

## 2023-02-15 ENCOUNTER — APPOINTMENT (OUTPATIENT)
Dept: INTERNAL MEDICINE | Facility: CLINIC | Age: 88
End: 2023-02-15
Payer: MEDICARE

## 2023-02-15 ENCOUNTER — APPOINTMENT (OUTPATIENT)
Dept: WOUND CARE | Facility: CLINIC | Age: 88
End: 2023-02-15
Payer: MEDICARE

## 2023-02-15 VITALS
DIASTOLIC BLOOD PRESSURE: 67 MMHG | HEART RATE: 98 BPM | HEIGHT: 60 IN | WEIGHT: 149.91 LBS | OXYGEN SATURATION: 99 % | SYSTOLIC BLOOD PRESSURE: 98 MMHG | RESPIRATION RATE: 20 BRPM | TEMPERATURE: 97 F

## 2023-02-15 VITALS
WEIGHT: 150 LBS | BODY MASS INDEX: 29.3 KG/M2 | DIASTOLIC BLOOD PRESSURE: 58 MMHG | SYSTOLIC BLOOD PRESSURE: 79 MMHG | HEART RATE: 75 BPM

## 2023-02-15 VITALS — HEART RATE: 70 BPM | SYSTOLIC BLOOD PRESSURE: 101 MMHG | OXYGEN SATURATION: 95 % | DIASTOLIC BLOOD PRESSURE: 55 MMHG

## 2023-02-15 VITALS — TEMPERATURE: 94.7 F

## 2023-02-15 DIAGNOSIS — R53.1 WEAKNESS: ICD-10-CM

## 2023-02-15 DIAGNOSIS — I87.2 VENOUS INSUFFICIENCY (CHRONIC) (PERIPHERAL): ICD-10-CM

## 2023-02-15 DIAGNOSIS — R29.898 OTHER SYMPTOMS AND SIGNS INVOLVING THE MUSCULOSKELETAL SYSTEM: ICD-10-CM

## 2023-02-15 DIAGNOSIS — N39.0 URINARY TRACT INFECTION, SITE NOT SPECIFIED: ICD-10-CM

## 2023-02-15 DIAGNOSIS — R60.0 LOCALIZED EDEMA: ICD-10-CM

## 2023-02-15 DIAGNOSIS — S51.802D UNSPECIFIED OPEN WOUND OF LEFT FOREARM, SUBSEQUENT ENCOUNTER: ICD-10-CM

## 2023-02-15 LAB
ALBUMIN SERPL ELPH-MCNC: 2.8 G/DL — LOW (ref 3.3–5)
ALP SERPL-CCNC: 199 U/L — HIGH (ref 40–120)
ALT FLD-CCNC: 43 U/L — SIGNIFICANT CHANGE UP (ref 10–45)
ANION GAP SERPL CALC-SCNC: 12 MMOL/L — SIGNIFICANT CHANGE UP (ref 5–17)
ANISOCYTOSIS BLD QL: SIGNIFICANT CHANGE UP
APPEARANCE UR: ABNORMAL
APTT BLD: 36.5 SEC — HIGH (ref 27.5–35.5)
AST SERPL-CCNC: 49 U/L — HIGH (ref 10–40)
BACTERIA # UR AUTO: ABNORMAL
BASE EXCESS BLDV CALC-SCNC: 1.2 MMOL/L — SIGNIFICANT CHANGE UP (ref -2–3)
BASOPHILS # BLD AUTO: 0 K/UL — SIGNIFICANT CHANGE UP (ref 0–0.2)
BASOPHILS NFR BLD AUTO: 0 % — SIGNIFICANT CHANGE UP (ref 0–2)
BILIRUB SERPL-MCNC: 0.2 MG/DL — SIGNIFICANT CHANGE UP (ref 0.2–1.2)
BILIRUB UR-MCNC: NEGATIVE — SIGNIFICANT CHANGE UP
BLOOD GAS VENOUS - CREATININE: SIGNIFICANT CHANGE UP MG/DL (ref 0.5–1.3)
BUN SERPL-MCNC: 47 MG/DL — HIGH (ref 7–23)
BURR CELLS BLD QL SMEAR: PRESENT — SIGNIFICANT CHANGE UP
BURR CELLS BLD QL SMEAR: SIGNIFICANT CHANGE UP
CA-I SERPL-SCNC: 1.17 MMOL/L — SIGNIFICANT CHANGE UP (ref 1.15–1.33)
CALCIUM SERPL-MCNC: 8.8 MG/DL — SIGNIFICANT CHANGE UP (ref 8.4–10.5)
CHLORIDE BLDV-SCNC: 98 MMOL/L — SIGNIFICANT CHANGE UP (ref 96–108)
CHLORIDE SERPL-SCNC: 99 MMOL/L — SIGNIFICANT CHANGE UP (ref 96–108)
CO2 BLDV-SCNC: 31 MMOL/L — HIGH (ref 22–26)
CO2 SERPL-SCNC: 24 MMOL/L — SIGNIFICANT CHANGE UP (ref 22–31)
COLOR SPEC: YELLOW — SIGNIFICANT CHANGE UP
CREAT SERPL-MCNC: 1.31 MG/DL — HIGH (ref 0.5–1.3)
DIFF PNL FLD: ABNORMAL
EGFR: 37 ML/MIN/1.73M2 — LOW
ELLIPTOCYTES BLD QL SMEAR: SLIGHT — SIGNIFICANT CHANGE UP
EOSINOPHIL # BLD AUTO: 0.22 K/UL — SIGNIFICANT CHANGE UP (ref 0–0.5)
EOSINOPHIL NFR BLD AUTO: 2.6 % — SIGNIFICANT CHANGE UP (ref 0–6)
EPI CELLS # UR: 0 /HPF — SIGNIFICANT CHANGE UP
GAS PNL BLDV: 130 MMOL/L — LOW (ref 136–145)
GAS PNL BLDV: SIGNIFICANT CHANGE UP
GAS PNL BLDV: SIGNIFICANT CHANGE UP
GIANT PLATELETS BLD QL SMEAR: PRESENT — SIGNIFICANT CHANGE UP
GLUCOSE BLDV-MCNC: 85 MG/DL — SIGNIFICANT CHANGE UP (ref 70–99)
GLUCOSE SERPL-MCNC: 89 MG/DL — SIGNIFICANT CHANGE UP (ref 70–99)
GLUCOSE UR QL: NEGATIVE — SIGNIFICANT CHANGE UP
HCO3 BLDV-SCNC: 29 MMOL/L — SIGNIFICANT CHANGE UP (ref 22–29)
HCT VFR BLD CALC: 40.5 % — SIGNIFICANT CHANGE UP (ref 34.5–45)
HCT VFR BLDA CALC: 41 % — SIGNIFICANT CHANGE UP (ref 34.5–46.5)
HGB BLD CALC-MCNC: 13.5 G/DL — SIGNIFICANT CHANGE UP (ref 11.7–16.1)
HGB BLD-MCNC: 13.1 G/DL — SIGNIFICANT CHANGE UP (ref 11.5–15.5)
HYALINE CASTS # UR AUTO: 7 /LPF — HIGH (ref 0–2)
INR BLD: 1.18 RATIO — HIGH (ref 0.88–1.16)
KETONES UR-MCNC: NEGATIVE — SIGNIFICANT CHANGE UP
LACTATE BLDV-MCNC: 1.7 MMOL/L — SIGNIFICANT CHANGE UP (ref 0.5–2)
LEUKOCYTE ESTERASE UR-ACNC: ABNORMAL
LYMPHOCYTES # BLD AUTO: 0.72 K/UL — LOW (ref 1–3.3)
LYMPHOCYTES # BLD AUTO: 8.7 % — LOW (ref 13–44)
MACROCYTES BLD QL: SIGNIFICANT CHANGE UP
MANUAL SMEAR VERIFICATION: SIGNIFICANT CHANGE UP
MCHC RBC-ENTMCNC: 29.6 PG — SIGNIFICANT CHANGE UP (ref 27–34)
MCHC RBC-ENTMCNC: 32.3 GM/DL — SIGNIFICANT CHANGE UP (ref 32–36)
MCV RBC AUTO: 91.4 FL — SIGNIFICANT CHANGE UP (ref 80–100)
MONOCYTES # BLD AUTO: 0.29 K/UL — SIGNIFICANT CHANGE UP (ref 0–0.9)
MONOCYTES NFR BLD AUTO: 3.5 % — SIGNIFICANT CHANGE UP (ref 2–14)
NEUTROPHILS # BLD AUTO: 7.06 K/UL — SIGNIFICANT CHANGE UP (ref 1.8–7.4)
NEUTROPHILS NFR BLD AUTO: 85.2 % — HIGH (ref 43–77)
NITRITE UR-MCNC: POSITIVE
PAPPENHEIMER BOD BLD QL SMEAR: PRESENT — SIGNIFICANT CHANGE UP
PCO2 BLDV: 61 MMHG — HIGH (ref 39–42)
PH BLDV: 7.29 — LOW (ref 7.32–7.43)
PH UR: 6 — SIGNIFICANT CHANGE UP (ref 5–8)
PLAT MORPH BLD: NORMAL — SIGNIFICANT CHANGE UP
PLATELET # BLD AUTO: 160 K/UL — SIGNIFICANT CHANGE UP (ref 150–400)
PO2 BLDV: 25 MMHG — SIGNIFICANT CHANGE UP (ref 25–45)
POIKILOCYTOSIS BLD QL AUTO: SIGNIFICANT CHANGE UP
POLYCHROMASIA BLD QL SMEAR: SLIGHT — SIGNIFICANT CHANGE UP
POTASSIUM BLDV-SCNC: 5.2 MMOL/L — HIGH (ref 3.5–5.1)
POTASSIUM SERPL-MCNC: 5.5 MMOL/L — HIGH (ref 3.5–5.3)
POTASSIUM SERPL-SCNC: 5.5 MMOL/L — HIGH (ref 3.5–5.3)
PROT SERPL-MCNC: 6.1 G/DL — SIGNIFICANT CHANGE UP (ref 6–8.3)
PROT UR-MCNC: ABNORMAL
PROTHROM AB SERPL-ACNC: 13.7 SEC — HIGH (ref 10.5–13.4)
RAPID RVP RESULT: SIGNIFICANT CHANGE UP
RBC # BLD: 4.43 M/UL — SIGNIFICANT CHANGE UP (ref 3.8–5.2)
RBC # FLD: 21.4 % — HIGH (ref 10.3–14.5)
RBC BLD AUTO: ABNORMAL
RBC CASTS # UR COMP ASSIST: 161 /HPF — HIGH (ref 0–4)
SAO2 % BLDV: 35.1 % — LOW (ref 67–88)
SARS-COV-2 RNA SPEC QL NAA+PROBE: SIGNIFICANT CHANGE UP
SODIUM SERPL-SCNC: 135 MMOL/L — SIGNIFICANT CHANGE UP (ref 135–145)
SP GR SPEC: 1.02 — SIGNIFICANT CHANGE UP (ref 1.01–1.02)
TARGETS BLD QL SMEAR: SLIGHT — SIGNIFICANT CHANGE UP
UROBILINOGEN FLD QL: NEGATIVE — SIGNIFICANT CHANGE UP
WBC # BLD: 8.29 K/UL — SIGNIFICANT CHANGE UP (ref 3.8–10.5)
WBC # FLD AUTO: 8.29 K/UL — SIGNIFICANT CHANGE UP (ref 3.8–10.5)
WBC UR QL: 110 /HPF — HIGH (ref 0–5)

## 2023-02-15 PROCEDURE — 99285 EMERGENCY DEPT VISIT HI MDM: CPT | Mod: GC

## 2023-02-15 PROCEDURE — 99223 1ST HOSP IP/OBS HIGH 75: CPT

## 2023-02-15 PROCEDURE — 71045 X-RAY EXAM CHEST 1 VIEW: CPT | Mod: 26

## 2023-02-15 PROCEDURE — 99214 OFFICE O/P EST MOD 30 MIN: CPT

## 2023-02-15 PROCEDURE — 11042 DBRDMT SUBQ TIS 1ST 20SQCM/<: CPT | Mod: PD

## 2023-02-15 PROCEDURE — 93922 UPR/L XTREMITY ART 2 LEVELS: CPT | Mod: 26

## 2023-02-15 RX ORDER — SODIUM CHLORIDE 9 MG/ML
500 INJECTION INTRAMUSCULAR; INTRAVENOUS; SUBCUTANEOUS ONCE
Refills: 0 | Status: COMPLETED | OUTPATIENT
Start: 2023-02-15 | End: 2023-02-15

## 2023-02-15 RX ORDER — CEFTRIAXONE 500 MG/1
1000 INJECTION, POWDER, FOR SOLUTION INTRAMUSCULAR; INTRAVENOUS ONCE
Refills: 0 | Status: COMPLETED | OUTPATIENT
Start: 2023-02-15 | End: 2023-02-15

## 2023-02-15 RX ADMIN — SODIUM CHLORIDE 500 MILLILITER(S): 9 INJECTION INTRAMUSCULAR; INTRAVENOUS; SUBCUTANEOUS at 21:00

## 2023-02-15 RX ADMIN — CEFTRIAXONE 100 MILLIGRAM(S): 500 INJECTION, POWDER, FOR SOLUTION INTRAMUSCULAR; INTRAVENOUS at 23:33

## 2023-02-15 NOTE — H&P ADULT - PROBLEM SELECTOR PLAN 4
Patient's wife states patient needs a letter stating patient came in on 01/08/2019 for a CPE  so he can have a discount on his insurance. Requesting we place letter in the mail , address verified. - Atenolol    - Lisinopril

## 2023-02-15 NOTE — ED PROVIDER NOTE - CROS ED NEURO POS
Encourage PO intake     Topical Recommendations    Mid back-Clean with NS. Pat dry. Cover with silicon foam dressing to protect against friction and shearing (Patient with kyphosis)    Sacrum: Clean with skin cleanser. Pat dry. Place sacrum silicone foam with borders. Change daily or PRN.     Lower buttock: Clean with skin cleanser. Pat dry. Apply Deion barrier cream, apply twice a day or PRN.     Continue low air loss bed therapy, continue heel elevation with Z-flex fluidized positioning boots, continue to turn & reposition q2h, soft pillow between bony prominences, continue moisture management with barrier creams & single breathable pad, continue measures to decrease friction/shear/pressure. Continue with nutritional support as per dietary/orders.    Please contact Wound Care Service Line if we can be of further assistance (ext [phone]).    DIFFICULTY WALKING / IMBALANCE

## 2023-02-15 NOTE — HISTORY OF PRESENT ILLNESS
[FreeTextEntry1] : I have just had the pleasure of seeing Mrs. Sabi Cordova ( 24) in consultation for lower extremity venous insufficiency. \par \par Mrs. Cordova is a pleasant 89-year-old lady who is accompanied by her daughter today. She was last evaluated at our office five years ago. At the time, she was treated for right leg cellulitis and edema.Patient was on  Compression therapy , but no pulses palpable today, and PAD on screening TERRY tool\par She presents today with copious lymphatic drainage from pin point ulcers of bilateral lower extremities. Her daughter reports that she soaks the bed sheets nightly. She spends a significant part of the day sitting with her legs in a dependent position. She denies any constitutional symptoms. She is minimally ambulatory. \par \par Her medical and surgical history is otherwise significant for TIA (difficulty with speech; seven years ago; her workup was negative), hypothyroidism, macular degeneration, anemia, hypertension, hyperlipidemia, and osteoarthritis\par LEft arm wound open as result of swelling\par \par Medications: Xarelto, Aspirin, Lipitor, Hydrochlorothiazide, Oxcarbazepine, Synthroid, Prilosec, Quinapril\par \par  [de-identified] : Previously, Unna boot therapy multilayer was ordered via VNS. Patient's daughter could not coordinate with VNS and with short course of compression, wounds healed. Patient now returns with weeping wounds of both legs and from the left upper extremity. Daughter reports she has also not obtained compression stockings for patient as she did not take patient with her to the store and legs could not be measured.

## 2023-02-15 NOTE — ED PROVIDER NOTE - OBJECTIVE STATEMENT
89 hx of HTN, HLD, AFIB on xarelto, chronic wounds, lymphedema presenting for evaluation of 10 days of generalized weakness, found to be hypotensive in the 80s systolic with blood in her urine at her PCP's office today. Daughter at bedside reports that the last time the patient was weak like this she had a UTI. The patient is unsure if the bleeding from the vagina (pt was told she has cervical polyps 10 years ago) or from the urine. Pt states she does not have strength to walk. Denies dysuria, urinary frequency, fever, chills, new sob, cp, abdominal pain, n/v, diarrhea, headache, slurred speech, asymmetric weakness.

## 2023-02-15 NOTE — ED PROVIDER NOTE - ATTENDING CONTRIBUTION TO CARE
attending Srini: 89yF h/o HTN, HLD, AFIB on Xarelto, chronic wounds, lymphedema p/w 10 days of generalized weakness, found to be hypotensive to 80s systolic at PCP's office today. Also with hematuria, similar to episode of prior UTI. Exam as above. Generalized weakness likely 2/2 UTI, will evaluate for other sources of infection vs anemia. Will obtain labs, UA/Ucx, ekg, cxr, will require admission

## 2023-02-15 NOTE — H&P ADULT - PROBLEM SELECTOR PLAN 5
- Atenolol   - Xarelto  - Telemetry - Atenolol   - Xarelto 15mg qhs, decreased from home dose of 20mg qhs iso MINISTERIO   - Telemetry

## 2023-02-15 NOTE — H&P ADULT - HISTORY OF PRESENT ILLNESS
89 hx of HTN, HLD, AFIB on xarelto, chronic wounds, lymphedema presenting for evaluation of 10 days of generalized weakness, found to be hypotensive in the 80s systolic with blood in her urine at her PCP's office today. Daughter at bedside reports that the last time the patient was weak like this she had a UTI. The patient is unsure if the bleeding from the vagina (pt was told she has cervical polyps 10 years ago) or from the urine. Pt states she does not have strength to walk. Denies dysuria, urinary frequency, fever, chills, new sob, cp, abdominal pain, n/v, diarrhea, headache, slurred speech, asymmetric weakness. 89y F pmh HTN, HLD, AFIB on xarelto, chronic wounds, lymphedema presenting for eval of 10 days of generalized weakness, found to be hypotensive in the 80s systolic with blood in her urine at her PCP's office today. Per chart review, daughter reports that the last time the patient was weak like this she had a UTI.  Pt states she does not have strength to walk.  Denies dysuria, urinary frequency, fever, chills, new sob, cp, abdominal pain, n/v, diarrhea, headache, slurred speech, asymmetric weakness.      Ambulates w/walker  Has HHA 8hr x 7d

## 2023-02-15 NOTE — ED PROVIDER NOTE - CLINICAL SUMMARY MEDICAL DECISION MAKING FREE TEXT BOX
89 hx of HTN, HLD, AFIB on xarelto, chronic wounds, lymphedema presenting for evaluation of 10 days of generalized weakness described as no strength to walk, found to be hypotensive in the 80s systolic with blood in her urine at her PCP's office today. Daughter states this was similar to the pt's prior presentation of UTI. Afebrile, with BPs in the 90s/low 100s, otherwise hemodynamically stable with cold extremities 2/2 to weeping lymphedema with clear serous drainage. Lungs ctab, no abdominal tenderness.  Pt states she has bleeding on her pads but is unsure if its from her urine or from her vagina/cervical polyps. pt never had bleeding from her polyps in the past.     Concern for UTI vs anemia from possible vaginal bleeding vs other occult infection vs HF/ACS. Will do sepsis workup, get ekg, cxr. pt may need to be admitted to the hospital for further management.

## 2023-02-15 NOTE — H&P ADULT - ASSESSMENT
89y F pmh HTN, HLD, AFIB on xarelto, chronic wounds, lymphedema presenting for eval of generalized weakness, found to be hypotensive, admit for w/u of weakness and UTI       Normal left ventricular systolic function. No segmental  wall motion abnormalities.  Right ventricular enlargement with decreased right  ventricular systolic function.  Severe tricuspid regurgitation.  Severe pulmonary hypertension.  EF (Visual Estimate): 55 %

## 2023-02-15 NOTE — ED PROVIDER NOTE - PROGRESS NOTE DETAILS
Sruthi Stubbs, PGY1, MD: pt with K of 5.5 and new GLENIS, pt does take potassium supplementation, is on lasix for her lymphedema, Last EF 55%, will give gentle fluid hydration and reassess her BMP. discussed her Glenis with patient and her daughter, pt does have a known left renal mass for many years but it likely is unrelated to her her GLENIS. UA pending. Sruthi Stubbs, PGY1, MD: pt with UTI on UA, pt is pansensitive based on prior cx, will start ceftriaxone and admit. Sruthi Stubbs, PGY1, MD: pt with pleural effusions on CXR, stopped fluids, pt received approx 200cc, will get trop and BNP, discussed with admitting team, will hold off on giving more lasix for now. pt not with any increased SOB compared to baseline but does have hx of HF. Discussed all findings with patient and her daughter, they agree with admission

## 2023-02-15 NOTE — H&P ADULT - PROBLEM SELECTOR PLAN 2
Presents for generalized weakness, also found to be hypotensive  - Afebrile, BP low but improved s/p IVF, VS stable otherwise    - Labs: no elevated wbc   - UA infectious, Ucx pending  - ED: 500ml NS bolus & ceftriaxone 1g   - C/w IV ceftriaxone   - F/u Ucx

## 2023-02-15 NOTE — ASSESSMENT
[FreeTextEntry1] : In summary, Mrs. Cordova presents with bilateral lower extremity edema with lymphorrhea, left arm wound\par \par Wound Assessment and Plan:\par \par The patient presents with a wound to the left arm -.  Swelling noted to the extremity.  \par TERRY/PVR and standing venous reflux study scheduled.\par Patient underwent evaluation for peripheral arterial disease using a [employer] diagnostic machine.  Ankle brachial index was obtained using blood pressure cuffs of the ankle and brachial segments of both legs.  A distal pulse volume recording was noted digitally on the device.  The procedure was supervised and interpreted by the physician.  TERRY of the right lower extremity PAD.   TERRY of the left lower extremity PAD.  Waveform noted to be(monophasic).   Patient tolerated procedure well in the office.  Results discussed with the patient. \par s/p excisional debridement of upper leg wound. Patient being followed by Dr. Wilson\par \par 1/25/23\par Bilateral legs weeping, legs edematous, draining moderate - large amounts clear fluid\par going for PVR today\par s/p excisional debridement left forearm of yellow slough\par \par 2/15/23\par Bilateral legs edematous, superficial openings weeping large amounts clear fluid, the current super absorber is not sufficient, also using abdominal pads which do not absorb\par bilateral hand wounds, left hand s/p excisional debridement today\par had ultrasound of bilateral arms, showed moderate arterial occlusive disease\par discussed using mild compression such as a tubie  to both arms and legs

## 2023-02-15 NOTE — PLAN
[FreeTextEntry1] : 2/15/23\par Plan - supplies ordered\par will now use adaptic/drawtex/ming/tubie , change every other day\par follow up 3 weeks

## 2023-02-15 NOTE — ED PROVIDER NOTE - CARE PLAN
Principal Discharge DX:	Weakness   1 Principal Discharge DX:	Weakness  Secondary Diagnosis:	Acute UTI  Secondary Diagnosis:	Pleural effusion

## 2023-02-15 NOTE — H&P ADULT - NSHPPHYSICALEXAM_GEN_ALL_CORE
T(C): 36.3 (02-15-23 @ 17:30), Max: 36.3 (02-15-23 @ 17:30)  HR: 75 (02-15-23 @ 19:40) (75 - 98)  BP: 102/70 (02-15-23 @ 19:40) (98/67 - 102/70)  RR: 18 (02-15-23 @ 19:40) (18 - 20)  SpO2: 99% (02-15-23 @ 19:40) (99% - 99%)    CONSTITUTIONAL: no apparent distress  EYES: PERRLA and symmetric, EOMI  ENMT: MMM.   NECK: Supple, symmetric and without tracheal deviation   RESP: No respiratory distress, CTA b/l, no WRR  CV: +S1S2, RRR, no MRG;  2+ peripheral edema  GI: Soft, NTND, no RGR; no palpable masses  MSK: Normal gait; No digital clubbing or cyanosis; normal pain free ROM x4 extremities   SKIN: chronic wound on b/l LE wrapped, ecchymosis at various stage of healing  NEURO: CN II-XII grossly intact; normal reflexes, sensation intact throughout   PSYCH: Appropriate insight/judgment; A+O x 3, mood and affect appropriate

## 2023-02-15 NOTE — PHYSICAL EXAM
[0] : left 0 [de-identified] : On physical examination the patient is NAD, AAOx3. Neurologically intact. The lungs are clear and the heart has an irregular rate and rhythm. The abdomen is soft, without tenderness or pulsatile mass. Bilateral femoral pulses are palpable. Pedal pulses are non-palpable. Bilateral lower extremity edema is present. There is drainage of lymphatic fluid from both legs. No signs of infection present. Left forearm abrasion with lymphatic fluid.

## 2023-02-15 NOTE — ED PROVIDER NOTE - PHYSICAL EXAMINATION
GENERAL: well appearing in no acute distress, non-toxic appearing  HEAD: normocephalic, atraumatic  HEENT: normal conjunctiva, oral mucosa moist,  CARDIAC: irregular rate and rhythm, no appreciable murmurs, 2+ pulses in UE/LE b/l  PULM: normal breath sounds, clear to ascultation bilaterally, no rales, rhonchi, wheezing  GI: abdomen nondistended, soft, nontender, no guarding, rebound tenderness  :  no suprapubic tenderness pelvic exam chaperoned   NEURO: no focal motor or sensory deficits, CN2-12 intact, normal speech, PERRLA, EOMI, AAOx3  MSK: +2 peripheral edema, no calf tenderness b/l  SKIN: well-perfused, extremities cool from weeping edema with clear drainage, no evidence of erythema.   PSYCH: appropriate mood and affect

## 2023-02-15 NOTE — H&P ADULT - NSHPLABSRESULTS_GEN_ALL_CORE
13.1   8.29  )-----------( 160      ( 15 Feb 2023 20:02 )             40.5       02-15    134<L>  |  100  |  47<H>  ----------------------------<  139<H>  4.8   |  26  |  1.32<H>    Ca    8.4      15 Feb 2023 23:44    TPro  6.1  /  Alb  2.8<L>  /  TBili  0.2  /  DBili  x   /  AST  49<H>  /  ALT  43  /  AlkPhos  199<H>  02-15      EKG personally reviewed:  atrial fib 73 bpm     Images personally reviewed:   < from: Xray Chest 1 View- PORTABLE-Urgent (Xray Chest 1 View- PORTABLE-Urgent .) (02.15.23 @ 20:01) >  IMPRESSION:  Bilateral pleural effusions and hazy bibasilar opacities.      < from: Transthoracic Echocardiogram (08.03.22 @ 07:08) >  Conclusions:  Normal left ventricular systolic function. No segmental  wall motion abnormalities.  Right ventricular enlargement with decreased right  ventricular systolic function.  Severe tricuspid regurgitation.  Severe pulmonary hypertension.  EF (Visual Estimate): 55 %

## 2023-02-15 NOTE — ED ADULT NURSE NOTE - OBJECTIVE STATEMENT
89yF A&Ox3 nonambulatory  presents to the ED c/o b/l weakness x 12 days. pt went to PCP and was found hypotensive and had hematuria. pt last fall was in sept 2022 and was due b/l lower extremity weakness due to UTI. pt denies fever, dysuria, abdominal pain, urinary frequency, CP, sob. pt daughter at bedside. 89yF A&Ox3 nonambulatory presents to the ED c/o b/l weakness x 12 days. pt went to PCP and was found hypotensive and had hematuria. pt last fall was in sept 2022 and was due b/l lower extremity weakness due to UTI. pt denies fever, dysuria, abdominal pain, urinary frequency, CP, sob. pt daughter at bedside. 89yF A&Ox3 nonambulatory presents to the ED c/o b/l weakness x 12 days. PMHX of HTN, HL, AFIB (Xarelto), lymphedema. pt went to PCP today and was found to be hypotensive and had hematuria. pt last fall was in sept 2022 and was due b/l lower extremity weakness due to UTI. pt denies fever, dysuria, abdominal pain, urinary frequency, CP, sob, headache. pt daughter at bedside.

## 2023-02-15 NOTE — ED ADULT NURSE REASSESSMENT NOTE - NS ED NURSE REASSESS COMMENT FT1
straight cath was performed with sterile technique with 200 cc of urine output. two RNs at bedside. pt was repositioned for comfort.

## 2023-02-15 NOTE — H&P ADULT - PROBLEM SELECTOR PLAN 7
- Frail elderly adult w/ multiple co- morbidity    - Lives by herself at home and has HHA 8hr x 7d   - Ambulates w/walker at bL but reports increased difficulty w/ ambulation   - PT consult

## 2023-02-16 DIAGNOSIS — I48.20 CHRONIC ATRIAL FIBRILLATION, UNSPECIFIED: ICD-10-CM

## 2023-02-16 DIAGNOSIS — I50.21 ACUTE SYSTOLIC (CONGESTIVE) HEART FAILURE: ICD-10-CM

## 2023-02-16 DIAGNOSIS — I50.33 ACUTE ON CHRONIC DIASTOLIC (CONGESTIVE) HEART FAILURE: ICD-10-CM

## 2023-02-16 DIAGNOSIS — E78.5 HYPERLIPIDEMIA, UNSPECIFIED: ICD-10-CM

## 2023-02-16 DIAGNOSIS — R54 AGE-RELATED PHYSICAL DEBILITY: ICD-10-CM

## 2023-02-16 DIAGNOSIS — Z29.9 ENCOUNTER FOR PROPHYLACTIC MEASURES, UNSPECIFIED: ICD-10-CM

## 2023-02-16 DIAGNOSIS — N39.0 URINARY TRACT INFECTION, SITE NOT SPECIFIED: ICD-10-CM

## 2023-02-16 DIAGNOSIS — N17.9 ACUTE KIDNEY FAILURE, UNSPECIFIED: ICD-10-CM

## 2023-02-16 DIAGNOSIS — I10 ESSENTIAL (PRIMARY) HYPERTENSION: ICD-10-CM

## 2023-02-16 LAB
ANION GAP SERPL CALC-SCNC: 12 MMOL/L — SIGNIFICANT CHANGE UP (ref 5–17)
ANION GAP SERPL CALC-SCNC: 8 MMOL/L — SIGNIFICANT CHANGE UP (ref 5–17)
BLD GP AB SCN SERPL QL: NEGATIVE — SIGNIFICANT CHANGE UP
BUN SERPL-MCNC: 47 MG/DL — HIGH (ref 7–23)
BUN SERPL-MCNC: 47 MG/DL — HIGH (ref 7–23)
CALCIUM SERPL-MCNC: 8.3 MG/DL — LOW (ref 8.4–10.5)
CALCIUM SERPL-MCNC: 8.4 MG/DL — SIGNIFICANT CHANGE UP (ref 8.4–10.5)
CHLORIDE SERPL-SCNC: 100 MMOL/L — SIGNIFICANT CHANGE UP (ref 96–108)
CHLORIDE SERPL-SCNC: 102 MMOL/L — SIGNIFICANT CHANGE UP (ref 96–108)
CO2 SERPL-SCNC: 22 MMOL/L — SIGNIFICANT CHANGE UP (ref 22–31)
CO2 SERPL-SCNC: 26 MMOL/L — SIGNIFICANT CHANGE UP (ref 22–31)
CREAT SERPL-MCNC: 1.28 MG/DL — SIGNIFICANT CHANGE UP (ref 0.5–1.3)
CREAT SERPL-MCNC: 1.32 MG/DL — HIGH (ref 0.5–1.3)
EGFR: 36 ML/MIN/1.73M2 — LOW
EGFR: 38 ML/MIN/1.73M2 — LOW
GLUCOSE SERPL-MCNC: 139 MG/DL — HIGH (ref 70–99)
GLUCOSE SERPL-MCNC: 92 MG/DL — SIGNIFICANT CHANGE UP (ref 70–99)
HCT VFR BLD CALC: 36.2 % — SIGNIFICANT CHANGE UP (ref 34.5–45)
HCT VFR BLD CALC: 39.2 % — SIGNIFICANT CHANGE UP (ref 34.5–45)
HGB BLD-MCNC: 12 G/DL — SIGNIFICANT CHANGE UP (ref 11.5–15.5)
HGB BLD-MCNC: 12.7 G/DL — SIGNIFICANT CHANGE UP (ref 11.5–15.5)
LACTATE SERPL-SCNC: 1.4 MMOL/L — SIGNIFICANT CHANGE UP (ref 0.5–2)
MAGNESIUM SERPL-MCNC: 2.2 MG/DL — SIGNIFICANT CHANGE UP (ref 1.6–2.6)
MCHC RBC-ENTMCNC: 29.5 PG — SIGNIFICANT CHANGE UP (ref 27–34)
MCHC RBC-ENTMCNC: 29.7 PG — SIGNIFICANT CHANGE UP (ref 27–34)
MCHC RBC-ENTMCNC: 32.4 GM/DL — SIGNIFICANT CHANGE UP (ref 32–36)
MCHC RBC-ENTMCNC: 33.1 GM/DL — SIGNIFICANT CHANGE UP (ref 32–36)
MCV RBC AUTO: 89.6 FL — SIGNIFICANT CHANGE UP (ref 80–100)
MCV RBC AUTO: 91.2 FL — SIGNIFICANT CHANGE UP (ref 80–100)
NRBC # BLD: 0 /100 WBCS — SIGNIFICANT CHANGE UP (ref 0–0)
NRBC # BLD: 0 /100 WBCS — SIGNIFICANT CHANGE UP (ref 0–0)
NT-PROBNP SERPL-SCNC: 3633 PG/ML — HIGH (ref 0–300)
PLATELET # BLD AUTO: 140 K/UL — LOW (ref 150–400)
PLATELET # BLD AUTO: 165 K/UL — SIGNIFICANT CHANGE UP (ref 150–400)
POTASSIUM SERPL-MCNC: 4.4 MMOL/L — SIGNIFICANT CHANGE UP (ref 3.5–5.3)
POTASSIUM SERPL-MCNC: 4.8 MMOL/L — SIGNIFICANT CHANGE UP (ref 3.5–5.3)
POTASSIUM SERPL-SCNC: 4.4 MMOL/L — SIGNIFICANT CHANGE UP (ref 3.5–5.3)
POTASSIUM SERPL-SCNC: 4.8 MMOL/L — SIGNIFICANT CHANGE UP (ref 3.5–5.3)
RBC # BLD: 4.04 M/UL — SIGNIFICANT CHANGE UP (ref 3.8–5.2)
RBC # BLD: 4.3 M/UL — SIGNIFICANT CHANGE UP (ref 3.8–5.2)
RBC # FLD: 21.2 % — HIGH (ref 10.3–14.5)
RBC # FLD: 21.4 % — HIGH (ref 10.3–14.5)
RH IG SCN BLD-IMP: POSITIVE — SIGNIFICANT CHANGE UP
SODIUM SERPL-SCNC: 134 MMOL/L — LOW (ref 135–145)
SODIUM SERPL-SCNC: 136 MMOL/L — SIGNIFICANT CHANGE UP (ref 135–145)
TROPONIN T, HIGH SENSITIVITY RESULT: 46 NG/L — SIGNIFICANT CHANGE UP (ref 0–51)
TSH SERPL-MCNC: 63 UIU/ML — HIGH (ref 0.27–4.2)
WBC # BLD: 6.96 K/UL — SIGNIFICANT CHANGE UP (ref 3.8–10.5)
WBC # BLD: 7.65 K/UL — SIGNIFICANT CHANGE UP (ref 3.8–10.5)
WBC # FLD AUTO: 6.96 K/UL — SIGNIFICANT CHANGE UP (ref 3.8–10.5)
WBC # FLD AUTO: 7.65 K/UL — SIGNIFICANT CHANGE UP (ref 3.8–10.5)

## 2023-02-16 PROCEDURE — 99497 ADVNCD CARE PLAN 30 MIN: CPT

## 2023-02-16 PROCEDURE — 99232 SBSQ HOSP IP/OBS MODERATE 35: CPT

## 2023-02-16 RX ORDER — RIVAROXABAN 15 MG-20MG
20 KIT ORAL DAILY
Refills: 0 | Status: DISCONTINUED | OUTPATIENT
Start: 2023-02-16 | End: 2023-02-16

## 2023-02-16 RX ORDER — SODIUM CHLORIDE 9 MG/ML
250 INJECTION INTRAMUSCULAR; INTRAVENOUS; SUBCUTANEOUS ONCE
Refills: 0 | Status: COMPLETED | OUTPATIENT
Start: 2023-02-16 | End: 2023-02-16

## 2023-02-16 RX ORDER — ATORVASTATIN CALCIUM 80 MG/1
20 TABLET, FILM COATED ORAL AT BEDTIME
Refills: 0 | Status: ACTIVE | OUTPATIENT
Start: 2023-02-16 | End: 2024-01-15

## 2023-02-16 RX ORDER — LANOLIN ALCOHOL/MO/W.PET/CERES
3 CREAM (GRAM) TOPICAL AT BEDTIME
Refills: 0 | Status: ACTIVE | OUTPATIENT
Start: 2023-02-16 | End: 2024-01-15

## 2023-02-16 RX ORDER — ATENOLOL 25 MG/1
25 TABLET ORAL
Refills: 0 | Status: DISCONTINUED | OUTPATIENT
Start: 2023-02-16 | End: 2023-02-17

## 2023-02-16 RX ORDER — FLUTICASONE FUROATE, UMECLIDINIUM BROMIDE AND VILANTEROL TRIFENATATE 200; 62.5; 25 UG/1; UG/1; UG/1
1 POWDER RESPIRATORY (INHALATION)
Qty: 0 | Refills: 0 | DISCHARGE

## 2023-02-16 RX ORDER — LISINOPRIL 2.5 MG/1
20 TABLET ORAL DAILY
Refills: 0 | Status: DISCONTINUED | OUTPATIENT
Start: 2023-02-16 | End: 2023-02-16

## 2023-02-16 RX ORDER — TIOTROPIUM BROMIDE 18 UG/1
2 CAPSULE ORAL; RESPIRATORY (INHALATION) DAILY
Refills: 0 | Status: DISCONTINUED | OUTPATIENT
Start: 2023-02-16 | End: 2023-02-22

## 2023-02-16 RX ORDER — ACETAMINOPHEN 500 MG
650 TABLET ORAL EVERY 6 HOURS
Refills: 0 | Status: ACTIVE | OUTPATIENT
Start: 2023-02-16 | End: 2024-01-15

## 2023-02-16 RX ORDER — FUROSEMIDE 40 MG
1 TABLET ORAL
Qty: 0 | Refills: 0 | DISCHARGE

## 2023-02-16 RX ORDER — BUDESONIDE AND FORMOTEROL FUMARATE DIHYDRATE 160; 4.5 UG/1; UG/1
2 AEROSOL RESPIRATORY (INHALATION)
Refills: 0 | Status: ACTIVE | OUTPATIENT
Start: 2023-02-16 | End: 2024-01-15

## 2023-02-16 RX ORDER — CHOLECALCIFEROL (VITAMIN D3) 125 MCG
1 CAPSULE ORAL
Qty: 0 | Refills: 0 | DISCHARGE

## 2023-02-16 RX ORDER — FUROSEMIDE 40 MG
40 TABLET ORAL ONCE
Refills: 0 | Status: DISCONTINUED | OUTPATIENT
Start: 2023-02-16 | End: 2023-02-16

## 2023-02-16 RX ORDER — ONDANSETRON 8 MG/1
4 TABLET, FILM COATED ORAL EVERY 8 HOURS
Refills: 0 | Status: ACTIVE | OUTPATIENT
Start: 2023-02-16 | End: 2024-01-15

## 2023-02-16 RX ORDER — CEFTRIAXONE 500 MG/1
1000 INJECTION, POWDER, FOR SOLUTION INTRAMUSCULAR; INTRAVENOUS EVERY 24 HOURS
Refills: 0 | Status: COMPLETED | OUTPATIENT
Start: 2023-02-16 | End: 2023-02-19

## 2023-02-16 RX ORDER — SENNA PLUS 8.6 MG/1
2 TABLET ORAL AT BEDTIME
Refills: 0 | Status: ACTIVE | OUTPATIENT
Start: 2023-02-16 | End: 2024-01-15

## 2023-02-16 RX ORDER — OXCARBAZEPINE 300 MG/1
150 TABLET, FILM COATED ORAL
Refills: 0 | Status: ACTIVE | OUTPATIENT
Start: 2023-02-16 | End: 2024-01-15

## 2023-02-16 RX ORDER — LEVOTHYROXINE SODIUM 125 MCG
75 TABLET ORAL DAILY
Refills: 0 | Status: DISCONTINUED | OUTPATIENT
Start: 2023-02-16 | End: 2023-02-18

## 2023-02-16 RX ORDER — MULTIVIT-MIN/FERROUS GLUCONATE 9 MG/15 ML
1 LIQUID (ML) ORAL
Qty: 0 | Refills: 0 | DISCHARGE

## 2023-02-16 RX ORDER — RIVAROXABAN 15 MG-20MG
15 KIT ORAL
Refills: 0 | Status: ACTIVE | OUTPATIENT
Start: 2023-02-16 | End: 2024-01-15

## 2023-02-16 RX ADMIN — ATORVASTATIN CALCIUM 20 MILLIGRAM(S): 80 TABLET, FILM COATED ORAL at 21:05

## 2023-02-16 RX ADMIN — OXCARBAZEPINE 150 MILLIGRAM(S): 300 TABLET, FILM COATED ORAL at 21:04

## 2023-02-16 RX ADMIN — Medication 75 MICROGRAM(S): at 06:17

## 2023-02-16 RX ADMIN — BUDESONIDE AND FORMOTEROL FUMARATE DIHYDRATE 2 PUFF(S): 160; 4.5 AEROSOL RESPIRATORY (INHALATION) at 17:44

## 2023-02-16 RX ADMIN — RIVAROXABAN 15 MILLIGRAM(S): KIT at 17:29

## 2023-02-16 RX ADMIN — SODIUM CHLORIDE 250 MILLILITER(S): 9 INJECTION INTRAMUSCULAR; INTRAVENOUS; SUBCUTANEOUS at 02:43

## 2023-02-16 RX ADMIN — SENNA PLUS 2 TABLET(S): 8.6 TABLET ORAL at 21:05

## 2023-02-16 RX ADMIN — ATENOLOL 25 MILLIGRAM(S): 25 TABLET ORAL at 17:29

## 2023-02-16 RX ADMIN — SODIUM CHLORIDE 125 MILLILITER(S): 9 INJECTION INTRAMUSCULAR; INTRAVENOUS; SUBCUTANEOUS at 07:26

## 2023-02-16 RX ADMIN — OXCARBAZEPINE 150 MILLIGRAM(S): 300 TABLET, FILM COATED ORAL at 06:17

## 2023-02-16 RX ADMIN — TIOTROPIUM BROMIDE 2 PUFF(S): 18 CAPSULE ORAL; RESPIRATORY (INHALATION) at 11:38

## 2023-02-16 RX ADMIN — BUDESONIDE AND FORMOTEROL FUMARATE DIHYDRATE 2 PUFF(S): 160; 4.5 AEROSOL RESPIRATORY (INHALATION) at 06:30

## 2023-02-16 NOTE — PROGRESS NOTE ADULT - NSPROGADDITIONALINFOA_GEN_ALL_CORE
Case d/w ACP on IRD Case d/w ACP on IRDs Case d/w ACP on IRDs    discussed case with patients daughter Beverley via phone this morning

## 2023-02-16 NOTE — GOALS OF CARE CONVERSATION - ADVANCED CARE PLANNING - CONVERSATION DETAILS
Introduced myself to patients daughter, Jaci Woodson. Discussed current condition, and overall favorable prognosis. Given age, discussed goals of care.   Patient at this time is able to make her own decisions.    Patient is DNR and would not wish for prolonged ventilation/Trach  Daughter would like more time to further discuss trial of intubation, and pressors with mother, if these options were to become medically indicated.     HCP established as Jaci Woodson

## 2023-02-16 NOTE — PROGRESS NOTE ADULT - PROBLEM SELECTOR PLAN 7
Frail elderly adult w/ multiple co- morbidity. Lives by herself at home and has HHA 8hr x 7d   - Ambulates w/walker at bL but reports increased difficulty w/ ambulation   - PT consult requested, will follow up Frail elderly adult w/ multiple co- morbidity. Lives by herself at home and has HHA 8hr x 7d   - Ambulates w/walker at bL but reports increased difficulty w/ ambulation   - PT consult requested, will follow up recs

## 2023-02-16 NOTE — PROGRESS NOTE ADULT - PROBLEM SELECTOR PLAN 2
Presents for generalized weakness, also found to be hypotensive. UA positive   - C/w IV ceftriaxone   - F/u Ucx Presents for generalized weakness, also found to be hypotensive. UA positive and now with hematuria.  - C/w IV ceftriaxone   - F/u Ucx  - Harris placed given hematuria with clots

## 2023-02-16 NOTE — PROGRESS NOTE ADULT - PROBLEM SELECTOR PLAN 3
- Cr 1.31 (bL 0.9 -1.07)   - Possible component of acute CHF & UTI  - Gentle IVF   - Trend labs - Cr 1.31 (bL 0.9 -1.07)   - Hold ACEi  - May need diuresis, however will hold given hypotension, pending cardiology recs

## 2023-02-16 NOTE — PHYSICAL THERAPY INITIAL EVALUATION ADULT - PERTINENT HX OF CURRENT PROBLEM, REHAB EVAL
89y F pmh HTN, HLD, AFIB on xarelto, chronic wounds, lymphedema presenting for eval of generalized weakness, found to be hypotensive, admit for w/u of weakness and UTI. 2/15 Chest Xray: Bilateral pleural effusions and hazy bibasilar opacities, possibly atelectasis or pneumonia. 89y F pmh HTN, HLD, AFIB on xarelto, chronic wounds, lymphedema presenting for eval of generalized weakness, found to be hypotensive, admit for w/u of weakness and UTI. 2/15 Chest Xray: Bilateral pleural effusions and hazy bibasilar opacities, possibly atelectasis or pneumonia. DUPLEX BLE (-)

## 2023-02-16 NOTE — PROVIDER CONTACT NOTE (OTHER) - ASSESSMENT
Patient denies pain, dizziness, shortness of breath or lightheadedness. Patient mental status at baseline a/ox4
patient aox3, vital signs stable other than hypothermic. patient cold to the touch. no complaints of pain or chills. LE and UE edema R>L. LE and UE wounds b/l.

## 2023-02-16 NOTE — PHYSICAL THERAPY INITIAL EVALUATION ADULT - ADDITIONAL COMMENTS
Pt resides alone in an apartment, no steps to enter building, +elevator. PTA pt was able to ambulate w/ rollator, has HHA from 9am-5pm, then 8pm-8am to assist w/ ADL's & mobility. Also owns shower chair & has grab bars.

## 2023-02-16 NOTE — PHYSICAL THERAPY INITIAL EVALUATION ADULT - RANGE OF MOTION EXAMINATION, REHAB EVAL
b/l shoulder flexion limited (states this is her baseline)/bilateral upper extremity ROM was WFL (within functional limits)/bilateral lower extremity ROM was WFL (within functional limits)

## 2023-02-16 NOTE — PHYSICAL THERAPY INITIAL EVALUATION ADULT - PLANNED THERAPY INTERVENTIONS, PT EVAL
bed mobility training/gait training/transfer training WC MANAGEMENT/bed mobility training/gait training/transfer training

## 2023-02-16 NOTE — PHYSICAL THERAPY INITIAL EVALUATION ADULT - MODALITIES TREATMENT COMMENTS
pt rochelle  eval well. MLCT requested by  medicine team to assist w/ weeping edema in BLEs, duplex BLE (-). pt rec'd in bed, b/l LE dressings, NAD, daughter at bedside. dressings rec'd C/D/I, no purulence or malodor, +DP bilaterally. no identified ulcerations on LLE, RLE w/ 4 small (.5x.5x.1cm) ulcerations on anterior tibia. wounds w/ scant serous drainage. adaptic layer followed by aquacel and then profore MLCT system applied to BLEs. pt w/ good capillary refill, able to wiggle toes, and sensation intact to BLEs following application. pt and daughter at bedside appreciative. all questions answered. Pt left in bed as found, RN aware, NAD, all lines intact, cb in reach, all needs met/5Ps.

## 2023-02-16 NOTE — PROGRESS NOTE ADULT - SUBJECTIVE AND OBJECTIVE BOX
[facility] Division of Hospital Medicine  Damon Joseph MD  Available via MS Teams  Pager [phone]    SUBJECTIVE / OVERNIGHT EVENTS: Patient seen and examined at bedside. Resting comfortably and in no acute distress. Patient reports improvement in her weakness. Denies chest pain or shortness of breath. Denies abdominal pain, nausea or vomiting. Endorses continued dysuria. Denies syncope or near syncope. ROS otherwise noncontributory.    MEDICATIONS  (STANDING):  atenolol  Tablet 25 milliGRAM(s) Oral two times a day  atorvastatin 20 milliGRAM(s) Oral at bedtime  budesonide  80 MICROgram(s)/formoterol 4.5 MICROgram(s) Inhaler 2 Puff(s) Inhalation two times a day  cefTRIAXone   IVPB 1000 milliGRAM(s) IV Intermittent every 24 hours  levothyroxine 75 MICROGram(s) Oral daily  OXcarbazepine 150 milliGRAM(s) Oral two times a day  rivaroxaban 15 milliGRAM(s) Oral with dinner  senna 2 Tablet(s) Oral at bedtime  tiotropium 2.5 MICROgram(s) Inhaler 2 Puff(s) Inhalation daily    MEDICATIONS  (PRN):  acetaminophen     Tablet .. 650 milliGRAM(s) Oral every 6 hours PRN Temp greater or equal to 38C (100.4F), Mild Pain (1 - 3)  aluminum hydroxide/magnesium hydroxide/simethicone Suspension 30 milliLiter(s) Oral every 4 hours PRN Dyspepsia  melatonin 3 milliGRAM(s) Oral at bedtime PRN Insomnia  ondansetron Injectable 4 milliGRAM(s) IV Push every 8 hours PRN Nausea and/or Vomiting      I&O's Summary      PHYSICAL EXAM:  Vital Signs Last 24 Hrs  T(C): 35.4 (2023 11:47), Max: 36.4 (2023 02:10)  T(F): 95.7 (2023 11:47), Max: 97.6 (2023 02:10)  HR: 85 (2023 11:47) (75 - 98)  BP: 88/63 (2023 11:47) (85/54 - 106/76)  BP(mean): 78 (2023 02:10) (78 - 78)  RR: 19 (2023 11:47) (18 - 20)  SpO2: 97% (2023 11:47) (95% - 100%)    Parameters below as of 2023 11:47  Patient On (Oxygen Delivery Method): room air      CONSTITUTIONAL: NAD, well-groomed  EYES: PERRLA; conjunctiva and sclera clear  ENMT: Moist oral mucosa, no pharyngeal injection or exudates; normal dentition  NECK: Supple, no palpable masses; no thyromegaly  RESPIRATORY: Normal respiratory effort; lungs are clear to auscultation bilaterally  CARDIOVASCULAR: normal S1 and S2, no murmur/rub/gallop; No lower extremity edema  ABDOMEN: Nontender to palpation, normoactive bowel sounds, no rebound/guarding; No hepatosplenomegaly  MUSCULOSKELETAL:  no clubbing or cyanosis of digits; no joint swelling or tenderness to palpation  PSYCH: A+O to person, place, and time; affect appropriate  NEUROLOGY: CN 2-12 are intact and symmetric; no gross sensory deficits   SKIN: No rashes; no palpable lesions    LABS:                        12.0   6.96  )-----------( 140      ( 2023 03:46 )             36.2     02-16    136  |  102  |  47<H>  ----------------------------<  92  4.4   |  22  |  1.28    Ca    8.3<L>      2023 03:46  Mg     2.2     02-16    TPro  6.1  /  Alb  2.8<L>  /  TBili  0.2  /  DBili  x   /  AST  49<H>  /  ALT  43  /  AlkPhos  199<H>  02-15    PT/INR - ( 15 Feb 2023 20:02 )   PT: 13.7 sec;   INR: 1.18 ratio         PTT - ( 15 Feb 2023 20:02 )  PTT:36.5 sec      Urinalysis Basic - ( 15 Feb 2023 21:10 )    Color: Yellow / Appearance: Slightly Turbid / S.017 / pH: x  Gluc: x / Ketone: Negative  / Bili: Negative / Urobili: Negative   Blood: x / Protein: 30 mg/dL / Nitrite: Positive   Leuk Esterase: Large / RBC: 161 /hpf /  /HPF   Sq Epi: x / Non Sq Epi: 0 /hpf / Bacteria: Many        SARS-CoV-2: Jhon (15 Feb 2023 20:48)      COMMUNICATION:  Care Discussed with Consultants/Other Providers and Details of Discussion: Case discussed with ACP  Discussions with Patient/Family: Case discussed with patient, discussed case with patients daughter Beverley via phone, plan to hold ACEi given hypotension [facility] Division of Hospital Medicine  Damon Joseph MD  Available via MS Teams  Pager [phone]    SUBJECTIVE / OVERNIGHT EVENTS: Patient seen and examined at bedside. Resting comfortably and in no acute distress. Patient reports improvement in her weakness. Denies chest pain or shortness of breath. Denies abdominal pain, nausea or vomiting. Endorses continued dysuria. Denies syncope or near syncope. ROS otherwise noncontributory.    MEDICATIONS  (STANDING):  atenolol  Tablet 25 milliGRAM(s) Oral two times a day  atorvastatin 20 milliGRAM(s) Oral at bedtime  budesonide  80 MICROgram(s)/formoterol 4.5 MICROgram(s) Inhaler 2 Puff(s) Inhalation two times a day  cefTRIAXone   IVPB 1000 milliGRAM(s) IV Intermittent every 24 hours  levothyroxine 75 MICROGram(s) Oral daily  OXcarbazepine 150 milliGRAM(s) Oral two times a day  rivaroxaban 15 milliGRAM(s) Oral with dinner  senna 2 Tablet(s) Oral at bedtime  tiotropium 2.5 MICROgram(s) Inhaler 2 Puff(s) Inhalation daily    MEDICATIONS  (PRN):  acetaminophen     Tablet .. 650 milliGRAM(s) Oral every 6 hours PRN Temp greater or equal to 38C (100.4F), Mild Pain (1 - 3)  aluminum hydroxide/magnesium hydroxide/simethicone Suspension 30 milliLiter(s) Oral every 4 hours PRN Dyspepsia  melatonin 3 milliGRAM(s) Oral at bedtime PRN Insomnia  ondansetron Injectable 4 milliGRAM(s) IV Push every 8 hours PRN Nausea and/or Vomiting      I&O's Summary      PHYSICAL EXAM:  Vital Signs Last 24 Hrs  T(C): 35.4 (2023 11:47), Max: 36.4 (2023 02:10)  T(F): 95.7 (2023 11:47), Max: 97.6 (2023 02:10)  HR: 85 (2023 11:47) (75 - 98)  BP: 88/63 (2023 11:47) (85/54 - 106/76)  BP(mean): 78 (2023 02:10) (78 - 78)  RR: 19 (2023 11:47) (18 - 20)  SpO2: 97% (2023 11:47) (95% - 100%)    Parameters below as of 2023 11:47  Patient On (Oxygen Delivery Method): room air      CONSTITUTIONAL: NAD, well-groomed  EYES: PERRLA; conjunctiva and sclera clear  ENMT: Moist oral mucosa, no pharyngeal injection or exudates; normal dentition  NECK: Supple, no palpable masses; no thyromegaly  RESPIRATORY: Normal respiratory effort; lungs are clear to auscultation bilaterally  CARDIOVASCULAR: normal S1 and S2, no murmur/rub/gallop; Significant lower extremity edema, pitting, equal bilaterally  ABDOMEN: Nontender to palpation, normoactive bowel sounds, no rebound/guarding; No hepatosplenomegaly  MUSCULOSKELETAL:  no clubbing or cyanosis of digits; no joint swelling or tenderness to palpation  PSYCH: A+O to person, place, and time; affect appropriate  NEUROLOGY: CN 2-12 are intact and symmetric; no gross sensory deficits   SKIN: No rashes; no palpable lesions    LABS:                        12.0   6.96  )-----------( 140      ( 2023 03:46 )             36.2     02-16    136  |  102  |  47<H>  ----------------------------<  92  4.4   |  22  |  1.28    Ca    8.3<L>      2023 03:46  Mg     2.2     02-16    TPro  6.1  /  Alb  2.8<L>  /  TBili  0.2  /  DBili  x   /  AST  49<H>  /  ALT  43  /  AlkPhos  199<H>  02-15    PT/INR - ( 15 Feb 2023 20:02 )   PT: 13.7 sec;   INR: 1.18 ratio         PTT - ( 15 Feb 2023 20:02 )  PTT:36.5 sec      Urinalysis Basic - ( 15 Feb 2023 21:10 )    Color: Yellow / Appearance: Slightly Turbid / S.017 / pH: x  Gluc: x / Ketone: Negative  / Bili: Negative / Urobili: Negative   Blood: x / Protein: 30 mg/dL / Nitrite: Positive   Leuk Esterase: Large / RBC: 161 /hpf /  /HPF   Sq Epi: x / Non Sq Epi: 0 /hpf / Bacteria: Many        SARS-CoV-2: Jhon (15 Feb 2023 20:48)      COMMUNICATION:  Care Discussed with Consultants/Other Providers and Details of Discussion: Case discussed with ACP  Discussions with Patient/Family: Case discussed with patient, discussed case with patients daughter Beverley via phone, plan to hold ACEi given hypotension

## 2023-02-16 NOTE — PHYSICAL THERAPY INITIAL EVALUATION ADULT - NSPTDISCHREC_GEN_A_CORE
If home, would recommend assistance w/ all ADL's & mobility. Recommend home PT if d/c home./[facility]-acute Rehab

## 2023-02-16 NOTE — PROGRESS NOTE ADULT - PROBLEM SELECTOR PLAN 5
- Atenolol   - Xarelto 15mg qhs, decreased from home dose of 20mg qhs iso MINISTERIO   - Telemetry - Atenolol for rate control   - Xarelto 15mg qhs, decreased from home dose of 20mg qhs iso MINISTERIO

## 2023-02-17 DIAGNOSIS — E03.9 HYPOTHYROIDISM, UNSPECIFIED: ICD-10-CM

## 2023-02-17 DIAGNOSIS — N30.01 ACUTE CYSTITIS WITH HEMATURIA: ICD-10-CM

## 2023-02-17 DIAGNOSIS — R31.9 HEMATURIA, UNSPECIFIED: ICD-10-CM

## 2023-02-17 LAB
ANION GAP SERPL CALC-SCNC: 12 MMOL/L — SIGNIFICANT CHANGE UP (ref 5–17)
BUN SERPL-MCNC: 45 MG/DL — HIGH (ref 7–23)
CALCIUM SERPL-MCNC: 8.5 MG/DL — SIGNIFICANT CHANGE UP (ref 8.4–10.5)
CHLORIDE SERPL-SCNC: 103 MMOL/L — SIGNIFICANT CHANGE UP (ref 96–108)
CO2 SERPL-SCNC: 21 MMOL/L — LOW (ref 22–31)
CREAT SERPL-MCNC: 1.22 MG/DL — SIGNIFICANT CHANGE UP (ref 0.5–1.3)
EGFR: 40 ML/MIN/1.73M2 — LOW
GLUCOSE SERPL-MCNC: 91 MG/DL — SIGNIFICANT CHANGE UP (ref 70–99)
MAGNESIUM SERPL-MCNC: 2.2 MG/DL — SIGNIFICANT CHANGE UP (ref 1.6–2.6)
POTASSIUM SERPL-MCNC: 4.9 MMOL/L — SIGNIFICANT CHANGE UP (ref 3.5–5.3)
POTASSIUM SERPL-SCNC: 4.9 MMOL/L — SIGNIFICANT CHANGE UP (ref 3.5–5.3)
SODIUM SERPL-SCNC: 136 MMOL/L — SIGNIFICANT CHANGE UP (ref 135–145)
T3 SERPL-MCNC: 34 NG/DL — LOW (ref 80–200)
T4 AB SER-ACNC: 2.4 UG/DL — LOW (ref 4.6–12)
T4 FREE SERPL-MCNC: 0.5 NG/DL — LOW (ref 0.9–1.8)
TSH SERPL-MCNC: 53.5 UIU/ML — HIGH (ref 0.27–4.2)

## 2023-02-17 PROCEDURE — 99232 SBSQ HOSP IP/OBS MODERATE 35: CPT

## 2023-02-17 PROCEDURE — 99222 1ST HOSP IP/OBS MODERATE 55: CPT

## 2023-02-17 RX ADMIN — SENNA PLUS 2 TABLET(S): 8.6 TABLET ORAL at 22:23

## 2023-02-17 RX ADMIN — BUDESONIDE AND FORMOTEROL FUMARATE DIHYDRATE 2 PUFF(S): 160; 4.5 AEROSOL RESPIRATORY (INHALATION) at 06:22

## 2023-02-17 RX ADMIN — BUDESONIDE AND FORMOTEROL FUMARATE DIHYDRATE 2 PUFF(S): 160; 4.5 AEROSOL RESPIRATORY (INHALATION) at 17:29

## 2023-02-17 RX ADMIN — RIVAROXABAN 15 MILLIGRAM(S): KIT at 17:28

## 2023-02-17 RX ADMIN — OXCARBAZEPINE 150 MILLIGRAM(S): 300 TABLET, FILM COATED ORAL at 17:28

## 2023-02-17 RX ADMIN — CEFTRIAXONE 100 MILLIGRAM(S): 500 INJECTION, POWDER, FOR SOLUTION INTRAMUSCULAR; INTRAVENOUS at 01:04

## 2023-02-17 RX ADMIN — OXCARBAZEPINE 150 MILLIGRAM(S): 300 TABLET, FILM COATED ORAL at 06:27

## 2023-02-17 RX ADMIN — Medication 75 MICROGRAM(S): at 06:28

## 2023-02-17 RX ADMIN — ATORVASTATIN CALCIUM 20 MILLIGRAM(S): 80 TABLET, FILM COATED ORAL at 22:23

## 2023-02-17 RX ADMIN — TIOTROPIUM BROMIDE 2 PUFF(S): 18 CAPSULE ORAL; RESPIRATORY (INHALATION) at 17:28

## 2023-02-17 NOTE — PROGRESS NOTE ADULT - PROBLEM SELECTOR PLAN 5
- c/w xarelto 15mg qhs, decreased from home dose of 20mg qhs iso MINISTERIO  - takes atenolol 25mg BID for rate control but given hypotension, will hold  - if BP improves/allows tomorrow, would start on low dose metoprolol for rate control as affect blood pressure less than atenolol

## 2023-02-17 NOTE — PROGRESS NOTE ADULT - SUBJECTIVE AND OBJECTIVE BOX
Kateryna Mays MD  Division of Hospital Medicine  [facility]   Available on Microsoft Teams (Mon-Fri 8am-5pm)    * messages preferred prior to calls  Other Times:  [phone]      Patient is a 89y old  Female who presents with a chief complaint of weakness, hypotension (2023 18:04)      SUBJECTIVE / OVERNIGHT EVENTS: no acute events overnight. no fever, chills, chest pain, abd pain, n/v. hematuria improved. has chronic SOB - at baseline.  ADDITIONAL REVIEW OF SYSTEMS:    MEDICATIONS  (STANDING):  atorvastatin 20 milliGRAM(s) Oral at bedtime  budesonide  80 MICROgram(s)/formoterol 4.5 MICROgram(s) Inhaler 2 Puff(s) Inhalation two times a day  cefTRIAXone   IVPB 1000 milliGRAM(s) IV Intermittent every 24 hours  levothyroxine 75 MICROGram(s) Oral daily  OXcarbazepine 150 milliGRAM(s) Oral two times a day  rivaroxaban 15 milliGRAM(s) Oral with dinner  senna 2 Tablet(s) Oral at bedtime  tiotropium 2.5 MICROgram(s) Inhaler 2 Puff(s) Inhalation daily    MEDICATIONS  (PRN):  acetaminophen     Tablet .. 650 milliGRAM(s) Oral every 6 hours PRN Temp greater or equal to 38C (100.4F), Mild Pain (1 - 3)  aluminum hydroxide/magnesium hydroxide/simethicone Suspension 30 milliLiter(s) Oral every 4 hours PRN Dyspepsia  melatonin 3 milliGRAM(s) Oral at bedtime PRN Insomnia  ondansetron Injectable 4 milliGRAM(s) IV Push every 8 hours PRN Nausea and/or Vomiting      CAPILLARY BLOOD GLUCOSE        I&O's Summary    2023 07:01  -  2023 07:00  --------------------------------------------------------  IN: 0 mL / OUT: 500 mL / NET: -500 mL        PHYSICAL EXAM:  Vital Signs Last 24 Hrs  T(C): 36.6 (2023 11:49), Max: 36.6 (2023 05:08)  T(F): 97.8 (2023 11:49), Max: 97.9 (2023 05:08)  HR: 86 (2023 11:49) (71 - 86)  BP: 96/65 (2023 11:49) (91/61 - 103/71)  BP(mean): --  RR: 16 (2023 11:49) (16 - 18)  SpO2: 96% (2023 11:49) (94% - 99%)    Parameters below as of 2023 11:49  Patient On (Oxygen Delivery Method): room air    CONSTITUTIONAL: pleasant elderly female in NAD  EYES: PERRLA; conjunctiva and sclera clear  ENMT: Moist oral mucosa, no pharyngeal injection or exudates; normal dentition  NECK: Supple, no palpable masses; no thyromegaly  RESPIRATORY: Normal respiratory effort; lungs are clear to auscultation bilaterally  CARDIOVASCULAR: Regular rate and rhythm, normal S1 and S2, no murmur/rub/gallop; 1+ pitting upper+lower extremity edema b/l; Peripheral pulses are 2+ bilaterally  ABDOMEN: Soft, Nondistended, Nontender to palpation, normoactive bowel sounds  GENITOURINARY: +payne catheter with concentrated urine with swirls/tinges of blood still  MUSCULOSKELETAL:  No clubbing or cyanosis of digits; no joint swelling or tenderness to palpation  PSYCH: A+O to person, place, and season (states  when asked year); affect appropriate  NEUROLOGY: CN 2-12 are intact and symmetric; no gross sensory deficits   SKIN: No rashes; no palpable lesions, b/l LE and L forearm with covered wounds with dressing c/d/i, venous stasis changes in all extremities      LABS:                        12.7   7.65  )-----------( 165      ( 2023 16:06 )             39.2     02-17    136  |  103  |  45<H>  ----------------------------<  91  4.9   |  21<L>  |  1.22    Ca    8.5      2023 00:40  Mg     2.2     02-    TPro  6.1  /  Alb  2.8<L>  /  TBili  0.2  /  DBili  x   /  AST  49<H>  /  ALT  43  /  AlkPhos  199<H>  02-15    PT/INR - ( 15 Feb 2023 20:02 )   PT: 13.7 sec;   INR: 1.18 ratio         PTT - ( 15 Feb 2023 20:02 )  PTT:36.5 sec      Urinalysis Basic - ( 15 Feb 2023 21:10 )    Color: Yellow / Appearance: Slightly Turbid / S.017 / pH: x  Gluc: x / Ketone: Negative  / Bili: Negative / Urobili: Negative   Blood: x / Protein: 30 mg/dL / Nitrite: Positive   Leuk Esterase: Large / RBC: 161 /hpf /  /HPF   Sq Epi: x / Non Sq Epi: 0 /hpf / Bacteria: Many        Culture - Urine (collected 15 Feb 2023 20:48)  Source: Clean Catch Clean Catch (Midstream)  Preliminary Report (2023 01:46):    >100,000 CFU/ml Escherichia coli      RADIOLOGY & ADDITIONAL TESTS:  Results Reviewed: urine cx growing E. coli, sensitivities pending  Imaging Personally Reviewed:  Electrocardiogram Personally Reviewed:    COORDINATION OF CARE:  Care Discussed with Consultants/Other Providers [Y]: medicine NP Gabrielle  Prior or Outpatient Records Reviewed [Y/N]:

## 2023-02-17 NOTE — PROGRESS NOTE ADULT - PROBLEM SELECTOR PLAN 3
- Cr 1.31 on admission (baseline 0.9 -1.07)   - improving  - hold lasix. encouraged her to liberalized fluid intake for hydration  - continue to hold ACEi  - avoid hypotension  - renally dose meds to GFR, avoid nephrotoxic agents

## 2023-02-17 NOTE — CONSULT NOTE ADULT - CONSULT REASON
bilateral lower leg weeping and Right wrist, left forearm wounds
hypotension evaluate cardiac status

## 2023-02-17 NOTE — CONSULT NOTE ADULT - SUBJECTIVE AND OBJECTIVE BOX
Wound Surgery Consult Note:    HPI:  89y F pmh HTN, HLD, AFIB on xarelto, chronic wounds, lymphedema presenting for eval of 10 days of generalized weakness, found to be hypotensive in the 80s systolic with blood in her urine at her PCP's office today. Per chart review, daughter reports that the last time the patient was weak like this she had a UTI.  Pt states she does not have strength to walk.  Denies dysuria, urinary frequency, fever, chills, new sob, cp, abdominal pain, n/v, diarrhea, headache, slurred speech, asymmetric weakness.   Ambulates w/walker  Has HHA 8hr x 7d  (15 Feb 2023 23:27)    Request for wound care consult for bilateral upper and lower extremity skin injuries received from primary team. Ms. Cordova was encountered on an alternating air with low air loss surface with her daughter at her bedside. She very [facility] which whom I discussed management of Ms. Cordova's management. She stated that her BLE weep serous flluid usually. TERRY were tested by Dr. Barr and were + PAD but may be compressed. She reported some mild discomfort in the BUE wounds. She stated that her BUE also swell and that her skin is "paper thin" and prone to getting injured by sharp edges, trauma.    PAST MEDICAL & SURGICAL HISTORY:  HTN (hypertension)  Hyperlipemia  GERD (gastroesophageal reflux disease)  Hypothyroidism  TIA (transient ischemic attack)  CVA (cerebrovascular accident)  Afib, on pradaxa  Status post left hip replacement  H/O repair of left rotator cuff  S/P laminectomy  Bilateral cataracts    REVIEW OF SYSTEMS:    Constitutional:     [x ] negative [ ] fevers [ ] chills [ ] weight loss [ ] weight gain  HEENT:                  [s ] negative [ ] dry eyes [ ] eye irritation [ ] postnasal drip [ ] nasal congestion   CV:                         [s ] negative  [ ] chest pain [ ] orthopnea [ ] palpitations [ ] tachycardia  Resp:                     [s ] negative [ x] cough [ ] shortness of breath [ ] dyspnea [ ] wheezing [ ] sputum [ ] hemoptysis  GI:                          [ ] negative [ ] nausea [ ] vomiting [ ] diarrhea [ ] constipation [ ] abd pain [ ] dysphagia [x ] incontinent of bowel  :                        [ ] negative [x ] dysuria [ ] nocturia [ ] hematuria [ ] increased urinary frequency [ x] incontinent of urine  Musculoskeletal:     [ ] negative [ ] back pain [ ] myalgias [ ] arthralgias [ ] fracture [ x] non -ambulatory at the moment  Skin:                       [ ] negative [ ] rash [ x] itch [x ] wound [ ] skin discoloration  Neurological:        [ ] negative [ ] headache [ ] dizziness [ ] syncope [x ] weakness [ ] numbness  Psychiatric:           [x ] negative [ ] anxiety [ ] depression  Endocrine:            [ ] negative [ ] diabetes [ x] thyroid problem  Heme/Lymph:      [x ] negative [ ] anemia [ ] bleeding problem  Allergic/Immune: [x ] negative [ ] itchy eyes [ ] nasal discharge [ ] hives [ ] angioedema    [x ] All other systems negative    MEDICATIONS  (STANDING):  atorvastatin 20 milliGRAM(s) Oral at bedtime  budesonide  80 MICROgram(s)/formoterol 4.5 MICROgram(s) Inhaler 2 Puff(s) Inhalation two times a day  cefTRIAXone   IVPB 1000 milliGRAM(s) IV Intermittent every 24 hours  levothyroxine 75 MICROGram(s) Oral daily  OXcarbazepine 150 milliGRAM(s) Oral two times a day  rivaroxaban 15 milliGRAM(s) Oral with dinner  senna 2 Tablet(s) Oral at bedtime  tiotropium 2.5 MICROgram(s) Inhaler 2 Puff(s) Inhalation daily    MEDICATIONS  (PRN):  acetaminophen     Tablet .. 650 milliGRAM(s) Oral every 6 hours PRN Temp greater or equal to 38C (100.4F), Mild Pain (1 - 3)  aluminum hydroxide/magnesium hydroxide/simethicone Suspension 30 milliLiter(s) Oral every 4 hours PRN Dyspepsia  melatonin 3 milliGRAM(s) Oral at bedtime PRN Insomnia  ondansetron Injectable 4 milliGRAM(s) IV Push every 8 hours PRN Nausea and/or Vomiting    Allergies    No Known Allergies    Intolerances    SOCIAL HISTORY:  ;  Denies smoking, ETOH, drugs    FAMILY HISTORY: no pertinent history among 1st degree relatives    Vital Signs Last 24 Hrs  T(C): 36.6 (2023 11:49), Max: 36.6 (2023 05:08)  T(F): 97.8 (2023 11:49), Max: 97.9 (2023 05:08)  HR: 86 (2023 11:49) (71 - 86)  BP: 96/65 (2023 11:49) (91/61 - 103/71)  BP(mean): --  RR: 16 (2023 11:49) (16 - 18)  SpO2: 96% (2023 11:49) (94% - 99%)    Parameters below as of 2023 11:49  Patient On (Oxygen Delivery Method): room air    Physical Exam:  General: Alert, WN  Ophthamology: sclera clear  ENMT: moist mucous membranes, trachea midline  Respiratory: equal chest rise with respirations  Gastrointestinal: soft NT/ND  Neurology: verbal,  following commands  Psych: calm, appropriate  Musculoskeletal: no contractures  Vascular: BLE, BUE edema equal  Skin:  Right dorsal wrist wound L 1cm X W 1cm x D 0.1cm with white fibrinous wound bed, and scant serosanguinous drainage, periwound unremarkable  Left forearm wound - L 1cm X W 1cm x D 0.1cm with white fibrinous wound bed, and scant serosanguinous drainage, periwound unremarkable  No odor, fluctuance, induration, increased warmth, TTP, erythema    LABS:      136  |  103  |  45<H>  ----------------------------<  91  4.9   |  21<L>  |  1.22    Ca    8.5      2023 00:40  Mg     2.2         TPro  6.1  /  Alb  2.8<L>  /  TBili  0.2  /  DBili  x   /  AST  49<H>  /  ALT  43  /  AlkPhos  199<H>  02-15                          12.7   7.65  )-----------( 165      ( 2023 16:06 )             39.2     PT/INR - ( 15 Feb 2023 20:02 )   PT: 13.7 sec;   INR: 1.18 ratio         PTT - ( 15 Feb 2023 20:02 )  PTT:36.5 sec  Urinalysis Basic - ( 15 Feb 2023 21:10 )    Color: Yellow / Appearance: Slightly Turbid / S.017 / pH: x  Gluc: x / Ketone: Negative  / Bili: Negative / Urobili: Negative   Blood: x / Protein: 30 mg/dL / Nitrite: Positive   Leuk Esterase: Large / RBC: 161 /hpf /  /HPF   Sq Epi: x / Non Sq Epi: 0 /hpf / Bacteria: Many        RADIOLOGY & ADDITIONAL STUDIES:  Cultures:    2.15.23 - urine Culture >100,000 E coli    
89Year old woman well known to me admitted with weakness hypotension and bladder infection.     HPI:  89y F pmh HTN, HLD, AFIB on xarelto, chronic wounds, lymphedema presenting for eval of 10 days of generalized weakness, found to be hypotensive in the 80s systolic with blood in her urine at her PCP's office today. Per chart review, daughter reports that the last time the patient was weak like this she had a UTI.  Pt states she does not have strength to walk.  Denies dysuria, urinary frequency, fever, chills, new sob, cp, abdominal pain, n/v, diarrhea, headache, slurred speech, asymmetric weakness.    has known normal LV fucntion sevee pulmonary hypertension chronic ulcerations and edema of lower extremities  now with bladder clot low BP but denies sob or cp  hx of afib on xarelto  MEDICATIONS:  MEDICATIONS  (STANDING):  atenolol  Tablet 25 milliGRAM(s) Oral two times a day  atorvastatin 20 milliGRAM(s) Oral at bedtime  budesonide  80 MICROgram(s)/formoterol 4.5 MICROgram(s) Inhaler 2 Puff(s) Inhalation two times a day  cefTRIAXone   IVPB 1000 milliGRAM(s) IV Intermittent every 24 hours  levothyroxine 75 MICROGram(s) Oral daily  OXcarbazepine 150 milliGRAM(s) Oral two times a day  rivaroxaban 15 milliGRAM(s) Oral with dinner  senna 2 Tablet(s) Oral at bedtime  tiotropium 2.5 MICROgram(s) Inhaler 2 Puff(s) Inhalation daily      PHYSICAL EXAM:  T(C): 36.3 (02-16-23 @ 17:59), Max: 36.4 (02-16-23 @ 02:10)  HR: 79 (02-16-23 @ 17:59) (75 - 85)  BP: 103/71 (02-16-23 @ 17:59) (85/54 - 106/76)  RR: 18 (02-16-23 @ 17:59) (16 - 20)  SpO2: 96% (02-16-23 @ 17:59) (95% - 100%)  Wt(kg): --  I&O's Summary        Appearance: fully intact NAD BP improved warm well perfused 	  HEENT:   Normal oral mucosa, PERRL, EOMI	  Cardiovascular: Normal S1 S2, No JVD, No murmurs ,  Respiratory: Lungs clear to auscultation, normal effort 	  Gastrointestinal:  Soft, Non-tender, + BS	  Psychiatry:  Mood & affect appropriate  Ext:  legs wrapped mildy edematous        LABS:    CARDIAC MARKERS:                                12.7   7.65  )-----------( 165      ( 16 Feb 2023 16:06 )             39.2     02-16    136  |  102  |  47<H>  ----------------------------<  92  4.4   |  22  |  1.28    Ca    8.3<L>      16 Feb 2023 03:46  Mg     2.2     02-16    TPro  6.1  /  Alb  2.8<L>  /  TBili  0.2  /  DBili  x   /  AST  49<H>  /  ALT  43  /  AlkPhos  199<H>  02-15    proBNP: Serum Pro-Brain Natriuretic Peptide: 3633 pg/mL (02-15 @ 23:44)    Lipid Profile:   HgA1c:   TSH: Thyroid Stimulating Hormone, Serum: 63.00 uIU/mL (02-15 @ 20:02)            TELEMETRY: 	    ECG:  afib	  RADIOLOGY:   DIAGNOSTIC TESTING:  [ ] Echocardiogram:  [ ]  Catheterization:  [ ] Stress Test:    OTHER:

## 2023-02-17 NOTE — CHART NOTE - NSCHARTNOTEFT_GEN_A_CORE
UROLOGY EVENT NOTE    Patient seen and examined at bedside. Called for gross hematuria.   Pt admitted with UTI, initially with pink urine per daughter, but became red today with clots requiring irrigation. Pt is known to Dr. Flaherty (urology) seen in fall for gross hematuria at which point cystoscopy performed - no bladder lesions per daughter, also with possible renal mass.       T(C): 36.1 (02-16-23 @ 14:00), Max: 36.4 (02-16-23 @ 02:10)  HR: 80 (02-16-23 @ 13:31) (75 - 98)  BP: 100/69 (02-16-23 @ 13:31) (85/54 - 106/76)  RR: 17 (02-16-23 @ 13:31) (17 - 20)  SpO2: 99% (02-16-23 @ 13:31) (95% - 100%)    Gen: NAD  Abdomen: Soft NT ND  Back: No CVAT  : +payne with red urine with clots in tubing. irrigated with NS, about 25cc of clots aspirated and then urine cleared to a very light pink. reassessed after about 20 minutes and urine still a very light pink color, no more clots.       A/P: 89yo female admitted with UTI, on xarelto for afib, uro consult called for gross hematuria  - payne irrigated and cleared of clot, now draining very light pink  - no need for CBI at this time  - RN may irrigate payne as needed  - increase hydration if possible (pt has CHF)  - please call private urologist for formal consult (Dr. Danyel Flaherty)  - cont abx  - f/u urine cx  - consider cardio consult about holding xarelto if hematuria is to recur or worsen
Spoke with urology Dr. Flaherty aware of UTI, hematuria and Harris, s/p bladder irrigation.   Recommended to treat UTI, may do TOV once hematuria resolves  and follow up as outpatient. no need for inpatient evaluation.     Gabrielle Mays NP-C   #[id number]

## 2023-02-17 NOTE — PROGRESS NOTE ADULT - PROBLEM SELECTOR PLAN 7
Frail elderly adult w/ multiple co- morbidity. Lives by herself at home and has HHA 8hr x 7d   - Ambulates w/walker at baseline but reports increased difficulty w/ ambulation   - PT consult requested, will follow up joy takes levothyroxine 75mcg daily at home  - TSH elevated on admission and again on repeat 53.5  - however free T4 never checked and could not be added on today  - check full TFTs with free T4 in AM  - c/w levothyroxine 75mcg daily for now - dose may need to be adjusted pending free T4

## 2023-02-17 NOTE — PROGRESS NOTE ADULT - PROBLEM SELECTOR PLAN 2
Presents for generalized weakness, also found to be hypotensive. Patient reportedly with history of hypotension, though normally to 90s. Patient reports no syncope, palpitations or vertigo. EKG with rate controlled AF.   - per daughter bedside on 2/17, her upper and lower extremity swelling is chronic and currently actually better than baseline  - continue to hold lasix  - continue to hold ACEi given hypotension/marginal BP  - I&O, daily weights  - Reviewed echo from Aug 2022: Norm LVSF, decreased RVSF, Severe TR & Pulm HTN  - f/u TTE though if clinically continues to do well, can defer to outpatient  - Her Cardiologist Dr. Cm following, recs appreciated

## 2023-02-17 NOTE — PROGRESS NOTE ADULT - PROBLEM SELECTOR PLAN 8
- DVT ppx: on Xarelto   - Diet: DASH  - PT consult   - Wound care Frail elderly adult w/ multiple co- morbidity. Lives by herself at home and has HHA 8hr x 7d   - Ambulates w/walker at baseline but reports increased difficulty w/ ambulation   - PT consult requested, will follow up joy

## 2023-02-17 NOTE — CONSULT NOTE ADULT - ASSESSMENT
Impression:    RIght dorsal wrist wound  Left forearm wound  BLE weeping  BLE and BUE edema  Venous Stasis   PAD, PVD    Recommend:  1.) topical therapy: BUE wounds - cleanse with NS, pat dry, apply adaptic, cover with allevyn foam dressing every 3 days  Bilateral lower legs - cleanse with NS, pat dry, apply aquacel, cover with incontinence pads, secure with spandage daily until compression applied  2.) Wound PT for multiple layer compression wraps  3.) BLE and BUE elevation  4.) ffload heels/feet with complete cair air fluidized boots/pillows; ensure that the soles of the feet are not resting on the foot board of the bed.  5.) Nutrition optimization - Please add Walter  6.) Please order BLE dopplers to evaluate for DVT. must be negative for compression wraps to be applied.    Care as per medicine. Will follow periodically during admisison  Upon discharge [facility] [address] [place] [address] [phone]  Discussed with Dr. Barr and discussed with clinical nurse  Thank you for this consult  Pushpa Olivera, AKIKO-C, [id number] [id number]
This is manohar jones 89 year old with pulmonary hypertension, chronic edema chronic afib probable diastolic dysfunction recurrent UTI now with weakness and some hypotension probably related to bladder infectiona dn blood clot that has cleared    Presently she is hemodynamically stable    Recommend  antibiotics as per medicine urology  hold lasix  physical therapy  hopefully early discharge

## 2023-02-17 NOTE — PROGRESS NOTE ADULT - NSPROGADDITIONALINFOA_GEN_ALL_CORE
.  Kateryna Mays MD  Division of Hospital Medicine  [facility]   Available on [employer] Teams - messages preferred prior to calls.    Urine cx sensitivities and PT eval pending.  TOV tomorrow if hematuria resolved.  Anticipate possible weekend discharge pending PT recs and improvement in hypotension.    Plan discussed with patient, daughter bedside, and medicine NP Gabrielle.

## 2023-02-18 LAB
-  AMIKACIN: SIGNIFICANT CHANGE UP
-  AMOXICILLIN/CLAVULANIC ACID: SIGNIFICANT CHANGE UP
-  AMPICILLIN/SULBACTAM: SIGNIFICANT CHANGE UP
-  AMPICILLIN: SIGNIFICANT CHANGE UP
-  AZTREONAM: SIGNIFICANT CHANGE UP
-  CEFAZOLIN: SIGNIFICANT CHANGE UP
-  CEFEPIME: SIGNIFICANT CHANGE UP
-  CEFOXITIN: SIGNIFICANT CHANGE UP
-  CEFTRIAXONE: SIGNIFICANT CHANGE UP
-  CEFUROXIME: SIGNIFICANT CHANGE UP
-  CIPROFLOXACIN: SIGNIFICANT CHANGE UP
-  ERTAPENEM: SIGNIFICANT CHANGE UP
-  GENTAMICIN: SIGNIFICANT CHANGE UP
-  IMIPENEM: SIGNIFICANT CHANGE UP
-  LEVOFLOXACIN: SIGNIFICANT CHANGE UP
-  MEROPENEM: SIGNIFICANT CHANGE UP
-  NITROFURANTOIN: SIGNIFICANT CHANGE UP
-  PIPERACILLIN/TAZOBACTAM: SIGNIFICANT CHANGE UP
-  TOBRAMYCIN: SIGNIFICANT CHANGE UP
-  TRIMETHOPRIM/SULFAMETHOXAZOLE: SIGNIFICANT CHANGE UP
ANION GAP SERPL CALC-SCNC: 13 MMOL/L — SIGNIFICANT CHANGE UP (ref 5–17)
BUN SERPL-MCNC: 42 MG/DL — HIGH (ref 7–23)
CALCIUM SERPL-MCNC: 9 MG/DL — SIGNIFICANT CHANGE UP (ref 8.4–10.5)
CHLORIDE SERPL-SCNC: 102 MMOL/L — SIGNIFICANT CHANGE UP (ref 96–108)
CO2 SERPL-SCNC: 22 MMOL/L — SIGNIFICANT CHANGE UP (ref 22–31)
CREAT SERPL-MCNC: 1.19 MG/DL — SIGNIFICANT CHANGE UP (ref 0.5–1.3)
CULTURE RESULTS: SIGNIFICANT CHANGE UP
EGFR: 41 ML/MIN/1.73M2 — LOW
GLUCOSE SERPL-MCNC: 83 MG/DL — SIGNIFICANT CHANGE UP (ref 70–99)
HCT VFR BLD CALC: 37.9 % — SIGNIFICANT CHANGE UP (ref 34.5–45)
HGB BLD-MCNC: 12.3 G/DL — SIGNIFICANT CHANGE UP (ref 11.5–15.5)
MAGNESIUM SERPL-MCNC: 2.4 MG/DL — SIGNIFICANT CHANGE UP (ref 1.6–2.6)
MCHC RBC-ENTMCNC: 29.4 PG — SIGNIFICANT CHANGE UP (ref 27–34)
MCHC RBC-ENTMCNC: 32.5 GM/DL — SIGNIFICANT CHANGE UP (ref 32–36)
MCV RBC AUTO: 90.7 FL — SIGNIFICANT CHANGE UP (ref 80–100)
METHOD TYPE: SIGNIFICANT CHANGE UP
NRBC # BLD: 0 /100 WBCS — SIGNIFICANT CHANGE UP (ref 0–0)
ORGANISM # SPEC MICROSCOPIC CNT: SIGNIFICANT CHANGE UP
ORGANISM # SPEC MICROSCOPIC CNT: SIGNIFICANT CHANGE UP
PHOSPHATE SERPL-MCNC: 2.9 MG/DL — SIGNIFICANT CHANGE UP (ref 2.5–4.5)
PLATELET # BLD AUTO: 166 K/UL — SIGNIFICANT CHANGE UP (ref 150–400)
POTASSIUM SERPL-MCNC: 5.6 MMOL/L — HIGH (ref 3.5–5.3)
POTASSIUM SERPL-SCNC: 5.6 MMOL/L — HIGH (ref 3.5–5.3)
RBC # BLD: 4.18 M/UL — SIGNIFICANT CHANGE UP (ref 3.8–5.2)
RBC # FLD: 21.7 % — HIGH (ref 10.3–14.5)
SODIUM SERPL-SCNC: 137 MMOL/L — SIGNIFICANT CHANGE UP (ref 135–145)
SPECIMEN SOURCE: SIGNIFICANT CHANGE UP
T3 SERPL-MCNC: 42 NG/DL — LOW (ref 80–200)
T4 AB SER-ACNC: 3 UG/DL — LOW (ref 4.6–12)
T4 FREE SERPL-MCNC: 0.6 NG/DL — LOW (ref 0.9–1.8)
TSH SERPL-MCNC: 57.4 UIU/ML — HIGH (ref 0.27–4.2)
WBC # BLD: 7.75 K/UL — SIGNIFICANT CHANGE UP (ref 3.8–10.5)
WBC # FLD AUTO: 7.75 K/UL — SIGNIFICANT CHANGE UP (ref 3.8–10.5)

## 2023-02-18 PROCEDURE — 93970 EXTREMITY STUDY: CPT | Mod: 26

## 2023-02-18 PROCEDURE — 93306 TTE W/DOPPLER COMPLETE: CPT | Mod: 26

## 2023-02-18 PROCEDURE — 99232 SBSQ HOSP IP/OBS MODERATE 35: CPT

## 2023-02-18 RX ORDER — LEVOTHYROXINE SODIUM 125 MCG
100 TABLET ORAL DAILY
Refills: 0 | Status: ACTIVE | OUTPATIENT
Start: 2023-02-19 | End: 2024-01-18

## 2023-02-18 RX ORDER — SODIUM ZIRCONIUM CYCLOSILICATE 10 G/10G
10 POWDER, FOR SUSPENSION ORAL DAILY
Refills: 0 | Status: DISCONTINUED | OUTPATIENT
Start: 2023-02-18 | End: 2023-02-23

## 2023-02-18 RX ADMIN — OXCARBAZEPINE 150 MILLIGRAM(S): 300 TABLET, FILM COATED ORAL at 17:38

## 2023-02-18 RX ADMIN — CEFTRIAXONE 100 MILLIGRAM(S): 500 INJECTION, POWDER, FOR SOLUTION INTRAMUSCULAR; INTRAVENOUS at 01:24

## 2023-02-18 RX ADMIN — ATORVASTATIN CALCIUM 20 MILLIGRAM(S): 80 TABLET, FILM COATED ORAL at 21:45

## 2023-02-18 RX ADMIN — Medication 75 MICROGRAM(S): at 05:50

## 2023-02-18 RX ADMIN — RIVAROXABAN 15 MILLIGRAM(S): KIT at 17:38

## 2023-02-18 RX ADMIN — BUDESONIDE AND FORMOTEROL FUMARATE DIHYDRATE 2 PUFF(S): 160; 4.5 AEROSOL RESPIRATORY (INHALATION) at 05:50

## 2023-02-18 RX ADMIN — OXCARBAZEPINE 150 MILLIGRAM(S): 300 TABLET, FILM COATED ORAL at 05:50

## 2023-02-18 RX ADMIN — BUDESONIDE AND FORMOTEROL FUMARATE DIHYDRATE 2 PUFF(S): 160; 4.5 AEROSOL RESPIRATORY (INHALATION) at 17:38

## 2023-02-18 RX ADMIN — TIOTROPIUM BROMIDE 2 PUFF(S): 18 CAPSULE ORAL; RESPIRATORY (INHALATION) at 17:39

## 2023-02-18 NOTE — PROGRESS NOTE ADULT - PROBLEM SELECTOR PLAN 7
takes levothyroxine 75mcg daily at home  - TSH elevated on admission and again on repeat 53.5  - free T4 low as well  - increase synthroid from 75mcg to 100mcg  - discussed with son at bedside that she will need repeat thyroid studies in 6 weeks

## 2023-02-18 NOTE — PROGRESS NOTE ADULT - PROBLEM SELECTOR PLAN 8
Frail elderly adult w/ multiple co- morbidity. Lives by herself at home and has HHA 8hr x 7d   - Ambulates w/walker at baseline but reports increased difficulty w/ ambulation   - PT consult rec FREDDY

## 2023-02-18 NOTE — PROGRESS NOTE ADULT - PROBLEM SELECTOR PLAN 5
- c/w xarelto 15mg qhs, decreased from home dose of 20mg qhs iso MINISTERIO  - takes atenolol 25mg BID for rate control but given hypotension, will hold  - BP still in the 90s, patient asymptomatic, HR is well controlled so far  - start metoprolol low dose if needed for HR control

## 2023-02-18 NOTE — PROGRESS NOTE ADULT - NSPROGADDITIONALINFOA_GEN_ALL_CORE
Plan discussed with NP [place] Mcintosh, DO  Available on Teams olayinka      TOV today  Monitor BP for 1 more day, if remains stable (even if SBP 90s as long as asymptomatic) then will be medically stable for discharge.    Plan discussed with patient, son at bedside.

## 2023-02-18 NOTE — PROGRESS NOTE ADULT - SUBJECTIVE AND OBJECTIVE BOX
[facility] Division of Hospital Medicine  Beatris Mcintosh DO  Available on Teams Philipp    Patient is a 89y old  Female who presents with a chief complaint of weakness, hypotension (17 Feb 2023 14:25)      SUBJECTIVE / OVERNIGHT EVENTS: no events. Patient denies any complaints. Feels weak. Was walking independently w/walker prior to this admission but has not been out of bed in many days.  ADDITIONAL REVIEW OF SYSTEMS: negative    MEDICATIONS  (STANDING):  atorvastatin 20 milliGRAM(s) Oral at bedtime  budesonide  80 MICROgram(s)/formoterol 4.5 MICROgram(s) Inhaler 2 Puff(s) Inhalation two times a day  cefTRIAXone   IVPB 1000 milliGRAM(s) IV Intermittent every 24 hours  levothyroxine 75 MICROGram(s) Oral daily  OXcarbazepine 150 milliGRAM(s) Oral two times a day  rivaroxaban 15 milliGRAM(s) Oral with dinner  senna 2 Tablet(s) Oral at bedtime  tiotropium 2.5 MICROgram(s) Inhaler 2 Puff(s) Inhalation daily    MEDICATIONS  (PRN):  acetaminophen     Tablet .. 650 milliGRAM(s) Oral every 6 hours PRN Temp greater or equal to 38C (100.4F), Mild Pain (1 - 3)  aluminum hydroxide/magnesium hydroxide/simethicone Suspension 30 milliLiter(s) Oral every 4 hours PRN Dyspepsia  melatonin 3 milliGRAM(s) Oral at bedtime PRN Insomnia  ondansetron Injectable 4 milliGRAM(s) IV Push every 8 hours PRN Nausea and/or Vomiting      CAPILLARY BLOOD GLUCOSE        I&O's Summary      PHYSICAL EXAM:  Vital Signs Last 24 Hrs  T(C): 36.3 (18 Feb 2023 05:25), Max: 36.6 (17 Feb 2023 20:17)  T(F): 97.4 (18 Feb 2023 05:25), Max: 97.9 (17 Feb 2023 20:17)  HR: 84 (18 Feb 2023 13:14) (74 - 90)  BP: 91/61 (18 Feb 2023 13:14) (91/61 - 112/79)  BP(mean): --  RR: 16 (18 Feb 2023 13:14) (16 - 19)  SpO2: 95% (18 Feb 2023 13:14) (93% - 96%)    Parameters below as of 18 Feb 2023 13:14  Patient On (Oxygen Delivery Method): room air      CONSTITUTIONAL: pleasant elderly female in NAD  EYES: PERRLA; conjunctiva and sclera clear  ENMT: Moist oral mucosa, no pharyngeal injection or exudates; normal dentition  NECK: Supple, no palpable masses; no thyromegaly  RESPIRATORY: Normal respiratory effort; lungs are clear to auscultation bilaterally  CARDIOVASCULAR: Regular rate and rhythm, normal S1 and S2, no murmur/rub/gallop; Peripheral pulses are 2+ bilaterally  ABDOMEN: Soft, Nondistended, Nontender to palpation, normoactive bowel sounds  GENITOURINARY: +payne catheter with clear, yellow urine, no blood   MUSCULOSKELETAL:  No clubbing or cyanosis of digits; no joint swelling or tenderness to palpation  PSYCH: A+O x3; affect appropriate  NEUROLOGY: CN 2-12 are intact and symmetric; no gross sensory deficits   SKIN: No rashes; no palpable lesions, b/l LE and L forearm with covered wounds with dressing c/d/i, venous stasis changes in all extremities    LABS:                        12.3   7.75  )-----------( 166      ( 18 Feb 2023 07:19 )             37.9     02-18    137  |  102  |  42<H>  ----------------------------<  83  5.6<H>   |  22  |  1.19    Ca    9.0      18 Feb 2023 07:16  Phos  2.9     02-18  Mg     2.4     02-18                Culture - Urine (collected 15 Feb 2023 20:48)  Source: Clean Catch Clean Catch (Midstream)  Final Report (18 Feb 2023 09:13):    >100,000 CFU/ml Escherichia coli  Organism: Escherichia coli (18 Feb 2023 09:13)  Organism: Escherichia coli (18 Feb 2023 09:13)

## 2023-02-19 LAB
ANION GAP SERPL CALC-SCNC: 10 MMOL/L — SIGNIFICANT CHANGE UP (ref 5–17)
BUN SERPL-MCNC: 42 MG/DL — HIGH (ref 7–23)
CALCIUM SERPL-MCNC: 8.9 MG/DL — SIGNIFICANT CHANGE UP (ref 8.4–10.5)
CHLORIDE SERPL-SCNC: 101 MMOL/L — SIGNIFICANT CHANGE UP (ref 96–108)
CO2 SERPL-SCNC: 23 MMOL/L — SIGNIFICANT CHANGE UP (ref 22–31)
CREAT SERPL-MCNC: 1.16 MG/DL — SIGNIFICANT CHANGE UP (ref 0.5–1.3)
EGFR: 43 ML/MIN/1.73M2 — LOW
GLUCOSE SERPL-MCNC: 85 MG/DL — SIGNIFICANT CHANGE UP (ref 70–99)
HCT VFR BLD CALC: 38 % — SIGNIFICANT CHANGE UP (ref 34.5–45)
HGB BLD-MCNC: 12.3 G/DL — SIGNIFICANT CHANGE UP (ref 11.5–15.5)
MAGNESIUM SERPL-MCNC: 2.3 MG/DL — SIGNIFICANT CHANGE UP (ref 1.6–2.6)
MCHC RBC-ENTMCNC: 29.4 PG — SIGNIFICANT CHANGE UP (ref 27–34)
MCHC RBC-ENTMCNC: 32.4 GM/DL — SIGNIFICANT CHANGE UP (ref 32–36)
MCV RBC AUTO: 90.9 FL — SIGNIFICANT CHANGE UP (ref 80–100)
NRBC # BLD: 0 /100 WBCS — SIGNIFICANT CHANGE UP (ref 0–0)
PHOSPHATE SERPL-MCNC: 3.1 MG/DL — SIGNIFICANT CHANGE UP (ref 2.5–4.5)
PLATELET # BLD AUTO: 163 K/UL — SIGNIFICANT CHANGE UP (ref 150–400)
POTASSIUM SERPL-MCNC: 5.1 MMOL/L — SIGNIFICANT CHANGE UP (ref 3.5–5.3)
POTASSIUM SERPL-SCNC: 5.1 MMOL/L — SIGNIFICANT CHANGE UP (ref 3.5–5.3)
RBC # BLD: 4.18 M/UL — SIGNIFICANT CHANGE UP (ref 3.8–5.2)
RBC # FLD: 21.7 % — HIGH (ref 10.3–14.5)
SODIUM SERPL-SCNC: 134 MMOL/L — LOW (ref 135–145)
WBC # BLD: 6.91 K/UL — SIGNIFICANT CHANGE UP (ref 3.8–10.5)
WBC # FLD AUTO: 6.91 K/UL — SIGNIFICANT CHANGE UP (ref 3.8–10.5)

## 2023-02-19 PROCEDURE — 99233 SBSQ HOSP IP/OBS HIGH 50: CPT

## 2023-02-19 RX ADMIN — ATORVASTATIN CALCIUM 20 MILLIGRAM(S): 80 TABLET, FILM COATED ORAL at 21:20

## 2023-02-19 RX ADMIN — CEFTRIAXONE 100 MILLIGRAM(S): 500 INJECTION, POWDER, FOR SOLUTION INTRAMUSCULAR; INTRAVENOUS at 00:35

## 2023-02-19 RX ADMIN — OXCARBAZEPINE 150 MILLIGRAM(S): 300 TABLET, FILM COATED ORAL at 16:51

## 2023-02-19 RX ADMIN — BUDESONIDE AND FORMOTEROL FUMARATE DIHYDRATE 2 PUFF(S): 160; 4.5 AEROSOL RESPIRATORY (INHALATION) at 16:52

## 2023-02-19 RX ADMIN — BUDESONIDE AND FORMOTEROL FUMARATE DIHYDRATE 2 PUFF(S): 160; 4.5 AEROSOL RESPIRATORY (INHALATION) at 05:04

## 2023-02-19 RX ADMIN — Medication 100 MICROGRAM(S): at 05:04

## 2023-02-19 RX ADMIN — OXCARBAZEPINE 150 MILLIGRAM(S): 300 TABLET, FILM COATED ORAL at 05:04

## 2023-02-19 RX ADMIN — RIVAROXABAN 15 MILLIGRAM(S): KIT at 16:51

## 2023-02-19 RX ADMIN — SODIUM ZIRCONIUM CYCLOSILICATE 10 GRAM(S): 10 POWDER, FOR SUSPENSION ORAL at 16:51

## 2023-02-19 RX ADMIN — TIOTROPIUM BROMIDE 2 PUFF(S): 18 CAPSULE ORAL; RESPIRATORY (INHALATION) at 16:52

## 2023-02-19 NOTE — PROGRESS NOTE ADULT - SUBJECTIVE AND OBJECTIVE BOX
Patient is a 89y old  Female who presents with a chief complaint of weakness, hypotension (18 Feb 2023 13:17)      SUBJECTIVE / OVERNIGHT EVENTS: pt feels ok, denies cp, sob, abdominal pain, chills     MEDICATIONS  (STANDING):  atorvastatin 20 milliGRAM(s) Oral at bedtime  budesonide  80 MICROgram(s)/formoterol 4.5 MICROgram(s) Inhaler 2 Puff(s) Inhalation two times a day  levothyroxine 100 MICROGram(s) Oral daily  OXcarbazepine 150 milliGRAM(s) Oral two times a day  rivaroxaban 15 milliGRAM(s) Oral with dinner  senna 2 Tablet(s) Oral at bedtime  sodium zirconium cyclosilicate 10 Gram(s) Oral daily  tiotropium 2.5 MICROgram(s) Inhaler 2 Puff(s) Inhalation daily    MEDICATIONS  (PRN):  acetaminophen     Tablet .. 650 milliGRAM(s) Oral every 6 hours PRN Temp greater or equal to 38C (100.4F), Mild Pain (1 - 3)  aluminum hydroxide/magnesium hydroxide/simethicone Suspension 30 milliLiter(s) Oral every 4 hours PRN Dyspepsia  melatonin 3 milliGRAM(s) Oral at bedtime PRN Insomnia  ondansetron Injectable 4 milliGRAM(s) IV Push every 8 hours PRN Nausea and/or Vomiting        CAPILLARY BLOOD GLUCOSE        I&O's Summary      PHYSICAL EXAM:  GENERAL: NAD, well-developed  HEAD:  Atraumatic, Normocephalic  EYES: conjunctiva and sclera clear  NECK: Supple, No JVD  CHEST/LUNG: Clear to auscultation bilaterally; No wheeze  HEART: Regular rate and rhythm; No murmurs, rubs, or gallops  ABDOMEN: Soft, Nontender, Nondistended; Bowel sounds present  EXTREMITIES:  2+ Peripheral Pulses, No clubbing, cyanosis, or edema  PSYCH: AAOx3      LABS:                        12.3   6.91  )-----------( 163      ( 19 Feb 2023 06:59 )             38.0     02-19    134<L>  |  101  |  42<H>  ----------------------------<  85  5.1   |  23  |  1.16    Ca    8.9      19 Feb 2023 06:59  Phos  3.1     02-19  Mg     2.3     02-19                RADIOLOGY & ADDITIONAL TESTS:    Imaging Personally Reviewed:    Consultant(s) Notes Reviewed:      Care Discussed with Consultants/Other Providers:

## 2023-02-19 NOTE — OCCUPATIONAL THERAPY INITIAL EVALUATION ADULT - PERTINENT HX OF CURRENT PROBLEM, REHAB EVAL
89yo female with PMH of HTN, HLD, afib on xarelto, chronic wounds, and lymphedema who presented with generalized weakness found to be hypotensive with E. Coli UTI.

## 2023-02-19 NOTE — OCCUPATIONAL THERAPY INITIAL EVALUATION ADULT - LIVES WITH, PROFILE
Pt lives alone in apt with no SHAMIR and elevator access. Pt has a HHA 9am-5pm 7 days/week, per daughter recently acquired night HHA as well (8 pm-8 am)./alone

## 2023-02-19 NOTE — OCCUPATIONAL THERAPY INITIAL EVALUATION ADULT - RANGE OF MOTION EXAMINATION, UPPER EXTREMITY
except b/l shoulder flexion 0-75 degrees (baseline)/bilateral UE Active ROM was WFL  (within functional limits)

## 2023-02-20 LAB
ANION GAP SERPL CALC-SCNC: 12 MMOL/L — SIGNIFICANT CHANGE UP (ref 5–17)
ANION GAP SERPL CALC-SCNC: 14 MMOL/L — SIGNIFICANT CHANGE UP (ref 5–17)
BUN SERPL-MCNC: 34 MG/DL — HIGH (ref 7–23)
BUN SERPL-MCNC: 36 MG/DL — HIGH (ref 7–23)
CALCIUM SERPL-MCNC: 8.9 MG/DL — SIGNIFICANT CHANGE UP (ref 8.4–10.5)
CALCIUM SERPL-MCNC: 9 MG/DL — SIGNIFICANT CHANGE UP (ref 8.4–10.5)
CHLORIDE SERPL-SCNC: 102 MMOL/L — SIGNIFICANT CHANGE UP (ref 96–108)
CHLORIDE SERPL-SCNC: 102 MMOL/L — SIGNIFICANT CHANGE UP (ref 96–108)
CO2 SERPL-SCNC: 17 MMOL/L — LOW (ref 22–31)
CO2 SERPL-SCNC: 20 MMOL/L — LOW (ref 22–31)
CREAT SERPL-MCNC: 0.94 MG/DL — SIGNIFICANT CHANGE UP (ref 0.5–1.3)
CREAT SERPL-MCNC: 1.1 MG/DL — SIGNIFICANT CHANGE UP (ref 0.5–1.3)
EGFR: 45 ML/MIN/1.73M2 — LOW
EGFR: 55 ML/MIN/1.73M2 — LOW
GLUCOSE SERPL-MCNC: 75 MG/DL — SIGNIFICANT CHANGE UP (ref 70–99)
GLUCOSE SERPL-MCNC: 93 MG/DL — SIGNIFICANT CHANGE UP (ref 70–99)
MAGNESIUM SERPL-MCNC: 2.2 MG/DL — SIGNIFICANT CHANGE UP (ref 1.6–2.6)
POTASSIUM SERPL-MCNC: 5.4 MMOL/L — HIGH (ref 3.5–5.3)
POTASSIUM SERPL-MCNC: 5.5 MMOL/L — HIGH (ref 3.5–5.3)
POTASSIUM SERPL-SCNC: 5.4 MMOL/L — HIGH (ref 3.5–5.3)
POTASSIUM SERPL-SCNC: 5.5 MMOL/L — HIGH (ref 3.5–5.3)
SODIUM SERPL-SCNC: 133 MMOL/L — LOW (ref 135–145)
SODIUM SERPL-SCNC: 134 MMOL/L — LOW (ref 135–145)

## 2023-02-20 PROCEDURE — 99233 SBSQ HOSP IP/OBS HIGH 50: CPT

## 2023-02-20 RX ADMIN — BUDESONIDE AND FORMOTEROL FUMARATE DIHYDRATE 2 PUFF(S): 160; 4.5 AEROSOL RESPIRATORY (INHALATION) at 18:37

## 2023-02-20 RX ADMIN — RIVAROXABAN 15 MILLIGRAM(S): KIT at 18:37

## 2023-02-20 RX ADMIN — SODIUM ZIRCONIUM CYCLOSILICATE 10 GRAM(S): 10 POWDER, FOR SUSPENSION ORAL at 11:35

## 2023-02-20 RX ADMIN — Medication 100 MICROGRAM(S): at 05:53

## 2023-02-20 RX ADMIN — ATORVASTATIN CALCIUM 20 MILLIGRAM(S): 80 TABLET, FILM COATED ORAL at 21:20

## 2023-02-20 RX ADMIN — BUDESONIDE AND FORMOTEROL FUMARATE DIHYDRATE 2 PUFF(S): 160; 4.5 AEROSOL RESPIRATORY (INHALATION) at 05:55

## 2023-02-20 RX ADMIN — TIOTROPIUM BROMIDE 2 PUFF(S): 18 CAPSULE ORAL; RESPIRATORY (INHALATION) at 11:37

## 2023-02-20 RX ADMIN — OXCARBAZEPINE 150 MILLIGRAM(S): 300 TABLET, FILM COATED ORAL at 18:37

## 2023-02-20 RX ADMIN — OXCARBAZEPINE 150 MILLIGRAM(S): 300 TABLET, FILM COATED ORAL at 05:52

## 2023-02-20 NOTE — PROGRESS NOTE ADULT - SUBJECTIVE AND OBJECTIVE BOX
Patient is a 89y old  Female who presents with a chief complaint of weakness, hypotension (19 Feb 2023 14:22)      SUBJECTIVE / OVERNIGHT EVENTS: pt feels better, denies cp, sob, abdominal pain    MEDICATIONS  (STANDING):  atorvastatin 20 milliGRAM(s) Oral at bedtime  budesonide  80 MICROgram(s)/formoterol 4.5 MICROgram(s) Inhaler 2 Puff(s) Inhalation two times a day  levothyroxine 100 MICROGram(s) Oral daily  OXcarbazepine 150 milliGRAM(s) Oral two times a day  rivaroxaban 15 milliGRAM(s) Oral with dinner  senna 2 Tablet(s) Oral at bedtime  sodium zirconium cyclosilicate 10 Gram(s) Oral daily  tiotropium 2.5 MICROgram(s) Inhaler 2 Puff(s) Inhalation daily    MEDICATIONS  (PRN):  acetaminophen     Tablet .. 650 milliGRAM(s) Oral every 6 hours PRN Temp greater or equal to 38C (100.4F), Mild Pain (1 - 3)  aluminum hydroxide/magnesium hydroxide/simethicone Suspension 30 milliLiter(s) Oral every 4 hours PRN Dyspepsia  melatonin 3 milliGRAM(s) Oral at bedtime PRN Insomnia  ondansetron Injectable 4 milliGRAM(s) IV Push every 8 hours PRN Nausea and/or Vomiting        CAPILLARY BLOOD GLUCOSE        I&O's Summary    19 Feb 2023 07:01  -  20 Feb 2023 07:00  --------------------------------------------------------  IN: 360 mL / OUT: 600 mL / NET: -240 mL        PHYSICAL EXAM:  GENERAL: NAD, well-developed  HEAD:  Atraumatic, Normocephalic  EYES: conjunctiva and sclera clear  NECK:  No JVD  CHEST/LUNG: Clear to auscultation bilaterally; No wheeze  HEART: Regular rate and rhythm; No murmurs, rubs, or gallops  ABDOMEN: Soft, Nontender, Nondistended; Bowel sounds present  EXTREMITIES:  2+ Peripheral Pulses, +venous stasis changes   PSYCH: AAOx3    LABS:                        12.3   6.91  )-----------( 163      ( 19 Feb 2023 06:59 )             38.0     02-20    134<L>  |  102  |  36<H>  ----------------------------<  75  5.4<H>   |  20<L>  |  1.10    Ca    8.9      20 Feb 2023 06:20  Phos  3.1     02-19  Mg     2.2     02-20                RADIOLOGY & ADDITIONAL TESTS:    Imaging Personally Reviewed:    Consultant(s) Notes Reviewed:      Care Discussed with Consultants/Other Providers:

## 2023-02-20 NOTE — PROGRESS NOTE ADULT - PROBLEM SELECTOR PLAN 5
- c/w xarelto 15mg qhs, decreased from home dose of 20mg qhs iso MINISTERIO  - takes atenolol 25mg BID for rate control but given hypotension, will hold

## 2023-02-21 LAB
ALBUMIN SERPL ELPH-MCNC: 2.6 G/DL — LOW (ref 3.3–5)
ALP SERPL-CCNC: 229 U/L — HIGH (ref 40–120)
ALT FLD-CCNC: 44 U/L — SIGNIFICANT CHANGE UP (ref 10–45)
ANION GAP SERPL CALC-SCNC: 11 MMOL/L — SIGNIFICANT CHANGE UP (ref 5–17)
ANION GAP SERPL CALC-SCNC: 12 MMOL/L — SIGNIFICANT CHANGE UP (ref 5–17)
AST SERPL-CCNC: 50 U/L — HIGH (ref 10–40)
BASOPHILS # BLD AUTO: 0.04 K/UL — SIGNIFICANT CHANGE UP (ref 0–0.2)
BASOPHILS NFR BLD AUTO: 0.6 % — SIGNIFICANT CHANGE UP (ref 0–2)
BILIRUB SERPL-MCNC: 0.2 MG/DL — SIGNIFICANT CHANGE UP (ref 0.2–1.2)
BUN SERPL-MCNC: 31 MG/DL — HIGH (ref 7–23)
BUN SERPL-MCNC: 32 MG/DL — HIGH (ref 7–23)
CALCIUM SERPL-MCNC: 8.8 MG/DL — SIGNIFICANT CHANGE UP (ref 8.4–10.5)
CALCIUM SERPL-MCNC: 8.9 MG/DL — SIGNIFICANT CHANGE UP (ref 8.4–10.5)
CHLORIDE SERPL-SCNC: 101 MMOL/L — SIGNIFICANT CHANGE UP (ref 96–108)
CHLORIDE SERPL-SCNC: 102 MMOL/L — SIGNIFICANT CHANGE UP (ref 96–108)
CO2 SERPL-SCNC: 23 MMOL/L — SIGNIFICANT CHANGE UP (ref 22–31)
CO2 SERPL-SCNC: 23 MMOL/L — SIGNIFICANT CHANGE UP (ref 22–31)
CREAT SERPL-MCNC: 0.99 MG/DL — SIGNIFICANT CHANGE UP (ref 0.5–1.3)
CREAT SERPL-MCNC: 1 MG/DL — SIGNIFICANT CHANGE UP (ref 0.5–1.3)
EGFR: 51 ML/MIN/1.73M2 — LOW
EGFR: 52 ML/MIN/1.73M2 — LOW
EOSINOPHIL # BLD AUTO: 0.08 K/UL — SIGNIFICANT CHANGE UP (ref 0–0.5)
EOSINOPHIL NFR BLD AUTO: 1.3 % — SIGNIFICANT CHANGE UP (ref 0–6)
GLUCOSE SERPL-MCNC: 83 MG/DL — SIGNIFICANT CHANGE UP (ref 70–99)
GLUCOSE SERPL-MCNC: 84 MG/DL — SIGNIFICANT CHANGE UP (ref 70–99)
HCT VFR BLD CALC: 34.7 % — SIGNIFICANT CHANGE UP (ref 34.5–45)
HGB BLD-MCNC: 11.1 G/DL — LOW (ref 11.5–15.5)
IMM GRANULOCYTES NFR BLD AUTO: 0.5 % — SIGNIFICANT CHANGE UP (ref 0–0.9)
LYMPHOCYTES # BLD AUTO: 0.8 K/UL — LOW (ref 1–3.3)
LYMPHOCYTES # BLD AUTO: 12.7 % — LOW (ref 13–44)
MAGNESIUM SERPL-MCNC: 2.3 MG/DL — SIGNIFICANT CHANGE UP (ref 1.6–2.6)
MAGNESIUM SERPL-MCNC: 2.3 MG/DL — SIGNIFICANT CHANGE UP (ref 1.6–2.6)
MCHC RBC-ENTMCNC: 29.3 PG — SIGNIFICANT CHANGE UP (ref 27–34)
MCHC RBC-ENTMCNC: 32 GM/DL — SIGNIFICANT CHANGE UP (ref 32–36)
MCV RBC AUTO: 91.6 FL — SIGNIFICANT CHANGE UP (ref 80–100)
MONOCYTES # BLD AUTO: 0.45 K/UL — SIGNIFICANT CHANGE UP (ref 0–0.9)
MONOCYTES NFR BLD AUTO: 7.2 % — SIGNIFICANT CHANGE UP (ref 2–14)
NEUTROPHILS # BLD AUTO: 4.88 K/UL — SIGNIFICANT CHANGE UP (ref 1.8–7.4)
NEUTROPHILS NFR BLD AUTO: 77.7 % — HIGH (ref 43–77)
NRBC # BLD: 0 /100 WBCS — SIGNIFICANT CHANGE UP (ref 0–0)
PHOSPHATE SERPL-MCNC: 2.8 MG/DL — SIGNIFICANT CHANGE UP (ref 2.5–4.5)
PLATELET # BLD AUTO: 172 K/UL — SIGNIFICANT CHANGE UP (ref 150–400)
POTASSIUM SERPL-MCNC: 4.7 MMOL/L — SIGNIFICANT CHANGE UP (ref 3.5–5.3)
POTASSIUM SERPL-MCNC: 4.7 MMOL/L — SIGNIFICANT CHANGE UP (ref 3.5–5.3)
POTASSIUM SERPL-SCNC: 4.7 MMOL/L — SIGNIFICANT CHANGE UP (ref 3.5–5.3)
POTASSIUM SERPL-SCNC: 4.7 MMOL/L — SIGNIFICANT CHANGE UP (ref 3.5–5.3)
PROT SERPL-MCNC: 5.7 G/DL — LOW (ref 6–8.3)
RBC # BLD: 3.79 M/UL — LOW (ref 3.8–5.2)
RBC # FLD: 21.8 % — HIGH (ref 10.3–14.5)
SARS-COV-2 RNA SPEC QL NAA+PROBE: SIGNIFICANT CHANGE UP
SODIUM SERPL-SCNC: 136 MMOL/L — SIGNIFICANT CHANGE UP (ref 135–145)
SODIUM SERPL-SCNC: 136 MMOL/L — SIGNIFICANT CHANGE UP (ref 135–145)
WBC # BLD: 6.28 K/UL — SIGNIFICANT CHANGE UP (ref 3.8–10.5)
WBC # FLD AUTO: 6.28 K/UL — SIGNIFICANT CHANGE UP (ref 3.8–10.5)

## 2023-02-21 PROCEDURE — 99232 SBSQ HOSP IP/OBS MODERATE 35: CPT

## 2023-02-21 PROCEDURE — 99239 HOSP IP/OBS DSCHRG MGMT >30: CPT

## 2023-02-21 RX ORDER — FUROSEMIDE 40 MG
20 TABLET ORAL
Refills: 0 | Status: DISCONTINUED | OUTPATIENT
Start: 2023-02-21 | End: 2023-02-22

## 2023-02-21 RX ORDER — FLUTICASONE PROPIONATE 50 MCG
1 SPRAY, SUSPENSION NASAL
Refills: 0 | Status: ACTIVE | OUTPATIENT
Start: 2023-02-21 | End: 2024-01-20

## 2023-02-21 RX ADMIN — ATORVASTATIN CALCIUM 20 MILLIGRAM(S): 80 TABLET, FILM COATED ORAL at 22:10

## 2023-02-21 RX ADMIN — BUDESONIDE AND FORMOTEROL FUMARATE DIHYDRATE 2 PUFF(S): 160; 4.5 AEROSOL RESPIRATORY (INHALATION) at 05:33

## 2023-02-21 RX ADMIN — Medication 20 MILLIGRAM(S): at 14:44

## 2023-02-21 RX ADMIN — SODIUM ZIRCONIUM CYCLOSILICATE 10 GRAM(S): 10 POWDER, FOR SUSPENSION ORAL at 17:30

## 2023-02-21 RX ADMIN — OXCARBAZEPINE 150 MILLIGRAM(S): 300 TABLET, FILM COATED ORAL at 17:29

## 2023-02-21 RX ADMIN — SENNA PLUS 2 TABLET(S): 8.6 TABLET ORAL at 22:10

## 2023-02-21 RX ADMIN — Medication 100 MICROGRAM(S): at 05:32

## 2023-02-21 RX ADMIN — OXCARBAZEPINE 150 MILLIGRAM(S): 300 TABLET, FILM COATED ORAL at 05:32

## 2023-02-21 RX ADMIN — RIVAROXABAN 15 MILLIGRAM(S): KIT at 17:29

## 2023-02-21 RX ADMIN — Medication 1 SPRAY(S): at 17:31

## 2023-02-21 NOTE — PROGRESS NOTE ADULT - SUBJECTIVE AND OBJECTIVE BOX
Patient is a 89y old  Female who presents with a chief complaint of weakness, hypotension (21 Feb 2023 10:48)      SUBJECTIVE / OVERNIGHT EVENTS: pt feels ok, denies cp, sob, abd pain, reports has a runny nose     MEDICATIONS  (STANDING):  atorvastatin 20 milliGRAM(s) Oral at bedtime  budesonide  80 MICROgram(s)/formoterol 4.5 MICROgram(s) Inhaler 2 Puff(s) Inhalation two times a day  furosemide    Tablet 20 milliGRAM(s) Oral two times a day  levothyroxine 100 MICROGram(s) Oral daily  OXcarbazepine 150 milliGRAM(s) Oral two times a day  rivaroxaban 15 milliGRAM(s) Oral with dinner  senna 2 Tablet(s) Oral at bedtime  sodium zirconium cyclosilicate 10 Gram(s) Oral daily  tiotropium 2.5 MICROgram(s) Inhaler 2 Puff(s) Inhalation daily    MEDICATIONS  (PRN):  acetaminophen     Tablet .. 650 milliGRAM(s) Oral every 6 hours PRN Temp greater or equal to 38C (100.4F), Mild Pain (1 - 3)  aluminum hydroxide/magnesium hydroxide/simethicone Suspension 30 milliLiter(s) Oral every 4 hours PRN Dyspepsia  melatonin 3 milliGRAM(s) Oral at bedtime PRN Insomnia  ondansetron Injectable 4 milliGRAM(s) IV Push every 8 hours PRN Nausea and/or Vomiting        CAPILLARY BLOOD GLUCOSE        I&O's Summary    20 Feb 2023 07:01  -  21 Feb 2023 07:00  --------------------------------------------------------  IN: 180 mL / OUT: 0 mL / NET: 180 mL        PHYSICAL EXAM:  GENERAL: NAD, well-developed  HEAD:  Atraumatic, Normocephalic  EYES: conjunctiva and sclera clear  CHEST/LUNG: Clear to auscultation bilaterally; No wheeze  HEART: Regular rate and rhythm; No murmurs, rubs, or gallops  ABDOMEN: Soft, Nontender, Nondistended; Bowel sounds present  EXTREMITIES:  ace wrap, +2 edema   PSYCH: AAOx3      LABS:                        11.1   6.28  )-----------( 172      ( 21 Feb 2023 07:06 )             34.7     02-21    136  |  101  |  32<H>  ----------------------------<  83  4.7   |  23  |  0.99    Ca    8.8      21 Feb 2023 07:05  Phos  2.8     02-21  Mg     2.3     02-21    TPro  5.7<L>  /  Alb  2.6<L>  /  TBili  0.2  /  DBili  x   /  AST  50<H>  /  ALT  44  /  AlkPhos  229<H>  02-21              RADIOLOGY & ADDITIONAL TESTS:    Imaging Personally Reviewed:    Consultant(s) Notes Reviewed:      Care Discussed with Consultants/Other Providers:

## 2023-02-21 NOTE — REVIEW OF SYSTEMS
[Fatigue] : fatigue [Negative] : Cardiovascular [FreeTextEntry2] : weakness [de-identified] : pressure ulcer

## 2023-02-21 NOTE — PROGRESS NOTE ADULT - PROBLEM SELECTOR PLAN 3
- Cr 1.31 on admission (baseline 0.9 -1.07)   - improving  - continue to hold ACEi  - avoid hypotension  - renally dose meds to GFR, avoid nephrotoxic agents

## 2023-02-21 NOTE — PROGRESS NOTE ADULT - SUBJECTIVE AND OBJECTIVE BOX
Subjective: Patient seen and examined. No new events except as noted.   c/o sob (which is chronic)  BP improved  Echo moderate global LV dysfunction new since last echo  mild to moderate pulmonary hypertenison  MEDICATIONS:  MEDICATIONS  (STANDING):  atorvastatin 20 milliGRAM(s) Oral at bedtime  budesonide  80 MICROgram(s)/formoterol 4.5 MICROgram(s) Inhaler 2 Puff(s) Inhalation two times a day  levothyroxine 100 MICROGram(s) Oral daily  OXcarbazepine 150 milliGRAM(s) Oral two times a day  rivaroxaban 15 milliGRAM(s) Oral with dinner  senna 2 Tablet(s) Oral at bedtime  sodium zirconium cyclosilicate 10 Gram(s) Oral daily  tiotropium 2.5 MICROgram(s) Inhaler 2 Puff(s) Inhalation daily      PHYSICAL EXAM:  T(C): 36.3 (02-21-23 @ 05:24), Max: 36.4 (02-20-23 @ 21:39)  HR: 92 (02-21-23 @ 05:24) (85 - 92)  BP: 99/63 (02-21-23 @ 05:24) (99/63 - 107/76)  RR: 18 (02-21-23 @ 05:24) (18 - 18)  SpO2: 94% (02-21-23 @ 05:24) (93% - 95%)  Wt(kg): --  I&O's Summary    20 Feb 2023 07:01  -  21 Feb 2023 07:00  --------------------------------------------------------  IN: 180 mL / OUT: 0 mL / NET: 180 mL          Appearance: Normal	elderly awake alert NAD  HEENT:   Normal oral mucosa, PERRL, EOMI	  Cardiovascular: Normal S1 S2, irregularly irregular moderate JVDRespiratory: Lungs clear to auscultation, normal effort 	  Gastrointestinal:  Soft, Non-tender, + BS	  Psychiatry:  Mood & affect appropriate  Ext: legs wrapped 2+ edema        LABS:    CARDIAC MARKERS:                                11.1   6.28  )-----------( 172      ( 21 Feb 2023 07:06 )             34.7     02-21    136  |  101  |  32<H>  ----------------------------<  83  4.7   |  23  |  0.99    Ca    8.8      21 Feb 2023 07:05  Phos  2.8     02-21  Mg     2.3     02-21    TPro  5.7<L>  /  Alb  2.6<L>  /  TBili  0.2  /  DBili  x   /  AST  50<H>  /  ALT  44  /  AlkPhos  229<H>  02-21    proBNP:   Lipid Profile:   HgA1c:   TSH:           TELEMETRY: 	    ECG:  afib	  RADIOLOGY:   xr< from: Xray Chest 1 View- PORTABLE-Urgent (Xray Chest 1 View- PORTABLE-Urgent .) (02.15.23 @ 20:01) >  FINDINGS:  The heart is not accurately assessed in this AP projection.  Bilateral pleural effusions and hazy bibasilar opacities.  There is no pneumothorax.    IMPRESSION:  Bilateral pleural effusions and hazy bibasilar opacities, possibly   atelectasis or pneumonia.    --- End of Report ---      < end of copied text `< from: Transthoracic Echocardiogram (02.18.23 @ 13:55) >  Conclusions:  1. Mitral annular calcification and calcified mitral  leaflets with normal diastolic opening. Moderate mitral  regurgitation.  2. Calcified aortic valve with decreased opening. Peak  transaortic valve gradient equals 7 mm Hg, mean transaortic  valve gradient equals 3 mm Hg, estimated aortic valve area  equals 1.1 sqcm (by continuity equation), aortic valve  velocity time integral equals 25 cm, consistent with  moderate aortic stenosis. Minimal aortic regurgitation.  3. Moderate global leftventricular systolic dysfunction.  Septal motion consistent with right ventricular overload.  4. Moderate right atrial enlargement.  5. Right ventricular enlargement with decreased right  ventricular systolic function.  The RV measures 5.2cm at  the base and 4.1cm mid RV. TAPSE 1.45cm.  6. Tethered tricuspid valve. Severe tricuspid  regurgitation.  7. Estimated pulmonary artery systolic pressure equals 46  mm Hg, assuming right atrial pressure equals > 15 mm Hg,  consistent with mild pulmonary pressures. This is  underestimated due to the significant TR.  8. Normal pericardium with trace pericardial effusion.  9. Bilateral pleural effusions.  *** Compared with echocardiogram of 8/3/2022, there has  been an interval decline in the LV systolic function, the  degree of PI as increased.  ------------------------------------------------------------------------  Confirmed on  2/18/2023 - 16:20:28 by Sumanth Ocasio M.D.  ------------------------------------------------------------------------

## 2023-02-21 NOTE — PHYSICAL EXAM
[Normal Rate] : normal rate  [Regular Rhythm] : with a regular rhythm [Normal] : affect was normal and insight and judgment were intact [de-identified] : see above description

## 2023-02-21 NOTE — HISTORY OF PRESENT ILLNESS
[FreeTextEntry8] : 89 year old female with /o acute change in strength on lower extremity\par \par She is more fatigued. \par \par She did take an antibiotic about 10 days ago. \par \par She used to ambulate on her own. \par

## 2023-02-21 NOTE — ASSESSMENT
[FreeTextEntry1] : long discussion with daughter\par pt will go to the ER \par Ordered appropriate labs based on diagnosis and prevention .\par \par I spent a total of 30 minutes with this patient including face to face and non face to face time.\par \par due to acute change pt to go to ER

## 2023-02-22 PROCEDURE — 99232 SBSQ HOSP IP/OBS MODERATE 35: CPT

## 2023-02-22 PROCEDURE — 99233 SBSQ HOSP IP/OBS HIGH 50: CPT

## 2023-02-22 PROCEDURE — 71045 X-RAY EXAM CHEST 1 VIEW: CPT | Mod: 26

## 2023-02-22 RX ORDER — IPRATROPIUM/ALBUTEROL SULFATE 18-103MCG
3 AEROSOL WITH ADAPTER (GRAM) INHALATION EVERY 6 HOURS
Refills: 0 | Status: ACTIVE | OUTPATIENT
Start: 2023-02-22 | End: 2024-01-21

## 2023-02-22 RX ORDER — FUROSEMIDE 40 MG
40 TABLET ORAL ONCE
Refills: 0 | Status: COMPLETED | OUTPATIENT
Start: 2023-02-22 | End: 2023-02-22

## 2023-02-22 RX ADMIN — Medication 1 SPRAY(S): at 05:23

## 2023-02-22 RX ADMIN — OXCARBAZEPINE 150 MILLIGRAM(S): 300 TABLET, FILM COATED ORAL at 18:37

## 2023-02-22 RX ADMIN — Medication 20 MILLIGRAM(S): at 05:24

## 2023-02-22 RX ADMIN — ATORVASTATIN CALCIUM 20 MILLIGRAM(S): 80 TABLET, FILM COATED ORAL at 21:22

## 2023-02-22 RX ADMIN — RIVAROXABAN 15 MILLIGRAM(S): KIT at 18:38

## 2023-02-22 RX ADMIN — BUDESONIDE AND FORMOTEROL FUMARATE DIHYDRATE 2 PUFF(S): 160; 4.5 AEROSOL RESPIRATORY (INHALATION) at 05:23

## 2023-02-22 RX ADMIN — Medication 3 MILLILITER(S): at 18:33

## 2023-02-22 RX ADMIN — Medication 40 MILLIGRAM(S): at 12:37

## 2023-02-22 RX ADMIN — Medication 1 SPRAY(S): at 18:38

## 2023-02-22 RX ADMIN — Medication 100 MICROGRAM(S): at 05:24

## 2023-02-22 RX ADMIN — Medication 3 MILLILITER(S): at 12:51

## 2023-02-22 RX ADMIN — BUDESONIDE AND FORMOTEROL FUMARATE DIHYDRATE 2 PUFF(S): 160; 4.5 AEROSOL RESPIRATORY (INHALATION) at 18:39

## 2023-02-22 RX ADMIN — OXCARBAZEPINE 150 MILLIGRAM(S): 300 TABLET, FILM COATED ORAL at 05:24

## 2023-02-22 RX ADMIN — SODIUM ZIRCONIUM CYCLOSILICATE 10 GRAM(S): 10 POWDER, FOR SUSPENSION ORAL at 12:52

## 2023-02-22 NOTE — PROGRESS NOTE ADULT - SUBJECTIVE AND OBJECTIVE BOX
Patient is a 89y old  Female who presents with a chief complaint of weakness, hypotension (21 Feb 2023 11:01)      SUBJECTIVE / OVERNIGHT EVENTS:  pt wheezing, feels dyspneic, no cp, chills, abdominal pain     MEDICATIONS  (STANDING):  albuterol/ipratropium for Nebulization 3 milliLiter(s) Nebulizer every 6 hours  atorvastatin 20 milliGRAM(s) Oral at bedtime  budesonide  80 MICROgram(s)/formoterol 4.5 MICROgram(s) Inhaler 2 Puff(s) Inhalation two times a day  fluticasone propionate 50 MICROgram(s)/spray Nasal Spray 1 Spray(s) Both Nostrils two times a day  furosemide   Injectable 40 milliGRAM(s) IV Push once  levothyroxine 100 MICROGram(s) Oral daily  OXcarbazepine 150 milliGRAM(s) Oral two times a day  rivaroxaban 15 milliGRAM(s) Oral with dinner  senna 2 Tablet(s) Oral at bedtime  sodium zirconium cyclosilicate 10 Gram(s) Oral daily    MEDICATIONS  (PRN):  acetaminophen     Tablet .. 650 milliGRAM(s) Oral every 6 hours PRN Temp greater or equal to 38C (100.4F), Mild Pain (1 - 3)  aluminum hydroxide/magnesium hydroxide/simethicone Suspension 30 milliLiter(s) Oral every 4 hours PRN Dyspepsia  melatonin 3 milliGRAM(s) Oral at bedtime PRN Insomnia  ondansetron Injectable 4 milliGRAM(s) IV Push every 8 hours PRN Nausea and/or Vomiting        CAPILLARY BLOOD GLUCOSE        I&O's Summary    21 Feb 2023 07:01  -  22 Feb 2023 07:00  --------------------------------------------------------  IN: 0 mL / OUT: 100 mL / NET: -100 mL        PHYSICAL EXAM:  GENERAL: NAD, frail   HEAD:  Atraumatic, Normocephalic  EYES: conjunctiva and sclera clear  CHEST/LUNG: +wheezing, decreased breath sounds bases   HEART: Regular rate and rhythm; S1S2  ABDOMEN: Soft, Nontender, Nondistended; Bowel sounds present  EXTREMITIES:  ace wrap, +2 edema   PSYCH: AAOx3    LABS:                        11.1   6.28  )-----------( 172      ( 21 Feb 2023 07:06 )             34.7     02-21    136  |  101  |  32<H>  ----------------------------<  83  4.7   |  23  |  0.99    Ca    8.8      21 Feb 2023 07:05  Phos  2.8     02-21  Mg     2.3     02-21    TPro  5.7<L>  /  Alb  2.6<L>  /  TBili  0.2  /  DBili  x   /  AST  50<H>  /  ALT  44  /  AlkPhos  229<H>  02-21              RADIOLOGY & ADDITIONAL TESTS:    Imaging Personally Reviewed:    Consultant(s) Notes Reviewed:      Care Discussed with Consultants/Other Providers:

## 2023-02-22 NOTE — ED ADULT NURSE NOTE - NSSEPSISNEWALTERMENTAL_ED_A_ED
Called patient to confirm appt on 2/24/23 with Dr. Valladares, but spoke with her sister, Shanika. She states the patient lives at [facility] #[phone] and to call them to confirm the appt because they provide transportation. I was transferred to a voicemail, [facility] for a return call.  
No

## 2023-02-22 NOTE — PROGRESS NOTE ADULT - SUBJECTIVE AND OBJECTIVE BOX
[facility]-- WOUND TEAM -- FOLLOW UP NOTE  --------------------------------------------------------------------------------    24 hour events/subjective:          Diet:  Diet, DASH/TLC:   Sodium & Cholesterol Restricted  1500mL Fluid Restriction ([id number])     Special Instructions for Nursin milliLiter(s) to 2000 milliLiter(s) fluid restriction (23 @ 01:25)      ROS: General/ SKIN/ MSK/ Neuro/ GI see HPI  all other systems negative  pt unable to offer    ALLERGIES & MEDICATIONS  --------------------------------------------------------------------------------  Allergies    No Known Allergies    Intolerances          STANDING INPATIENT MEDICATIONS    albuterol/ipratropium for Nebulization 3 milliLiter(s) Nebulizer every 6 hours  atorvastatin 20 milliGRAM(s) Oral at bedtime  budesonide  80 MICROgram(s)/formoterol 4.5 MICROgram(s) Inhaler 2 Puff(s) Inhalation two times a day  fluticasone propionate 50 MICROgram(s)/spray Nasal Spray 1 Spray(s) Both Nostrils two times a day  levothyroxine 100 MICROGram(s) Oral daily  OXcarbazepine 150 milliGRAM(s) Oral two times a day  rivaroxaban 15 milliGRAM(s) Oral with dinner  senna 2 Tablet(s) Oral at bedtime  sodium zirconium cyclosilicate 10 Gram(s) Oral daily      PRN INPATIENT MEDICATION  acetaminophen     Tablet .. 650 milliGRAM(s) Oral every 6 hours PRN  aluminum hydroxide/magnesium hydroxide/simethicone Suspension 30 milliLiter(s) Oral every 4 hours PRN  melatonin 3 milliGRAM(s) Oral at bedtime PRN  ondansetron Injectable 4 milliGRAM(s) IV Push every 8 hours PRN        VITALS/PHYSICAL EXAM  --------------------------------------------------------------------------------  T(C): 36.4 (23 @ 11:51), Max: 36.4 (23 @ 11:51)  HR: 102 (23 11:51) (92 - 102)  BP: 108/68 (23 11:51) (99/63 - 110/73)  RR: 18 (23 11:51) (18 - 20)  SpO2: 93% (23 11:51) (92% - 93%)  Wt(kg): --        23 @ 07:01  -  23 @ 07:00  --------------------------------------------------------  IN: 0 mL / OUT: 100 mL / NET: -100 mL            LABS/ CULTURES/ RADIOLOGY:              11.1   6.28  >-----------<  172      [23 @ 07:06]              34.7     136  |  101  |  32  ----------------------------<  83      [23 @ 07:05]  4.7   |  23  |  0.99        Ca     8.8     [23 07:05]      Mg     2.3     [23 @ 07:05]      Phos  2.8     [23 07:05]    TPro  5.7  /  Alb  2.6  /  TBili  0.2  /  DBili  x   /  AST  50  /  ALT  44  /  AlkPhos  229  [23 @ 07:05]              CAPILLARY BLOOD GLUCOSE                           [facility]-- WOUND TEAM -- FOLLOW UP NOTE  --------------------------------------------------------------------------------    24 hour events/subjective:    afebrile   tolerating po w/o n/v  ambulates w/ assist  c/o Multilayer wrapping too tight  daughter removed coban layer  daughter noted that pt compliant at home w/ ace wrapping and elevation  all questions answered to expressed satisfaction      Diet:  Diet, DASH/TLC:   Sodium & Cholesterol Restricted  1500mL Fluid Restriction ([id number])     Special Instructions for Nursin milliLiter(s) to 2000 milliLiter(s) fluid restriction (23 @ 01:25)      ROS: General/ SKIN/ MSK/ Vasc see HPI  all other systems negative      ALLERGIES & MEDICATIONS  --------------------------------------------------------------------------------    No Known Allergies        STANDING INPATIENT MEDICATIONS  albuterol/ipratropium for Nebulization 3 milliLiter(s) Nebulizer every 6 hours  atorvastatin 20 milliGRAM(s) Oral at bedtime  budesonide  80 MICROgram(s)/formoterol 4.5 MICROgram(s) Inhaler 2 Puff(s) Inhalation two times a day  fluticasone propionate 50 MICROgram(s)/spray Nasal Spray 1 Spray(s) Both Nostrils two times a day  levothyroxine 100 MICROGram(s) Oral daily  OXcarbazepine 150 milliGRAM(s) Oral two times a day  rivaroxaban 15 milliGRAM(s) Oral with dinner  senna 2 Tablet(s) Oral at bedtime  sodium zirconium cyclosilicate 10 Gram(s) Oral daily      PRN INPATIENT MEDICATION  acetaminophen  Tablet 650 milliGRAM(s) Oral every 6 hours PRN  aluminum hydroxide/magnesium hydroxide/simethicone Suspension 30 milliLiter(s) Oral every 4 hours PRN  melatonin 3 milliGRAM(s) Oral at bedtime PRN  ondansetron Injectable 4 milliGRAM(s) IV Push every 8 hours PRN        VITALS/PHYSICAL EXAM  --------------------------------------------------------------------------------  T(C): 36.4 (23 @ 11:51), Max: 36.4 (23 @ 11:51)  HR: 102 (23 @ 11:51) (92 - 102)  BP: 108/68 (23 @ 11:51) (99/63 - 110/73)  RR: 18 (23 @ 11:51) (18 - 20)  SpO2: 93% (23 @ 11:51) (92% - 93%)  Wt(kg): --        23 @ 07:01  -  23 @ 07:00  --------------------------------------------------------  IN: 0 mL / OUT: 100 mL / NET: -100 mL      General: NAD A&Ox3, WN/ WD/ WG  VersaCare P500 bed  HEENT: NC/AT. EOMI, sclera clear, moist mucous membranes, trachea midline  Neurology: weakened strength, sensation grossly intact, verbal,  following commands  Psych: calm, appropriate  Musculoskeletal: FROM, no contractures  Vascular: BLE equally warm w/ mild edema,  BUE resolved          no cyanosis, clubbing, nor acute ischemia noted         BLE DP pulses palpable,             BLE hemosiderin staining      bilateral toe w/ hammer toe like deformity      bilateral feet w/ 3rd DIP joint w/ superficial dry wounds  Skin:  thin, frail, ecchymosis w/o hematoma  Right dorsal wrist wound w/ dried adherent scab  Left forearm wound - L 1cm X W 1cm x D 0.1cm with white fibrinous wound bed     w/ scant serosanguinous drainage  LLE healed, scab easily removed, no open lesions  RLE w/ several scattered denuded and pinpoint areas w/     scant serosanguinous drainage,  No odor, fluctuance, induration, crepitus increased warmth, tenderness, erythema      LABS/ CULTURES/ RADIOLOGY:              11.1   6.28  >-----------<  172      [23 @ 07:06]              34.7     136  |  101  |  32  ----------------------------<  83      [23 @ 07:05]  4.7   |  23  |  0.99        Ca     8.8     [23 @ 07:05]      Mg     2.3     [23 @ 07:05]      Phos  2.8     [23 @ 07:05]    TPro  5.7  /  Alb  2.6  /  TBili  0.2  /  DBili  x   /  AST  50  /  ALT  44  /  AlkPhos  229  [23 @ 07:05]      ACC: [id number] EXAM:  DUPLEX SCAN EXT VEINS LOWER BI   ORDERED BY: SORAYA BROWN     PROCEDURE DATE:  2023          INTERPRETATION:  CLINICAL INFORMATION: Leg swelling.    COMPARISON: None available.    TECHNIQUE: Duplex sonography of the BILATERAL LOWER extremity veins with   color and spectral Doppler, with and without compression.    FINDINGS: Study is limited by patient positioning and body habitus.    RIGHT:  Normal compressibility of the RIGHT common femoral, femoral and popliteal   veins.  Doppler examination shows normal spontaneous and phasic flow.  Limited visualized of the RIGHT calf veins. No RIGHT calf vein thrombosis   detected.    LEFT:  Normal compressibility of the LEFT common femoral, femoral and popliteal   veins.  Doppler examination shows normal spontaneous and phasic flow.  Limited visualized of the LEFT calf veins. No LEFT calf vein thrombosis   detected.    IMPRESSION:  No evidence of deep venous thrombosis in either lower extremity.  Limited visualization of the calf veins.            CAPILLARY BLOOD GLUCOSE

## 2023-02-22 NOTE — PROGRESS NOTE ADULT - PROBLEM SELECTOR PLAN 7
- takes levothyroxine 75mcg daily at home  - TSH elevated on admission and again on repeat 53.5  - free T4 low as well  - increase synthroid from 75mcg to 100mcg  - discussed with son at bedside that she will need repeat thyroid studies in 6 weeks

## 2023-02-22 NOTE — PROGRESS NOTE ADULT - PROBLEM SELECTOR PLAN 2
- Presents for generalized weakness, also found to be hypotensive. Patient reportedly with history of hypotension, though normally to 90s. Patient reports no syncope, palpitations or vertigo. EKG with rate controlled AF.   - volume overloaded today 2/22  - start lasix 40mg IV qd   - continue to hold ACEi given hypotension/marginal BP  - I&O, daily weights  - Reviewed echo from Aug 2022: Norm LVSF, decreased RVSF, Severe TR & Pulm HTN  - f/u TTE though if clinically continues to do well, can defer to outpatient  - D/w Dr. Cm 2/22 appreciate recs

## 2023-02-23 LAB
ANION GAP SERPL CALC-SCNC: 12 MMOL/L — SIGNIFICANT CHANGE UP (ref 5–17)
BUN SERPL-MCNC: 32 MG/DL — HIGH (ref 7–23)
CALCIUM SERPL-MCNC: 8.6 MG/DL — SIGNIFICANT CHANGE UP (ref 8.4–10.5)
CHLORIDE SERPL-SCNC: 102 MMOL/L — SIGNIFICANT CHANGE UP (ref 96–108)
CO2 SERPL-SCNC: 22 MMOL/L — SIGNIFICANT CHANGE UP (ref 22–31)
CREAT SERPL-MCNC: 1.01 MG/DL — SIGNIFICANT CHANGE UP (ref 0.5–1.3)
EGFR: 50 ML/MIN/1.73M2 — LOW
GLUCOSE SERPL-MCNC: 64 MG/DL — LOW (ref 70–99)
HCT VFR BLD CALC: 34 % — LOW (ref 34.5–45)
HGB BLD-MCNC: 11.2 G/DL — LOW (ref 11.5–15.5)
MAGNESIUM SERPL-MCNC: 2 MG/DL — SIGNIFICANT CHANGE UP (ref 1.6–2.6)
MCHC RBC-ENTMCNC: 29.7 PG — SIGNIFICANT CHANGE UP (ref 27–34)
MCHC RBC-ENTMCNC: 32.9 GM/DL — SIGNIFICANT CHANGE UP (ref 32–36)
MCV RBC AUTO: 90.2 FL — SIGNIFICANT CHANGE UP (ref 80–100)
NRBC # BLD: 0 /100 WBCS — SIGNIFICANT CHANGE UP (ref 0–0)
PHOSPHATE SERPL-MCNC: 3.4 MG/DL — SIGNIFICANT CHANGE UP (ref 2.5–4.5)
PLATELET # BLD AUTO: 155 K/UL — SIGNIFICANT CHANGE UP (ref 150–400)
POTASSIUM SERPL-MCNC: 4.4 MMOL/L — SIGNIFICANT CHANGE UP (ref 3.5–5.3)
POTASSIUM SERPL-SCNC: 4.4 MMOL/L — SIGNIFICANT CHANGE UP (ref 3.5–5.3)
RBC # BLD: 3.77 M/UL — LOW (ref 3.8–5.2)
RBC # FLD: 22.1 % — HIGH (ref 10.3–14.5)
SODIUM SERPL-SCNC: 136 MMOL/L — SIGNIFICANT CHANGE UP (ref 135–145)
WBC # BLD: 4.87 K/UL — SIGNIFICANT CHANGE UP (ref 3.8–10.5)
WBC # FLD AUTO: 4.87 K/UL — SIGNIFICANT CHANGE UP (ref 3.8–10.5)

## 2023-02-23 PROCEDURE — 99233 SBSQ HOSP IP/OBS HIGH 50: CPT

## 2023-02-23 PROCEDURE — 99232 SBSQ HOSP IP/OBS MODERATE 35: CPT

## 2023-02-23 RX ORDER — FUROSEMIDE 40 MG
40 TABLET ORAL DAILY
Refills: 0 | Status: ACTIVE | OUTPATIENT
Start: 2023-02-23 | End: 2024-01-22

## 2023-02-23 RX ORDER — ATENOLOL 25 MG/1
25 TABLET ORAL
Refills: 0 | Status: ACTIVE | OUTPATIENT
Start: 2023-02-23 | End: 2024-01-22

## 2023-02-23 RX ORDER — ATENOLOL 25 MG/1
25 TABLET ORAL DAILY
Refills: 0 | Status: DISCONTINUED | OUTPATIENT
Start: 2023-02-23 | End: 2023-02-23

## 2023-02-23 RX ADMIN — Medication 3 MILLILITER(S): at 05:32

## 2023-02-23 RX ADMIN — Medication 1 SPRAY(S): at 18:43

## 2023-02-23 RX ADMIN — OXCARBAZEPINE 150 MILLIGRAM(S): 300 TABLET, FILM COATED ORAL at 05:32

## 2023-02-23 RX ADMIN — BUDESONIDE AND FORMOTEROL FUMARATE DIHYDRATE 2 PUFF(S): 160; 4.5 AEROSOL RESPIRATORY (INHALATION) at 05:33

## 2023-02-23 RX ADMIN — Medication 1 SPRAY(S): at 05:33

## 2023-02-23 RX ADMIN — ATENOLOL 25 MILLIGRAM(S): 25 TABLET ORAL at 18:42

## 2023-02-23 RX ADMIN — Medication 40 MILLIGRAM(S): at 08:03

## 2023-02-23 RX ADMIN — ATORVASTATIN CALCIUM 20 MILLIGRAM(S): 80 TABLET, FILM COATED ORAL at 21:15

## 2023-02-23 RX ADMIN — OXCARBAZEPINE 150 MILLIGRAM(S): 300 TABLET, FILM COATED ORAL at 18:42

## 2023-02-23 RX ADMIN — BUDESONIDE AND FORMOTEROL FUMARATE DIHYDRATE 2 PUFF(S): 160; 4.5 AEROSOL RESPIRATORY (INHALATION) at 18:43

## 2023-02-23 RX ADMIN — Medication 3 MILLILITER(S): at 00:05

## 2023-02-23 RX ADMIN — Medication 100 MICROGRAM(S): at 05:32

## 2023-02-23 RX ADMIN — Medication 3 MILLILITER(S): at 11:43

## 2023-02-23 RX ADMIN — RIVAROXABAN 15 MILLIGRAM(S): KIT at 18:42

## 2023-02-23 RX ADMIN — ATENOLOL 25 MILLIGRAM(S): 25 TABLET ORAL at 12:53

## 2023-02-23 NOTE — PROGRESS NOTE ADULT - SUBJECTIVE AND OBJECTIVE BOX
Patient is a 89y old  Female who presents with a chief complaint of weakness, hypotension (22 Feb 2023 17:10)      SUBJECTIVE / OVERNIGHT EVENTS: pt feels better, breathing is better, no abdominal pain, chills     MEDICATIONS  (STANDING):  albuterol/ipratropium for Nebulization 3 milliLiter(s) Nebulizer every 6 hours  atenolol  Tablet 25 milliGRAM(s) Oral two times a day  atorvastatin 20 milliGRAM(s) Oral at bedtime  budesonide  80 MICROgram(s)/formoterol 4.5 MICROgram(s) Inhaler 2 Puff(s) Inhalation two times a day  fluticasone propionate 50 MICROgram(s)/spray Nasal Spray 1 Spray(s) Both Nostrils two times a day  furosemide   Injectable 40 milliGRAM(s) IV Push daily  levothyroxine 100 MICROGram(s) Oral daily  OXcarbazepine 150 milliGRAM(s) Oral two times a day  rivaroxaban 15 milliGRAM(s) Oral with dinner  senna 2 Tablet(s) Oral at bedtime    MEDICATIONS  (PRN):  acetaminophen     Tablet .. 650 milliGRAM(s) Oral every 6 hours PRN Temp greater or equal to 38C (100.4F), Mild Pain (1 - 3)  aluminum hydroxide/magnesium hydroxide/simethicone Suspension 30 milliLiter(s) Oral every 4 hours PRN Dyspepsia  melatonin 3 milliGRAM(s) Oral at bedtime PRN Insomnia  ondansetron Injectable 4 milliGRAM(s) IV Push every 8 hours PRN Nausea and/or Vomiting        CAPILLARY BLOOD GLUCOSE        I&O's Summary      PHYSICAL EXAM:  GENERAL: NAD, frail   HEAD:  Atraumatic, Normocephalic  EYES: conjunctiva and sclera clear  CHEST/LUNG: +wheezing, decreased breath sounds bases   HEART: Regular rate and rhythm; S1S2  ABDOMEN: Soft, Nontender, Nondistended; Bowel sounds present  EXTREMITIES:  ace wrap, +2 edema   PSYCH: AAOx3    LABS:                        11.2   4.87  )-----------( 155      ( 23 Feb 2023 07:16 )             34.0     02-23    136  |  102  |  32<H>  ----------------------------<  64<L>  4.4   |  22  |  1.01    Ca    8.6      23 Feb 2023 07:16  Phos  3.4     02-23  Mg     2.0     02-23                RADIOLOGY & ADDITIONAL TESTS:    Imaging Personally Reviewed:    Consultant(s) Notes Reviewed:      Care Discussed with Consultants/Other Providers:

## 2023-02-23 NOTE — PROGRESS NOTE ADULT - PROBLEM SELECTOR PLAN 2
- Presents for generalized weakness, also found to be hypotensive. Patient reportedly with history of hypotension, though normally to 90s. Patient reports no syncope, palpitations or vertigo. EKG with rate controlled AF.   - volume overloaded 2/22  - C/w lasix 40mg IV qd   - Change to po lasix 20mg tomorrow   - continue to hold ACEi given hypotension/marginal BP  - I&O, daily weights  - Reviewed echo from Aug 2022: Norm LVSF, decreased RVSF, Severe TR & Pulm HTN  - f/u TTE though if clinically continues to do well, can defer to outpatient  - D/w Dr. Cm 2/23 appreciate recs

## 2023-02-23 NOTE — PROVIDER CONTACT NOTE (MEDICATION) - ASSESSMENT
pt has no s/s of acute distress
Pt refuse Senna. States she does not need and has regular bowel movements. Pt A&O4.

## 2023-02-23 NOTE — PROGRESS NOTE ADULT - SUBJECTIVE AND OBJECTIVE BOX
Subjective: Patient seen and examined. No new events except as noted.   sob wheezing yesterday given IV lasix  today feels well no sob no wheezing  MEDICATIONS:  MEDICATIONS  (STANDING):  albuterol/ipratropium for Nebulization 3 milliLiter(s) Nebulizer every 6 hours  atenolol  Tablet 25 milliGRAM(s) Oral two times a day  atorvastatin 20 milliGRAM(s) Oral at bedtime  budesonide  80 MICROgram(s)/formoterol 4.5 MICROgram(s) Inhaler 2 Puff(s) Inhalation two times a day  fluticasone propionate 50 MICROgram(s)/spray Nasal Spray 1 Spray(s) Both Nostrils two times a day  furosemide   Injectable 40 milliGRAM(s) IV Push daily  levothyroxine 100 MICROGram(s) Oral daily  OXcarbazepine 150 milliGRAM(s) Oral two times a day  rivaroxaban 15 milliGRAM(s) Oral with dinner  senna 2 Tablet(s) Oral at bedtime      PHYSICAL EXAM:  T(C): 36.6 (02-23-23 @ 12:20), Max: 36.6 (02-23-23 @ 12:20)  HR: 95 (02-23-23 @ 12:20) (95 - 107)  BP: 106/71 (02-23-23 @ 12:20) (106/71 - 116/78)  RR: 18 (02-23-23 @ 12:20) (17 - 18)  SpO2: 95% (02-23-23 @ 12:20) (94% - 96%)  Wt(kg): --  I&O's Summary        Appearance: Normal	looks well NAD warm well perfused  HEENT:   Normal oral mucosa, PERRL, EOMI	  Cardiovascular: Normal S1 S2, irregularly irregular    Respiratory: Lungs clear to auscultation, normal effort 	  Gastrointestinal:  Soft, Non-tender, + BS	  Psychiatry:  Mood & affect appropriate  Ext:  legs wrapped        LABS:    CARDIAC MARKERS:                                11.2   4.87  )-----------( 155      ( 23 Feb 2023 07:16 )             34.0     02-23    136  |  102  |  32<H>  ----------------------------<  64<L>  4.4   |  22  |  1.01    Ca    8.6      23 Feb 2023 07:16  Phos  3.4     02-23  Mg     2.0     02-23      proBNP:   Lipid Profile:   HgA1c:   TSH: Thyroid Stimulating Hormone, Serum: 57.40 uIU/mL (02.18.23 @ 07:20)       Historical Values  Thyroid Stimulating Hormone, Serum: 57.40 uIU/mL (02.18.23 @ 07:20)   Thyroid Stimulating Hormone, Serum: 53.50 uIU/mL (02.17.23 @ 06:53)   Thyroid Stimulating Hormone, Serum: 63.00 uIU/mL (02.15.23 @ 20:02)   Thyroid Stimulating Hormone, Serum: 3.45 uIU/mL (06.21.12 @ 06:40)           TELEMETRY: 	    ECG:  afib   RADIOLOGY:   < from: Xray Chest 1 View- PORTABLE-Routine (Xray Chest 1 View- PORTABLE-Routine .) (02.22.23 @ 10:45) >  FINDINGS:  Heart size not well assessed on this projection. Calcified aortic knob.  Moderate left and small right pleural effusions with hazy bibasilar   opacities. No pneumothorax.    IMPRESSION:  Moderate left and small right pleural effusions with hazy bibasilar   opacities, likely atelectasis however pneumonia cannot be excluded.      < end of copied text >  < from: Xray Chest 1 View- PORTABLE-Routine (Xray Chest 1 View- PORTABLE-Routine .) (02.22.23 @ 10:45) >  FINDINGS:  Heart size not well assessed on this projection. Calcified aortic knob.  Moderate left and small right pleural effusions with hazy bibasilar   opacities. No pneumothorax.    IMPRESSION:  Moderate left and small right pleural effusions with hazy bibasilar   opacities, likely atelectasis however pneumonia cannot be excluded.

## 2023-02-23 NOTE — PROGRESS NOTE ADULT - PROBLEM SELECTOR PLAN 5
- c/w xarelto 15mg qhs, decreased from home dose of 20mg qhs iso MINISTERIO  - resume atenolol 25mg BID

## 2023-02-23 NOTE — PROGRESS NOTE ADULT - PROBLEM SELECTOR PLAN 7
- takes levothyroxine 75mcg daily at home  - TSH elevated on admission and again on repeat 53.5  - free T4 low as well  - increase synthroid from 75mcg to 100mcg  - discussed with daughter that she will need repeat thyroid studies in 6 weeks  - endocrine eval

## 2023-02-24 ENCOUNTER — TRANSCRIPTION ENCOUNTER (OUTPATIENT)
Age: 88
End: 2023-02-24

## 2023-02-24 VITALS
SYSTOLIC BLOOD PRESSURE: 97 MMHG | OXYGEN SATURATION: 95 % | HEART RATE: 91 BPM | TEMPERATURE: 98 F | DIASTOLIC BLOOD PRESSURE: 63 MMHG | RESPIRATION RATE: 18 BRPM

## 2023-02-24 DIAGNOSIS — N39.0 URINARY TRACT INFECTION, SITE NOT SPECIFIED: ICD-10-CM

## 2023-02-24 DIAGNOSIS — J90 PLEURAL EFFUSION, NOT ELSEWHERE CLASSIFIED: ICD-10-CM

## 2023-02-24 LAB
ANION GAP SERPL CALC-SCNC: 11 MMOL/L — SIGNIFICANT CHANGE UP (ref 5–17)
BUN SERPL-MCNC: 32 MG/DL — HIGH (ref 7–23)
CALCIUM SERPL-MCNC: 8.6 MG/DL — SIGNIFICANT CHANGE UP (ref 8.4–10.5)
CHLORIDE SERPL-SCNC: 102 MMOL/L — SIGNIFICANT CHANGE UP (ref 96–108)
CO2 SERPL-SCNC: 24 MMOL/L — SIGNIFICANT CHANGE UP (ref 22–31)
CREAT SERPL-MCNC: 1.16 MG/DL — SIGNIFICANT CHANGE UP (ref 0.5–1.3)
EGFR: 43 ML/MIN/1.73M2 — LOW
GLUCOSE SERPL-MCNC: 62 MG/DL — LOW (ref 70–99)
MAGNESIUM SERPL-MCNC: 2 MG/DL — SIGNIFICANT CHANGE UP (ref 1.6–2.6)
POTASSIUM SERPL-MCNC: 4.3 MMOL/L — SIGNIFICANT CHANGE UP (ref 3.5–5.3)
POTASSIUM SERPL-SCNC: 4.3 MMOL/L — SIGNIFICANT CHANGE UP (ref 3.5–5.3)
SODIUM SERPL-SCNC: 137 MMOL/L — SIGNIFICANT CHANGE UP (ref 135–145)

## 2023-02-24 PROCEDURE — 84480 ASSAY TRIIODOTHYRONINE (T3): CPT

## 2023-02-24 PROCEDURE — 82803 BLOOD GASES ANY COMBINATION: CPT

## 2023-02-24 PROCEDURE — 80048 BASIC METABOLIC PNL TOTAL CA: CPT

## 2023-02-24 PROCEDURE — 71045 X-RAY EXAM CHEST 1 VIEW: CPT

## 2023-02-24 PROCEDURE — 82330 ASSAY OF CALCIUM: CPT

## 2023-02-24 PROCEDURE — 84484 ASSAY OF TROPONIN QUANT: CPT

## 2023-02-24 PROCEDURE — 97602 WOUND(S) CARE NON-SELECTIVE: CPT

## 2023-02-24 PROCEDURE — 83735 ASSAY OF MAGNESIUM: CPT

## 2023-02-24 PROCEDURE — 97164 PT RE-EVAL EST PLAN CARE: CPT

## 2023-02-24 PROCEDURE — 83605 ASSAY OF LACTIC ACID: CPT

## 2023-02-24 PROCEDURE — 86850 RBC ANTIBODY SCREEN: CPT

## 2023-02-24 PROCEDURE — 97161 PT EVAL LOW COMPLEX 20 MIN: CPT

## 2023-02-24 PROCEDURE — 85610 PROTHROMBIN TIME: CPT

## 2023-02-24 PROCEDURE — U0003: CPT

## 2023-02-24 PROCEDURE — 85730 THROMBOPLASTIN TIME PARTIAL: CPT

## 2023-02-24 PROCEDURE — 87086 URINE CULTURE/COLONY COUNT: CPT

## 2023-02-24 PROCEDURE — 94640 AIRWAY INHALATION TREATMENT: CPT

## 2023-02-24 PROCEDURE — 84100 ASSAY OF PHOSPHORUS: CPT

## 2023-02-24 PROCEDURE — 87186 SC STD MICRODIL/AGAR DIL: CPT

## 2023-02-24 PROCEDURE — 82565 ASSAY OF CREATININE: CPT

## 2023-02-24 PROCEDURE — 36415 COLL VENOUS BLD VENIPUNCTURE: CPT

## 2023-02-24 PROCEDURE — 84436 ASSAY OF TOTAL THYROXINE: CPT

## 2023-02-24 PROCEDURE — 86900 BLOOD TYPING SEROLOGIC ABO: CPT

## 2023-02-24 PROCEDURE — 82435 ASSAY OF BLOOD CHLORIDE: CPT

## 2023-02-24 PROCEDURE — 85025 COMPLETE CBC W/AUTO DIFF WBC: CPT

## 2023-02-24 PROCEDURE — 99285 EMERGENCY DEPT VISIT HI MDM: CPT | Mod: 25

## 2023-02-24 PROCEDURE — 85014 HEMATOCRIT: CPT

## 2023-02-24 PROCEDURE — 99233 SBSQ HOSP IP/OBS HIGH 50: CPT

## 2023-02-24 PROCEDURE — 0225U NFCT DS DNA&RNA 21 SARSCOV2: CPT

## 2023-02-24 PROCEDURE — U0005: CPT

## 2023-02-24 PROCEDURE — 83880 ASSAY OF NATRIURETIC PEPTIDE: CPT

## 2023-02-24 PROCEDURE — 81001 URINALYSIS AUTO W/SCOPE: CPT

## 2023-02-24 PROCEDURE — 82947 ASSAY GLUCOSE BLOOD QUANT: CPT

## 2023-02-24 PROCEDURE — 85018 HEMOGLOBIN: CPT

## 2023-02-24 PROCEDURE — 84132 ASSAY OF SERUM POTASSIUM: CPT

## 2023-02-24 PROCEDURE — 93306 TTE W/DOPPLER COMPLETE: CPT

## 2023-02-24 PROCEDURE — 93970 EXTREMITY STUDY: CPT

## 2023-02-24 PROCEDURE — 97110 THERAPEUTIC EXERCISES: CPT

## 2023-02-24 PROCEDURE — 97116 GAIT TRAINING THERAPY: CPT

## 2023-02-24 PROCEDURE — 86901 BLOOD TYPING SEROLOGIC RH(D): CPT

## 2023-02-24 PROCEDURE — 84439 ASSAY OF FREE THYROXINE: CPT

## 2023-02-24 PROCEDURE — 97165 OT EVAL LOW COMPLEX 30 MIN: CPT

## 2023-02-24 PROCEDURE — 80053 COMPREHEN METABOLIC PANEL: CPT

## 2023-02-24 PROCEDURE — 85027 COMPLETE CBC AUTOMATED: CPT

## 2023-02-24 PROCEDURE — 84295 ASSAY OF SERUM SODIUM: CPT

## 2023-02-24 PROCEDURE — 84443 ASSAY THYROID STIM HORMONE: CPT

## 2023-02-24 PROCEDURE — 99232 SBSQ HOSP IP/OBS MODERATE 35: CPT

## 2023-02-24 RX ORDER — OXCARBAZEPINE 300 MG/1
1 TABLET, FILM COATED ORAL
Qty: 0 | Refills: 0 | DISCHARGE
Start: 2023-02-24

## 2023-02-24 RX ORDER — SENNA PLUS 8.6 MG/1
2 TABLET ORAL
Qty: 0 | Refills: 0 | DISCHARGE
Start: 2023-02-24

## 2023-02-24 RX ORDER — RIVAROXABAN 15 MG-20MG
1 KIT ORAL
Qty: 0 | Refills: 0 | DISCHARGE
Start: 2023-02-24

## 2023-02-24 RX ORDER — FLUTICASONE PROPIONATE 50 MCG
1 SPRAY, SUSPENSION NASAL
Qty: 0 | Refills: 0 | DISCHARGE
Start: 2023-02-24

## 2023-02-24 RX ORDER — RIVAROXABAN 15 MG-20MG
1 KIT ORAL
Qty: 0 | Refills: 0 | DISCHARGE

## 2023-02-24 RX ORDER — ATENOLOL 25 MG/1
1 TABLET ORAL
Qty: 0 | Refills: 0 | DISCHARGE
Start: 2023-02-24

## 2023-02-24 RX ORDER — OXCARBAZEPINE 300 MG/1
1 TABLET, FILM COATED ORAL
Qty: 0 | Refills: 0 | DISCHARGE

## 2023-02-24 RX ORDER — QUINAPRIL HYDROCHLORIDE 40 MG/1
1 TABLET, FILM COATED ORAL
Qty: 0 | Refills: 0 | DISCHARGE

## 2023-02-24 RX ORDER — ATORVASTATIN CALCIUM 80 MG/1
1 TABLET, FILM COATED ORAL
Qty: 0 | Refills: 0 | DISCHARGE
Start: 2023-02-24

## 2023-02-24 RX ADMIN — Medication 40 MILLIGRAM(S): at 05:25

## 2023-02-24 RX ADMIN — Medication 3 MILLILITER(S): at 12:06

## 2023-02-24 RX ADMIN — OXCARBAZEPINE 150 MILLIGRAM(S): 300 TABLET, FILM COATED ORAL at 05:26

## 2023-02-24 RX ADMIN — Medication 1 SPRAY(S): at 05:26

## 2023-02-24 RX ADMIN — Medication 100 MICROGRAM(S): at 05:26

## 2023-02-24 RX ADMIN — BUDESONIDE AND FORMOTEROL FUMARATE DIHYDRATE 2 PUFF(S): 160; 4.5 AEROSOL RESPIRATORY (INHALATION) at 05:26

## 2023-02-24 RX ADMIN — Medication 3 MILLILITER(S): at 05:25

## 2023-02-24 RX ADMIN — Medication 3 MILLILITER(S): at 00:02

## 2023-02-24 RX ADMIN — ATENOLOL 25 MILLIGRAM(S): 25 TABLET ORAL at 05:26

## 2023-02-24 NOTE — PROGRESS NOTE ADULT - SUBJECTIVE AND OBJECTIVE BOX
Subjective: Patient seen and examined. No new events except as noted.   Patient continues to feel well.She denies sob or wheezing  afib VR 85 todaynow on [id number] BID  MEDICATIONS:  MEDICATIONS  (STANDING):  albuterol/ipratropium for Nebulization 3 milliLiter(s) Nebulizer every 6 hours  atenolol  Tablet 25 milliGRAM(s) Oral two times a day  atorvastatin 20 milliGRAM(s) Oral at bedtime  budesonide  80 MICROgram(s)/formoterol 4.5 MICROgram(s) Inhaler 2 Puff(s) Inhalation two times a day  fluticasone propionate 50 MICROgram(s)/spray Nasal Spray 1 Spray(s) Both Nostrils two times a day  furosemide   Injectable 40 milliGRAM(s) IV Push daily  levothyroxine 100 MICROGram(s) Oral daily  OXcarbazepine 150 milliGRAM(s) Oral two times a day  rivaroxaban 15 milliGRAM(s) Oral with dinner  senna 2 Tablet(s) Oral at bedtime      PHYSICAL EXAM:  T(C): 36.4 (02-24-23 @ 11:47), Max: 36.7 (02-23-23 @ 20:32)  HR: 86 (02-24-23 @ 11:47) (78 - 105)  BP: 95/59 (02-24-23 @ 11:47) (95/56 - 117/80)  RR: 18 (02-24-23 @ 11:47) (18 - 18)  SpO2: 93% (02-24-23 @ 11:47) (93% - 96%)  Wt(kg): --  I&O's Summary    24 Feb 2023 07:01  -  24 Feb 2023 15:36  --------------------------------------------------------  IN: 720 mL / OUT: 0 mL / NET: 720 mL          Appearance: Normal	elderly but looks well warm well perfused  HEENT:   Normal oral mucosa, PERRL, EOMI	  Cardiovascular: Normal S1 S2, No JVD, No murmurs ,  Respiratory: Lungs clear to auscultation, normal effort no egophony no rales or wheezing	  Psychiatry:  Mood & affect appropriate fully alert and oriented  Ext: legs wrapped        LABS:    CARDIAC MARKERS:                                11.2   4.87  )-----------( 155      ( 23 Feb 2023 07:16 )             34.0     02-24    137  |  102  |  32<H>  ----------------------------<  62<L>  4.3   |  24  |  1.16    Ca    8.6      24 Feb 2023 05:49  Phos  3.4     02-23  Mg     2.0     02-24      proBNP:   Lipid Profile:   HgA1c:   TSH:           TELEMETRY: 	    ECG:  	  RADIOLOGY:   DIAGNOSTIC TESTING:  [ ] Echocardiogram:  [ ]  Catheterization:  [ ] Stress Test:    OTHER:

## 2023-02-24 NOTE — PROGRESS NOTE ADULT - PROBLEM SELECTOR PLAN 6
- c/w statin

## 2023-02-24 NOTE — DISCHARGE NOTE PROVIDER - NSDCMRMEDTOKEN_GEN_ALL_CORE_FT
atenolol 25 mg oral tablet: 1 tab(s) orally 2 times a day  atorvastatin 20 mg oral tablet: 1 tab(s) orally once a day (at bedtime)  fluticasone 50 mcg/inh nasal spray: 1 spray(s) nasal 2 times a day  Lasix 20 mg oral tablet: 1 milligram(s) orally 2 times a day  multivitamin: 1 tab(s) orally once a day  OXcarbazepine 150 mg oral tablet: 1 tab(s) orally 2 times a day  PreserVision AREDS oral capsule: 1 cap(s) orally once a day  rivaroxaban 15 mg oral tablet: 1 tab(s) orally once a day (before a meal)  senna leaf extract oral tablet: 2 tab(s) orally once a day (at bedtime)  Synthroid 75 mcg (0.075 mg) oral tablet: 1 tab(s) orally once a day  Trelegy Ellipta 100 mcg-62.5 mcg-25 mcg/inh inhalation powder: 1 puff(s) inhaled once a day, As Needed  Vitamin D3 25 mcg (1000 intl units) oral tablet: 1 tab(s) orally once a day

## 2023-02-24 NOTE — PROGRESS NOTE ADULT - REASON FOR ADMISSION
weakness, hypotension

## 2023-02-24 NOTE — PROGRESS NOTE ADULT - ASSESSMENT
89yo female with PMH of HTN, HLD, afib on xarelto, chronic wounds, and lymphedema who presented with generalized weakness found to be hypotensive with E. Coli UTI. Also in acute on chronic CHF exacerbation 
This is manohar jones 89 year old with pulmonary hypertension, chronic edema chronic afib probable diastolic dysfunction recurrent UTI now with weakness and some hypotension probably related to bladder infectiona dn blood clot that has cleared  LV systolic function has worsened since last echo of august 2022  appeas fluid overloaded   BP improved    Recommend  antibiotics as per medicine urology  restart diuretic  discharge planning
This is manohar jones 89 year old with pulmonary hypertension, chronic edema chronic afib probable diastolic dysfunction recurrent UTI now with weakness and some hypotension probably related to bladder infectiona dn blood clot that has cleared  LV systolic function has worsened since last echo of august 2022  appeas fluid overloaded   bilateral pleural effusion  improved today  Recommend  antibiotics as per medicine urology  restart diuretic IV lasix for now  restart atenolol 25 BID
This is amazing 89 year old with pulmonary hypertension, chronic edema chronic afib probable diastolic dysfunction recurrent UTI now with weakness and some hypotension probably related to bladder infection and blood clot that has cleared  LV systolic function has worsened since last echo of august 2022  bilateral pleural effusion seem improved and patient less fluid overloaded  afib VR controlled  improved today      Recommend  1. would discharge to rehab on present meds and PO lasix 40  2. followup with me and Dr. bello as outpatient
89yo female with PMH of HTN, HLD, afib on xarelto, chronic wounds, and lymphedema who presented with generalized weakness found to be hypotensive with E. Coli UTI.    Wound Consult requested to assist w/ management of  Right dorsal wrist wound  Left forearm wound  BLE weeping  BLE and BUE edema  BLE PAD, PVD  w/ Venous Stasis     Recommend:  1.) LUE wounds - cleanse with NS, pat dry, apply adaptic dressing QD  Bilateral lower legs - cleanse with NS, pat dry, apply Adaptic & Aquacel dressing QD    2.) BLE elevation and ACE compression wraps  3.) BLE duplex- negative for DVT  4.) Offload heels/feet with complete cair air fluidized boots/pillows; ensure that the soles of the feet are not resting on the foot board of the bed.  5.) Nutrition optimization - w/ high quality protein, mvi, & vit C to assist w/ wound healing  6.) buttocks/ sacrum Hetal BID and prn soiling        attends underpads and pericare as per protocol  7.) q2h turning and offloading as per protocol  Care as per medicine. Will follow periodically during admission  Upon [facility] [address] [place] [address] [phone]     and w/ Vascular Surgery, Dr Wilson  d/w attng, team, & RN  Melvi Flaherty PA-C, CWS [id number]  I spent 35minutes face to face w/ this pt of which more than 50% of the time was spent counseling & coordinating care of this pt. 
89yo female with PMH of HTN, HLD, afib on xarelto, chronic wounds, and lymphedema who presented with generalized weakness found to be hypotensive with E. Coli UTI.
89yo female with PMH of HTN, HLD, afib on xarelto, chronic wounds, and lymphedema who presented with generalized weakness found to be hypotensive with E. Coli UTI.
89 yo female with PMH of HTN, HLD, afib on xarelto, chronic wounds, and lymphedema who presented with generalized weakness found to be hypotensive with E. Coli UTI.
89y F pmh HTN, HLD, afib on xarelto, chronic wounds, lymphedema presenting for eval of generalized weakness, found to be hypotensive and with weakness, now admitted
89yo female with PMH of HTN, HLD, afib on xarelto, chronic wounds, and lymphedema who presented with generalized weakness found to be hypotensive with E. Coli UTI.
89yo female with PMH of HTN, HLD, afib on xarelto, chronic wounds, and lymphedema who presented with generalized weakness found to be hypotensive with E. Coli UTI. Also in acute on chronic CHF exacerbation 
89yo female with PMH of HTN, HLD, afib on xarelto, chronic wounds, and lymphedema who presented with generalized weakness found to be hypotensive with E. Coli UTI.
89yo female with PMH of HTN, HLD, afib on xarelto, chronic wounds, and lymphedema who presented with generalized weakness found to be hypotensive with E. Coli UTI.

## 2023-02-24 NOTE — PROGRESS NOTE ADULT - PROBLEM SELECTOR PLAN 1
- presenting with hypotension and positive UA and hematuria  - urine cx growing >100K E. coli, sensitive to ceftriaxone  - s/p ceftriaxone 1g q24h for 3 days end 2/18  - s/p payne placement with clots irrigated and hematuria resolved   - TOV 2/18- passed   - hematuria resolved
- presenting with hypotension and positive UA and hematuria  - urine cx growing >100K E. coli, sensitive to ceftriaxone  - s/p ceftriaxone 1g q24h for 3 days end 2/18  - s/p payne placement with clots irrigated and hematuria resolved   - TOV 2/18- passed   - hematuria resolved
Presents for generalized weakness, also found to be hypotensive. Patient reportedly with history of hypotension, though normally to 90s. Patient reports no syncope, palpitations or vertigo. EKG with rate controlled AF.   - Hold ACEi given hypotension   - IV lasix when BP permits      - I&O, daily weights  - Reviewed echo from Aug 2022: Norm LVSF, decreased RVSF, Severe TR & Pulm HTN  - repeat echocardiogram requested   - Primary cardiologist, Dr. Cm, notified, will arrange for follow up in hospital
presenting with hypotension and positive UA and hematuria  - urine cx growing >100K E. coli, sensitive to ceftriaxone  - c/w ceftriaxone 1g q24h for 3 days end 2/18  - s/p payne placement with clots irrigated and hematuria resolved - TOV 2/18- passed   - hematuria resolved
presenting with hypotension and positive UA and hematuria  - urine cx growing >100K E. coli, sensitive to ceftriaxone  - c/w ceftriaxone 1g q24h for 3 days end 2/18  - s/p payne placement with clots irrigated and hematuria resolved - TOV 2/18- passed   - hematuria resolved
- presenting with hypotension and positive UA and hematuria  - urine cx growing >100K E. coli, sensitive to ceftriaxone  - s/p ceftriaxone 1g q24h for 3 days end 2/18  - s/p payne placement with clots irrigated and hematuria resolved   - TOV 2/18- passed   - hematuria resolved
presenting with hypotension and positive UA and hematuria  - urine cx growing >100K E. coli, sensitivities pending  - c/w ceftriaxone 1g q24h for 3 days to end 2/18  - f/u urine cx sensitivities  - s/p payne placement with clots irrigated and hematuria improved  - still with tinges of blood in her urine but much improved  - her outpatient urologist Dr. Danyel Flaherty aware of above, recommending TOV when hematuria resolves  - re-eval for possible TOV tomorrow
presenting with hypotension and positive UA and hematuria  - urine cx growing >100K E. coli, sensitive to ceftriaxone  - c/w ceftriaxone 1g q24h for 3 days to end 2/18  - s/p payne placement with clots irrigated and hematuria resolved - TOV 2/18  - her outpatient urologist Dr. Danyel Flaherty aware of above, recommended TOV when hematuria resolves
presenting with hypotension and positive UA and hematuria  - urine cx growing >100K E. coli, sensitive to ceftriaxone  - c/w ceftriaxone 1g q24h for 3 days end 2/18  - s/p payne placement with clots irrigated and hematuria resolved - TOV 2/18- passed   - hematuria resolved

## 2023-02-24 NOTE — PROGRESS NOTE ADULT - SUBJECTIVE AND OBJECTIVE BOX
Patient is a 89y old  Female who presents with a chief complaint of weakness, hypotension (23 Feb 2023 13:48)      SUBJECTIVE / OVERNIGHT EVENTS: pt feels better, no cp, sob, abdominal pain    MEDICATIONS  (STANDING):  albuterol/ipratropium for Nebulization 3 milliLiter(s) Nebulizer every 6 hours  atenolol  Tablet 25 milliGRAM(s) Oral two times a day  atorvastatin 20 milliGRAM(s) Oral at bedtime  budesonide  80 MICROgram(s)/formoterol 4.5 MICROgram(s) Inhaler 2 Puff(s) Inhalation two times a day  fluticasone propionate 50 MICROgram(s)/spray Nasal Spray 1 Spray(s) Both Nostrils two times a day  furosemide   Injectable 40 milliGRAM(s) IV Push daily  levothyroxine 100 MICROGram(s) Oral daily  OXcarbazepine 150 milliGRAM(s) Oral two times a day  rivaroxaban 15 milliGRAM(s) Oral with dinner  senna 2 Tablet(s) Oral at bedtime    MEDICATIONS  (PRN):  acetaminophen     Tablet .. 650 milliGRAM(s) Oral every 6 hours PRN Temp greater or equal to 38C (100.4F), Mild Pain (1 - 3)  aluminum hydroxide/magnesium hydroxide/simethicone Suspension 30 milliLiter(s) Oral every 4 hours PRN Dyspepsia  melatonin 3 milliGRAM(s) Oral at bedtime PRN Insomnia  ondansetron Injectable 4 milliGRAM(s) IV Push every 8 hours PRN Nausea and/or Vomiting        CAPILLARY BLOOD GLUCOSE        I&O's Summary      PHYSICAL EXAM:  GENERAL: NAD, frail   HEAD:  Atraumatic, Normocephalic  EYES: conjunctiva and sclera clear  CHEST/LUNG: +wheezing, decreased breath sounds bases   HEART: Regular rate and rhythm; S1S2  ABDOMEN: Soft, Nontender, Nondistended; Bowel sounds present  EXTREMITIES:  ace wrap, +2 edema   PSYCH: AAOx3    LABS:                        11.2   4.87  )-----------( 155      ( 23 Feb 2023 07:16 )             34.0     02-24    137  |  102  |  32<H>  ----------------------------<  62<L>  4.3   |  24  |  1.16    Ca    8.6      24 Feb 2023 05:49  Phos  3.4     02-23  Mg     2.0     02-24                RADIOLOGY & ADDITIONAL TESTS:    Imaging Personally Reviewed:    Consultant(s) Notes Reviewed:      Care Discussed with Consultants/Other Providers:

## 2023-02-24 NOTE — PROGRESS NOTE ADULT - PROVIDER SPECIALTY LIST ADULT
Cardiology
Wound Care
Cardiology
Cardiology
Hospitalist
Internal Medicine
Internal Medicine
Hospitalist

## 2023-02-24 NOTE — DISCHARGE NOTE PROVIDER - NSDCCPCAREPLAN_GEN_ALL_CORE_FT
PRINCIPAL DISCHARGE DIAGNOSIS  Diagnosis: Acute UTI  Assessment and Plan of Treatment: You were noted either on arrival or during this hospitalization to have a Urinary Tract Infection. You may have already been treated and completed the antibiotics, please refer to the list of medications present on your discharge paperwork. If you notice that there are antibiotics listed, these may be to treat your infection, be sure to complete taking the full course, whether you have symptoms or not, as prescribed.  While taking antibiotics, you may benefit from taking a probiotic such as florastore to help to try and prevent an infectious type of diarrhea known as C Diff. If you notice that you begin having severe watery diarrhea, more than 4-5 episodes a day, please see your Primary Care Doctor or come to the ER to have your stool tested for this infection.   It is not necessary to repeat a urine test to see if the infection is gone, it is assumed that after treatment it should have resolved. However, if you continue to have symptoms, please see your Primary Care doctor or return to the ER.      SECONDARY DISCHARGE DIAGNOSES  Diagnosis: Acute systolic congestive heart failure  Assessment and Plan of Treatment: Congestive heart failure (CHF) is a chronic condition in which the heart has trouble pumping blood. In some cases of heart failure, fluid may back up into your lungs or you may have swelling (edema) in your lower legs. There are many causes of heart failure including high blood pressure, coronary artery disease, abnormal heart valves, heart muscle disease, lung disease, diabetes, etc. Symptoms include shortness of breath with activity or when lying flat, cough, swelling of the legs, fatigue, or increased urination during the night.   Treatment is aimed at managing the symptoms of heart failure and may include lifestyle changes, medications, or surgical procedures. Take medicines only as directed by your health care provider and do not stop unless instructed to do so. Eat heart-healthy foods with low or no trans/saturated fats, cholesterol and salt. Weigh yourself every day for early recognition of fluid accumulation.  SEEK IMMEDIATE MEDICAL CARE IF YOU HAVE ANY OF THE FOLLOWING SYMPTOMS: shortness of breath, change in mental status, chest pain, lightheadedness/dizziness/fainting, or worsening of symptoms including not being able to conduct normal physical activity.    Diagnosis: Pleural effusion  Assessment and Plan of Treatment:     Diagnosis: Acute UTI  Assessment and Plan of Treatment:      PRINCIPAL DISCHARGE DIAGNOSIS  Diagnosis: Acute UTI  Assessment and Plan of Treatment: You were noted either on arrival or during this hospitalization to have a Urinary Tract Infection. You may have already been treated and completed the antibiotics, please refer to the list of medications present on your discharge paperwork. If you notice that there are antibiotics listed, these may be to treat your infection, be sure to complete taking the full course, whether you have symptoms or not, as prescribed.  While taking antibiotics, you may benefit from taking a probiotic such as florastore to help to try and prevent an infectious type of diarrhea known as C Diff. If you notice that you begin having severe watery diarrhea, more than 4-5 episodes a day, please see your Primary Care Doctor or come to the ER to have your stool tested for this infection.   It is not necessary to repeat a urine test to see if the infection is gone, it is assumed that after treatment it should have resolved. However, if you continue to have symptoms, please see your Primary Care doctor or return to the ER.      SECONDARY DISCHARGE DIAGNOSES  Diagnosis: Acute systolic congestive heart failure  Assessment and Plan of Treatment: Congestive heart failure (CHF) is a chronic condition in which the heart has trouble pumping blood. In some cases of heart failure, fluid may back up into your lungs or you may have swelling (edema) in your lower legs. There are many causes of heart failure including high blood pressure, coronary artery disease, abnormal heart valves, heart muscle disease, lung disease, diabetes, etc. Symptoms include shortness of breath with activity or when lying flat, cough, swelling of the legs, fatigue, or increased urination during the night.   Treatment is aimed at managing the symptoms of heart failure and may include lifestyle changes, medications, or surgical procedures. Take medicines only as directed by your health care provider and do not stop unless instructed to do so. Eat heart-healthy foods with low or no trans/saturated fats, cholesterol and salt. Weigh yourself every day for early recognition of fluid accumulation.  SEEK IMMEDIATE MEDICAL CARE IF YOU HAVE ANY OF THE FOLLOWING SYMPTOMS: shortness of breath, change in mental status, chest pain, lightheadedness/dizziness/fainting, or worsening of symptoms including not being able to conduct normal physical activity.    Diagnosis: Chronic atrial fibrillation  Assessment and Plan of Treatment: Atrial fibrillation is a type of irregular heartbeat (arrhythmia) where the heart quivers continuously in a chaotic pattern that makes the heart unable to pump blood normally. This can increase the risk for stroke, heart failure, and other heart-related conditions. Atrial fibrillation can be caused by a variety of conditions and may be temporary, intermittent, or permanent. Symptoms include feeling that your heart is beating rapidly or irregularly, chest discomfort, shortness of breath, or dizziness/lightheadedness that may be worse with exertion.   Please continue Xarelto for anticoagulation.   SEEK IMMEDIATE MEDICAL CARE IF YOU HAVE ANY OF THE FOLLOWING SYMPTOMS: chest pain, shortness of breath, abdominal pain, sweating, vomiting, blood in vomit/bowel movements/urine, dizziness/lightheadedness, weakness or numbness to face/arm/leg, trouble speaking or understanding, facial droop.    Diagnosis: MINISTERIO (acute kidney injury)  Assessment and Plan of Treatment: You were noted to have a temporary insult to your kidney function either at the time that you arrived at the hospital or during your stay here. We have monitored your kidney function with blood work during your time here and you are at a level that no longer requires continued hospital level care. We do recommend that you follow up to continually have your kidney function checked. You can follow up with your primary care doctor, or, if recommended in the discharge paperwork, you should follow up with a kidney specialist called a nephrologist. We will continue to hold ypur ACE inhibitor until follow up with Neprhology or our PMD.

## 2023-02-24 NOTE — PROGRESS NOTE ADULT - PROBLEM SELECTOR PLAN 4
- but with hypotension currently likely in setting of infection/UTI.  - hold home lisinopril  - home atenolol started   - monitor vitals
- but with hypotension currently likely in setting of infection/UTI.  - hold home lisinopril  - home atenolol started   - monitor vitals
but with hypotension currently likely in setting of infection/UTI.  - hold home atenolol and lisinopril  - monitor vitals
- but with hypotension currently likely in setting of infection/UTI.  - hold home atenolol and lisinopril  - monitor vitals
but with hypotension currently likely in setting of infection/UTI.  - hold home atenolol and lisinopril  - monitor vitals
but with hypotension currently likely in setting of infection/UTI.  - hold home atenolol and lisinopril  - monitor vitals
- Atenolol    - Hold lisinopril given hypotension on admission

## 2023-02-24 NOTE — PROGRESS NOTE ADULT - PROBLEM SELECTOR PROBLEM 3
MINISTERIO (acute kidney injury)
MNIISTERIO (acute kidney injury)
MINISTERIO (acute kidney injury)

## 2023-02-24 NOTE — PROGRESS NOTE ADULT - PROBLEM SELECTOR PLAN 2
- Presents for generalized weakness, also found to be hypotensive. Patient reportedly with history of hypotension, though normally to 90s. Patient reports no syncope, palpitations or vertigo. EKG with rate controlled AF.   - volume overloaded 2/22  - C/w lasix 40mg IV qd   - Change to po lasix 20mg on discharge today   - continue to hold ACEi given hypotension/marginal BP  - I&O, daily weights  - Reviewed echo from Aug 2022: Norm LVSF, decreased RVSF, Severe TR & Pulm HTN  - D/w Dr. Cm 2/23 appreciate recs

## 2023-02-24 NOTE — DISCHARGE NOTE NURSING/CASE MANAGEMENT/SOCIAL WORK - NSDCPEFALRISK_GEN_ALL_CORE
For information on Fall & Injury Prevention, visit: [email or web address] OR  [email or web address] OR  https://www.cdc.gov/steadi/patient.html

## 2023-02-24 NOTE — PROGRESS NOTE ADULT - PROBLEM SELECTOR PROBLEM 2
Acute UTI
Acute on chronic diastolic congestive heart failure

## 2023-02-24 NOTE — DISCHARGE NOTE PROVIDER - HOSPITAL COURSE
89yo female with PMH of HTN, HLD, afib on xarelto, chronic wounds, and lymphedema who presented with generalized weakness found to be hypotensive with E. Coli UTI. Also in acute on chronic CHF exacerbation. Urine cx growing >100K E. coli, sensitive to ceftriaxone s/p ceftriaxone 1g q24h for 3 days end 2/18. s/p payne placement with clots irrigated and hematuria resolved. TOV 2/18- passed, hematuria resolved. Patient also volume overloaded and is sp Lasix 40mg IV, now transitioned to Lasix 20mg PO daily. Patient is medically cleared to be discharged to Rehab with follow up with PMD upon discharge.

## 2023-02-24 NOTE — PROGRESS NOTE ADULT - PROBLEM SELECTOR PROBLEM 1
Acute cystitis with hematuria
Acute cystitis with hematuria
Acute on chronic diastolic congestive heart failure
Acute cystitis with hematuria

## 2023-02-24 NOTE — PROGRESS NOTE ADULT - PROBLEM SELECTOR PLAN 9
- DVT ppx: on Xarelto   - Diet: DASH  - PT consult rec [facility]  - Wound care  - CM to facilitate dc to [facility]ita
- DVT ppx: on Xarelto   - Diet: DASH  - PT consult rec FREDDY  - Wound care      D/w daughter 2/23 updated on full plan of care
- DVT ppx: on Xarelto   - Diet: DASH  - PT consult rec ANNITA  - Wound care  - CM to facilitate dc to annita
- DVT ppx: on Xarelto   - Diet: DASH  - PT consult rec FREDDY  - Wound care
- DVT ppx: on Xarelto   - Diet: DASH  - PT consult rec FREDDY  - Wound care      D/w dominique at bedside 2/22 updated on full plan of care
- DVT ppx: on Xarelto   - Diet: DASH  - PT consult rec FREDDY  - Wound care      D/w daughter 2/23 updated on full plan of care
- DVT ppx: on Xarelto   - Diet: DASH  - PT consult rec FREDDY  - Wound care
- DVT ppx: on Xarelto   - Diet: DASH  - PT consult   - Wound care

## 2023-02-24 NOTE — PROGRESS NOTE ADULT - PROBLEM SELECTOR PLAN 7
- takes levothyroxine 75mcg daily at home  - TSH elevated on admission and again on repeat 53.5  - free T4 low as well  - increase synthroid from 75mcg to 100mcg  - discussed with daughter that she will need repeat thyroid studies in 6 weeks

## 2023-02-24 NOTE — DISCHARGE NOTE NURSING/CASE MANAGEMENT/SOCIAL WORK - PATIENT PORTAL LINK FT
You can access the FollowMyHealth Patient Portal offered by [facility] by registering at the following website: [email or web address]. By joining [facility] FollowMyHealth portal, you will also be able to view your health information using other applications (apps) compatible with our system.

## 2023-02-24 NOTE — DISCHARGE NOTE PROVIDER - CARE PROVIDER_API CALL
Guilherme Cm (MD)  Cardiovascular Disease; Internal Medicine; Interventional Cardiology  [address] [place] [address]  Phone: [phone]  Fax: [phone]  Follow Up Time:

## 2023-02-27 ENCOUNTER — NON-APPOINTMENT (OUTPATIENT)
Age: 88
End: 2023-02-27

## 2023-03-06 ENCOUNTER — TRANSCRIPTION ENCOUNTER (OUTPATIENT)
Age: 88
End: 2023-03-06

## 2023-03-09 ENCOUNTER — APPOINTMENT (OUTPATIENT)
Dept: WOUND CARE | Facility: CLINIC | Age: 88
End: 2023-03-09

## 2023-03-14 ENCOUNTER — APPOINTMENT (OUTPATIENT)
Dept: WOUND CARE | Facility: CLINIC | Age: 88
End: 2023-03-14
Payer: MEDICARE

## 2023-03-14 ENCOUNTER — TRANSCRIPTION ENCOUNTER (OUTPATIENT)
Age: 88
End: 2023-03-14

## 2023-03-14 DIAGNOSIS — S81.802D UNSPECIFIED OPEN WOUND, RIGHT LOWER LEG, SUBSEQUENT ENCOUNTER: ICD-10-CM

## 2023-03-14 DIAGNOSIS — S61.502S: ICD-10-CM

## 2023-03-14 DIAGNOSIS — S81.801D UNSPECIFIED OPEN WOUND, RIGHT LOWER LEG, SUBSEQUENT ENCOUNTER: ICD-10-CM

## 2023-03-14 PROCEDURE — 99214 OFFICE O/P EST MOD 30 MIN: CPT | Mod: 95

## 2023-03-14 NOTE — HISTORY OF PRESENT ILLNESS
[Other Location: e.g. School (Enter Location, City,State)___] : at [unfilled], at the time of the visit. [Medical Office: ([facility])___] : at the medical office located in  [Family Member] : family member [Verbal consent obtained from patient] : the patient, [unfilled] [FreeTextEntry4] : Ceasar NP [FreeTextEntry1] : I have just had the pleasure of seeing Mrs. Sabi Cordova ( 24) in consultation for lower extremity venous insufficiency. \par \par Mrs. Cordvoa is a pleasant 89-year-old lady who is accompanied by her daughter today. She was last evaluated at our office five years ago. At the time, she was treated for right leg cellulitis and edema.Patient was on  Compression therapy , but no pulses palpable today, and PAD on screening TERRY tool\par She presents today with copious lymphatic drainage from pin point ulcers of bilateral lower extremities. Her daughter reports that she soaks the bed sheets nightly. She spends a significant part of the day sitting with her legs in a dependent position. She denies any constitutional symptoms. She is minimally ambulatory. \par \par Her medical and surgical history is otherwise significant for TIA (difficulty with speech; seven years ago; her workup was negative), hypothyroidism, macular degeneration, anemia, hypertension, hyperlipidemia, and osteoarthritis\par LEft arm wound open as result of swelling\par \par Medications: Xarelto, Aspirin, Lipitor, Hydrochlorothiazide, Oxcarbazepine, Synthroid, Prilosec, Quinapril\par \par  [de-identified] : Previously, Unna boot therapy multilayer was ordered via VNS. Patient's daughter could not coordinate with VNS and with short course of compression, wounds healed. Patient now returns with weeping wounds of both legs and from the left upper extremity. Daughter reports she has also not obtained compression stockings for patient as she did not take patient with her to the store and legs could not be measured.

## 2023-03-14 NOTE — PLAN
[FreeTextEntry1] : 3/14/23\par Plan - recc. santyl to hand wound after washing/adaptic/gauze/ming\par legs - adaptic/super absorber/ming/ace\par follow up after d/c from rehab

## 2023-03-14 NOTE — PHYSICAL EXAM
[0] : left 0 [de-identified] : On physical examination the patient is NAD, AAOx3. Neurologically intact. The lungs are clear and the heart has an irregular rate and rhythm. The abdomen is soft, without tenderness or pulsatile mass. Bilateral femoral pulses are palpable. Pedal pulses are non-palpable. Bilateral lower extremity edema is present. There is drainage of lymphatic fluid from both legs. No signs of infection present. Left forearm abrasion with lymphatic fluid.

## 2023-03-14 NOTE — ASSESSMENT
[FreeTextEntry1] : In summary, Mrs. Cordova presents with bilateral lower extremity edema with lymphorrhea, left arm wound\par \par Wound Assessment and Plan:\par \par The patient presents with a wound to the left arm -.  Swelling noted to the extremity.  \par TERRY/PVR and standing venous reflux study scheduled.\par Patient underwent evaluation for peripheral arterial disease using a [employer] diagnostic machine.  Ankle brachial index was obtained using blood pressure cuffs of the ankle and brachial segments of both legs.  A distal pulse volume recording was noted digitally on the device.  The procedure was supervised and interpreted by the physician.  TERRY of the right lower extremity PAD.   TERRY of the left lower extremity PAD.  Waveform noted to be(monophasic).   Patient tolerated procedure well in the office.  Results discussed with the patient. \par s/p excisional debridement of upper leg wound. Patient being followed by Dr. Wilson\par \par 1/25/23\par Bilateral legs weeping, legs edematous, draining moderate - large amounts clear fluid\par going for PVR today\par s/p excisional debridement left forearm of yellow slough\par \par 2/15/23\par Bilateral legs edematous, superficial openings weeping large amounts clear fluid, the current super absorber is not sufficient, also using abdominal pads which do not absorb\par bilateral hand wounds, left hand s/p excisional debridement today\par had ultrasound of bilateral arms, showed moderate arterial occlusive disease\par discussed using mild compression such as a tubie  to both arms and legs\par 3/14/23\par Pt. sitting up in bed, no complaints, in good spirits\par pt. currently in a rehab in [place]\par seen in real time and pics sent in\par left wrist wound with yellow slough\par bilateral legs less edematous, superficial openings draining moderate, but less than prior \par has super absorber, daughter has been rendering wound care to mom

## 2023-03-20 ENCOUNTER — TRANSCRIPTION ENCOUNTER (OUTPATIENT)
Age: 88
End: 2023-03-20

## 2023-04-04 NOTE — ED PROVIDER NOTE - TEMPLATE, MLM
Detail Level: Detailed Quality 130: Documentation Of Current Medications In The Medical Record: Current Medications Documented General

## 2023-04-29 NOTE — ED ADULT NURSE NOTE - GENITOURINARY ASSESSMENT
Dax Vidal is a 89 year old male presenting to the walk-in clinic today alone for both ears are clogged with wax. Left one is worse..    Treatment tried prior to visit: none    Swabs/Specimens collected during rooming process:  None    Work, School or  note needed: No    New vs Established: Established    Patient would like communication of their results via:      Home Phone: [phone] (home)  Okay to leave a message containing results? Yes     - - -

## 2023-07-18 NOTE — DISCHARGE NOTE NURSING/CASE MANAGEMENT/SOCIAL WORK - NSDCPETBCESMAN_GEN_ALL_CORE
Physical Therapy    Visit Type: treatment    Relevant History/Co-morbidities: Pt. fell and sustained right humerus fracture.  Brace and splint on right UE at all times.   NWB right UE.     SUBJECTIVE  Patient agreed to participate in therapy this date.  Patient verbally agrees to allow the following to be present during session: son (daughter in law)  RN, Yoanna, approved session.    Patient reports that she is not feeling the best today.  Pt. initially refused mobility, but with strong encouragement from therapist, aide, and pt's family, pt agreeable to try.  Pt. states that she is fearful of falling.   Patient / Family Goal: maximize function    Pain   RN informed on pain level.    Location: no pain at rest; right arm 6/10 with activity     OBJECTIVE     Cognitive Status   Arousal Alertness   - appropriate responses to stimuli  Affect/Behavior    - cooperative and pleasant  Orientation    - Oriented to: person, place, time and situation  Functional Communication   - Overall Status: within functional limits   - Forms of Communication: verbal  Following Direction   - follows all commands and directions consistently    Patient Activity Tolerance: 1 to 2 activity to rest    Posture:  - Seated: thoracic kyphosis and forward head  - Standing: thoracic kyphosis, trunk lean anterior, flexed hips and forward head      Range of Motion (ROM)   (degrees unless noted; active unless noted; norms in ( ); negative=lacking to 0, positive=beyond 0)  WFL: LLE, RLE    Strength  (out of 5 unless noted, standard test position unless noted)   Comments / Details: Decreased LE strength as noted with transfers.       Sitting Balance  (VANDANA = base of support)  Dynamic      - Trial 1 details: stand by assist    Standing Balance  (VANDANA = base of support)  Firm Surface: Double Leg      - Static, Eyes Open       - Trial 1 details: with single UE support and contact guard     - Dynamic, Eyes Open       - Trial 1 details: with single UE support and  minimal assist  SBQC utilized in left hand for standing balance.          Bed Mobility  - Supine to sit: total assist - non-dependent (min assist x 2)  - Sit to supine: total assist - non-dependent (mod assist x 2)  Cues for sequencing.  Assist for trunk and bilateral LEs.  Increased time due to right UE pain.   Transfers  Assistive devices: small base quad cane, gait belt  - Sit to stand: total assist - non-dependent (min A x 1, CGA x 1)  - Stand to sit: total assist - non-dependent (min A x 1, CGA x 1)  Pt. performed two standing transfers during session.  Min assist for balance and CGA of second person for safety.     Ambulation / Gait  - Assistive device: gait belt and small base quad cane  - Distance (feet unless otherwise indicated): 12, 12  - Assist Level: total assist - non-dependent (min A x 1, CGA x 1)  - Description: forward flexed at hips, decreased mercedes/pace, decreased step length left, decreased step length right, decreased stance time LLE and decreased stance time RLE  Cues for sequencing with cane and for upright posture.  Sitting rest breaks needed between walks to recover from fatigue.        Interventions     Training provided: bed mobility training, balance retraining, gait training, safety training, transfer training, activity tolerance, use of assistive device and positioning  Increased time to adjust sling appropriately during session.   Skilled input: Verbal instruction/cues  Verbal Consent: Writer verbally educated and received verbal consent for hand placement, positioning of patient, and techniques to be performed today from patient          Education:   - Present and ready to learn: patient, patient's family and with patient present  Education provided during session:  - Results of above outlined education: Verbalizes understanding, Demonstrates understanding and Needs reinforcement    ASSESSMENT   Progress: progressing toward goals    Summary of function and discharge needs based on  today's assessment:   - Current level of function: significantly below baseline level of function   - Therapy needs at discharge: therapy 5 or more times per week   - Activities of daily living (ADLs) requiring support at discharge: bed mobility, transfers and ambulation   - Impairments that require further therapy intervention: ROM, strength, balance, activity tolerance and pain    AM-PAC  - Generalized Prior Level of Function: IND/MOD I ([facility] 22-24)       Key: MOD A=moderate assistance, IND/MOD I=independent/modified independent  - Generalized Current Level of Function     - Current Mobility Score: 6       AM-PAC Scoring Key= >21 Modified Independent; 20-21 Supervision; 18-19 Minimal assist; 13-18 Moderate assist; 9-12 Max assist; <9 Total assist        PLAN   Suggestions for next session as indicated: Bed mobility, transfers and ambulation with SBQC, bring aide to assist and provide chair follow for ambulation, increase ambulation distance  PT Frequency: 3-5 x per week      PT/OT Mobility Equipment for Discharge: pt will need small base quad cane - likely defer to [facility]  Agreement to plan and goals: patient agrees with goals and treatment plan        GOALS  Review Date: 7/20/2023  Long Term Goals: (to be met by time of discharge from hospital)  Sit to supine: Patient will complete sit to supine modified independent.  Status: progressing/ongoing  Supine to sit: Patient will complete supine to sit modified independent.  Status: progressing/ongoing  Sit to stand: Patient will complete sit to stand transfer with least restrictive device, modified independent.   Status: progressing/ongoing  Stand to sit: Patient will complete stand to sit transfer with least restrictive device, modified independent.   Status: progressing/ongoing  Ambulation (even): Patient will ambulate on even surface for 100 feet with least restrictive device, modified independent.   Status: progressing/ongoing  Home program: patient independent with  home program as instructed.   Status: progressing/ongoing    Documented in the chart in the following areas: Assessment/Plan.      Patient at End of Session:   Location: in chair  Safety measures: alarm system in place/re-engaged, call light within reach and lines intact  Handoff to: nurse      Therapy procedure time and total treatment time can be found documented on the Time Entry flowsheet   If you are a smoker, it is important for your health to stop smoking. Please be aware that second hand smoke is also harmful.

## 2024-02-18 NOTE — H&P ADULT - HEMATOLOGY/LYMPHATICS
Hgb low , stable  s/p Vitamin K and 1 unit FFP for INR 2.19 prior to PEG placement. Hgb low , stable  s/p Vitamin K and 1 unit FFP for INR 2.19 prior to PEG placement.  AC on hold  C/w PPI   Monitor CBC and transfuse for Hgb <8.0 or symptomatic negative

## 2024-09-17 NOTE — REVIEW OF SYSTEMS
[Dyspnea on Exertion] : dyspnea on exertion [Negative] : Psychiatric [FreeTextEntry5] : Dyspnea on exertion No assistance needed

## 2024-11-12 NOTE — ED ADULT NURSE NOTE - NSIMPLEMENTINTERV_GEN_ALL_ED
Medicare Preventive Visit Patient Instructions  Thank you for completing your Welcome to Medicare Visit or Medicare Annual Wellness Visit today. Your next wellness visit will be due in one year (11/13/2025).  The screening/preventive services that you may require over the next 5-10 years are detailed below. Some tests may not apply to you based off risk factors and/or age. Screening tests ordered at today's visit but not completed yet may show as past due. Also, please note that scanned in results may not display below.  Preventive Screenings:  Service Recommendations Previous Testing/Comments   Colorectal Cancer Screening  * Colonoscopy    * Fecal Occult Blood Test (FOBT)/Fecal Immunochemical Test (FIT)  * Fecal DNA/Cologuard Test  * Flexible Sigmoidoscopy Age: 45-75 years old   Colonoscopy: every 10 years (may be performed more frequently if at higher risk)  OR  FOBT/FIT: every 1 year  OR  Cologuard: every 3 years  OR  Sigmoidoscopy: every 5 years  Screening may be recommended earlier than age 45 if at higher risk for colorectal cancer. Also, an individualized decision between you and your healthcare provider will decide whether screening between the ages of 76-85 would be appropriate. Colonoscopy: 01/13/2022  FOBT/FIT: Not on file  Cologuard: Not on file  Sigmoidoscopy: Not on file    Screening Not Indicated     Breast Cancer Screening Age: 40+ years old  Frequency: every 1-2 years  Not required if history of left and right mastectomy Mammogram: Not on file        Cervical Cancer Screening Between the ages of 21-29, pap smear recommended once every 3 years.   Between the ages of 30-65, can perform pap smear with HPV co-testing every 5 years.   Recommendations may differ for women with a history of total hysterectomy, cervical cancer, or abnormal pap smears in past. Pap Smear: Not on file    Screening Not Indicated   Hepatitis C Screening Once for adults born between 1945 and 1965  More frequently in patients at  high risk for Hepatitis C Hep C Antibody: Not on file        Diabetes Screening 1-2 times per year if you're at risk for diabetes or have pre-diabetes Fasting glucose: 137 mg/dL (1/7/2022)  A1C: No results in last 5 years (No results in last 5 years)      Cholesterol Screening Once every 5 years if you don't have a lipid disorder. May order more often based on risk factors. Lipid panel: Not on file    Screening Not Indicated  History Lipid Disorder     Other Preventive Screenings Covered by Medicare:  Abdominal Aortic Aneurysm (AAA) Screening: covered once if your at risk. You're considered to be at risk if you have a family history of AAA.  Lung Cancer Screening: covers low dose CT scan once per year if you meet all of the following conditions: (1) Age 55-77; (2) No signs or symptoms of lung cancer; (3) Current smoker or have quit smoking within the last 15 years; (4) You have a tobacco smoking history of at least 20 pack years (packs per day multiplied by number of years you smoked); (5) You get a written order from a healthcare provider.  Glaucoma Screening: covered annually if you're considered high risk: (1) You have diabetes OR (2) Family history of glaucoma OR (3)  aged 50 and older OR (4)  American aged 65 and older  Osteoporosis Screening: covered every 2 years if you meet one of the following conditions: (1) You're estrogen deficient and at risk for osteoporosis based off medical history and other findings; (2) Have a vertebral abnormality; (3) On glucocorticoid therapy for more than 3 months; (4) Have primary hyperparathyroidism; (5) On osteoporosis medications and need to assess response to drug therapy.   Last bone density test (DXA Scan): Not on file.  HIV Screening: covered annually if you're between the age of 15-65. Also covered annually if you are younger than 15 and older than 65 with risk factors for HIV infection. For pregnant patients, it is covered up to 3 times per  pregnancy.    Immunizations:  Immunization Recommendations   Influenza Vaccine Annual influenza vaccination during flu season is recommended for all persons aged >= 6 months who do not have contraindications   Pneumococcal Vaccine   * Pneumococcal conjugate vaccine = PCV13 (Prevnar 13), PCV15 (Vaxneuvance), PCV20 (Prevnar 20)  * Pneumococcal polysaccharide vaccine = PPSV23 (Pneumovax) Adults 19-63 yo with certain risk factors or if 65+ yo  If never received any pneumonia vaccine: recommend Prevnar 20 (PCV20)  Give PCV20 if previously received 1 dose of PCV13 or PPSV23   Hepatitis B Vaccine 3 dose series if at intermediate or high risk (ex: diabetes, end stage renal disease, liver disease)   Respiratory syncytial virus (RSV) Vaccine - COVERED BY MEDICARE PART D  * RSVPreF3 (Arexvy) CDC recommends that adults 60 years of age and older may receive a single dose of RSV vaccine using shared clinical decision-making (SCDM)   Tetanus (Td) Vaccine - COST NOT COVERED BY MEDICARE PART B Following completion of primary series, a booster dose should be given every 10 years to maintain immunity against tetanus. Td may also be given as tetanus wound prophylaxis.   Tdap Vaccine - COST NOT COVERED BY MEDICARE PART B Recommended at least once for all adults. For pregnant patients, recommended with each pregnancy.   Shingles Vaccine (Shingrix) - COST NOT COVERED BY MEDICARE PART B  2 shot series recommended in those 19 years and older who have or will have weakened immune systems or those 50 years and older     Health Maintenance Due:      Topic Date Due   • DXA SCAN  Never done     Immunizations Due:      Topic Date Due   • Pneumococcal Vaccine: 65+ Years (1 of 1 - PCV) Never done   • Influenza Vaccine (1) Never done   • COVID-19 Vaccine (3 - 2023-24 season) 09/01/2024     Advance Directives   What are advance directives?  Advance directives are legal documents that state your wishes and plans for medical care. These plans are made  ahead of time in case you lose your ability to make decisions for yourself. Advance directives can apply to any medical decision, such as the treatments you want, and if you want to donate organs.   What are the types of advance directives?  There are many types of advance directives, and each state has rules about how to use them. You may choose a combination of any of the following:  Living will:  This is a written record of the treatment you want. You can also choose which treatments you do not want, which to limit, and which to stop at a certain time. This includes surgery, medicine, IV fluid, and tube feedings.   Durable power of  for healthcare (DPAHC):  This is a written record that states who you want to make healthcare choices for you when you are unable to make them for yourself. This person, called a proxy, is usually a family member or a friend. You may choose more than 1 proxy.  Do not resuscitate (DNR) order:  A DNR order is used in case your heart stops beating or you stop breathing. It is a request not to have certain forms of treatment, such as CPR. A DNR order may be included in other types of advance directives.  Medical directive:  This covers the care that you want if you are in a coma, near death, or unable to make decisions for yourself. You can list the treatments you want for each condition. Treatment may include pain medicine, surgery, blood transfusions, dialysis, IV or tube feedings, and a ventilator (breathing machine).  Values history:  This document has questions about your views, beliefs, and how you feel and think about life. This information can help others choose the care that you would choose.  Why are advance directives important?  An advance directive helps you control your care. Although spoken wishes may be used, it is better to have your wishes written down. Spoken wishes can be misunderstood, or not followed. Treatments may be given even if you do not want them. An  advance directive may make it easier for your family to make difficult choices about your care.   Weight Management   Why it is important to manage your weight:  Being overweight increases your risk of health conditions such as heart disease, high blood pressure, type 2 diabetes, and certain types of cancer. It can also increase your risk for osteoarthritis, sleep apnea, and other respiratory problems. Aim for a slow, steady weight loss. Even a small amount of weight loss can lower your risk of health problems.  How to lose weight safely:  A safe and healthy way to lose weight is to eat fewer calories and get regular exercise. You can lose up about 1 pound a week by decreasing the number of calories you eat by 500 calories each day.   Healthy meal plan for weight management:  A healthy meal plan includes a variety of foods, contains fewer calories, and helps you stay healthy. A healthy meal plan includes the following:  Eat whole-grain foods more often.  A healthy meal plan should contain fiber. Fiber is the part of grains, fruits, and vegetables that is not broken down by your body. Whole-grain foods are healthy and provide extra fiber in your diet. Some examples of whole-grain foods are whole-wheat breads and pastas, oatmeal, brown rice, and bulgur.  Eat a variety of vegetables every day.  Include dark, leafy greens such as spinach, kale, bharti greens, and mustard greens. Eat yellow and orange vegetables such as carrots, sweet potatoes, and winter squash.   Eat a variety of fruits every day.  Choose fresh or canned fruit (canned in its own juice or light syrup) instead of juice. Fruit juice has very little or no fiber.  Eat low-fat dairy foods.  Drink fat-free (skim) milk or 1% milk. Eat fat-free yogurt and low-fat cottage cheese. Try low-fat cheeses such as mozzarella and other reduced-fat cheeses.  Choose meat and other protein foods that are low in fat.  Choose beans or other legumes such as split peas or  lentils. Choose fish, skinless poultry (chicken or turkey), or lean cuts of red meat (beef or pork). Before you cook meat or poultry, cut off any visible fat.   Use less fat and oil.  Try baking foods instead of frying them. Add less fat, such as margarine, sour cream, regular salad dressing and mayonnaise to foods. Eat fewer high-fat foods. Some examples of high-fat foods include french fries, doughnuts, ice cream, and cakes.  Eat fewer sweets.  Limit foods and drinks that are high in sugar. This includes candy, cookies, regular soda, and sweetened drinks.  Exercise:  Exercise at least 30 minutes per day on most days of the week. Some examples of exercise include walking, biking, dancing, and swimming. You can also fit in more physical activity by taking the stairs instead of the elevator or parking farther away from stores. Ask your healthcare provider about the best exercise plan for you.      © Copyright [employer] 2018 Information is for End User's use only and may not be sold, redistributed or otherwise used for commercial purposes. All illustrations and images included in CareNotes® are the copyrighted property of [employer] or [employer]       Implemented All Fall with Harm Risk Interventions:  [place] to call system. Call bell, personal items and telephone within reach. Instruct patient to call for assistance. Room bathroom lighting operational. Non-slip footwear when patient is off stretcher. Physically safe environment: no spills, clutter or unnecessary equipment. Stretcher in lowest position, wheels locked, appropriate side rails in place. Provide visual cue, wrist band, yellow gown, etc. Monitor gait and stability. Monitor for mental status changes and reorient to person, place, and time. Review medications for side effects contributing to fall risk. Reinforce activity limits and safety measures with patient and family. Provide visual clues: red socks.

## 2024-12-10 NOTE — HISTORY OF PRESENT ILLNESS
Addressed today  
FYI
PA for Zepbound 15 MG/0.5 ML DENIED    Reason:(Screenshot if applicable)        Message sent to office clinical pool Yes    Denial letter scanned into Media Yes    Appeal started No (Provider will need to decide if appeal is warranted and send clinical documentation to Prior Authorization Team for initiation.)    **Please follow up with your patient regarding denial and next steps**  
PA for Zepbound 15 MG/0.5 ML SUBMITTED to [employer]    via    [x]CMM-KEY: [id number]  []Surescripts-Case ID #   []Availity-Auth ID # NDC #   []Faxed to plan   []Other website   []Phone call Case ID #     [x]PA sent as URGENT    All office notes, labs and other pertaining documents and studies sent. Clinical questions answered. Awaiting determination from insurance company.     Turnaround time for your insurance to make a decision on your Prior Authorization can take 7-21 business days.               
Patient called the RX Refill Line. Message is being forwarded to the office.     Patient called and states taht she spoke with the insurance and they said that juan antonio needs to sent documentation the patient has lost and maintained 7.5% of weight and has two of the conditions stated. Stated that she was due for her injection last week and missed it due to the denial of medication. Would like that sent to [facility]    Please contact patient at [phone] when it is sent to the pharmacy or any issues.  
[FreeTextEntry1] : To review, Sabi is now 89 years old and well-known to me.  She has a history of paroxysmal atrial fibrillation but has reverted to normal sinus rhythm and remains in normal sinus rhythm with a sinus bradycardia.  She has been on anticoagulation for several years with Xarelto which she has tolerated.  She is also on aspirin which was given to her after a presumed CVA\par \par Several years ago she had an episode of confusion initially felt to be a possible TIA but subsequently felt possibly to be a seizure disorder.  She has been on seizure medications without any recurrence of these episodes and has remained on those medications including the Xarelto and aspirin\par \par She does have chronic shortness of breath which may recently have exacerbated\par Last echocardiogram 2019 revealed overall preserved LV function with moderate mitral regurgitation and mild pulmonary hypertension.\par \par \par She remains quite mentally intact, uses a walker but finds a difficulty walking with fatigue and discomfort in her legs\par \par Last visit, she had some degree of a cold and a cough with sputum production and felt increased shortness of breath.  She had no chest pain.  She took Spiriva with improvement and presently has no significant shortness of breath other than her chronic shortness of breath and fatigue when she is walking with a walker.  According to her daughter she has decreased her walking activity.  She continues to be mentally intact.  Presently she has no shortness of breath no wheezing no cough and no sputum production.  No time did she have fever\par \par She received the COVID-19 Moderna vaccine\par \par She continues to experience  exertional shortness of breath and fatigue no chest pain or palpitations.  She has had the symptoms for some time but this is her major complaint.  She denies palpitations dizziness or syncope\par \par She recently had something fall on her right leg and developed some increasing edema and some weeping.  It is slowly getting better and Lasix 20 mg was prescribed an increased to total of 60 mg a day\par \par During the exam she suddenly had an irregularly irregular heart rate probably compatible with A. fib\par \par Overall at age 89 she has been functioning fairly well.  She tries to keep as active as possible and mentally she remains quite sharp\par \par Recent blood test October 2021  LDL 66 triglycerides 77 HDL 78 creatinine 0.94 potassium 4.1 GFR 51 and hemoglobin A1c 5.9\par \par Since the last visit, she continues with shortness of breath which seems to have increased in severity with having to stop after a few feet and sit down with her walker.  She denies significant increase in edema of her lower extremities.  She feels that Spiriva and Trelegy do help.  She has no chest pain.  Occasionally she does have palpitations and feels that she is in atrial fibrillation\par \par On her last visit her EKG did reveal atrial fibrillation with a controlled ventricular response\par Her last echo suggested moderate to severe MR normal LV function and pulmonary hypertension.  This is probably mostly compatible with diastolic dysfunction\par \par

## 2025-02-20 NOTE — ED PROVIDER NOTE - HIV OFFER
Patient returning call. States she saw the MyC message already. Assisted in scheduling appointment for redraw. No further questions at this time.    Opt out

## 2025-04-08 NOTE — PHYSICAL THERAPY INITIAL EVALUATION ADULT - WEIGHT-BEARING RESTRICTIONS: SIT/STAND, REHAB EVAL
Physical Therapy   Date: 4/8/2025  Patient did not attend nor call to cancel  today's (4/8/2025) scheduled appointment.       Spoke with patient about missing today's appointment.  Zhou was transferred up to  to reschedule with Clinical .   full weight-bearing 1.87

## 2025-07-07 NOTE — PROGRESS NOTE ADULT - PROBLEM SELECTOR PLAN 2
H&P reviewed. The patient was examined and there are no changes to the H&P.   Presents for generalized weakness, also found to be hypotensive. Patient reportedly with history of hypotension, though normally to 90s. Patient reports no syncope, palpitations or vertigo. EKG with rate controlled AF.   - resume lasix 20mg bid   - continue to hold ACEi given hypotension/marginal BP  - I&O, daily weights  - Reviewed echo from Aug 2022: Norm LVSF, decreased RVSF, Severe TR & Pulm HTN  - f/u TTE though if clinically continues to do well, can defer to outpatient  - Her Cardiologist Dr. Cm following, recs appreciated